# Patient Record
Sex: FEMALE | Race: WHITE | NOT HISPANIC OR LATINO | Employment: OTHER | URBAN - METROPOLITAN AREA
[De-identification: names, ages, dates, MRNs, and addresses within clinical notes are randomized per-mention and may not be internally consistent; named-entity substitution may affect disease eponyms.]

---

## 2017-01-05 ENCOUNTER — TELEPHONE (OUTPATIENT)
Dept: VASCULAR SURGERY | Facility: CLINIC | Age: 67
End: 2017-01-05

## 2017-01-05 NOTE — TELEPHONE ENCOUNTER
----- Message from Tara Daugherty sent at 1/4/2017 11:35 AM CST -----  Contact: kAua silverman/ [facility]   [facility]ua silverman/ [facility]  Calling in regards to speaking with a Nurse about the patient. Please advise. Call to pod. No answer.   Call back Akua .   Thanks!

## 2017-01-09 DIAGNOSIS — Z95.1 S/P CABG (CORONARY ARTERY BYPASS GRAFT): ICD-10-CM

## 2017-01-09 DIAGNOSIS — I25.10 CORONARY ARTERY DISEASE INVOLVING NATIVE CORONARY ARTERY OF NATIVE HEART WITHOUT ANGINA PECTORIS: ICD-10-CM

## 2017-01-10 RX ORDER — ISOSORBIDE MONONITRATE 30 MG/1
TABLET, EXTENDED RELEASE ORAL
Qty: 60 TABLET | Refills: 11 | Status: SHIPPED | OUTPATIENT
Start: 2017-01-10 | End: 2017-12-15 | Stop reason: SDUPTHER

## 2017-01-20 DIAGNOSIS — G62.9 NEUROPATHY: ICD-10-CM

## 2017-01-20 DIAGNOSIS — I25.10 CORONARY ARTERY DISEASE INVOLVING NATIVE CORONARY ARTERY OF NATIVE HEART WITHOUT ANGINA PECTORIS: ICD-10-CM

## 2017-01-20 DIAGNOSIS — I73.9 PVD (PERIPHERAL VASCULAR DISEASE): ICD-10-CM

## 2017-01-23 RX ORDER — GABAPENTIN 400 MG/1
400 CAPSULE ORAL 3 TIMES DAILY
Qty: 90 CAPSULE | Refills: 0 | Status: SHIPPED | OUTPATIENT
Start: 2017-01-23 | End: 2017-02-20 | Stop reason: SDUPTHER

## 2017-02-20 DIAGNOSIS — I25.10 CORONARY ARTERY DISEASE INVOLVING NATIVE CORONARY ARTERY OF NATIVE HEART WITHOUT ANGINA PECTORIS: ICD-10-CM

## 2017-02-20 DIAGNOSIS — M48.061 LUMBAR STENOSIS: Primary | ICD-10-CM

## 2017-02-20 DIAGNOSIS — G62.9 NEUROPATHY: ICD-10-CM

## 2017-02-20 DIAGNOSIS — I73.9 PVD (PERIPHERAL VASCULAR DISEASE): ICD-10-CM

## 2017-02-21 RX ORDER — GABAPENTIN 400 MG/1
400 CAPSULE ORAL 3 TIMES DAILY
Qty: 90 CAPSULE | Refills: 0 | Status: SHIPPED | OUTPATIENT
Start: 2017-02-21 | End: 2017-06-14 | Stop reason: SDUPTHER

## 2017-02-24 ENCOUNTER — TELEPHONE (OUTPATIENT)
Dept: CARDIOLOGY | Facility: CLINIC | Age: 67
End: 2017-02-24

## 2017-02-24 NOTE — TELEPHONE ENCOUNTER
Spoke with patient about appointment with Celia.She mentioned she is applying for [employer] benefits and will be needing information.I asked patient to contact office when she finds out what is needed.

## 2017-03-03 ENCOUNTER — TELEPHONE (OUTPATIENT)
Dept: CARDIOLOGY | Facility: CLINIC | Age: 67
End: 2017-03-03

## 2017-03-03 NOTE — TELEPHONE ENCOUNTER
Pt attempting to get approved for hospice care//primary care would not approve and stated to speak with cardio since the issue is heart related.  I explained to rayne that I would pass message to Dr Kapoor for review.

## 2017-03-03 NOTE — TELEPHONE ENCOUNTER
----- Message from Marilee Jason sent at 3/3/2017 12:49 PM CST -----  Contact: Heather, [facility]  Ms Heather would like to speak to nurse about starting hospice services, please call her back at [phone]. Thank you

## 2017-03-08 DIAGNOSIS — I10 ESSENTIAL HYPERTENSION: ICD-10-CM

## 2017-03-08 DIAGNOSIS — I25.10 CORONARY ARTERY DISEASE INVOLVING NATIVE CORONARY ARTERY OF NATIVE HEART WITHOUT ANGINA PECTORIS: ICD-10-CM

## 2017-03-08 DIAGNOSIS — I73.9 PVD (PERIPHERAL VASCULAR DISEASE): ICD-10-CM

## 2017-03-08 RX ORDER — CARVEDILOL 12.5 MG/1
12.5 TABLET ORAL 2 TIMES DAILY WITH MEALS
Qty: 60 TABLET | Refills: 11 | Status: SHIPPED | OUTPATIENT
Start: 2017-03-08 | End: 2017-12-21 | Stop reason: SDUPTHER

## 2017-03-08 NOTE — PROGRESS NOTES
Ms. Wright stated that she is out of her Coreg and has been for a few days. Refilled Coreg and sent Rx to [facility] in [place].

## 2017-03-17 DIAGNOSIS — Z95.0 PACEMAKER: Primary | ICD-10-CM

## 2017-03-17 DIAGNOSIS — I25.10 CORONARY ARTERY DISEASE INVOLVING NATIVE CORONARY ARTERY OF NATIVE HEART WITHOUT ANGINA PECTORIS: ICD-10-CM

## 2017-03-20 ENCOUNTER — TELEPHONE (OUTPATIENT)
Dept: CARDIOLOGY | Facility: CLINIC | Age: 67
End: 2017-03-20

## 2017-03-20 NOTE — TELEPHONE ENCOUNTER
Dr Bo had asked for clearance to hold anticoagulants for colonoscopy to be scheduled.Informed them patient had NSTEMI w 3 MARY placed 8/2016 and can not hold medication for one year unless emergency is needed.Reviewed with Celia Whaley NP.Faxed information over to dr Bo.

## 2017-04-07 DIAGNOSIS — R06.02 SOB (SHORTNESS OF BREATH): ICD-10-CM

## 2017-04-10 RX ORDER — ALBUTEROL SULFATE 90 UG/1
AEROSOL, METERED RESPIRATORY (INHALATION)
Qty: 18 G | Refills: 1 | Status: SHIPPED | OUTPATIENT
Start: 2017-04-10 | End: 2019-02-20 | Stop reason: CLARIF

## 2017-04-19 ENCOUNTER — OFFICE VISIT (OUTPATIENT)
Dept: CARDIOLOGY | Facility: CLINIC | Age: 67
End: 2017-04-19
Payer: MEDICARE

## 2017-04-19 VITALS
HEIGHT: 64 IN | HEART RATE: 67 BPM | SYSTOLIC BLOOD PRESSURE: 169 MMHG | BODY MASS INDEX: 39.27 KG/M2 | DIASTOLIC BLOOD PRESSURE: 69 MMHG | WEIGHT: 230 LBS

## 2017-04-19 DIAGNOSIS — I48.0 PAROXYSMAL ATRIAL FIBRILLATION: ICD-10-CM

## 2017-04-19 DIAGNOSIS — I73.9 PVD (PERIPHERAL VASCULAR DISEASE): ICD-10-CM

## 2017-04-19 DIAGNOSIS — Z09 HOSPITAL DISCHARGE FOLLOW-UP: Primary | ICD-10-CM

## 2017-04-19 DIAGNOSIS — I25.10 CORONARY ARTERY DISEASE INVOLVING NATIVE CORONARY ARTERY OF NATIVE HEART WITHOUT ANGINA PECTORIS: ICD-10-CM

## 2017-04-19 DIAGNOSIS — I26.99 OTHER PULMONARY EMBOLISM WITHOUT ACUTE COR PULMONALE, UNSPECIFIED CHRONICITY: ICD-10-CM

## 2017-04-19 DIAGNOSIS — I49.5 SSS (SICK SINUS SYNDROME): ICD-10-CM

## 2017-04-19 DIAGNOSIS — Z95.0 PACEMAKER: ICD-10-CM

## 2017-04-19 DIAGNOSIS — E78.5 HYPERLIPIDEMIA, UNSPECIFIED HYPERLIPIDEMIA TYPE: ICD-10-CM

## 2017-04-19 DIAGNOSIS — R09.81 SINUS CONGESTION: ICD-10-CM

## 2017-04-19 PROCEDURE — 99213 OFFICE O/P EST LOW 20 MIN: CPT | Mod: PBBFAC,PO | Performed by: NURSE PRACTITIONER

## 2017-04-19 PROCEDURE — 99999 PR PBB SHADOW E&M-EST. PATIENT-LVL III: CPT | Mod: PBBFAC,,, | Performed by: NURSE PRACTITIONER

## 2017-04-19 PROCEDURE — 99213 OFFICE O/P EST LOW 20 MIN: CPT | Mod: S$PBB,,, | Performed by: NURSE PRACTITIONER

## 2017-04-19 RX ORDER — FLUTICASONE PROPIONATE 50 MCG
1 SPRAY, SUSPENSION (ML) NASAL DAILY
Qty: 1 BOTTLE | Refills: 0 | Status: SHIPPED | OUTPATIENT
Start: 2017-04-19 | End: 2017-05-17 | Stop reason: SDUPTHER

## 2017-04-19 NOTE — MR AVS SNAPSHOT
[facility]  [address]  [place] [address]  Phone: [phone]  Fax: [phone]                  Kalpana Wright   2017 9:00 AM   Office Visit    Description:  Female : 1950   Provider:  Celia Whaley NP   Department:  [facility]           Reason for Visit     Hospital Follow Up           Diagnoses this Visit        Comments    Sinus congestion    -  Primary            To Do List           Future Appointments        Provider Department Dept Phone    2017 10:15 AM PACEMAKER [facility] [phone]    2017 8:20 AM Celia Whaley NP [facility] [phone]      Goals (5 Years of Data)     None      Follow-Up and Disposition     Return in about 3 weeks (around 5/10/2017).       These Medications        Disp Refills Start End    fluticasone (FLONASE) 50 mcg/actuation nasal spray 1 Bottle 0 2017     1 spray by Each Nare route once daily. - Each Nare    Pharmacy: [id number] - [place] - [address] #: [phone]         [facility]     [facility] Nurse Care Line -  Assistance  Unless otherwise directed by your provider, please contact [facility], our nurse care line that is available for  assistance.     Registered nurses in the [facility] provide: appointment scheduling, clinical advisement, health education, and other advisory services.  Call: [phone] (toll free)               Medications           Message regarding Medications     Verify the changes and/or additions to your medication regime listed below are the same as discussed with your clinician today.  If any of these changes or additions are incorrect, please notify your healthcare provider.        START taking these NEW medications        Refills    fluticasone (FLONASE) 50 mcg/actuation nasal spray 0    Si spray by Each Nare route once daily.    Class: Normal    Route: Each Nare      STOP taking these medications      clopidogrel (PLAVIX) 75 mg tablet Take 1 tablet (75 mg total) by mouth once daily.           Verify that the below list of medications is an accurate representation of the medications you are currently taking.  If none reported, the list may be blank. If incorrect, please contact your healthcare provider. Carry this list with you in case of emergency.           Current Medications     aspirin (ECOTRIN) 81 MG EC tablet Take 81 mg by mouth every evening.    benazepril-hydrochlorthiazide (LOTENSIN HCT) 20-25 mg Tab 1 tablet once daily.     carvedilol (COREG) 12.5 MG tablet Take 1 tablet (12.5 mg total) by mouth 2 (two) times daily with meals.    ELIQUIS 5 mg Tab TAKE ONE TABLET BY MOUTH TWICE DAILY    furosemide (LASIX) 80 MG tablet Take 80 mg by mouth once daily.    gabapentin (NEURONTIN) 400 MG capsule Take 1 capsule (400 mg total) by mouth 3 (three) times daily.    hydrocodone-acetaminophen 10-325mg (NORCO)  mg Tab Take 1 tablet by mouth 2 (two) times daily as needed.    isosorbide mononitrate (IMDUR) 30 MG 24 hr tablet Take on tablet by mouth twice daily.    levothyroxine (SYNTHROID) 50 MCG tablet Take 1 tablet (50 mcg total) by mouth once daily.    nitroGLYCERIN (NITROSTAT) 0.4 MG SL tablet Place 0.4 mg under the tongue every 5 (five) minutes as needed for Chest pain.    potassium chloride (MICRO-K) 10 MEQ CpSR Take 1 capsule (10 mEq total) by mouth 2 (two) times daily.    ranolazine (RANEXA) 1,000 mg Tb12 Take 1 tablet (1,000 mg total) by mouth 2 (two) times daily.    ropinirole (REQUIP) 5 MG tablet Take by mouth. 1 Tablet Oral Every evening    torsemide (DEMADEX) 10 MG Tab Take 20 mg by mouth once daily.    VENTOLIN HFA 90 mcg/actuation inhaler INHALE ONE PUFF BY MOUTH EVERY FOUR HOURS AS NEEDED    fluticasone (FLONASE) 50 mcg/actuation nasal spray 1 spray by Each Nare route once daily.           Clinical Reference Information           Your Vitals Were     BP Pulse Height Weight Last Period BMI    169/69  "(BP Location: Left arm, Patient Position: Sitting, BP Method: Automatic) 67 5' 4" (1.626 m) 104.3 kg (230 lb) (LMP Unknown) 39.48 kg/m2      Blood Pressure          Most Recent Value    BP  (!)  169/69      Allergies as of 4/19/2017     Atorvastatin    Demerol [Meperidine]    Penicillins    Zithromax [Azithromycin]    Pravastatin    Ativan [Lorazepam]      Immunizations Administered on Date of Encounter - 4/19/2017     None      [facility] Sign-Up     Activating your [facility] account is as easy as 1-2-3!     1) Visit [email or web address], select Sign Up Now, enter this activation code and your date of birth, then select Next.  [email or web address]  Expires: 6/3/2017 10:05 AM      2) Create a username and password to use when you visit [facility] in the future and select a security question in case you lose your password and select Next.    3) Enter your e-mail address and click Sign Up!    Additional Information  If you have questions, please e-mail [email or web address] or call [phone] to talk to our [facility] staff. Remember, [facility] is NOT to be used for urgent needs. For medical emergencies, dial 911.         Language Assistance Services     ATTENTION: Language assistance services are available, free of charge. Please call [phone].      ATENCIÓN: Si habla español, tiene a lujan disposición servicios gratuitos de asistencia lingüística. Llame al [phone].     CHÚ Ý: N?u b?n nói Ti?ng Vi?t, có các d?ch v? h? tr? ngôn ng? mi?n phí dành cho b?n. G?i s? [phone].         [facility] complies with applicable Federal civil rights laws and does not discriminate on the basis of race, color, national origin, age, disability, or sex.        "

## 2017-04-19 NOTE — PROGRESS NOTES
Subjective:    Patient ID:  Kalpana Wright is a 67 y.o. female who presents for follow-up of Hospital Follow Up      HPI: Ms. Kalpana Wright presents to the clinic for follow up after discharge from [facility]. she stated that she recently had a cholecystectomy and shortly after discharge, she became very ill and was diagnosed with C diff. She stated that she was in the [facility] for about 5 weeks. She said that she was given Cipro, Flagyl, vancomycin, and lasix. She stated that she doesn't recall having any problems with her heart, but she does remember seeing Dr. Sullivan briefly. She stated that she went to [facility] and was discharged to home from there about one week ago. She has [facility] with physical therapy. She also said that she was taken off of plavix, but she doesn't know why. Last coronary stents (SVG to OM2) - August 22, 2016.    Review of the record from [place]: She had lap padmaja on March 21, 2017 and was discharged to home. On March 24, she went to NO ER for abdominal pain and some SOB. They felt that she might have some mild CHF, so she was given lasix. Cardiac enzymes were negative. CT of chest revealed no PE. Cardiology consult indicated that she appeared stable from cardiac standpoint and recommended stopping lasix due to bump in creatinine and an echo was ordered. It appears as though plavix was not continued through this hospitalization, but no one's note indicated purposefully discontinuing it. It also appears that she had microcytic anemia leading to blood transfusions (X3) on April 4, 2017.    PMH: CAD, Hx. CABG and PCIs, severe PAD, h/o PE, s/p PPM and lower back pain.    Venous U/S of LE 12/29/16: No evidence of Left lower extremity DVT.    Patient is in a lot of leg pain, unable to fully compress left distal SFV due to obesity and pain.   CONCLUSIONS: Technically difficult study.     Pharm [place] stress test 11/16/2016: Impression: ABNORMAL MYOCARDIAL  PERFUSION  1. There is a moderate to large size fixed defect of moderate to severe intensity that extends from the base to the apical inferior wall of the left ventricle, consistent with myocardial injury. There is trivial rojelio-injury ischemia.   2. Resting wall motion is physiologic.   3. Resting LV function is normal.   4. The ventricular volumes are normal at rest and stress.   5. The extracardiac distribution of radioactivity is normal.     Echo 11/16/16:CONCLUSIONS     1 - Concentric hypertrophy.     2 - Normal left ventricular systolic function (EF 55-60%).     3 - Normal left ventricular diastolic function.     4 - Normal right ventricular systolic function .     5 - The estimated PA systolic pressure is 22 mmHg.      Echo at [place] 3/27/17: Interpretation Summary  Definity contrast used for enhancement of LV function.  Technically difficult, suboptimal study  Ejection Fraction = 55-60%.  A variety of Doppler measurements indicate impaired left ventricular relaxation, which is associated with grade I/IV or mild diastolic dysfunction.  There is trace to mild mitral regurgitation. Right ventricular systolic pressure is elevated at 40-50mmHg.     Medications: she is off plavix. She is still taking ASA and Eliquis.  Sodium: she does not add salt to foods,  she is not  reading labels for sodium content. She is consuming reduced sodium V8, which still has a lot of sodium.  Diet: Stated that she is only able to consume jello and lower sodium V8 juice.  Exercise: she is working with PT twice a week at home. [facility]  Tobacco:Former smoker. no alcohol use     Weight: 104.3 kg (230 lb) she states that her weight decreased by 35 pounds due to illness.  Wt Readings from Last 3 Encounters:   04/19/17 104.3 kg (230 lb)   12/28/16 121 kg (266 lb 12.1 oz)   12/20/16 117.5 kg (259 lb)     BP log: None.      Review of Systems   Constitution: Positive for decreased appetite and weight loss. Negative for  chills, fever, night sweats and weight gain.   HENT: Negative for congestion.    Cardiovascular: Positive for dyspnea on exertion and leg swelling (Left leg with chronic mild swelling.). Negative for chest pain, claudication, cyanosis, irregular heartbeat, near-syncope, orthopnea, palpitations, paroxysmal nocturnal dyspnea and syncope.   Respiratory: Negative for cough, hemoptysis, shortness of breath, sputum production and wheezing.    Hematologic/Lymphatic: Negative for adenopathy and bleeding problem. Does not bruise/bleed easily.   Skin: Negative for color change and nail changes.   Gastrointestinal: Negative for bloating, abdominal pain, change in bowel habit, heartburn, hematochezia, melena, nausea and vomiting.   Genitourinary: Negative for hematuria.   Neurological: Negative for dizziness and light-headedness.   Psychiatric/Behavioral: Negative for altered mental status.       Objective:   Physical Exam   Constitutional: She is oriented to person, place, and time. She appears well-developed and well-nourished. No distress.   HENT:   Head: Normocephalic and atraumatic.   Eyes: Conjunctivae are normal. No scleral icterus.   Neck: Neck supple. No JVD present. No tracheal deviation present. No thyromegaly present.   Cardiovascular: Normal rate, regular rhythm, normal heart sounds and intact distal pulses.  Exam reveals no gallop and no friction rub.    No murmur heard.  Pulmonary/Chest: Effort normal and breath sounds normal. No respiratory distress. She has no wheezes. She has no rales. She exhibits no tenderness.   Abdominal: Soft. Bowel sounds are normal. She exhibits no distension and no mass. There is no tenderness. There is no rebound and no guarding.   Abdomen obese.   Musculoskeletal: Normal range of motion. She exhibits edema (Mild, nonpitting edema to left lower leg; chronic.).   Lymphadenopathy:     She has no cervical adenopathy.   Neurological: She is alert and oriented to person, place, and time.    Skin: Skin is warm and dry. No rash noted. She is not diaphoretic. No erythema. No pallor.   [place]   Psychiatric: She has a normal mood and affect.        Assessment:      1. Hospital discharge follow-up    2. Sinus congestion    3. Coronary artery disease involving native coronary artery of native heart without angina pectoris    4. PVD (peripheral vascular disease)    5. Paroxysmal atrial fibrillation    6. History of SSS (sick sinus syndrome)    7. Pacemaker    8. Other pulmonary embolism without acute cor pulmonale, unspecified chronicity    9. Hyperlipidemia, unspecified hyperlipidemia type    10. BMI 39.0-39.9,adult        Plan:     CBC via home health.If stable, will restart plavix.  PPM interrogation.  Sodium restriction discussed.  Continue Home Health and PT.  Continue current medications as directed.  Follow up in 3 weeks or call sooner for any problems.

## 2017-05-16 ENCOUNTER — TELEPHONE (OUTPATIENT)
Dept: CARDIOLOGY | Facility: CLINIC | Age: 67
End: 2017-05-16

## 2017-05-16 NOTE — TELEPHONE ENCOUNTER
The patient called and cx her PPM appointment she just got out the hospital.Appointment rescheduled.

## 2017-05-17 DIAGNOSIS — R09.81 SINUS CONGESTION: ICD-10-CM

## 2017-05-17 RX ORDER — FLUTICASONE PROPIONATE 50 MCG
1 SPRAY, SUSPENSION (ML) NASAL DAILY
Qty: 1 BOTTLE | Refills: 0 | Status: SHIPPED | OUTPATIENT
Start: 2017-05-17 | End: 2017-12-21

## 2017-05-24 ENCOUNTER — TELEPHONE (OUTPATIENT)
Dept: CARDIOLOGY | Facility: CLINIC | Age: 67
End: 2017-05-24

## 2017-05-24 NOTE — TELEPHONE ENCOUNTER
Attempted to call [facility] and it was sent to voicemail.Left message to contact me at desk [phone].

## 2017-05-25 NOTE — TELEPHONE ENCOUNTER
Dr Kapoor contacted me and d/c Plavix. Noted that patient was not supposed to be on plavix but she stated she was told to restart.Also noted Plavix removed at previous appointment.Instructed patient to stop Plavix.She verbalized understanding.She will come in for 5/30 for PPM check and instructed to call if any more problems.[facility] notified.

## 2017-05-25 NOTE — TELEPHONE ENCOUNTER
Kimberly with the [facility] at [phone] contacted me and reported the patient was seen this week and stated she has been having nose bleeds and noticed also increased bruising on her arms.it seems any scratches on her skin bleed more than usual.  She was seen by Dr Morris.The patient is on Eliquis 5 mgm daily and they wanted to report this to see what recommendations.I contacted patient and she stated as above and still taking Eliquis,Plavix and ASA daily.She has taken doses today.I instructed patient to be careful today because she can bleed easily.I will contact Dr Kapoor and call her back with instructions.her medication will need to be changed.She verbalized understanding.

## 2017-05-30 ENCOUNTER — CLINICAL SUPPORT (OUTPATIENT)
Dept: CARDIOLOGY | Facility: CLINIC | Age: 67
End: 2017-05-30
Payer: MEDICARE

## 2017-05-30 DIAGNOSIS — Z95.0 PACEMAKER: ICD-10-CM

## 2017-05-30 DIAGNOSIS — I25.10 CORONARY ARTERY DISEASE INVOLVING NATIVE CORONARY ARTERY OF NATIVE HEART WITHOUT ANGINA PECTORIS: ICD-10-CM

## 2017-05-30 PROCEDURE — 93280 PM DEVICE PROGR EVAL DUAL: CPT | Mod: PBBFAC,PO | Performed by: INTERNAL MEDICINE

## 2017-06-06 DIAGNOSIS — I25.10 CORONARY ARTERY DISEASE INVOLVING NATIVE CORONARY ARTERY OF NATIVE HEART WITHOUT ANGINA PECTORIS: Primary | ICD-10-CM

## 2017-06-06 DIAGNOSIS — R06.02 SOB (SHORTNESS OF BREATH): ICD-10-CM

## 2017-06-06 DIAGNOSIS — R06.02 SHORTNESS OF BREATH: ICD-10-CM

## 2017-06-06 RX ORDER — RANOLAZINE 1000 MG/1
1000 TABLET, EXTENDED RELEASE ORAL 2 TIMES DAILY
Qty: 60 TABLET | Refills: 5 | Status: SHIPPED | OUTPATIENT
Start: 2017-06-06 | End: 2017-11-20 | Stop reason: SDUPTHER

## 2017-06-08 ENCOUNTER — TELEPHONE (OUTPATIENT)
Dept: CARDIOLOGY | Facility: CLINIC | Age: 67
End: 2017-06-08

## 2017-06-08 NOTE — TELEPHONE ENCOUNTER
The patient had missed her appointment yesterday with Celia Whaley as HDC.I attempted to reach her at home and her cell and no answer.I was able to leave a message on her cell phone to please contact our office to reschedule.

## 2017-06-14 ENCOUNTER — OFFICE VISIT (OUTPATIENT)
Dept: CARDIOLOGY | Facility: CLINIC | Age: 67
End: 2017-06-14
Payer: MEDICARE

## 2017-06-14 VITALS
WEIGHT: 229.75 LBS | DIASTOLIC BLOOD PRESSURE: 60 MMHG | SYSTOLIC BLOOD PRESSURE: 124 MMHG | HEART RATE: 70 BPM | HEIGHT: 64 IN | BODY MASS INDEX: 39.22 KG/M2

## 2017-06-14 DIAGNOSIS — Z95.0 PACEMAKER: ICD-10-CM

## 2017-06-14 DIAGNOSIS — F17.200 SMOKER: ICD-10-CM

## 2017-06-14 DIAGNOSIS — I73.9 PVD (PERIPHERAL VASCULAR DISEASE): ICD-10-CM

## 2017-06-14 DIAGNOSIS — Z09 HOSPITAL DISCHARGE FOLLOW-UP: Primary | ICD-10-CM

## 2017-06-14 DIAGNOSIS — I10 ESSENTIAL HYPERTENSION: ICD-10-CM

## 2017-06-14 DIAGNOSIS — Z95.1 HX OF CABG: ICD-10-CM

## 2017-06-14 DIAGNOSIS — I49.5 SSS (SICK SINUS SYNDROME): ICD-10-CM

## 2017-06-14 DIAGNOSIS — I26.99 OTHER PULMONARY EMBOLISM WITHOUT ACUTE COR PULMONALE, UNSPECIFIED CHRONICITY: ICD-10-CM

## 2017-06-14 DIAGNOSIS — I25.10 CORONARY ARTERY DISEASE INVOLVING NATIVE CORONARY ARTERY OF NATIVE HEART WITHOUT ANGINA PECTORIS: ICD-10-CM

## 2017-06-14 DIAGNOSIS — I48.0 PAROXYSMAL ATRIAL FIBRILLATION: ICD-10-CM

## 2017-06-14 DIAGNOSIS — G62.9 NEUROPATHY: ICD-10-CM

## 2017-06-14 DIAGNOSIS — E78.5 HYPERLIPIDEMIA, UNSPECIFIED HYPERLIPIDEMIA TYPE: ICD-10-CM

## 2017-06-14 PROCEDURE — 99999 PR PBB SHADOW E&M-EST. PATIENT-LVL IV: CPT | Mod: PBBFAC,,, | Performed by: NURSE PRACTITIONER

## 2017-06-14 PROCEDURE — 1159F MED LIST DOCD IN RCRD: CPT | Mod: ,,, | Performed by: NURSE PRACTITIONER

## 2017-06-14 PROCEDURE — 99213 OFFICE O/P EST LOW 20 MIN: CPT | Mod: S$PBB,,, | Performed by: NURSE PRACTITIONER

## 2017-06-14 PROCEDURE — 99214 OFFICE O/P EST MOD 30 MIN: CPT | Mod: PBBFAC,PO | Performed by: NURSE PRACTITIONER

## 2017-06-14 RX ORDER — GABAPENTIN 400 MG/1
400 CAPSULE ORAL 3 TIMES DAILY
Qty: 90 CAPSULE | Refills: 0 | Status: SHIPPED | OUTPATIENT
Start: 2017-06-14 | End: 2017-12-21 | Stop reason: SDUPTHER

## 2017-06-14 RX ORDER — TORSEMIDE 20 MG/1
20 TABLET ORAL DAILY
COMMUNITY
End: 2017-12-21 | Stop reason: SDUPTHER

## 2017-06-14 NOTE — PROGRESS NOTES
Subjective:    Patient ID:  Kalpana Wright is a 67 y.o. female who presents for follow-up of Hospital Follow Up      HPI: Ms. Kalpana Wright presents to the clinic for follow up after recent hospital discharge. she stated that she was in hospital last week with black stools. She is trying to get EGD/colonoscopy.  Does complain of mild ROSA walking about 1/2 block. She has been fairly sedentary for the last 2 1/2 months-she has been in hospital most of that time for abdominal pain, C-Diff (twice), cholescystectomy. Her knees bother her more than anything. When they get tired, she has to sit.  It appears that her plavix had been stopped around May 25 due to epistaxis. She is maintained on ASA and Eliquis.    Medications: she is not missing any doses. She stated that she is taking a probiotic and she is feeling better.  Sodium: she does not add salt to foods,  she is not reading labels for sodium content.   Diet: She stated that she is finally eating healthier foods and her blood sugars have been good.  Exercise: she does not  Tobacco:Former smoker. no alcohol use    PPM interrogation 5/30/17:   Episodes  # of mode switches 6  % of time in mode switch <1%    High venticular rates  #of episodes 5 NSVT     Echo 11/16/16: CONCLUSIONS     1 - Concentric hypertrophy.     2 - Normal left ventricular systolic function (EF 55-60%).     3 - Normal left ventricular diastolic function.     4 - Normal right ventricular systolic function .     5 - The estimated PA systolic pressure is 22 mmHg.      Weight: 104.2 kg (229 lb 11.5 oz) she states that her daily weights has been stable  Wt Readings from Last 3 Encounters:   06/14/17 104.2 kg (229 lb 11.5 oz)   04/19/17 104.3 kg (230 lb)   12/28/16 121 kg (266 lb 12.1 oz)     BP log: None. She has [facility].    Review of Systems   Constitution: Positive for weight loss. Negative for chills, decreased appetite (improved), fever, night sweats and weight gain.   HENT: Negative for  congestion.    Cardiovascular: Positive for dyspnea on exertion (sometimes with walking.) and leg swelling (improved). Negative for chest pain, claudication, cyanosis, irregular heartbeat, near-syncope, orthopnea, palpitations, paroxysmal nocturnal dyspnea and syncope.   Respiratory: Negative for cough, hemoptysis, shortness of breath, sputum production and wheezing.    Hematologic/Lymphatic: Negative for adenopathy and bleeding problem. Bruises/bleeds easily.   Skin: Negative for color change and nail changes.   Gastrointestinal: Positive for melena. Negative for bloating, abdominal pain, change in bowel habit, diarrhea, heartburn, hematochezia, nausea and vomiting.   Genitourinary: Negative for hematuria.   Neurological: Negative for dizziness and light-headedness.   Psychiatric/Behavioral: Negative for altered mental status.       Objective:   Physical Exam   Constitutional: She is oriented to person, place, and time. She appears well-developed and well-nourished. No distress.   HENT:   Head: Normocephalic and atraumatic.   Eyes: Conjunctivae are normal. No scleral icterus.   Neck: Neck supple. No JVD present. No tracheal deviation present. No thyromegaly present.   Cardiovascular: Normal rate, regular rhythm, normal heart sounds and intact distal pulses.  Exam reveals no gallop and no friction rub.    No murmur heard.  Pulmonary/Chest: Effort normal and breath sounds normal. No respiratory distress. She has no wheezes. She has no rales. She exhibits no tenderness.   Abdominal: Soft. Bowel sounds are normal. She exhibits no distension and no mass. There is no tenderness. There is no rebound and no guarding.   Abdomen obese.   Musculoskeletal: Normal range of motion. She exhibits edema (Trace pretibial edema to lower legs bilaterally.).   Lymphadenopathy:     She has no cervical adenopathy.   Neurological: She is alert and oriented to person, place, and time.   Skin: Skin is warm and dry. No rash noted. She is not  diaphoretic. No erythema. No pallor.   [place]   Psychiatric: She has a normal mood and affect.        Assessment:      1. Hospital discharge follow-up    2. Coronary artery disease involving native coronary artery of native heart without angina pectoris    3. Hx of CABG    4. History of SSS (sick sinus syndrome)    5. Paroxysmal atrial fibrillation    6. Pacemaker    7. Essential hypertension    8. Hyperlipidemia, unspecified hyperlipidemia type    9. History of smoking >40 pack years (quit 3 years ago)    10. BMI 39.0-39.9,adult    11. Other pulmonary embolism without acute cor pulmonale, unspecified chronicity        Plan:   Continue current medications as directed. Continue coreg at current dose for now.  Record from [facility].  BP log from [facility].  Sodium restriction.  Walking recommended daily.  Will see GI today regarding melena. Dr. Bo.  Follow up in three months or call sooner for any problems.

## 2017-06-19 ENCOUNTER — TELEPHONE (OUTPATIENT)
Dept: CARDIOLOGY | Facility: CLINIC | Age: 67
End: 2017-06-19

## 2017-06-19 NOTE — TELEPHONE ENCOUNTER
Patient called to see if we had paper work from gastro to sign and send back. I informed her that we did not at this time.  I called the office number to her -[phone] [facility]. And gave them our correct fax number and they say they will be faxing clearance form to us soon. Will inform patient.

## 2017-06-22 ENCOUNTER — TELEPHONE (OUTPATIENT)
Dept: CARDIOLOGY | Facility: CLINIC | Age: 67
End: 2017-06-22

## 2017-06-22 NOTE — TELEPHONE ENCOUNTER
Spoke with Hui at Dr Bright's office to clarify order to hold Eliquis 3 days prior EGD per CHICA Whaley NP/Louis Knox RN.

## 2017-07-17 RX ORDER — ALBUTEROL SULFATE 90 UG/1
AEROSOL, METERED RESPIRATORY (INHALATION)
Qty: 18 G | Refills: 0 | OUTPATIENT
Start: 2017-07-17

## 2017-09-20 ENCOUNTER — OFFICE VISIT (OUTPATIENT)
Dept: CARDIOLOGY | Facility: CLINIC | Age: 67
End: 2017-09-20
Payer: MEDICARE

## 2017-09-20 VITALS
SYSTOLIC BLOOD PRESSURE: 134 MMHG | WEIGHT: 222.56 LBS | DIASTOLIC BLOOD PRESSURE: 56 MMHG | HEART RATE: 65 BPM | HEIGHT: 64 IN | BODY MASS INDEX: 38 KG/M2

## 2017-09-20 DIAGNOSIS — I49.5 SSS (SICK SINUS SYNDROME): ICD-10-CM

## 2017-09-20 DIAGNOSIS — Z95.1 HX OF CABG: ICD-10-CM

## 2017-09-20 DIAGNOSIS — I10 ESSENTIAL HYPERTENSION: ICD-10-CM

## 2017-09-20 DIAGNOSIS — I73.9 PVD (PERIPHERAL VASCULAR DISEASE): ICD-10-CM

## 2017-09-20 DIAGNOSIS — Z95.0 PACEMAKER: ICD-10-CM

## 2017-09-20 DIAGNOSIS — K21.9 GASTROESOPHAGEAL REFLUX DISEASE, ESOPHAGITIS PRESENCE NOT SPECIFIED: Primary | ICD-10-CM

## 2017-09-20 DIAGNOSIS — I48.0 PAROXYSMAL ATRIAL FIBRILLATION: ICD-10-CM

## 2017-09-20 DIAGNOSIS — I25.10 CORONARY ARTERY DISEASE INVOLVING NATIVE CORONARY ARTERY OF NATIVE HEART WITHOUT ANGINA PECTORIS: ICD-10-CM

## 2017-09-20 PROBLEM — I50.32 CHRONIC CONGESTIVE HEART FAILURE WITH LEFT VENTRICULAR DIASTOLIC DYSFUNCTION: Chronic | Status: ACTIVE | Noted: 2017-09-20

## 2017-09-20 PROCEDURE — 99214 OFFICE O/P EST MOD 30 MIN: CPT | Mod: PBBFAC,PO | Performed by: NURSE PRACTITIONER

## 2017-09-20 PROCEDURE — 1159F MED LIST DOCD IN RCRD: CPT | Mod: ,,, | Performed by: NURSE PRACTITIONER

## 2017-09-20 PROCEDURE — 99999 PR PBB SHADOW E&M-EST. PATIENT-LVL IV: CPT | Mod: PBBFAC,,, | Performed by: NURSE PRACTITIONER

## 2017-09-20 PROCEDURE — 3075F SYST BP GE 130 - 139MM HG: CPT | Mod: ,,, | Performed by: NURSE PRACTITIONER

## 2017-09-20 PROCEDURE — 3078F DIAST BP <80 MM HG: CPT | Mod: ,,, | Performed by: NURSE PRACTITIONER

## 2017-09-20 PROCEDURE — 99213 OFFICE O/P EST LOW 20 MIN: CPT | Mod: S$PBB,,, | Performed by: NURSE PRACTITIONER

## 2017-09-20 RX ORDER — ESOMEPRAZOLE MAGNESIUM 40 MG/1
40 CAPSULE, DELAYED RELEASE ORAL
Qty: 30 CAPSULE | Refills: 11 | Status: SHIPPED | OUTPATIENT
Start: 2017-09-20 | End: 2019-12-30 | Stop reason: SDUPTHER

## 2017-09-20 RX ORDER — CYCLOBENZAPRINE HCL 10 MG
10 TABLET ORAL
COMMUNITY
End: 2017-12-21

## 2017-09-20 NOTE — PROGRESS NOTES
Subjective:    Patient ID:  Kalpana Wright is a 67 y.o. female who presents for follow-up of Follow-up      HPI: Ms. Kalpana Wright presents to the clinic for follow up of CAD, CABG, chronic diastolic HF, atrial fibrillation, SSS, PPM. she stated that she is under a lot of stress because her mother suddenly became disoriented, stopped eating, and says that she is ready to go home to be with her  . Her mother is in rehab and is expected to go to nursing home in the next day or so. Ms. Wright does not expect her mother to survive. She is tearful when she talks about this and quite distressed. She denied any chest pain, SOB at rest, but she does have mild, chronic ROSA with walking. She has pain in her knees and her low back that limit her walking. She has physical and occupational therapy, but she doesn't do much otherwise, due to pain.  Last PPM interrogation 17: 6 mode switches< 1% of the time; 5 episodes of NSVT. Interrogation due in 2017.    Medications: she is not missing any doses. She is having GERd symptoms and requesting nexium refill.  Sodium: she does not add salt to foods,  she is not reading labels for sodium content. She is eating salty foods.  Exercise: she has physical therapy twice week and occupational therapy is once weekly.  Tobacco:former smoker. no alcohol use     Weight: 101 kg (222 lb 8.9 oz) she states that her daily weights has decreased over last several months; after cholecystectomy, she does not crave sweets.  Wt Readings from Last 3 Encounters:   17 101 kg (222 lb 8.9 oz)   17 104.2 kg (229 lb 11.5 oz)   17 104.3 kg (230 lb)     BP log: None. Has not been checking it at home; she does not have a BP cuff. Blood sugars .      Review of Systems   Constitution: Positive for weight loss. Negative for chills, decreased appetite, fever, night sweats and weight gain.   HENT: Negative for congestion.    Cardiovascular: Positive for dyspnea on  exertion (chronic and unchanged from the past.). Negative for chest pain, claudication, cyanosis, irregular heartbeat, leg swelling, near-syncope, orthopnea, palpitations, paroxysmal nocturnal dyspnea and syncope.   Respiratory: Negative for cough, hemoptysis, shortness of breath, sputum production and wheezing.    Hematologic/Lymphatic: Negative for adenopathy and bleeding problem. Does not bruise/bleed easily.   Skin: Negative for color change and nail changes.   Musculoskeletal: Positive for back pain (low back pain; chronic; had injection.) and joint pain (Knees hurt, especially with walking.).   Gastrointestinal: Positive for heartburn (Needs nexium refill; has not had EGD/colonoscopy yet. Has to reschedule appointment.). Negative for bloating, abdominal pain, change in bowel habit, hematochezia, melena, nausea and vomiting.   Genitourinary: Negative for hematuria.   Neurological: Negative for dizziness and light-headedness.   Psychiatric/Behavioral: Negative for altered mental status.       Objective:   Physical Exam   Constitutional: She is oriented to person, place, and time. She appears well-developed and well-nourished. No distress.   HENT:   Head: Normocephalic and atraumatic.   Eyes: Conjunctivae are normal. No scleral icterus.   Neck: Neck supple. No JVD present. No tracheal deviation present. No thyromegaly present.   Cardiovascular: Normal rate, regular rhythm, normal heart sounds and intact distal pulses.  Exam reveals no gallop and no friction rub.    No murmur heard.  Pulmonary/Chest: Effort normal and breath sounds normal. No respiratory distress. She has no wheezes. She has no rales. She exhibits no tenderness.   Abdominal: Soft. Bowel sounds are normal. She exhibits no distension and no mass. There is no tenderness. There is no rebound and no guarding.   Abdomen obese.   Musculoskeletal: Normal range of motion. She exhibits edema (trace pretibial edema to LLE; it is the leg that she had venous  harvest in CABG. Chronically swollen.).   Lymphadenopathy:     She has no cervical adenopathy.   Neurological: She is alert and oriented to person, place, and time.   Skin: Skin is warm and dry. No rash noted. She is not diaphoretic. No erythema. No pallor.   [place]   Psychiatric: She has a normal mood and affect.        Assessment:      1. Gastroesophageal reflux disease, esophagitis presence not specified    2. Coronary artery disease involving native coronary artery of native heart without angina pectoris    3. Hx of CABG    4. PVD (peripheral vascular disease)    5. History of SSS (sick sinus syndrome)    6. Paroxysmal atrial fibrillation    7. Pacemaker    8. Essential hypertension    9. Other pulmonary embolism without acute cor pulmonale, unspecified chronicity    10. Chronic congestive heart failure with left ventricular diastolic dysfunction        Plan:   Continue current medications as directed.  Refilled Nexium. She agreed to make appointment with GI to have her EGD and colonoscopy as planned.  Encouraged walking and doing exercises suggested by PT and OT.  Sodium restriction.  Rx for BP monitoring kit.  Elevate legs when seated.  PPM interrogation November 2017.  Labs from Dr. Morris' office. She is due for blood work tomorrow. Looking for lipids, chemistries, and liver function.  Follow up in 3 months or call sooner for any problems.

## 2017-10-19 ENCOUNTER — TELEPHONE (OUTPATIENT)
Dept: CARDIOLOGY | Facility: CLINIC | Age: 67
End: 2017-10-19

## 2017-10-19 NOTE — TELEPHONE ENCOUNTER
The patient had called and requested a letter to carry in her car explaining that due to her post op problems with her chest non union and her PPM it is uncomfortable to place seatbelt strap over her chest,it causes discomfort to site.She adjusts under her arm and uses across her lap only.She was warned that only if she had a doctors note explaining this will she not get a citation/ticket for not wearing belt correctly.Dsicussed with provider. Letter completed and patient notified to  when she can.She agreed.

## 2017-11-20 DIAGNOSIS — R06.02 SHORTNESS OF BREATH: ICD-10-CM

## 2017-11-20 DIAGNOSIS — I25.10 CORONARY ARTERY DISEASE INVOLVING NATIVE CORONARY ARTERY OF NATIVE HEART WITHOUT ANGINA PECTORIS: ICD-10-CM

## 2017-11-29 RX ORDER — RANOLAZINE 1000 MG/1
1000 TABLET, EXTENDED RELEASE ORAL 2 TIMES DAILY
Qty: 60 TABLET | Refills: 5 | Status: SHIPPED | OUTPATIENT
Start: 2017-11-29 | End: 2019-07-01

## 2017-12-15 DIAGNOSIS — I25.10 CORONARY ARTERY DISEASE INVOLVING NATIVE CORONARY ARTERY OF NATIVE HEART WITHOUT ANGINA PECTORIS: ICD-10-CM

## 2017-12-15 DIAGNOSIS — Z95.1 S/P CABG (CORONARY ARTERY BYPASS GRAFT): ICD-10-CM

## 2017-12-16 RX ORDER — ISOSORBIDE MONONITRATE 30 MG/1
TABLET, EXTENDED RELEASE ORAL
Qty: 60 TABLET | Refills: 11 | Status: SHIPPED | OUTPATIENT
Start: 2017-12-16 | End: 2017-12-21 | Stop reason: SDUPTHER

## 2017-12-21 ENCOUNTER — OFFICE VISIT (OUTPATIENT)
Dept: CARDIOLOGY | Facility: CLINIC | Age: 67
End: 2017-12-21
Payer: MEDICARE

## 2017-12-21 ENCOUNTER — TELEPHONE (OUTPATIENT)
Dept: NEUROSURGERY | Facility: CLINIC | Age: 67
End: 2017-12-21

## 2017-12-21 ENCOUNTER — TELEPHONE (OUTPATIENT)
Dept: CARDIOLOGY | Facility: CLINIC | Age: 67
End: 2017-12-21

## 2017-12-21 ENCOUNTER — CLINICAL SUPPORT (OUTPATIENT)
Dept: CARDIOLOGY | Facility: CLINIC | Age: 67
End: 2017-12-21
Attending: INTERNAL MEDICINE
Payer: MEDICARE

## 2017-12-21 VITALS
HEART RATE: 70 BPM | HEIGHT: 64 IN | BODY MASS INDEX: 35.85 KG/M2 | WEIGHT: 210 LBS | SYSTOLIC BLOOD PRESSURE: 122 MMHG | DIASTOLIC BLOOD PRESSURE: 58 MMHG

## 2017-12-21 DIAGNOSIS — R60.9 EDEMA, UNSPECIFIED TYPE: ICD-10-CM

## 2017-12-21 DIAGNOSIS — I25.10 CORONARY ARTERY DISEASE INVOLVING NATIVE CORONARY ARTERY OF NATIVE HEART WITHOUT ANGINA PECTORIS: ICD-10-CM

## 2017-12-21 DIAGNOSIS — M79.661 PAIN IN BOTH LOWER LEGS: ICD-10-CM

## 2017-12-21 DIAGNOSIS — G62.9 NEUROPATHY: ICD-10-CM

## 2017-12-21 DIAGNOSIS — I25.10 CORONARY ARTERY DISEASE INVOLVING NATIVE CORONARY ARTERY OF NATIVE HEART WITHOUT ANGINA PECTORIS: Primary | ICD-10-CM

## 2017-12-21 DIAGNOSIS — Z95.0 PACEMAKER: ICD-10-CM

## 2017-12-21 DIAGNOSIS — I48.0 PAROXYSMAL ATRIAL FIBRILLATION: ICD-10-CM

## 2017-12-21 DIAGNOSIS — R26.2 DIFFICULTY WALKING: ICD-10-CM

## 2017-12-21 DIAGNOSIS — G89.29 CHRONIC BILATERAL LOW BACK PAIN, WITH SCIATICA PRESENCE UNSPECIFIED: ICD-10-CM

## 2017-12-21 DIAGNOSIS — Z95.1 S/P CABG (CORONARY ARTERY BYPASS GRAFT): ICD-10-CM

## 2017-12-21 DIAGNOSIS — M54.5 CHRONIC BILATERAL LOW BACK PAIN, WITH SCIATICA PRESENCE UNSPECIFIED: ICD-10-CM

## 2017-12-21 DIAGNOSIS — I10 ESSENTIAL HYPERTENSION: ICD-10-CM

## 2017-12-21 DIAGNOSIS — M79.662 PAIN IN BOTH LOWER LEGS: ICD-10-CM

## 2017-12-21 DIAGNOSIS — E78.5 HYPERLIPIDEMIA, UNSPECIFIED HYPERLIPIDEMIA TYPE: ICD-10-CM

## 2017-12-21 DIAGNOSIS — I73.9 PVD (PERIPHERAL VASCULAR DISEASE): ICD-10-CM

## 2017-12-21 PROCEDURE — 99999 PR PBB SHADOW E&M-EST. PATIENT-LVL IV: CPT | Mod: PBBFAC,,, | Performed by: NURSE PRACTITIONER

## 2017-12-21 PROCEDURE — 93280 PM DEVICE PROGR EVAL DUAL: CPT | Mod: 26,,, | Performed by: INTERNAL MEDICINE

## 2017-12-21 PROCEDURE — 99213 OFFICE O/P EST LOW 20 MIN: CPT | Mod: S$PBB,,, | Performed by: NURSE PRACTITIONER

## 2017-12-21 PROCEDURE — 99214 OFFICE O/P EST MOD 30 MIN: CPT | Mod: PBBFAC,PO | Performed by: NURSE PRACTITIONER

## 2017-12-21 RX ORDER — GABAPENTIN 400 MG/1
400 CAPSULE ORAL 3 TIMES DAILY
Qty: 90 CAPSULE | Refills: 0 | Status: SHIPPED | OUTPATIENT
Start: 2017-12-21 | End: 2018-02-14 | Stop reason: DRUGHIGH

## 2017-12-21 RX ORDER — BENAZEPRIL/HYDROCHLOROTHIAZIDE 20 MG-25MG
1 TABLET ORAL DAILY
Qty: 30 TABLET | Refills: 11 | Status: SHIPPED | OUTPATIENT
Start: 2017-12-21 | End: 2018-02-28

## 2017-12-21 RX ORDER — ISOSORBIDE MONONITRATE 30 MG/1
TABLET, EXTENDED RELEASE ORAL
Qty: 60 TABLET | Refills: 11 | Status: SHIPPED | OUTPATIENT
Start: 2017-12-21 | End: 2018-03-15 | Stop reason: DRUGHIGH

## 2017-12-21 RX ORDER — NITROGLYCERIN 0.4 MG/1
0.4 TABLET SUBLINGUAL EVERY 5 MIN PRN
Qty: 25 TABLET | Refills: 11 | Status: SHIPPED | OUTPATIENT
Start: 2017-12-21 | End: 2021-06-30 | Stop reason: SDUPTHER

## 2017-12-21 RX ORDER — CARVEDILOL 12.5 MG/1
12.5 TABLET ORAL 2 TIMES DAILY WITH MEALS
Qty: 60 TABLET | Refills: 11 | Status: SHIPPED | OUTPATIENT
Start: 2017-12-21 | End: 2018-04-17

## 2017-12-21 RX ORDER — TORSEMIDE 20 MG/1
20 TABLET ORAL DAILY
Qty: 30 TABLET | Refills: 5 | Status: SHIPPED | OUTPATIENT
Start: 2017-12-21 | End: 2018-02-28

## 2017-12-21 NOTE — TELEPHONE ENCOUNTER
----- Message from Lena Stubbs sent at 12/21/2017  3:24 PM CST -----  Contact: Akua Knox- [facility]  Needs an appt due to back and leg pain. Please call back patient at [phone] (home)

## 2017-12-21 NOTE — PROGRESS NOTES
Subjective:    Patient ID:  Kalpana Wright is a 67 y.o. female who presents for follow-up of Follow-up      HPI: Ms. Kalpana Wright presents to the clinic in a wheelchair for follow up of CAD, CABG, chronic diastolic HF, atrial fibrillation, SSS, PPM. she stated that she is doing alright. She denied any chest pain or SOB. She does have mild ROSA sometimes, but not often. She is not walking much due to pain in her legs. Dr. Garcia's consult in the past indicated that she needed to be evaluated by neurology and orthopedics first. She was seen by Dr. Martinez and lumbar fusion was planned, but it was cancelled for unknown reason. She stated that she cannot walk very far without having to sit down and rest. She also complains of lower back pain that radiates down both legs. She also complains of chronic pain in her knees. She stated that she was seen by Dr. Douglas and surgery was not recommended.  Her mother passed away in September 2017.    Last PPM interrogation 5/30/17: 6 mode switches< 1% of the time; 5 episodes of NSVT. Interrogation was due in November 2017. Planned to get it done in clinic today.    Medications: she is not missing any doses.   Sodium: she does not add salt to foods,  she is not reading labels for sodium content. She is eating salty foods.  Exercise: she does not  Tobacco:former smoker. no alcohol use     Weight: 95.3 kg (210 lb) (patient reported weight) she states that her daily weights has decreased over last several months; after cholecystectomy, she does not crave sweets. She does crave salty and sour foods.  Wt Readings from Last 3 Encounters:   12/21/17 95.3 kg (210 lb)   09/20/17 101 kg (222 lb 8.9 oz)   06/14/17 104.2 kg (229 lb 11.5 oz)     BP log: None. Has not been checking it at home; she does not have a BP cuff. She stated that she cannot afford to get one at this time.      Review of Systems   Constitution: Positive for decreased appetite and weight loss. Negative for chills, fever, night  sweats and weight gain.   HENT: Negative for congestion.         Sinus drainage pretty constant.   Cardiovascular: Positive for dyspnea on exertion (ROSA with climbing stairs; chronic and unchanged from the past.). Negative for chest pain, claudication, cyanosis, irregular heartbeat, leg swelling, near-syncope, orthopnea, palpitations, paroxysmal nocturnal dyspnea and syncope.   Respiratory: Negative for cough, hemoptysis, shortness of breath, sputum production and wheezing.    Hematologic/Lymphatic: Negative for adenopathy and bleeding problem. Does not bruise/bleed easily.   Skin: Negative for color change and nail changes.   Musculoskeletal: Positive for back pain (low back pain; chronic; had injection.) and joint pain (Knees hurt, especially with walking.).   Gastrointestinal: Positive for heartburn (Improved; has not had EGD/colonoscopy yet. Has to reschedule appointment.). Negative for bloating, abdominal pain, change in bowel habit, hematochezia, melena, nausea and vomiting.   Genitourinary: Negative for hematuria.   Neurological: Negative for dizziness and light-headedness.   Psychiatric/Behavioral: Negative for altered mental status.       Objective:   Physical Exam   Constitutional: She is oriented to person, place, and time. She appears well-developed and well-nourished. No distress.   HENT:   Head: Normocephalic and atraumatic.   Eyes: Conjunctivae are normal. No scleral icterus.   Neck: Neck supple. No JVD present. No tracheal deviation present. No thyromegaly present.   Cardiovascular: Normal rate, regular rhythm, normal heart sounds and intact distal pulses.  Exam reveals no gallop and no friction rub.    No murmur heard.  Pulmonary/Chest: Effort normal and breath sounds normal. No respiratory distress. She has no wheezes. She has no rales. She exhibits no tenderness.   Abdominal: Soft. Bowel sounds are normal. She exhibits no distension and no mass. There is no tenderness. There is no rebound and no  guarding.   Abdomen obese.   Musculoskeletal: Normal range of motion. She exhibits no edema.   Lymphadenopathy:     She has no cervical adenopathy.   Neurological: She is alert and oriented to person, place, and time.   Skin: Skin is warm and dry. No rash noted. She is not diaphoretic. No erythema. No pallor.   [place]   Psychiatric: She has a normal mood and affect.        Assessment:      1. Coronary artery disease involving native coronary artery of native heart without angina pectoris    2. Paroxysmal atrial fibrillation    3. S/P CABG (coronary artery bypass graft)    4. PVD (peripheral vascular disease)    5. Essential hypertension    6. Edema, unspecified type    7. Neuropathy    8. Hyperlipidemia, unspecified hyperlipidemia type        Plan:   Continue current medications as directed.  Referral to Dr. Martinze for opinion about her back and leg pain.  Sodium restriction.  PPM interrogation today.  Refilled prescriptions.  Labs from Dr. Morris' office did not show lipid panel. Will request labs to be drawn by [facility]: FLP, BMP, ALT, APO-B. She is statin intolerant.  Follow up in 3 months or call sooner for any problems.    Addendum: labs received from [facility], collected on 01/09/18: glucose 110, sodium 140, K 4.5, BUN 30, creatinine 1.0 eGFR 55, ALT 7, cholesterol 152, triglycerides 77, HDL 58, LDL 79, VLDL 15, Non-HDL 94; Chol/HDL 2.6. APO B 77. Continue current treatment plan. She did not tolerate atorvastatin, lovastatin, or pravastatin.  Celia Whaley NP  [facility]

## 2017-12-21 NOTE — TELEPHONE ENCOUNTER
The patient has a referral from cardiology to see Dr Martinez for leg and back pain. The patient had been seen in past by Neurosurgery and is having increased pain ,discomfort The patient left the office before the visit was finished. Message with  .Message sent to Dr Martinez's nurse to contact patient to set up appointment after the holidays .

## 2018-01-02 DIAGNOSIS — I73.9 PVD (PERIPHERAL VASCULAR DISEASE): ICD-10-CM

## 2018-01-02 DIAGNOSIS — I25.10 CORONARY ARTERY DISEASE INVOLVING NATIVE CORONARY ARTERY OF NATIVE HEART WITHOUT ANGINA PECTORIS: ICD-10-CM

## 2018-01-02 DIAGNOSIS — R60.9 EDEMA, UNSPECIFIED TYPE: ICD-10-CM

## 2018-01-02 DIAGNOSIS — I10 ESSENTIAL HYPERTENSION: ICD-10-CM

## 2018-01-03 RX ORDER — POTASSIUM CHLORIDE 750 MG/1
10 CAPSULE, EXTENDED RELEASE ORAL 2 TIMES DAILY
Qty: 60 CAPSULE | Refills: 11 | Status: SHIPPED | OUTPATIENT
Start: 2018-01-03 | End: 2019-02-28 | Stop reason: SDUPTHER

## 2018-01-23 DIAGNOSIS — M54.5 LOW BACK PAIN, UNSPECIFIED BACK PAIN LATERALITY, UNSPECIFIED CHRONICITY, WITH SCIATICA PRESENCE UNSPECIFIED: Primary | ICD-10-CM

## 2018-02-14 ENCOUNTER — OFFICE VISIT (OUTPATIENT)
Dept: CARDIOLOGY | Facility: CLINIC | Age: 68
End: 2018-02-14
Payer: MEDICARE

## 2018-02-14 ENCOUNTER — TELEPHONE (OUTPATIENT)
Dept: CARDIOLOGY | Facility: CLINIC | Age: 68
End: 2018-02-14

## 2018-02-14 VITALS
SYSTOLIC BLOOD PRESSURE: 128 MMHG | HEART RATE: 66 BPM | DIASTOLIC BLOOD PRESSURE: 58 MMHG | BODY MASS INDEX: 37.25 KG/M2 | HEIGHT: 64 IN | WEIGHT: 218.19 LBS

## 2018-02-14 DIAGNOSIS — I25.119 CORONARY ARTERY DISEASE INVOLVING NATIVE CORONARY ARTERY OF NATIVE HEART WITH ANGINA PECTORIS: ICD-10-CM

## 2018-02-14 DIAGNOSIS — I10 ESSENTIAL HYPERTENSION: ICD-10-CM

## 2018-02-14 DIAGNOSIS — I73.9 PVD (PERIPHERAL VASCULAR DISEASE): ICD-10-CM

## 2018-02-14 DIAGNOSIS — R07.9 CHEST PAIN, UNSPECIFIED TYPE: Primary | ICD-10-CM

## 2018-02-14 DIAGNOSIS — Z95.1 HX OF CABG: ICD-10-CM

## 2018-02-14 DIAGNOSIS — Z86.711 HISTORY OF PULMONARY EMBOLISM: ICD-10-CM

## 2018-02-14 PROCEDURE — 99214 OFFICE O/P EST MOD 30 MIN: CPT | Mod: S$PBB,,, | Performed by: NURSE PRACTITIONER

## 2018-02-14 PROCEDURE — 1159F MED LIST DOCD IN RCRD: CPT | Mod: ,,, | Performed by: NURSE PRACTITIONER

## 2018-02-14 PROCEDURE — 99214 OFFICE O/P EST MOD 30 MIN: CPT | Mod: PBBFAC,PO | Performed by: NURSE PRACTITIONER

## 2018-02-14 PROCEDURE — 99999 PR PBB SHADOW E&M-EST. PATIENT-LVL IV: CPT | Mod: PBBFAC,,, | Performed by: NURSE PRACTITIONER

## 2018-02-14 RX ORDER — HYDROCHLOROTHIAZIDE 25 MG/1
12.5 TABLET ORAL DAILY
COMMUNITY
End: 2019-02-20

## 2018-02-14 RX ORDER — GABAPENTIN 600 MG/1
600 TABLET ORAL 3 TIMES DAILY
COMMUNITY
End: 2019-10-29 | Stop reason: SDUPTHER

## 2018-02-14 RX ORDER — BENAZEPRIL HYDROCHLORIDE 40 MG/1
40 TABLET ORAL DAILY
COMMUNITY
End: 2019-08-16 | Stop reason: SDUPTHER

## 2018-02-14 NOTE — TELEPHONE ENCOUNTER
The patient called to report she has had to use TNG the last several days almost daily and it takes two to relieve chest pain.She also noted last night in shoulder a throbbing beeping feeling which was not painful but was aware and it lasted a short time then went away.I made her an appointment to see Celia Whaley NP today.

## 2018-02-14 NOTE — PROGRESS NOTES
"Subjective:    Patient ID:  Kalpana Wright is a 68 y.o. female who presents for evaluation of Follow-up      HPI: Ms. Kalpana Wright presents to the clinic for evaluation of chest pain. she stated that she has had chest pain at night for the last 4-5 nights when she lays down. It is located on the left chest; described like "something sitting on my chest." Pulse ox 91-95% at home on room air. She stated that she notices tachycardia and increased force of contractions during that time. She also notices numbness in both arms. In addition, she notices electrical shock-type pain to left shoulder blade. She has been taking nitroglycerin every night - she stated that she takes 1-2 nitro tablets each night. She also said that the pain lasts quite a while. if she sits up when it first starts, the pain does not get severe, i.e, it improves when sitting up. Episodes last about 30 minutes or so. It is not clear that relief of pain is related to nitroglycerin; it seems that time from nitroglycerin to relief of symptoms is long.  Also complains of pain and swelling to left lower leg. This has been present for a couple of weeks.    Medications: she is not missing any doses. She appears to be taking double benazepril and HCTZ. One is a combination pill and she thinks that she also has benzepril tablets and HCTZ tablets.  Sodium: she does not add salt to foods,  she is not reading labels for sodium content. Eats crackers and cracklins  Exercise: she does not; she is sedentary.  Tobacco: former smoker   Alcohol: no alcohol use     Weight: 99 kg (218 lb 3.2 oz) she states that her daily weights has been stable  Wt Readings from Last 3 Encounters:   02/14/18 99 kg (218 lb 3.2 oz)   12/21/17 95.3 kg (210 lb)   09/20/17 101 kg (222 lb 8.9 oz)     BP log: None; [facility] comes once a week; also has PT for legs and knees.    Review of Systems   Constitution: Negative for chills, decreased appetite, diaphoresis, fever, night sweats, weight " "gain and weight loss.   HENT: Negative for congestion.    Cardiovascular: Positive for chest pain and leg swelling. Negative for claudication, cyanosis, dyspnea on exertion, irregular heartbeat, near-syncope, orthopnea, paroxysmal nocturnal dyspnea and syncope.   Respiratory: Negative for cough, hemoptysis, shortness of breath, sputum production and wheezing.    Hematologic/Lymphatic: Negative for adenopathy and bleeding problem. Does not bruise/bleed easily.   Skin: Negative for color change and nail changes.   Gastrointestinal: Negative for bloating, abdominal pain, change in bowel habit, heartburn, hematochezia, melena, nausea and vomiting.   Genitourinary: Negative for hematuria.   Neurological: Negative for dizziness and light-headedness.   Psychiatric/Behavioral: Negative for altered mental status.       Objective:   Physical Exam   Constitutional: She is oriented to person, place, and time. She appears well-developed and well-nourished. No distress.   HENT:   Head: Normocephalic and atraumatic.   Eyes: Conjunctivae are normal. No scleral icterus.   Neck: Neck supple. No JVD present. No tracheal deviation present. No thyromegaly present.   Cardiovascular: Normal rate, regular rhythm, normal heart sounds and intact distal pulses.  Exam reveals no gallop and no friction rub.    No murmur heard.  Pulses:       Radial pulses are 2+ on the right side, and 2+ on the left side.        Dorsalis pedis pulses are 2+ on the right side, and 2+ on the left side.        Posterior tibial pulses are 1+ on the right side, and 1+ on the left side.   Measurements: left calf 17 2/8"; right calf 16 5/8 inches; left ankle 10.5"; right ankle 9 1/8".   Pulmonary/Chest: Effort normal and breath sounds normal. No respiratory distress. She has no wheezes. She has no rales. She exhibits no tenderness.   Abdominal: Soft. Bowel sounds are normal. She exhibits no distension and no mass. There is no tenderness. There is no rebound and no " guarding.   Abdomen obese.   Musculoskeletal: Normal range of motion. She exhibits edema (1+ edema to left lower leg, with brown hyperpigmentation. The tibia and calf are tender to palpation.).   Lymphadenopathy:     She has no cervical adenopathy.   Neurological: She is alert and oriented to person, place, and time.   Skin: Skin is warm and dry. No rash noted. She is not diaphoretic. No erythema. No pallor.   [place]   Psychiatric: She has a normal mood and affect.     Addendum: labs received from [facility], collected on 01/09/18: glucose 110, sodium 140, K 4.5, BUN 30, creatinine 1.0 eGFR 55, ALT 7, cholesterol 152, triglycerides 77, HDL 58, LDL 79, VLDL 15, Non-HDL 94; Chol/HDL 2.6. APO B 77. Continue current treatment plan. She did not tolerate atorvastatin, lovastatin, or pravastatin.    Echo 11/16/16: CONCLUSIONS     1 - Concentric hypertrophy.     2 - Normal left ventricular systolic function (EF 55-60%).     3 - Normal left ventricular diastolic function.     4 - Normal right ventricular systolic function .     5 - The estimated PA systolic pressure is 22 mmHg.     Pharm. NM stress test 11/16/16:  Impression: ABNORMAL MYOCARDIAL PERFUSION  1. There is a moderate to large size fixed defect of moderate to severe intensity that extends from the base to the apical inferior wall of the left ventricle, consistent with myocardial injury. There is trivial rojelio-injury ischemia.   2. Resting wall motion is physiologic.   3. Resting LV function is normal.   4. The ventricular volumes are normal at rest and stress.   5. The extracardiac distribution of radioactivity is normal.   Assessment:      1. Chest pain, unspecified type    2. Coronary artery disease involving native coronary artery of native heart with angina pectoris    3. Hx of CABG    4. PVD (peripheral vascular disease)    5. Essential hypertension    6. History of pulmonary embolism    7. BMI 37.0-37.9, adult        Plan:     Stop duplication of  benazepril and HCTZ. Call Ms. Wright to reconcile medication list.  Get vital signs from [facility].  Pharm NM stress test.  Echocardiogram.  Venous ultrasound of left leg to evaluate for DVT.  Elevate legs when seated.  Reduce salty foods  Increase isosorbide to 60mg Qpm. Continue 30mg Qam.  Continue other current medications as directed.  Get [facility] from [facility]. She is a patient of Dr. Kristofer Morris. She was seen by JESUS Nova  Follow up after testing complete or call sooner for any problems.    45 minutes spent with patient sorting through issues.    Addendum:  Labs from [facility] show H/H 8.9/30.2. Ms Wright stated that she has been on iron tablets for one week. Will delay stress test for a couple of weeks for iron replacement. She is supposed to see PCP in a couple of weeks. She is also planning EGD in the future. Will get echo to evaluate LV function and assess for wall motion abnormalities.

## 2018-02-19 ENCOUNTER — CLINICAL SUPPORT (OUTPATIENT)
Dept: CARDIOLOGY | Facility: CLINIC | Age: 68
End: 2018-02-19
Attending: NURSE PRACTITIONER
Payer: MEDICARE

## 2018-02-19 DIAGNOSIS — I73.9 PVD (PERIPHERAL VASCULAR DISEASE): ICD-10-CM

## 2018-02-19 DIAGNOSIS — Z95.1 HX OF CABG: ICD-10-CM

## 2018-02-19 DIAGNOSIS — R07.9 CHEST PAIN, UNSPECIFIED TYPE: ICD-10-CM

## 2018-02-19 DIAGNOSIS — I10 ESSENTIAL HYPERTENSION: ICD-10-CM

## 2018-02-19 DIAGNOSIS — I25.119 CORONARY ARTERY DISEASE INVOLVING NATIVE CORONARY ARTERY OF NATIVE HEART WITH ANGINA PECTORIS: ICD-10-CM

## 2018-02-19 LAB
DIASTOLIC DYSFUNCTION: NO
ESTIMATED PA SYSTOLIC PRESSURE: 23.98
RETIRED EF AND QEF - SEE NOTES: 60 (ref 55–65)

## 2018-02-19 PROCEDURE — 93971 EXTREMITY STUDY: CPT | Mod: PBBFAC,PO | Performed by: INTERNAL MEDICINE

## 2018-02-19 PROCEDURE — 93306 TTE W/DOPPLER COMPLETE: CPT | Mod: PBBFAC,PO | Performed by: NUCLEAR MEDICINE

## 2018-02-20 ENCOUNTER — TELEPHONE (OUTPATIENT)
Dept: CARDIOLOGY | Facility: CLINIC | Age: 68
End: 2018-02-20

## 2018-02-20 NOTE — TELEPHONE ENCOUNTER
I called [facility] and got Kendra. She is with pt now at [facility] with her mom. She is not feeling good. So far they have given her fluids and pain med. Waiting on test results from [place]. Per Kendra she hopes her mom gets admitted. If not she has an appt with Gastro tomorrow at 2:45pm. Told her to keep that appt if possible. I would get med list from her at a later date, may change depending on her trmt. She agreed. olu

## 2018-02-21 ENCOUNTER — TELEPHONE (OUTPATIENT)
Dept: CARDIOLOGY | Facility: CLINIC | Age: 68
End: 2018-02-21

## 2018-02-21 NOTE — TELEPHONE ENCOUNTER
Need clearance for GI workup/procedure with dr Boet al in [place].Paerwork for clearance given to provider.  Paperwork completed and faxed to Dr Bo.

## 2018-02-23 ENCOUNTER — TELEPHONE (OUTPATIENT)
Dept: CARDIOLOGY | Facility: CLINIC | Age: 68
End: 2018-02-23

## 2018-02-23 NOTE — TELEPHONE ENCOUNTER
----- Message from Celia Whaley NP sent at 2/21/2018  8:26 AM CST -----  Her echo looks pretty good. Concentric hypertrophy has been present. Monitor BP at home. Bring BP log to next visit. If she still has [facility], we can ask for vital signs log.

## 2018-02-23 NOTE — TELEPHONE ENCOUNTER
Pt notified and will do log of B/P and Pulse and bring to the next appt. Right now she is miserable and waiting on GI to rtc to her to reschedule her appt. Terrible cramps and diarrhea. Told her to let me get off the phone in case they are trying to call her, she is expecting call today. olu

## 2018-02-27 DIAGNOSIS — I25.10 CORONARY ARTERY DISEASE INVOLVING NATIVE CORONARY ARTERY OF NATIVE HEART WITHOUT ANGINA PECTORIS: Primary | ICD-10-CM

## 2018-02-27 NOTE — TELEPHONE ENCOUNTER
Patient called and reviewed instructions for nuclear /Lexascan in the morning.She was asked to bring her bottles of medication in to review and update.

## 2018-02-28 ENCOUNTER — CLINICAL SUPPORT (OUTPATIENT)
Dept: CARDIOLOGY | Facility: CLINIC | Age: 68
End: 2018-02-28
Attending: NURSE PRACTITIONER
Payer: MEDICARE

## 2018-02-28 ENCOUNTER — TELEPHONE (OUTPATIENT)
Dept: CARDIOLOGY | Facility: CLINIC | Age: 68
End: 2018-02-28

## 2018-02-28 DIAGNOSIS — I10 ESSENTIAL HYPERTENSION: ICD-10-CM

## 2018-02-28 DIAGNOSIS — I73.9 PVD (PERIPHERAL VASCULAR DISEASE): ICD-10-CM

## 2018-02-28 DIAGNOSIS — R07.9 CHEST PAIN, UNSPECIFIED TYPE: ICD-10-CM

## 2018-02-28 DIAGNOSIS — I25.119 CORONARY ARTERY DISEASE INVOLVING NATIVE CORONARY ARTERY OF NATIVE HEART WITH ANGINA PECTORIS: ICD-10-CM

## 2018-02-28 DIAGNOSIS — Z95.1 HX OF CABG: ICD-10-CM

## 2018-02-28 PROCEDURE — 78452 HT MUSCLE IMAGE SPECT MULT: CPT | Mod: PBBFAC,PO | Performed by: INTERNAL MEDICINE

## 2018-02-28 PROCEDURE — 93016 CV STRESS TEST SUPVJ ONLY: CPT | Mod: S$PBB,,, | Performed by: INTERNAL MEDICINE

## 2018-02-28 PROCEDURE — 93018 CV STRESS TEST I&R ONLY: CPT | Mod: S$PBB,,, | Performed by: INTERNAL MEDICINE

## 2018-02-28 RX ORDER — PROMETHAZINE HYDROCHLORIDE 25 MG/1
25 TABLET ORAL EVERY 6 HOURS PRN
COMMUNITY
End: 2021-02-11 | Stop reason: SDUPTHER

## 2018-02-28 RX ORDER — FERROUS SULFATE 325(65) MG
325 TABLET ORAL
COMMUNITY
End: 2019-08-08

## 2018-02-28 RX ORDER — TEMAZEPAM 30 MG/1
30 CAPSULE ORAL NIGHTLY PRN
COMMUNITY
End: 2018-08-06

## 2018-02-28 RX ORDER — MELATONIN 10 MG
10 TABLET, SUBLINGUAL SUBLINGUAL DAILY
COMMUNITY
End: 2018-08-06

## 2018-02-28 RX ORDER — CYCLOBENZAPRINE HCL 10 MG
10 TABLET ORAL 3 TIMES DAILY PRN
COMMUNITY
End: 2019-02-20

## 2018-02-28 RX ORDER — LEVOTHYROXINE SODIUM 50 UG/1
50 TABLET ORAL DAILY
Qty: 30 TABLET | Refills: 0 | Status: SHIPPED | OUTPATIENT
Start: 2018-02-28 | End: 2019-08-30 | Stop reason: SDUPTHER

## 2018-02-28 NOTE — TELEPHONE ENCOUNTER
Reviewed med list with patient and updated list.Rx refill for Synthroid sent in.Celia Whaley NP notified of Rx refill request.Recommended we contact Dr Morris patient PCP if he wishes to continue same dose of Synthroid and to contact patient.Patient also asked to contact Dr Morris to verify how to restart the Synthroid medication.Message left at Dr Morris office at [facility].His nurse was not available and I left a voicemail.The patient was notified also and she verbalized understanding.

## 2018-03-04 LAB — DIASTOLIC DYSFUNCTION: NO

## 2018-03-15 ENCOUNTER — OFFICE VISIT (OUTPATIENT)
Dept: CARDIOLOGY | Facility: CLINIC | Age: 68
End: 2018-03-15
Payer: MEDICARE

## 2018-03-15 VITALS
BODY MASS INDEX: 36.62 KG/M2 | SYSTOLIC BLOOD PRESSURE: 142 MMHG | DIASTOLIC BLOOD PRESSURE: 62 MMHG | HEART RATE: 73 BPM | HEIGHT: 64 IN | WEIGHT: 214.5 LBS

## 2018-03-15 DIAGNOSIS — I10 ESSENTIAL HYPERTENSION: ICD-10-CM

## 2018-03-15 DIAGNOSIS — Z95.0 PACEMAKER: ICD-10-CM

## 2018-03-15 DIAGNOSIS — I49.5 SSS (SICK SINUS SYNDROME): Primary | ICD-10-CM

## 2018-03-15 DIAGNOSIS — I25.10 CORONARY ARTERY DISEASE INVOLVING NATIVE CORONARY ARTERY OF NATIVE HEART WITHOUT ANGINA PECTORIS: ICD-10-CM

## 2018-03-15 DIAGNOSIS — Z95.1 S/P CABG (CORONARY ARTERY BYPASS GRAFT): ICD-10-CM

## 2018-03-15 DIAGNOSIS — E78.5 HYPERLIPIDEMIA, UNSPECIFIED HYPERLIPIDEMIA TYPE: ICD-10-CM

## 2018-03-15 DIAGNOSIS — I49.5 SSS (SICK SINUS SYNDROME): ICD-10-CM

## 2018-03-15 DIAGNOSIS — Z09 HOSPITAL DISCHARGE FOLLOW-UP: Primary | ICD-10-CM

## 2018-03-15 DIAGNOSIS — I48.0 PAROXYSMAL ATRIAL FIBRILLATION: ICD-10-CM

## 2018-03-15 DIAGNOSIS — R06.02 SOB (SHORTNESS OF BREATH): ICD-10-CM

## 2018-03-15 DIAGNOSIS — Z95.1 HX OF CABG: ICD-10-CM

## 2018-03-15 PROCEDURE — 99214 OFFICE O/P EST MOD 30 MIN: CPT | Mod: S$PBB,,, | Performed by: NURSE PRACTITIONER

## 2018-03-15 PROCEDURE — 99214 OFFICE O/P EST MOD 30 MIN: CPT | Mod: PBBFAC,PO | Performed by: NURSE PRACTITIONER

## 2018-03-15 PROCEDURE — 99999 PR PBB SHADOW E&M-EST. PATIENT-LVL IV: CPT | Mod: PBBFAC,,, | Performed by: NURSE PRACTITIONER

## 2018-03-15 RX ORDER — ISOSORBIDE MONONITRATE 60 MG/1
TABLET, EXTENDED RELEASE ORAL
Qty: 60 TABLET | Refills: 5 | Status: SHIPPED | OUTPATIENT
Start: 2018-03-15 | End: 2019-02-20

## 2018-03-15 RX ORDER — SUCRALFATE 1 G/1
1 TABLET ORAL
COMMUNITY
End: 2019-02-20 | Stop reason: DRUGHIGH

## 2018-03-15 RX ORDER — VILAZODONE HYDROCHLORIDE 20 MG/1
20 TABLET ORAL
COMMUNITY
End: 2019-08-08

## 2018-03-15 NOTE — TELEPHONE ENCOUNTER
Dr Kapoor and Celia Whaley NP reviewed patients medical records from [facility].Plan to increase Imdur to 60 mg BID,patient verbalized understanding.Device check scheduled for next week after Nurse visit for status check.Return to clinic in 2-3 weeks to see provider and review status.Patient verbalized understanding.I called [facility] @[phone] and spoke with Case Management.They will see her tomorrw and send an update.

## 2018-03-15 NOTE — PROGRESS NOTES
Subjective:    Patient ID:  Kalpana Wright is a 68 y.o. female who presents for evaluation of Hospital Follow Up      HPI: Ms. Kalpana Wright presents to the clinic for follow up after hospital discharge. She stated that she went into [facility] yesterday for SOB and was discharged this morning to come to this appointment. She stated that she had breathing treatments at hospital and lab work. She denied chest pain, edema, orthopnea or PND. She stated that she was cleaning her refrigerator when she started coughing. She became SOB and called 911. No record is available at this time from [facility].  She stated that she was also admitted to [facility] one week ago for 4 days. She went for abdominal pain. She was found to be anemic and received on unit of blood. She reported having black stools, but she is taking iron. She had EGD on March 12-no source of bleeding found; no ulcer; biopsies done.    Medications: she is not missing any doses. She is taking Imdur 30mg Qam and 60mg Qpm.  Sodium: she does not add salt to foods,  she is not reading labels for sodium content. Eats crackers and cracklins  Exercise: she does not; she is sedentary.  Tobacco: former smoker   Alcohol: no alcohol use     Weight: 97.3 kg (214 lb 8.1 oz) she states that her daily weights has been stable  Wt Readings from Last 3 Encounters:   03/15/18 97.3 kg (214 lb 8.1 oz)   02/14/18 99 kg (218 lb 3.2 oz)   12/21/17 95.3 kg (210 lb)     BP log: None    Review of Systems   Constitution: Positive for malaise/fatigue. Negative for chills, decreased appetite, diaphoresis, fever, night sweats, weight gain and weight loss.   HENT: Negative for congestion.    Cardiovascular: Negative for chest pain, claudication, cyanosis, dyspnea on exertion, irregular heartbeat, leg swelling, near-syncope, orthopnea, paroxysmal nocturnal dyspnea and syncope.   Respiratory: Positive for cough, shortness of breath and wheezing. Negative for hemoptysis and sputum  production.    Hematologic/Lymphatic: Negative for adenopathy and bleeding problem. Does not bruise/bleed easily.   Skin: Negative for color change and nail changes.   Gastrointestinal: Negative for bloating, abdominal pain, change in bowel habit, heartburn, hematochezia, melena, nausea and vomiting.   Genitourinary: Negative for hematuria.   Neurological: Negative for dizziness and light-headedness.   Psychiatric/Behavioral: Negative for altered mental status.       Objective:   Physical Exam   Constitutional: She is oriented to person, place, and time. She appears well-developed and well-nourished. No distress.   HENT:   Head: Normocephalic and atraumatic.   Eyes: Conjunctivae are normal. No scleral icterus.   Neck: Neck supple. No JVD present. No tracheal deviation present. No thyromegaly present.   Cardiovascular: Normal rate, regular rhythm, normal heart sounds and intact distal pulses.  Exam reveals no gallop and no friction rub.    No murmur heard.  Pulses:       Radial pulses are 2+ on the right side, and 2+ on the left side.        Dorsalis pedis pulses are 2+ on the right side, and 2+ on the left side.        Posterior tibial pulses are 1+ on the right side, and 1+ on the left side.   Pulmonary/Chest: Effort normal. No respiratory distress. She has wheezes (expiratory wheezes bilaterally). She has no rales. She exhibits no tenderness.   Abdominal: Soft. Bowel sounds are normal. She exhibits no distension and no mass. There is no tenderness. There is no rebound and no guarding.   Abdomen obese.   Musculoskeletal: Normal range of motion. She exhibits no edema.   Lymphadenopathy:     She has no cervical adenopathy.   Neurological: She is alert and oriented to person, place, and time.   Skin: Skin is warm and dry. No rash noted. She is not diaphoretic. No erythema. No pallor.   [place]   Psychiatric: She has a normal mood and affect.     Addendum: labs received from [facility], collected on 01/09/18:  glucose 110, sodium 140, K 4.5, BUN 30, creatinine 1.0 eGFR 55, ALT 7, cholesterol 152, triglycerides 77, HDL 58, LDL 79, VLDL 15, Non-HDL 94; Chol/HDL 2.6. APO B 77. Continue current treatment plan. She did not tolerate atorvastatin, lovastatin, or pravastatin.    Echo 02/19/18: CONCLUSIONS     1 - Concentric hypertrophy.     2 - No wall motion abnormalities.     3 - Normal left ventricular systolic function (EF 60-65%).     4 - Normal left ventricular diastolic function.     5 - Normal right ventricular systolic function .     6 - The estimated PA systolic pressure is 24 mmHg.     Pharm. NM stress test 02/28/18:  Nuclear Quantitative Functional Analysis: LVEF: >= 70 %  Impression: ABNORMAL MYOCARDIAL PERFUSION  1. There is a moderate fixed defect of moderate intensity that extends from the base to the apical inferior wall of the left ventricle and small to moderate fixed defect of moderate intensity that extends from the base to the apical inferolateral wall of the left ventricle. There is trivial rojelio-injury ischemia.   2. There is abnormal wall motion at rest showing hypokinesis of the inferior wall of the left ventricle.   3. Resting LV function is normal.   4. The ventricular volumes are normal at rest and stress.   5. The extracardiac distribution of radioactivity is normal.      Venous doppler 02/19/18: LEFT:  No evidence of Left lower extremity DVT.      Assessment:      1. Hospital discharge follow-up    2. SOB (shortness of breath)    3. Coronary artery disease involving native coronary artery of native heart without angina pectoris    4. Hx of CABG    5. History of SSS (sick sinus syndrome)    6. Pacemaker    7. Paroxysmal atrial fibrillation    8. Essential hypertension    9. Hyperlipidemia, unspecified hyperlipidemia type    10. BMI 36.0-36.9,adult        Plan:     Continue current medications as directed.  Take medications as soon as she gets home, including proventil inhaler for wheezing. Imdur 60 mg  BID.  Monitor BP at home.  Pacemaker interrogation.  Phone review in one week.  Home health for nursing assessment and vital signs, medication adherence, activity level, low-sodium diet  -----------------------  Spoke with Dr. Morris and he stated that Ms. Wright came in with SOB and pallor with O2 sat 96%; CTA chest revealed no PE. She did not appear to have CHF or a COPD exacerbation. They did nebulizer treatments and then discharged her to come to cardiology clinic.Dr. Morris stated that he could not find a cause for her SOB, and was concerned about a heart problem.  -----------------------  Consulted with Dr. Kapoor. He recommended LHC if her symptoms continue.

## 2018-03-22 ENCOUNTER — TELEPHONE (OUTPATIENT)
Dept: CARDIOLOGY | Facility: CLINIC | Age: 68
End: 2018-03-22

## 2018-03-22 NOTE — TELEPHONE ENCOUNTER
Spoke with Grand daughter yesterday.The patient is in [facility] for evaluation of chest pain.Transfered from [facility].Plan to do a heart cath today.PPM check cancelled.[employer] notified earlier today.

## 2018-04-17 ENCOUNTER — OFFICE VISIT (OUTPATIENT)
Dept: CARDIOLOGY | Facility: CLINIC | Age: 68
End: 2018-04-17
Payer: MEDICARE

## 2018-04-17 VITALS
DIASTOLIC BLOOD PRESSURE: 58 MMHG | HEIGHT: 64 IN | WEIGHT: 204.25 LBS | BODY MASS INDEX: 34.87 KG/M2 | HEART RATE: 83 BPM | SYSTOLIC BLOOD PRESSURE: 137 MMHG

## 2018-04-17 DIAGNOSIS — I49.5 SSS (SICK SINUS SYNDROME): ICD-10-CM

## 2018-04-17 DIAGNOSIS — I50.32 CHRONIC CONGESTIVE HEART FAILURE WITH LEFT VENTRICULAR DIASTOLIC DYSFUNCTION: Chronic | ICD-10-CM

## 2018-04-17 DIAGNOSIS — E78.00 PURE HYPERCHOLESTEROLEMIA: ICD-10-CM

## 2018-04-17 DIAGNOSIS — Z95.1 HX OF CABG: ICD-10-CM

## 2018-04-17 DIAGNOSIS — I73.9 PVD (PERIPHERAL VASCULAR DISEASE): Primary | ICD-10-CM

## 2018-04-17 DIAGNOSIS — I10 ESSENTIAL HYPERTENSION: ICD-10-CM

## 2018-04-17 DIAGNOSIS — Z79.899 ENCOUNTER FOR LONG-TERM (CURRENT) USE OF MEDICATIONS: ICD-10-CM

## 2018-04-17 DIAGNOSIS — F17.200 SMOKER: ICD-10-CM

## 2018-04-17 DIAGNOSIS — I25.10 CORONARY ARTERY DISEASE INVOLVING NATIVE CORONARY ARTERY OF NATIVE HEART WITHOUT ANGINA PECTORIS: ICD-10-CM

## 2018-04-17 DIAGNOSIS — E78.49 OTHER HYPERLIPIDEMIA: Primary | ICD-10-CM

## 2018-04-17 DIAGNOSIS — Z95.0 PACEMAKER: ICD-10-CM

## 2018-04-17 PROCEDURE — 99999 PR PBB SHADOW E&M-EST. PATIENT-LVL II: CPT | Mod: PBBFAC,,, | Performed by: INTERNAL MEDICINE

## 2018-04-17 PROCEDURE — 99214 OFFICE O/P EST MOD 30 MIN: CPT | Mod: S$PBB,,, | Performed by: INTERNAL MEDICINE

## 2018-04-17 PROCEDURE — 99212 OFFICE O/P EST SF 10 MIN: CPT | Mod: PBBFAC,PO | Performed by: INTERNAL MEDICINE

## 2018-04-17 NOTE — PROGRESS NOTES
Subjective:   Patient ID:  Kalpana Wright is a 68 y.o. female who presents for follow-up of Hospital Follow Up  Pt s/p PCI of SVG to OM at [facility] . Pt now on brilenta, asa, eliquis( afib).Patient denies CP, angina or anginal equivalent.  Pt states with much bruising.    Hypertension   This is a chronic problem. The current episode started more than 1 year ago. The problem has been gradually improving since onset. Pertinent negatives include no chest pain, palpitations or shortness of breath. Past treatments include diuretics and ACE inhibitors. The current treatment provides moderate improvement. Compliance problems include medication side effects.    Hyperlipidemia   This is a chronic problem. The current episode started more than 1 year ago. Recent lipid tests were reviewed and are variable. Pertinent negatives include no chest pain or shortness of breath. She is currently on no antihyperlipidemic treatment. The current treatment provides mild improvement of lipids. Compliance problems include medication side effects.        Review of Systems   Constitution: Negative. Negative for weight gain.   HENT: Negative.    Eyes: Negative.    Cardiovascular: Negative.  Negative for chest pain, leg swelling and palpitations.   Respiratory: Negative.  Negative for shortness of breath.    Endocrine: Negative.    Hematologic/Lymphatic: Negative.    Skin: Negative.    Musculoskeletal: Negative for muscle weakness.   Gastrointestinal: Negative.    Genitourinary: Negative.    Neurological: Negative.  Negative for dizziness.   Psychiatric/Behavioral: Negative.    Allergic/Immunologic: Negative.      Family History   Problem Relation Age of Onset    Cancer Mother     Lung cancer Father     Breast cancer Sister      Past Medical History:   Diagnosis Date    Anticoagulant long-term use     Arthritis     Asthma     CHF (congestive heart failure)     COPD (chronic obstructive pulmonary disease)     Coronary artery disease      Depression     Encounter for blood transfusion     Hyperlipidemia     Hypertension     Peripheral vascular disease     Thyroid disease      Current Outpatient Prescriptions on File Prior to Visit   Medication Sig Dispense Refill    apixaban (ELIQUIS) 5 mg Tab Take 1 tablet (5 mg total) by mouth 2 (two) times daily. 60 tablet 5    aspirin (ECOTRIN) 81 MG EC tablet Take 81 mg by mouth every evening.      benazepril (LOTENSIN) 40 MG tablet Take 40 mg by mouth once daily.      cyclobenzaprine (FLEXERIL) 10 MG tablet Take 10 mg by mouth 3 (three) times daily as needed.      esomeprazole (NEXIUM) 40 MG capsule Take 1 capsule (40 mg total) by mouth before breakfast. 30 capsule 11    ferrous sulfate 325 mg (65 mg iron) Tab tablet Take 325 mg by mouth daily with breakfast.      gabapentin (NEURONTIN) 600 MG tablet Take 600 mg by mouth 3 (three) times daily.      hydroCHLOROthiazide (HYDRODIURIL) 25 MG tablet Take by mouth once daily.      hydrocodone-acetaminophen 10-325mg (NORCO)  mg Tab Take 1 tablet by mouth 2 (two) times daily as needed.      isosorbide mononitrate (IMDUR) 60 MG 24 hr tablet Take one tablet twice daily 60 tablet 5    levothyroxine (SYNTHROID) 50 MCG tablet Take 1 tablet (50 mcg total) by mouth once daily. 30 tablet 0    melatonin 10 mg Subl Place 10 mg under the tongue Daily.      nitroGLYCERIN (NITROSTAT) 0.4 MG SL tablet Place 1 tablet (0.4 mg total) under the tongue every 5 (five) minutes as needed for Chest pain. 25 tablet 11    potassium chloride (MICRO-K) 10 MEQ CpSR Take 1 capsule (10 mEq total) by mouth 2 (two) times daily. 60 capsule 11    promethazine (PHENERGAN) 25 MG tablet Take 25 mg by mouth every 6 (six) hours as needed.      ranolazine (RANEXA) 1,000 mg Tb12 Take 1 tablet (1,000 mg total) by mouth 2 (two) times daily. 60 tablet 5    ropinirole (REQUIP) 5 MG tablet Take by mouth. 1 Tablet Oral Every evening      sucralfate (CARAFATE) 1 gram tablet Take 1 g by  mouth 4 (four) times daily before meals and nightly.      temazepam (RESTORIL) 30 mg capsule Take 30 mg by mouth nightly as needed.      umeclidinium-vilanterol (ANORO ELLIPTA) 62.5-25 mcg/actuation DsDv Inhale 1 puff into the lungs once daily. Controller      VENTOLIN HFA 90 mcg/actuation inhaler INHALE ONE PUFF BY MOUTH EVERY FOUR HOURS AS NEEDED 18 g 1    vilazodone (VIIBRYD) 20 mg Tab Take 20 mg by mouth. One time a day with meals      [DISCONTINUED] carvedilol (COREG) 12.5 MG tablet Take 1 tablet (12.5 mg total) by mouth 2 (two) times daily with meals. 60 tablet 11     No current facility-administered medications on file prior to visit.      Review of patient's allergies indicates:   Allergen Reactions    Atorvastatin Other (See Comments)     Severe muscle pain    Demerol [meperidine] Hives     Other reaction(s): Anaphylaxis    Penicillins Anaphylaxis     Other reaction(s): Anaphylaxis    Zithromax [azithromycin] Other (See Comments)     By shot, closed veins and made large bruises    Pravastatin Other (See Comments)     Severe myalgias.    Ativan [lorazepam] Anxiety     Made me goofy       Objective:     Physical Exam   Constitutional: She is oriented to person, place, and time. She appears well-developed and well-nourished.   HENT:   Head: Normocephalic and atraumatic.   Eyes: Conjunctivae and EOM are normal. Pupils are equal, round, and reactive to light.   Neck: Normal range of motion. Neck supple.   Cardiovascular: Normal rate, normal heart sounds and intact distal pulses.  An irregularly irregular rhythm present.   Pulmonary/Chest: Effort normal and breath sounds normal.   Abdominal: Soft. Bowel sounds are normal.   Musculoskeletal: Normal range of motion.   Neurological: She is alert and oriented to person, place, and time.   Skin: Skin is warm and dry.   Psychiatric: She has a normal mood and affect.   Nursing note and vitals reviewed.      Assessment:   1. CAD  2. HTN  3. HLP  4. Afib  5. Hx  of MI    Plan:     Continue asa, brilenta- cad/PCI( hx of nose bleed on plavix)  Continue eliquis- afib  Continue ranexa, imdur- cad  Continue lotensin, diuretics-htn  Check lipids

## 2018-05-04 ENCOUNTER — LAB VISIT (OUTPATIENT)
Dept: LAB | Facility: HOSPITAL | Age: 68
End: 2018-05-04
Attending: INTERNAL MEDICINE
Payer: MEDICARE

## 2018-05-04 DIAGNOSIS — Z79.899 ENCOUNTER FOR LONG-TERM (CURRENT) USE OF MEDICATIONS: ICD-10-CM

## 2018-05-04 DIAGNOSIS — E78.49 OTHER HYPERLIPIDEMIA: ICD-10-CM

## 2018-05-04 LAB
ALBUMIN SERPL BCP-MCNC: 3 G/DL
ALP SERPL-CCNC: 83 U/L
ALT SERPL W/O P-5'-P-CCNC: 5 U/L
AST SERPL-CCNC: 10 U/L
BILIRUB DIRECT SERPL-MCNC: 0.1 MG/DL
BILIRUB SERPL-MCNC: 0.3 MG/DL
CHOLEST SERPL-MCNC: 156 MG/DL
CHOLEST/HDLC SERPL: 3.4 {RATIO}
HDLC SERPL-MCNC: 46 MG/DL
HDLC SERPL: 29.5 %
LDLC SERPL CALC-MCNC: 84.2 MG/DL
NONHDLC SERPL-MCNC: 110 MG/DL
PROT SERPL-MCNC: 6.9 G/DL
TRIGL SERPL-MCNC: 129 MG/DL

## 2018-05-04 PROCEDURE — 80076 HEPATIC FUNCTION PANEL: CPT

## 2018-05-04 PROCEDURE — 80061 LIPID PANEL: CPT

## 2018-05-04 PROCEDURE — 36415 COLL VENOUS BLD VENIPUNCTURE: CPT | Mod: PO

## 2018-05-07 NOTE — TELEPHONE ENCOUNTER
----- Message from Huber Kapoor MD sent at 5/6/2018  5:58 AM CDT -----  Please ell pt lipids are at goal

## 2018-05-29 ENCOUNTER — TELEPHONE (OUTPATIENT)
Dept: CARDIOLOGY | Facility: CLINIC | Age: 68
End: 2018-05-29

## 2018-05-29 NOTE — TELEPHONE ENCOUNTER
Attempted to reach patient and daughter at numbers listed and could not reached.i contacted [facility] and they will contact patient and have her call our office in reference to setting up PPM check.

## 2018-05-29 NOTE — TELEPHONE ENCOUNTER
The patient was called and notified their appointment is Thursday at 2pm.She agreed to be here for appointment.

## 2018-05-31 ENCOUNTER — TELEPHONE (OUTPATIENT)
Dept: CARDIOLOGY | Facility: CLINIC | Age: 68
End: 2018-05-31

## 2018-05-31 ENCOUNTER — CLINICAL SUPPORT (OUTPATIENT)
Dept: CARDIOLOGY | Facility: CLINIC | Age: 68
End: 2018-05-31
Attending: INTERNAL MEDICINE
Payer: MEDICARE

## 2018-05-31 DIAGNOSIS — Z95.0 PACEMAKER: ICD-10-CM

## 2018-05-31 DIAGNOSIS — I49.5 SSS (SICK SINUS SYNDROME): ICD-10-CM

## 2018-05-31 PROCEDURE — 93280 PM DEVICE PROGR EVAL DUAL: CPT | Mod: 26,,, | Performed by: INTERNAL MEDICINE

## 2018-05-31 NOTE — TELEPHONE ENCOUNTER
Attempted to reach patient earlier and still no answer.I am unable to leave a message.The patient has a PPM check today at 2pm.

## 2018-06-06 ENCOUNTER — TELEPHONE (OUTPATIENT)
Dept: CARDIOLOGY | Facility: CLINIC | Age: 68
End: 2018-06-06

## 2018-06-06 ENCOUNTER — OFFICE VISIT (OUTPATIENT)
Dept: CARDIOLOGY | Facility: CLINIC | Age: 68
End: 2018-06-06
Payer: MEDICARE

## 2018-06-06 VITALS
WEIGHT: 208.44 LBS | HEIGHT: 64 IN | BODY MASS INDEX: 35.59 KG/M2 | SYSTOLIC BLOOD PRESSURE: 113 MMHG | DIASTOLIC BLOOD PRESSURE: 50 MMHG | HEART RATE: 62 BPM

## 2018-06-06 DIAGNOSIS — Z79.01 LONG TERM CURRENT USE OF ANTICOAGULANT THERAPY: Primary | ICD-10-CM

## 2018-06-06 DIAGNOSIS — I26.99 OTHER PULMONARY EMBOLISM WITHOUT ACUTE COR PULMONALE, UNSPECIFIED CHRONICITY: ICD-10-CM

## 2018-06-06 DIAGNOSIS — T14.8XXA BLEEDING FROM WOUND: ICD-10-CM

## 2018-06-06 DIAGNOSIS — Z95.1 HISTORY OF INSERTION OF STENT INTO CORONARY ARTERY BYPASS GRAFT: ICD-10-CM

## 2018-06-06 PROCEDURE — 99214 OFFICE O/P EST MOD 30 MIN: CPT | Mod: PBBFAC,PO | Performed by: NURSE PRACTITIONER

## 2018-06-06 PROCEDURE — 99999 PR PBB SHADOW E&M-EST. PATIENT-LVL IV: CPT | Mod: PBBFAC,,, | Performed by: NURSE PRACTITIONER

## 2018-06-06 PROCEDURE — 99213 OFFICE O/P EST LOW 20 MIN: CPT | Mod: S$PBB,,, | Performed by: NURSE PRACTITIONER

## 2018-06-06 RX ORDER — ERGOCALCIFEROL 1.25 MG/1
50000 CAPSULE ORAL
COMMUNITY
End: 2019-04-24

## 2018-06-06 NOTE — Clinical Note
Ms. Wright is taking brilinta, eliquis and ASA. She had ACS on March 20, 2018 with instent restenosis of SVG to OM-she was not on plavix at the time. She had PE and atrial fib. She is complaining of bleeding from simple/minor wounds on skin that are very difficult to control. She stated that they bleed intermittently for a couple of days. She is only a little over two months since stenting. I stopped her ASA. We will call her next week to see how things are going.

## 2018-06-06 NOTE — PROGRESS NOTES
"Subjective:    Patient ID:  Kalpana Wright is a 68 y.o. female who presents for evaluation of Bleeding/Bruising      HPI: Ms. Kalpana Wright presents to the clinic for evaluation of bleeding. She stated that she is taking brilinta, eliquis and ASA daily. She stated that minor trauma causes bleeding that is very difficult to control. She stated that she stubbed her left great toe and it bled for three days. Her dog's paw scratched her leg and it bled intermittently for several days and had to be cauterized. She bumped her left forearm and a wound there has bled intermittently for 2 days. She put "gorilla glue" on the skin flap to stop bleeding. She stated that applying pressure to the wounds either doesn't stop the bleeding or it temporarily stops bleeding. She denied any bleeding from gums, nose or hematochezia, melena, or hematuria.  She denied chest pain, edema, orthopnea or PND. She is on iron for anemia. It appears that her plavix was stopped at [facility] around April 2017 when she had C diff, but she doesn't know why it was stopped.    Review of discharge summary form [facility]: Had ACS 3/20/18 and went to [facility] and transferred to [facility]; had angiogram with instent restenosis of SVG-to OM (proximal). She was loaded with Brilinta and continued on it at discharge.S/P MARY to OM graft  Cardiology note states: "can consider dropping ASA after one month"    Medications: she is not missing any doses. She is taking Imdur 60mg Qam and 60mg Qpm and ranexa.  Sodium: she does not add salt to foods,  she is not reading labels for sodium content. Eats crackers and cracklins  Exercise: she does not; she is sedentary.  Tobacco: former smoker   Alcohol: no alcohol use     Weight: 94.6 kg (208 lb 7.1 oz) she states that her daily weights has been stable Body mass index is 35.78 kg/m².    Wt Readings from Last 3 Encounters:   06/06/18 94.6 kg (208 lb 7.1 oz)   04/17/18 92.7 kg (204 lb 4.1 oz)   03/15/18 97.3 kg (214 lb " 8.1 oz)     BP log: None    Review of Systems   Constitution: Positive for malaise/fatigue. Negative for chills, decreased appetite, diaphoresis, fever, night sweats, weight gain and weight loss.   HENT: Negative for congestion.    Cardiovascular: Negative for chest pain, claudication, cyanosis, dyspnea on exertion, irregular heartbeat, leg swelling, near-syncope, orthopnea, paroxysmal nocturnal dyspnea and syncope.   Respiratory: Negative for cough, hemoptysis, shortness of breath, sputum production and wheezing.    Hematologic/Lymphatic: Positive for bleeding problem. Negative for adenopathy. Bruises/bleeds easily.   Skin: Negative for color change and nail changes.   Gastrointestinal: Negative for bloating, abdominal pain, change in bowel habit, heartburn, hematochezia, melena, nausea and vomiting.   Genitourinary: Negative for hematuria.   Neurological: Negative for dizziness and light-headedness.   Psychiatric/Behavioral: Positive for depression. Negative for altered mental status.       Objective:   Physical Exam   Constitutional: She is oriented to person, place, and time. She appears well-developed and well-nourished. No distress.   HENT:   Head: Normocephalic and atraumatic.   Eyes: Conjunctivae are normal. No scleral icterus.   Neck: Neck supple. No JVD present. No tracheal deviation present. No thyromegaly present.   Cardiovascular: Normal rate, regular rhythm, normal heart sounds and intact distal pulses.  Exam reveals no gallop and no friction rub.    No murmur heard.  Pulses:       Radial pulses are 2+ on the right side, and 2+ on the left side.        Dorsalis pedis pulses are 2+ on the right side, and 2+ on the left side.        Posterior tibial pulses are 1+ on the right side, and 1+ on the left side.   Pulmonary/Chest: Effort normal. No respiratory distress. She has no wheezes. She has no rales. She exhibits no tenderness.   Abdominal: Soft. Bowel sounds are normal. She exhibits no distension and  no mass. There is no tenderness. There is no rebound and no guarding.   Abdomen obese.   Musculoskeletal: Normal range of motion. She exhibits edema (Trace pretibial edema bilaterally; left more than right.).   Lymphadenopathy:     She has no cervical adenopathy.   Neurological: She is alert and oriented to person, place, and time.   Skin: Skin is warm and dry. No rash noted. She is not diaphoretic. No erythema. No pallor.   Pink; several ecchymotic areas noted on forearms and legs. Bandage to left forearm. Closed wound noted to left medial lower leg.   Psychiatric: She has a normal mood and affect.     Addendum: labs received from [facility], collected on 01/09/18: glucose 110, sodium 140, K 4.5, BUN 30, creatinine 1.0 eGFR 55, ALT 7, cholesterol 152, triglycerides 77, HDL 58, LDL 79, VLDL 15, Non-HDL 94; Chol/HDL 2.6. APO B 77. Continue current treatment plan. She did not tolerate atorvastatin, lovastatin, or pravastatin.    Echo 02/19/18: CONCLUSIONS     1 - Concentric hypertrophy.     2 - No wall motion abnormalities.     3 - Normal left ventricular systolic function (EF 60-65%).     4 - Normal left ventricular diastolic function.     5 - Normal right ventricular systolic function .     6 - The estimated PA systolic pressure is 24 mmHg.     Pharm. NM stress test 02/28/18:  Nuclear Quantitative Functional Analysis: LVEF: >= 70 %  Impression: ABNORMAL MYOCARDIAL PERFUSION  1. There is a moderate fixed defect of moderate intensity that extends from the base to the apical inferior wall of the left ventricle and small to moderate fixed defect of moderate intensity that extends from the base to the apical inferolateral wall of the left ventricle. There is trivial rojelio-injury ischemia.   2. There is abnormal wall motion at rest showing hypokinesis of the inferior wall of the left ventricle.   3. Resting LV function is normal.   4. The ventricular volumes are normal at rest and stress.   5. The extracardiac  distribution of radioactivity is normal.      Venous doppler 02/19/18: LEFT:  No evidence of Left lower extremity DVT.      Assessment:      1. Long term current use of anticoagulant therapy    2. Bleeding from wound    3. History of insertion of stent into coronary artery bypass graft    4. Other pulmonary embolism without acute cor pulmonale, unspecified chronicity        Plan:   Stop ASA; continue brilinta and eliquis.  Continue other current medications as directed.  Monitor BP at home.  Pacemaker interrogation done 5/31/18; report not available.  Phone review in one week.  Follow up with Dr. Kapoor on August 6, 2018 as planned or call sooner for any problems.    Addendum: PPM report shows 12 episodes in NSVT range. Will add low-dose beta blocker and decrease HCTZ. Monitor BP at home. Call for any problems.  Celia Whaley NP  [facility]

## 2018-06-06 NOTE — TELEPHONE ENCOUNTER
The patient called with noticing bleeding more prolonged.On different blood thinners and asked for appointment to see JESUS Whaley today.

## 2018-06-13 DIAGNOSIS — I47.29 NSVT (NONSUSTAINED VENTRICULAR TACHYCARDIA): Primary | ICD-10-CM

## 2018-06-13 NOTE — TELEPHONE ENCOUNTER
Attempted to reach patient and no answer.Recent PPM device check done and noted NSVT. Celia Whaley adding new Rx Metoprolol 12.5 mgm QD and decrease HCTZ 12.5 mgm. BP log x 2 weeks and call in to nurse.Will attempt to reach patient again.Could not leave message.

## 2018-06-14 RX ORDER — METOPROLOL SUCCINATE 25 MG/1
TABLET, EXTENDED RELEASE ORAL
Qty: 30 TABLET | Refills: 11 | Status: SHIPPED | OUTPATIENT
Start: 2018-06-14 | End: 2019-02-20

## 2018-06-14 NOTE — TELEPHONE ENCOUNTER
Was able to reach patient and review medication changes.She verbalized understanding,Rx sent to [facility] per patients request.

## 2018-08-06 ENCOUNTER — OFFICE VISIT (OUTPATIENT)
Dept: CARDIOLOGY | Facility: CLINIC | Age: 68
End: 2018-08-06
Payer: MEDICARE

## 2018-08-06 ENCOUNTER — TELEPHONE (OUTPATIENT)
Dept: CARDIOLOGY | Facility: CLINIC | Age: 68
End: 2018-08-06

## 2018-08-06 VITALS
HEIGHT: 64 IN | WEIGHT: 212.94 LBS | DIASTOLIC BLOOD PRESSURE: 69 MMHG | SYSTOLIC BLOOD PRESSURE: 177 MMHG | BODY MASS INDEX: 36.35 KG/M2 | HEART RATE: 82 BPM

## 2018-08-06 DIAGNOSIS — Z79.01 LONG TERM CURRENT USE OF ANTICOAGULANT THERAPY: ICD-10-CM

## 2018-08-06 DIAGNOSIS — I10 ESSENTIAL HYPERTENSION: ICD-10-CM

## 2018-08-06 DIAGNOSIS — I50.32 CHRONIC CONGESTIVE HEART FAILURE WITH LEFT VENTRICULAR DIASTOLIC DYSFUNCTION: Chronic | ICD-10-CM

## 2018-08-06 DIAGNOSIS — I73.9 PVD (PERIPHERAL VASCULAR DISEASE): Primary | ICD-10-CM

## 2018-08-06 DIAGNOSIS — F17.200 SMOKER: ICD-10-CM

## 2018-08-06 DIAGNOSIS — R06.02 SOB (SHORTNESS OF BREATH): ICD-10-CM

## 2018-08-06 DIAGNOSIS — Z95.1 HISTORY OF INSERTION OF STENT INTO CORONARY ARTERY BYPASS GRAFT: ICD-10-CM

## 2018-08-06 DIAGNOSIS — I25.10 CORONARY ARTERY DISEASE INVOLVING NATIVE CORONARY ARTERY OF NATIVE HEART WITHOUT ANGINA PECTORIS: ICD-10-CM

## 2018-08-06 DIAGNOSIS — E78.00 PURE HYPERCHOLESTEROLEMIA: ICD-10-CM

## 2018-08-06 DIAGNOSIS — Z95.0 PACEMAKER: ICD-10-CM

## 2018-08-06 PROCEDURE — 99212 OFFICE O/P EST SF 10 MIN: CPT | Mod: PBBFAC,PO | Performed by: INTERNAL MEDICINE

## 2018-08-06 PROCEDURE — 99215 OFFICE O/P EST HI 40 MIN: CPT | Mod: S$PBB,,, | Performed by: INTERNAL MEDICINE

## 2018-08-06 PROCEDURE — 99999 PR PBB SHADOW E&M-EST. PATIENT-LVL II: CPT | Mod: PBBFAC,,, | Performed by: INTERNAL MEDICINE

## 2018-08-06 RX ORDER — AMLODIPINE BESYLATE 5 MG/1
5 TABLET ORAL DAILY
Qty: 30 TABLET | Refills: 11 | Status: SHIPPED | OUTPATIENT
Start: 2018-08-06 | End: 2019-02-20 | Stop reason: DRUGHIGH

## 2018-08-06 NOTE — TELEPHONE ENCOUNTER
Called pt and notified of [facility] appt [place]. Rev'd all pretest instructions with pt, written instructions on med not to take am of test. cm

## 2018-08-06 NOTE — PROGRESS NOTES
"Subjective:   Patient ID:  Kalpana Wright is a 68 y.o. female who presents for follow-up of Follow-up  Pt with CP radiating to both arms, different from her angina 3-18  Review of discharge summary form [facility]: Had ACS 3/20/18 and went to [facility] and transferred to [facility]; had angiogram with instent restenosis of SVG-to OM (proximal). She was loaded with Brilinta and continued on it at discharge.S/P MARY to OM graft  Cardiology note states: "can consider dropping ASA after one month"    Hypertension   This is a chronic problem. The current episode started more than 1 year ago. The problem has been gradually improving since onset. The problem is controlled. Associated symptoms include chest pain and shortness of breath. Pertinent negatives include no palpitations. Past treatments include beta blockers, diuretics and ACE inhibitors. The current treatment provides moderate improvement. Compliance problems include medication side effects.  Hypertensive end-organ damage includes kidney disease, CAD/MI, CVA and heart failure. Identifiable causes of hypertension include chronic renal disease.   Atrial Fibrillation   Presents for follow-up visit. Symptoms include chest pain and shortness of breath. Symptoms are negative for bradycardia, dizziness, hemodynamic instability, hypotension, pacemaker problem, palpitations, syncope, tachycardia and weakness. The symptoms have been stable. Past medical history includes atrial fibrillation and CAD. Medication compliance problems include medication side effects.   Coronary Artery Disease   Presents for follow-up visit. Symptoms include chest pain and shortness of breath. Pertinent negatives include no dizziness, leg swelling, muscle weakness, palpitations or weight gain. The symptoms have been stable. Compliance with diet is variable. Compliance with exercise is variable. Compliance with medications is good.       Review of Systems   Constitution: Negative. Negative for " weakness and weight gain.   HENT: Negative.    Eyes: Negative.    Cardiovascular: Positive for chest pain. Negative for leg swelling, palpitations and syncope.   Respiratory: Positive for shortness of breath.    Endocrine: Negative.    Hematologic/Lymphatic: Negative.    Skin: Negative.    Musculoskeletal: Negative for muscle weakness.   Gastrointestinal: Negative.    Genitourinary: Negative.    Neurological: Negative.  Negative for dizziness.   Psychiatric/Behavioral: Negative.    Allergic/Immunologic: Negative.      Family History   Problem Relation Age of Onset    Cancer Mother     Lung cancer Father     Breast cancer Sister      Past Medical History:   Diagnosis Date    Anticoagulant long-term use     Arthritis     Asthma     CHF (congestive heart failure)     COPD (chronic obstructive pulmonary disease)     Coronary artery disease     Depression     Encounter for blood transfusion     Hyperlipidemia     Hypertension     Peripheral vascular disease     Thyroid disease      Current Outpatient Prescriptions on File Prior to Visit   Medication Sig Dispense Refill    apixaban (ELIQUIS) 5 mg Tab Take 1 tablet (5 mg total) by mouth 2 (two) times daily. 60 tablet 5    aspirin (ECOTRIN) 81 MG EC tablet Take 81 mg by mouth every evening.      benazepril (LOTENSIN) 40 MG tablet Take 40 mg by mouth once daily.      cyclobenzaprine (FLEXERIL) 10 MG tablet Take 10 mg by mouth 3 (three) times daily as needed.      ergocalciferol (VITAMIN D2) 50,000 unit Cap Take 50,000 Units by mouth every 7 days.      esomeprazole (NEXIUM) 40 MG capsule Take 1 capsule (40 mg total) by mouth before breakfast. 30 capsule 11    ferrous sulfate 325 mg (65 mg iron) Tab tablet Take 325 mg by mouth daily with breakfast.      gabapentin (NEURONTIN) 600 MG tablet Take 600 mg by mouth 3 (three) times daily.      hydroCHLOROthiazide (HYDRODIURIL) 25 MG tablet Take 12.5 mg by mouth once daily.      hydrocodone-acetaminophen  10-325mg (NORCO)  mg Tab Take 1 tablet by mouth 2 (two) times daily as needed.      isosorbide mononitrate (IMDUR) 60 MG 24 hr tablet Take one tablet twice daily 60 tablet 5    levothyroxine (SYNTHROID) 50 MCG tablet Take 1 tablet (50 mcg total) by mouth once daily. 30 tablet 0    nitroGLYCERIN (NITROSTAT) 0.4 MG SL tablet Place 1 tablet (0.4 mg total) under the tongue every 5 (five) minutes as needed for Chest pain. 25 tablet 11    potassium chloride (MICRO-K) 10 MEQ CpSR Take 1 capsule (10 mEq total) by mouth 2 (two) times daily. 60 capsule 11    promethazine (PHENERGAN) 25 MG tablet Take 25 mg by mouth every 6 (six) hours as needed.      ranolazine (RANEXA) 1,000 mg Tb12 Take 1 tablet (1,000 mg total) by mouth 2 (two) times daily. 60 tablet 5    ropinirole (REQUIP) 5 MG tablet Take by mouth. 1 Tablet Oral Every evening      sucralfate (CARAFATE) 1 gram tablet Take 1 g by mouth 4 (four) times daily before meals and nightly.      ticagrelor (BRILINTA) 90 mg tablet Take 1 tablet (90 mg total) by mouth 2 (two) times daily. 60 tablet 5    umeclidinium-vilanterol (ANORO ELLIPTA) 62.5-25 mcg/actuation DsDv Inhale 1 puff into the lungs once daily. Controller      VENTOLIN HFA 90 mcg/actuation inhaler INHALE ONE PUFF BY MOUTH EVERY FOUR HOURS AS NEEDED 18 g 1    vilazodone (VIIBRYD) 20 mg Tab Take 20 mg by mouth. One time a day with meals      [DISCONTINUED] melatonin 10 mg Subl Place 10 mg under the tongue Daily.      [DISCONTINUED] temazepam (RESTORIL) 30 mg capsule Take 30 mg by mouth nightly as needed.      metoprolol succinate (TOPROL-XL) 25 MG 24 hr tablet Take 1/2 tablet,12.5 mg daily. 30 tablet 11     No current facility-administered medications on file prior to visit.      Review of patient's allergies indicates:   Allergen Reactions    Atorvastatin Other (See Comments)     Severe muscle pain    Demerol [meperidine] Hives     Other reaction(s): Anaphylaxis    Penicillins Anaphylaxis      Other reaction(s): Anaphylaxis    Zithromax [azithromycin] Other (See Comments)     By shot, closed veins and made large bruises    Pravastatin Other (See Comments)     Severe myalgias.    Ativan [lorazepam] Anxiety     Made me goofy       Objective:     Physical Exam   Constitutional: She is oriented to person, place, and time. She appears well-developed and well-nourished.   HENT:   Head: Normocephalic and atraumatic.   Eyes: Conjunctivae and EOM are normal. Pupils are equal, round, and reactive to light.   Neck: Normal range of motion. Neck supple.   Cardiovascular: Normal rate, normal heart sounds and intact distal pulses.  An irregularly irregular rhythm present.   Pulmonary/Chest: Effort normal and breath sounds normal.   Abdominal: Soft. Bowel sounds are normal.   Musculoskeletal: Normal range of motion.   Neurological: She is alert and oriented to person, place, and time.   Skin: Skin is warm and dry.   Psychiatric: She has a normal mood and affect.   Nursing note and vitals reviewed.      Assessment:     1. PVD (peripheral vascular disease)    2. Coronary artery disease involving native coronary artery of native heart without angina pectoris    3. Essential hypertension    4. Pure hypercholesterolemia    5. History of smoking >40 pack years (quit 3 years ago)    6. Pacemaker    7. Long term current use of anticoagulant therapy    8. SOB (shortness of breath)    9. BMI 37.0-37.9, adult    10. History of insertion of stent into coronary artery bypass graft    11. Chronic congestive heart failure with left ventricular diastolic dysfunction        Plan:     PVD (peripheral vascular disease)    Coronary artery disease involving native coronary artery of native heart without angina pectoris    Essential hypertension    Pure hypercholesterolemia    History of smoking >40 pack years (quit 3 years ago)    Pacemaker    Long term current use of anticoagulant therapy    SOB (shortness of breath)    BMI 37.0-37.9,  adult    History of insertion of stent into coronary artery bypass graft    Chronic congestive heart failure with left ventricular diastolic dysfunction    continue benazapril diuretic, metoprolol-htn  Stress test

## 2018-08-09 ENCOUNTER — CLINICAL SUPPORT (OUTPATIENT)
Dept: CARDIOLOGY | Facility: CLINIC | Age: 68
End: 2018-08-09
Attending: INTERNAL MEDICINE
Payer: MEDICARE

## 2018-08-09 ENCOUNTER — HOSPITAL ENCOUNTER (OUTPATIENT)
Dept: RADIOLOGY | Facility: HOSPITAL | Age: 68
Discharge: HOME | End: 2018-08-09
Attending: INTERNAL MEDICINE
Payer: MEDICARE

## 2018-08-09 VITALS — WEIGHT: 212 LBS | HEIGHT: 64 IN | BODY MASS INDEX: 36.19 KG/M2

## 2018-08-09 DIAGNOSIS — I25.10 CORONARY ARTERY DISEASE INVOLVING NATIVE CORONARY ARTERY OF NATIVE HEART WITHOUT ANGINA PECTORIS: ICD-10-CM

## 2018-08-09 DIAGNOSIS — R06.02 SOB (SHORTNESS OF BREATH): ICD-10-CM

## 2018-08-09 PROCEDURE — 99999 PR PBB SHADOW E&M-EST. PATIENT-LVL I: CPT | Mod: PBBFAC,,,

## 2018-08-09 PROCEDURE — 99211 OFF/OP EST MAY X REQ PHY/QHP: CPT | Mod: PBBFAC,25,PO

## 2018-08-09 PROCEDURE — 93016 CV STRESS TEST SUPVJ ONLY: CPT | Mod: ,,, | Performed by: INTERNAL MEDICINE

## 2018-08-09 PROCEDURE — 78452 HT MUSCLE IMAGE SPECT MULT: CPT | Mod: 26,,, | Performed by: INTERNAL MEDICINE

## 2018-08-09 PROCEDURE — 93018 CV STRESS TEST I&R ONLY: CPT | Mod: ,,, | Performed by: INTERNAL MEDICINE

## 2018-08-09 PROCEDURE — 93017 CV STRESS TEST TRACING ONLY: CPT | Mod: PBBFAC,PO

## 2018-08-10 LAB
CV STRESS BASE HR: 63
CVPEAKDIABP: 54
CVPEAKSYSBP: 146
CVRESTDIABP: 54
CVRESTSYSBP: 146
NUC REST DIASTOLIC VOLUME INDEX: 113
NUC REST EJECTION FRACTION: 64
NUC REST SYSTOLIC VOLUME INDEX: 41

## 2018-08-14 ENCOUNTER — TELEPHONE (OUTPATIENT)
Dept: CARDIOLOGY | Facility: CLINIC | Age: 68
End: 2018-08-14

## 2018-08-14 NOTE — TELEPHONE ENCOUNTER
Note in system where pt was contacted about the  htn digital clinic. She was instructed to contact -Orders for HDMP. Referred patient to tech support to activate [facility] account and complete enrollment.  cm   ---- Message from Marti Charles sent at 8/14/2018  8:13 AM CDT -----  Contact: pt  The pt states she hasn't received her blood pressure monitor, the pt request a call at [phone]///[id number]  I called and she has not gotten a call or anything about her B/P monitoring. Told her I would look in to it and call her when I get an answer, may not be today. She agreed. olu

## 2018-08-17 ENCOUNTER — TELEPHONE (OUTPATIENT)
Dept: CARDIOLOGY | Facility: CLINIC | Age: 68
End: 2018-08-17

## 2018-08-17 DIAGNOSIS — I25.10 CORONARY ARTERY DISEASE INVOLVING NATIVE CORONARY ARTERY OF NATIVE HEART WITHOUT ANGINA PECTORIS: ICD-10-CM

## 2018-08-17 DIAGNOSIS — I10 ESSENTIAL HYPERTENSION: Primary | ICD-10-CM

## 2018-08-17 NOTE — TELEPHONE ENCOUNTER
----- Message from Huber Kapoor MD sent at 8/12/2018  8:52 PM CDT -----  Please tell pt stress test wnl  08/17 pt notified cm

## 2018-08-17 NOTE — TELEPHONE ENCOUNTER
08/17 Order for Digital Htn program  Placed again. I call and left v/m for a rtc with status on this pt. Left request to call me at [phone].cm

## 2018-08-21 NOTE — TELEPHONE ENCOUNTER
Orders for HDMP. Referred patient to tech support to activate [facility] account and complete enrollment.

## 2018-08-22 ENCOUNTER — TELEPHONE (OUTPATIENT)
Dept: CARDIOLOGY | Facility: CLINIC | Age: 68
End: 2018-08-22

## 2018-08-24 ENCOUNTER — TELEPHONE (OUTPATIENT)
Dept: CARDIOLOGY | Facility: CLINIC | Age: 68
End: 2018-08-24

## 2018-08-24 NOTE — TELEPHONE ENCOUNTER
08/24 Rtc and pt said she had been in [facility] x 1 week had excessive bleeding and had to get 3 units of blood. She was discharged on her blood thinners. Need to check her small bowel for reason on bleeding. Needs an appt with Dr Kapoor for clearance.  Moved her up to see Celia Whaley NP. Cm      ---- Message from Ricardo Guzman sent at 8/24/2018 11:48 AM CDT -----  Contact: lacey  Trying to setup followup appt for pt btw 9/3- 9/7 if possible.        ..[phone] (home)

## 2018-08-29 ENCOUNTER — TELEPHONE (OUTPATIENT)
Dept: CARDIOLOGY | Facility: CLINIC | Age: 68
End: 2018-08-29

## 2018-08-29 ENCOUNTER — OFFICE VISIT (OUTPATIENT)
Dept: CARDIOLOGY | Facility: CLINIC | Age: 68
End: 2018-08-29
Payer: MEDICARE

## 2018-08-29 VITALS
HEIGHT: 64 IN | BODY MASS INDEX: 36.19 KG/M2 | SYSTOLIC BLOOD PRESSURE: 162 MMHG | DIASTOLIC BLOOD PRESSURE: 55 MMHG | HEART RATE: 74 BPM | WEIGHT: 212 LBS

## 2018-08-29 DIAGNOSIS — Z95.1 HISTORY OF INSERTION OF STENT INTO CORONARY ARTERY BYPASS GRAFT: ICD-10-CM

## 2018-08-29 DIAGNOSIS — I49.5 SSS (SICK SINUS SYNDROME): ICD-10-CM

## 2018-08-29 DIAGNOSIS — Z95.0 PACEMAKER: ICD-10-CM

## 2018-08-29 DIAGNOSIS — I25.810 CORONARY ARTERY DISEASE INVOLVING CORONARY BYPASS GRAFT OF NATIVE HEART WITHOUT ANGINA PECTORIS: ICD-10-CM

## 2018-08-29 DIAGNOSIS — Z01.810 PREOP CARDIOVASCULAR EXAM: Primary | ICD-10-CM

## 2018-08-29 DIAGNOSIS — Z79.01 LONG TERM CURRENT USE OF ANTICOAGULANT THERAPY: ICD-10-CM

## 2018-08-29 DIAGNOSIS — I48.0 PAROXYSMAL ATRIAL FIBRILLATION: ICD-10-CM

## 2018-08-29 DIAGNOSIS — Z09 HOSPITAL DISCHARGE FOLLOW-UP: ICD-10-CM

## 2018-08-29 DIAGNOSIS — Z95.1 HX OF CABG: ICD-10-CM

## 2018-08-29 PROCEDURE — 99213 OFFICE O/P EST LOW 20 MIN: CPT | Mod: S$PBB,,, | Performed by: NURSE PRACTITIONER

## 2018-08-29 PROCEDURE — 99214 OFFICE O/P EST MOD 30 MIN: CPT | Mod: PBBFAC,PO | Performed by: NURSE PRACTITIONER

## 2018-08-29 PROCEDURE — 99999 PR PBB SHADOW E&M-EST. PATIENT-LVL IV: CPT | Mod: PBBFAC,,, | Performed by: NURSE PRACTITIONER

## 2018-08-29 RX ORDER — PRAMIPEXOLE DIHYDROCHLORIDE 0.25 MG/1
0.25 TABLET ORAL NIGHTLY
COMMUNITY
Start: 2018-08-10 | End: 2019-12-30 | Stop reason: DRUGHIGH

## 2018-08-29 NOTE — PROGRESS NOTES
"Subjective:    Patient ID:  Kalpana Wright is a 68 y.o. female who presents for evaluation of pre op clearance      HPI: Ms. Kalpana Wright presents to the clinic for follow up of hospital discharge for GIB and follow up of chest discomfort and stress test results. She denied any further episodes of chest discomfort since she saw Dr. Kapoor. Her Regadenoson NM stress test is negative for ischemia. It was done in [place] on August 9, 2018 and read by Dr. Wyman. She stated further that she had GIB requiring hospitalization and blood transfusions last week. She stated that she is feeling much better, and has no GIB since hospitalization. She is on iron for anemia. She will need a capsule endoscopy and will need to stop anticoagulants prior to procedure.  She stated that she had stopped her ASA as we discussed the last time I saw her; however, [facility] restarted it at some point since our last visit. She is taking brilinta, ASA, and eliquis daily.  She denied any bleeding since hospitalization. She denied chest pain, edema, orthopnea or PND.    She complains of pain to the posterior aspect of proximal calf when standing. This has been going on for about 1-2 weeks. She was at [facility] and got up to walk when she felt a sharp pain in that area. She stated that an U/S of the leg was done and it was fine.    Venous U/S left leg done  At [facility] on August 22, 2018: negative for DVT.    Review of discharge summary form [facility]: Had ACS 3/20/18 and went to [facility] and transferred to [facility]; had angiogram with instent restenosis of SVG-to OM (proximal). She was loaded with Brilinta and continued on it at discharge.S/P MARY to OM graft  Cardiology note states: "can consider dropping ASA after one month"    Medications: she is not missing any doses. She is taking Imdur 60mg Qam and 60mg Qpm and ranexa.  Sodium: she does not add salt to foods at the table, but she does cook with salt.  she is not reading " labels for sodium content. Eats crackers and cracklins  Exercise: she does not; she is sedentary.  Tobacco: former smoker   Alcohol: no alcohol use     Weight: 96.2 kg (211 lb 15.6 oz) she states that her daily weights has been stable Body mass index is 36.38 kg/m².    Wt Readings from Last 3 Encounters:   08/29/18 96.2 kg (211 lb 15.6 oz)   08/09/18 96.2 kg (212 lb)   08/06/18 96.6 kg (212 lb 15.4 oz)     BP log: None; she has no BP monitor. She is waiting for BP cuff.    Review of Systems   Constitution: Negative for chills, decreased appetite, diaphoresis, fever, weakness, malaise/fatigue, night sweats, weight gain and weight loss.   HENT: Negative for congestion.    Cardiovascular: Negative for chest pain, claudication, cyanosis, dyspnea on exertion, irregular heartbeat, leg swelling, near-syncope, orthopnea, paroxysmal nocturnal dyspnea and syncope.   Respiratory: Negative for cough, hemoptysis, shortness of breath, sputum production and wheezing.    Hematologic/Lymphatic: Negative for adenopathy and bleeding problem. Bruises/bleeds easily.   Skin: Negative for color change and nail changes.   Gastrointestinal: Negative for bloating, abdominal pain, change in bowel habit, heartburn, hematochezia, melena, nausea and vomiting.   Genitourinary: Negative for hematuria.   Neurological: Negative for dizziness and light-headedness.   Psychiatric/Behavioral: Positive for depression (improved.). Negative for altered mental status.       Objective:   Physical Exam   Constitutional: She is oriented to person, place, and time. She appears well-developed and well-nourished. No distress.   HENT:   Head: Normocephalic and atraumatic.   Eyes: Conjunctivae are normal. No scleral icterus.   Neck: Neck supple. No JVD present. No tracheal deviation present. No thyromegaly present.   Cardiovascular: Normal rate, regular rhythm, normal heart sounds and intact distal pulses. Exam reveals no gallop and no friction rub.   No murmur  heard.  Pulses:       Radial pulses are 2+ on the right side, and 2+ on the left side.        Dorsalis pedis pulses are 2+ on the right side, and 2+ on the left side.        Posterior tibial pulses are 1+ on the right side, and 1+ on the left side.   Pulmonary/Chest: Effort normal. No respiratory distress. She has no wheezes. She has no rales. She exhibits no tenderness.   Abdominal: Soft. Bowel sounds are normal. She exhibits no distension and no mass. There is no tenderness. There is no rebound and no guarding.   Abdomen obese.   Musculoskeletal: Normal range of motion. She exhibits edema (Trace pretibial edema to the left; right leg without edema.).   Lymphadenopathy:     She has no cervical adenopathy.   Neurological: She is alert and oriented to person, place, and time.   Skin: Skin is warm and dry. No rash noted. She is not diaphoretic. No erythema. No pallor.   Pink; several ecchymotic areas noted on forearms and legs.    Psychiatric: She has a normal mood and affect.     Addendum: labs received from [facility], collected on 01/09/18: glucose 110, sodium 140, K 4.5, BUN 30, creatinine 1.0 eGFR 55, ALT 7, cholesterol 152, triglycerides 77, HDL 58, LDL 79, VLDL 15, Non-HDL 94; Chol/HDL 2.6. APO B 77. She did not tolerate atorvastatin, lovastatin, or pravastatin.    Echo 02/19/18: CONCLUSIONS     1 - Concentric hypertrophy.     2 - No wall motion abnormalities.     3 - Normal left ventricular systolic function (EF 60-65%).     4 - Normal left ventricular diastolic function.     5 - Normal right ventricular systolic function .     6 - The estimated PA systolic pressure is 24 mmHg.     Pharm. NM stress test 02/28/18:  Nuclear Quantitative Functional Analysis: LVEF: >= 70 %  Impression: ABNORMAL MYOCARDIAL PERFUSION  1. There is a moderate fixed defect of moderate intensity that extends from the base to the apical inferior wall of the left ventricle and small to moderate fixed defect of moderate  intensity that extends from the base to the apical inferolateral wall of the left ventricle. There is trivial rojelio-injury ischemia.   2. There is abnormal wall motion at rest showing hypokinesis of the inferior wall of the left ventricle.   3. Resting LV function is normal.   4. The ventricular volumes are normal at rest and stress.   5. The extracardiac distribution of radioactivity is normal.      Venous doppler 02/19/18: LEFT:  No evidence of Left lower extremity DVT.      PPM interrogation 5/31/18: 12 episodes in NSVT range.  Beta blocker added.    Assessment:      1. Preop cardiovascular exam    2. Hospital discharge follow-up    3. Coronary artery disease involving coronary bypass graft of native heart without angina pectoris    4. Hx of CABG    5. History of insertion of stent into coronary artery bypass graft    6. History of SSS (sick sinus syndrome)    7. Pacemaker    8. Paroxysmal atrial fibrillation    9. Long term current use of anticoagulant therapy    10. BMI 36.0-36.9,adult        Plan:     [facility] - Blood pressure log.  Ms. Wright is in stable cardiac condition for endoscopy. Can hold ASA, brilinta, and eliquis for one week prior to procedure. Resume ASAP after procedure.  Continue amlodipine, benazepril, HCTZ, and metoprolol - HTN.  Continue apixaban, metoprolol - PAF  Continue brilinta, metoprolol, benazepril - CAD; she is statin intolerant. Lipids at goal.  Sodium restriction encouraged.  Elevate legs when seated.  Walking as much as possible.  Follow up in 3 months or call sooner for any problems.    Celia Whaley NP  [facility]

## 2018-08-29 NOTE — TELEPHONE ENCOUNTER
The patient was seen today.Plan: Appointment with Dr Bo  Scheduled .Instructions given to patient.[facility] called and requested vital sign report.To be faxed.

## 2018-09-07 NOTE — TELEPHONE ENCOUNTER
Followed up with patient. She will go to [facility] next week for set up. She is appreciative of follow up.

## 2018-09-27 ENCOUNTER — TELEPHONE (OUTPATIENT)
Dept: CARDIOLOGY | Facility: CLINIC | Age: 68
End: 2018-09-27

## 2018-09-27 NOTE — TELEPHONE ENCOUNTER
----- Message from Rachel Polanco sent at 9/27/2018  2:30 PM CDT -----  Type: Needs Medical Advice    Who Called: Patient  Symptoms (please be specific):  Patient  How long has patient had these symptoms:  [facility]  Pharmacy name and phone #:  [facility]  Best Call Back Number: [phone] (home)     Additional Information: Patient is currently admitted at [facility] and currently need a copy of her nuclear tests sent to their location. Please fax # [phone], Attention: Jacob

## 2018-09-27 NOTE — TELEPHONE ENCOUNTER
The patient is at [facility] and needed stress nuclear test done at [facility] sent to [facility]Called and notified it is in Epic and can retrieve.

## 2018-11-06 ENCOUNTER — OFFICE VISIT (OUTPATIENT)
Dept: CARDIOLOGY | Facility: CLINIC | Age: 68
End: 2018-11-06
Payer: MEDICARE

## 2018-11-06 VITALS
DIASTOLIC BLOOD PRESSURE: 81 MMHG | HEIGHT: 64 IN | WEIGHT: 207 LBS | SYSTOLIC BLOOD PRESSURE: 198 MMHG | HEART RATE: 91 BPM | BODY MASS INDEX: 35.34 KG/M2

## 2018-11-06 DIAGNOSIS — F17.200 SMOKER: ICD-10-CM

## 2018-11-06 DIAGNOSIS — I48.0 PAROXYSMAL ATRIAL FIBRILLATION: ICD-10-CM

## 2018-11-06 DIAGNOSIS — R06.02 SOB (SHORTNESS OF BREATH): ICD-10-CM

## 2018-11-06 DIAGNOSIS — I20.9 ANGINA PECTORIS: ICD-10-CM

## 2018-11-06 DIAGNOSIS — Z95.0 PACEMAKER: ICD-10-CM

## 2018-11-06 DIAGNOSIS — E78.00 PURE HYPERCHOLESTEROLEMIA: ICD-10-CM

## 2018-11-06 DIAGNOSIS — I10 ESSENTIAL HYPERTENSION: ICD-10-CM

## 2018-11-06 DIAGNOSIS — Z95.1 HX OF CABG: ICD-10-CM

## 2018-11-06 DIAGNOSIS — I25.810 CORONARY ARTERY DISEASE INVOLVING CORONARY BYPASS GRAFT OF NATIVE HEART WITHOUT ANGINA PECTORIS: Primary | ICD-10-CM

## 2018-11-06 PROCEDURE — 99214 OFFICE O/P EST MOD 30 MIN: CPT | Mod: S$PBB,,, | Performed by: INTERNAL MEDICINE

## 2018-11-06 PROCEDURE — 99999 PR PBB SHADOW E&M-EST. PATIENT-LVL III: CPT | Mod: PBBFAC,,, | Performed by: INTERNAL MEDICINE

## 2018-11-06 PROCEDURE — 99213 OFFICE O/P EST LOW 20 MIN: CPT | Mod: PBBFAC,PO | Performed by: INTERNAL MEDICINE

## 2018-11-06 NOTE — PROGRESS NOTES
Subjective:    Patient ID:  Kalpana Wright is a 68 y.o. female who presents for evaluation of Hospital Follow Up (hospital follow up)      HPI 67 yo WF with difficult to control HTN and CAD with previous CABG and repeat cath in March of 2018 with patent LIMA to LAD and lesion in SVG to OM that was stented. Cath report states collateral circulation to RCA thus I assume RCA was occluded. She had negative nuclear in August but has had increasing CP with frequent use of NTG.She also has pacemaker and PAF.    Kalpana Wright   Regadenoson stress test with myocardial perfusion SPECT (multi study)   Order# [id number]   Reading physician: Jesus Wyman MD Ordering physician: Huber Kapoor MD Study date: 8/9/18   Patient Information     Name MRN Description   Kalpana Wright [id number] 68 y.o. Female   Performed Procedure     STRESS TEST, REGADENOSON W MYOCARDIAL PERFUSION SPECT (MULTI STUDY)   Conclusion     · The patient reported shortness of breath and chest discomfort during the stress test.  · There were no arrhythmias during stress.  · The perfusion scan is free of evidence from myocardial ischemia or injury.  · Resting ejection fraction was 64 %         Review of Systems   Constitution: Negative for decreased appetite, fever, weakness, malaise/fatigue, weight gain and weight loss.   HENT: Negative for hearing loss and nosebleeds.    Eyes: Negative for visual disturbance.   Cardiovascular: Positive for chest pain and dyspnea on exertion. Negative for claudication, cyanosis, irregular heartbeat, leg swelling, near-syncope, orthopnea, palpitations, paroxysmal nocturnal dyspnea and syncope.   Respiratory: Positive for shortness of breath. Negative for cough, hemoptysis, sleep disturbances due to breathing, snoring and wheezing.    Endocrine: Negative for cold intolerance, heat intolerance, polydipsia and polyuria.   Hematologic/Lymphatic: Negative for adenopathy and bleeding problem. Does not bruise/bleed easily.   Skin:  "Negative for color change, itching, poor wound healing, rash and suspicious lesions.   Musculoskeletal: Negative for arthritis, back pain, falls, joint pain, joint swelling, muscle cramps, muscle weakness and myalgias.   Gastrointestinal: Positive for abdominal pain. Negative for bloating, change in bowel habit, constipation, flatus, heartburn, hematemesis, hematochezia, hemorrhoids, jaundice, melena, nausea and vomiting.   Genitourinary: Negative for bladder incontinence, decreased libido, frequency, hematuria, hesitancy and urgency.   Neurological: Negative for brief paralysis, difficulty with concentration, excessive daytime sleepiness, dizziness, focal weakness, headaches, light-headedness, loss of balance, numbness and vertigo.   Psychiatric/Behavioral: Negative for altered mental status, depression and memory loss. The patient does not have insomnia and is not nervous/anxious.    Allergic/Immunologic: Negative for environmental allergies, hives and persistent infections.        Objective:    Physical Exam   Constitutional: She is oriented to person, place, and time. She appears well-developed and well-nourished.   BP (!) 198/81   Pulse 91   Ht 5' 4" (1.626 m)   Wt 93.9 kg (207 lb)   LMP  (LMP Unknown)   BMI 35.53 kg/m²      HENT:   Head: Normocephalic and atraumatic.   Right Ear: External ear normal.   Left Ear: External ear normal.   Nose: Nose normal.   Mouth/Throat: Oropharynx is clear and moist.   Eyes: Conjunctivae, EOM and lids are normal. Pupils are equal, round, and reactive to light. Right eye exhibits no discharge. Left eye exhibits no discharge. Right conjunctiva has no hemorrhage. No scleral icterus.   Neck: Normal range of motion. Neck supple. No JVD present. No tracheal deviation present. No thyromegaly present.   Cardiovascular: Normal rate, regular rhythm, normal heart sounds and intact distal pulses. Exam reveals decreased pulses. Exam reveals no gallop and no friction rub.   No murmur " heard.  Pulses:       Posterior tibial pulses are 1+ on the right side, and 1+ on the left side.   Pulmonary/Chest: Effort normal and breath sounds normal. No respiratory distress. She has no wheezes. She has no rales. She exhibits no tenderness. Breasts are symmetrical.   Sternal scar   Abdominal: Soft. Bowel sounds are normal. She exhibits no distension and no mass. There is no hepatosplenomegaly or hepatomegaly. There is no tenderness. There is no rebound and no guarding.   Musculoskeletal: Normal range of motion. She exhibits no edema or tenderness.   Lymphadenopathy:     She has no cervical adenopathy.   Neurological: She is alert and oriented to person, place, and time. She displays normal reflexes. No cranial nerve deficit. Coordination normal.   Skin: Skin is warm and dry. No rash noted. No erythema. No pallor.   Psychiatric: She has a normal mood and affect. Her behavior is normal. Judgment and thought content normal.   Nursing note and vitals reviewed.        Assessment:       1. Coronary artery disease involving coronary bypass graft of native heart without angina pectoris    2. Paroxysmal atrial fibrillation    3. Essential hypertension    4. Hx of CABG    5. Pure hypercholesterolemia    6. Pacemaker    7. BMI 37.0-37.9, adult    8. History of smoking >40 pack years (quit 3 years ago)    9. SOB (shortness of breath)    10. Angina pectoris         Plan:     Patient with known CAD with recurrent and more frequent CP requiring NTG    Poorly controlled HTN. Increase amlodipine to BID    Discussed options and she wants to repeat angiogram.    Will arrange this with Dr Kapoor in [place].     No orders of the defined types were placed in this encounter.  Instructed to go to ER if symptoms worse.  Follow-up in about 3 months (around 2/6/2019).

## 2018-11-09 ENCOUNTER — TELEPHONE (OUTPATIENT)
Dept: CARDIOLOGY | Facility: CLINIC | Age: 68
End: 2018-11-09

## 2018-11-09 DIAGNOSIS — I20.9 ANGINA PECTORIS: ICD-10-CM

## 2018-11-09 DIAGNOSIS — I50.32 CHRONIC CONGESTIVE HEART FAILURE WITH LEFT VENTRICULAR DIASTOLIC DYSFUNCTION: Primary | Chronic | ICD-10-CM

## 2018-11-09 DIAGNOSIS — Z95.1 HX OF CABG: ICD-10-CM

## 2018-11-09 DIAGNOSIS — Z79.01 LONG TERM CURRENT USE OF ANTICOAGULANT THERAPY: ICD-10-CM

## 2018-11-09 DIAGNOSIS — I10 ESSENTIAL HYPERTENSION: ICD-10-CM

## 2018-11-09 DIAGNOSIS — R06.02 SOB (SHORTNESS OF BREATH): ICD-10-CM

## 2018-11-09 DIAGNOSIS — I48.91 ATRIAL FIBRILLATION, UNSPECIFIED TYPE: ICD-10-CM

## 2018-11-09 NOTE — TELEPHONE ENCOUNTER
I called pt and notified her of date and time of cath on Wed 11/14/18. Scheduled her for cxr and labs to do in [place] on 11/12/18-Monday. She agreed. Told her I know she is to hold her eliquis mon, tues and am of procedure wed. Will check with Dr Kapoor on instructions for Vickie and will let her know on mon. She tells me she will stop by here on Mon to get info after her precath test. I agreed. cm

## 2018-11-12 ENCOUNTER — HOSPITAL ENCOUNTER (OUTPATIENT)
Dept: RADIOLOGY | Facility: HOSPITAL | Age: 68
Discharge: HOME | End: 2018-11-12
Attending: INTERNAL MEDICINE
Payer: MEDICARE

## 2018-11-12 DIAGNOSIS — I20.9 ANGINA PECTORIS: ICD-10-CM

## 2018-11-12 DIAGNOSIS — Z95.1 HX OF CABG: ICD-10-CM

## 2018-11-12 DIAGNOSIS — I48.91 ATRIAL FIBRILLATION, UNSPECIFIED TYPE: ICD-10-CM

## 2018-11-12 DIAGNOSIS — I50.32 CHRONIC CONGESTIVE HEART FAILURE WITH LEFT VENTRICULAR DIASTOLIC DYSFUNCTION: Chronic | ICD-10-CM

## 2018-11-12 DIAGNOSIS — I10 ESSENTIAL HYPERTENSION: ICD-10-CM

## 2018-11-12 DIAGNOSIS — R06.02 SOB (SHORTNESS OF BREATH): ICD-10-CM

## 2018-11-12 PROCEDURE — 71046 X-RAY EXAM CHEST 2 VIEWS: CPT | Mod: TC,PO

## 2018-11-12 PROCEDURE — 71046 X-RAY EXAM CHEST 2 VIEWS: CPT | Mod: 26,,, | Performed by: RADIOLOGY

## 2018-11-13 RX ORDER — NITROGLYCERIN 0.4 MG/1
0.4 TABLET SUBLINGUAL EVERY 5 MIN PRN
Status: CANCELLED | OUTPATIENT
Start: 2018-11-13

## 2018-11-13 RX ORDER — HYDROCODONE BITARTRATE AND ACETAMINOPHEN 5; 325 MG/1; MG/1
1 TABLET ORAL EVERY 4 HOURS PRN
Status: CANCELLED | OUTPATIENT
Start: 2018-11-13

## 2018-11-13 RX ORDER — OXYCODONE HYDROCHLORIDE 5 MG/1
10 TABLET ORAL EVERY 4 HOURS PRN
Status: CANCELLED | OUTPATIENT
Start: 2018-11-13

## 2018-11-13 RX ORDER — ACETAMINOPHEN 325 MG/1
650 TABLET ORAL EVERY 4 HOURS PRN
Status: CANCELLED | OUTPATIENT
Start: 2018-11-13

## 2018-11-14 ENCOUNTER — HOSPITAL ENCOUNTER (OUTPATIENT)
Facility: HOSPITAL | Age: 68
Discharge: HOME | End: 2018-11-14
Attending: INTERNAL MEDICINE | Admitting: INTERNAL MEDICINE
Payer: MEDICARE

## 2018-11-14 VITALS
HEIGHT: 64 IN | BODY MASS INDEX: 35.34 KG/M2 | HEART RATE: 62 BPM | RESPIRATION RATE: 19 BRPM | TEMPERATURE: 98 F | OXYGEN SATURATION: 100 % | DIASTOLIC BLOOD PRESSURE: 50 MMHG | SYSTOLIC BLOOD PRESSURE: 110 MMHG | WEIGHT: 207 LBS

## 2018-11-14 DIAGNOSIS — R94.39 ABNORMAL STRESS TEST: ICD-10-CM

## 2018-11-14 DIAGNOSIS — I25.10 CORONARY ARTERY DISEASE INVOLVING NATIVE CORONARY ARTERY, ANGINA PRESENCE UNSPECIFIED, UNSPECIFIED WHETHER NATIVE OR TRANSPLANTED HEART: Primary | ICD-10-CM

## 2018-11-14 DIAGNOSIS — Z95.1 PRESENCE OF AORTOCORONARY BYPASS GRAFT: ICD-10-CM

## 2018-11-14 LAB — CORONARY STENOSIS: ABNORMAL

## 2018-11-14 PROCEDURE — 93459 L HRT ART/GRFT ANGIO: CPT | Mod: 26,,, | Performed by: INTERNAL MEDICINE

## 2018-11-14 PROCEDURE — 63600175 PHARM REV CODE 636 W HCPCS

## 2018-11-14 PROCEDURE — 93459 L HRT ART/GRFT ANGIO: CPT

## 2018-11-14 PROCEDURE — 93567 NJX CAR CTH SPRVLV AORTGRPHY: CPT | Mod: ,,, | Performed by: INTERNAL MEDICINE

## 2018-11-14 PROCEDURE — 25000003 PHARM REV CODE 250

## 2018-11-14 PROCEDURE — 99152 MOD SED SAME PHYS/QHP 5/>YRS: CPT

## 2018-11-14 PROCEDURE — 99152 MOD SED SAME PHYS/QHP 5/>YRS: CPT | Mod: ,,, | Performed by: INTERNAL MEDICINE

## 2018-11-14 RX ORDER — ACETAMINOPHEN 325 MG/1
650 TABLET ORAL EVERY 4 HOURS PRN
Status: DISCONTINUED | OUTPATIENT
Start: 2018-11-14 | End: 2018-11-14 | Stop reason: HOSPADM

## 2018-11-14 RX ORDER — ATROPINE SULFATE 0.1 MG/ML
0.5 INJECTION INTRAVENOUS
Status: DISCONTINUED | OUTPATIENT
Start: 2018-11-14 | End: 2018-11-14 | Stop reason: HOSPADM

## 2018-11-14 RX ORDER — SODIUM CHLORIDE 9 MG/ML
INJECTION, SOLUTION INTRAVENOUS CONTINUOUS
Status: DISCONTINUED | OUTPATIENT
Start: 2018-11-14 | End: 2018-11-14 | Stop reason: HOSPADM

## 2018-11-14 RX ORDER — OXYCODONE HYDROCHLORIDE 5 MG/1
10 TABLET ORAL EVERY 4 HOURS PRN
Status: DISCONTINUED | OUTPATIENT
Start: 2018-11-14 | End: 2018-11-14 | Stop reason: HOSPADM

## 2018-11-14 RX ORDER — HYDROCODONE BITARTRATE AND ACETAMINOPHEN 5; 325 MG/1; MG/1
1 TABLET ORAL EVERY 4 HOURS PRN
Status: DISCONTINUED | OUTPATIENT
Start: 2018-11-14 | End: 2018-11-14 | Stop reason: HOSPADM

## 2018-11-14 RX ORDER — NITROGLYCERIN 0.4 MG/1
0.4 TABLET SUBLINGUAL EVERY 5 MIN PRN
Status: DISCONTINUED | OUTPATIENT
Start: 2018-11-14 | End: 2018-11-14 | Stop reason: HOSPADM

## 2018-11-14 RX ORDER — DIPHENHYDRAMINE HCL 50 MG
50 CAPSULE ORAL ONCE
Status: COMPLETED | OUTPATIENT
Start: 2018-11-14 | End: 2018-11-14

## 2018-11-14 RX ADMIN — SODIUM CHLORIDE: 9 INJECTION, SOLUTION INTRAVENOUS at 06:11

## 2018-11-14 RX ADMIN — Medication 50 MG: at 07:11

## 2018-11-14 NOTE — PLAN OF CARE
1015 VSS DIET TAKEN 100% DISCHARGE INSTRUCTIONS GIVEN TO PT AND GRAND DAUGHTER AMB TO BATHROOM RT GROIN DRESSING DRY AND INTACT CM AND PIV DC'D REDRESSED. 1030 DISCHARGED PER W/C WITH BELONGINGS ACCOMPANIED BY THIS RN AND GRAND DAUGHTER

## 2018-11-14 NOTE — H&P
Consult Note  Cardiology    Consult Requested By:  Reason for Consult:     SUBJECTIVE:     History of Present Illness:  Patient is a 68 y.o. female presents with angina pectoris.    Patient ID:  Kalpana Wright is a 68 y.o. female who presents for evaluation of Hospital Follow Up (hospital follow up)        HPI 67 yo WF with difficult to control HTN and CAD with previous CABG and repeat cath in March of 2018 with patent LIMA to LAD and lesion in SVG to OM that was stented. Cath report states collateral circulation to RCA thus I assume RCA was occluded. She had negative nuclear in August but has had increasing CP with frequent use of NTG.She also has pacemaker and PAF.     Review of patient's allergies indicates:   Allergen Reactions    Atorvastatin Other (See Comments)     Severe muscle pain    Demerol [meperidine] Hives     Other reaction(s): Anaphylaxis    Penicillins Anaphylaxis     Other reaction(s): Anaphylaxis    Zithromax [azithromycin] Other (See Comments)     By shot, closed veins and made large bruises    Pravastatin Other (See Comments)     Severe myalgias.    Ativan [lorazepam] Anxiety     Made me goofy     Past Srical hx: CABG and PCI below  Past Medical History:   Diagnosis Date    Anticoagulant long-term use     Arthritis     Asthma     CHF (congestive heart failure)     COPD (chronic obstructive pulmonary disease)     Coronary artery disease     Depression     Encounter for blood transfusion     Hyperlipidemia     Hypertension     Peripheral vascular disease     Thyroid disease      Past Surgical History:   Procedure Laterality Date    AORTOCORONARY BYPASS-CABG N/A 4/16/2015    Performed by Mc [facility] at [facility] OR 2ND FLR    CARDIAC SURGERY      CORONARY ARTERY BYPASS GRAFT  04/16/2015    EYE SURGERY      HEART CATH-LEFT Left 4/13/2015    Performed by Fernando Yoder [facility] at [facility] CATH LAB    HYSTERECTOMY      INSERT / REPLACE / REMOVE PACEMAKER      peripheral angiogram       peripheral stenting      PLACEMENT-IVC FILTER Right 2014    [facility] at [facility] CATH LAB    REPLACEMENT, PULSE GENERATOR, CARDIAC PACEMAKER N/A 2014    Performed by [facility] at [facility] CATH LAB    STERNAL PLATING (SYNTHES) N/A 2015    Performed by Mc Kwan [facility] at [facility] OR [facility] FLR     Family History   Problem Relation Age of Onset    Cancer Mother     Lung cancer Father     Breast cancer Sister      Social History     Tobacco Use    Smoking status: Former Smoker     Packs/day: 1.00     Years: 45.00     Pack years: 45.00     Types: Cigarettes     Last attempt to quit: 10/3/2012     Years since quittin.1    Smokeless tobacco: Never Used    Tobacco comment: 43 years   Substance Use Topics    Alcohol use: No    Drug use: Not on file        Review of Systems:  Constitutional: negative  Eyes: negative  Ears, nose, mouth, throat, and face: negative  Respiratory: negative  Cardiovascular: negative  Gastrointestinal: negative  Genitourinary:negative  Hematologic/lymphatic: negative  Musculoskeletal:negative,   Neurological: negative  Behavioral/Psych: negative  Endocrine: negative  Allergic/Immunologic: negative    OBJECTIVE:     Vital Signs (Most Recent)       Vital Signs Range (Last 24H):       No current facility-administered medications for this encounter.      Current Outpatient Medications   Medication Sig    amLODIPine (NORVASC) 5 MG tablet Take 1 tablet (5 mg total) by mouth once daily.    apixaban (ELIQUIS) 5 mg Tab Take 1 tablet (5 mg total) by mouth 2 (two) times daily.    aspirin (ECOTRIN) 81 MG EC tablet Take 81 mg by mouth every evening.    benazepril (LOTENSIN) 40 MG tablet Take 40 mg by mouth once daily.    cyclobenzaprine (FLEXERIL) 10 MG tablet Take 10 mg by mouth 3 (three) times daily as needed.    ergocalciferol (VITAMIN D2) 50,000 unit Cap Take 50,000 Units by mouth every 7 days.    esomeprazole (NEXIUM) 40 MG capsule  Take 1 capsule (40 mg total) by mouth before breakfast.    ferrous sulfate 325 mg (65 mg iron) Tab tablet Take 325 mg by mouth daily with breakfast.    gabapentin (NEURONTIN) 600 MG tablet Take 600 mg by mouth 3 (three) times daily.    hydroCHLOROthiazide (HYDRODIURIL) 25 MG tablet Take 12.5 mg by mouth once daily.    hydrocodone-acetaminophen 10-325mg (NORCO)  mg Tab Take 1 tablet by mouth 2 (two) times daily as needed.    isosorbide mononitrate (IMDUR) 60 MG 24 hr tablet Take one tablet twice daily    levothyroxine (SYNTHROID) 50 MCG tablet Take 1 tablet (50 mcg total) by mouth once daily.    metoprolol succinate (TOPROL-XL) 25 MG 24 hr tablet Take 1/2 tablet,12.5 mg daily.    nitroGLYCERIN (NITROSTAT) 0.4 MG SL tablet Place 1 tablet (0.4 mg total) under the tongue every 5 (five) minutes as needed for Chest pain.    potassium chloride (MICRO-K) 10 MEQ CpSR Take 1 capsule (10 mEq total) by mouth 2 (two) times daily.    pramipexole (MIRAPEX) 0.25 MG tablet Take 0.25 mg by mouth every evening.    promethazine (PHENERGAN) 25 MG tablet Take 25 mg by mouth every 6 (six) hours as needed.    ranolazine (RANEXA) 1,000 mg Tb12 Take 1 tablet (1,000 mg total) by mouth 2 (two) times daily.    sucralfate (CARAFATE) 1 gram tablet Take 1 g by mouth 4 (four) times daily before meals and nightly.    ticagrelor (BRILINTA) 90 mg tablet Take 1 tablet (90 mg total) by mouth 2 (two) times daily.    umeclidinium-vilanterol (ANORO ELLIPTA) 62.5-25 mcg/actuation DsDv Inhale 1 puff into the lungs once daily. Controller    VENTOLIN HFA 90 mcg/actuation inhaler INHALE ONE PUFF BY MOUTH EVERY FOUR HOURS AS NEEDED    vilazodone (VIIBRYD) 20 mg Tab Take 20 mg by mouth. One time a day with meals     No current facility-administered medications on file prior to encounter.      Current Outpatient Medications on File Prior to Encounter   Medication Sig    amLODIPine (NORVASC) 5 MG tablet Take 1 tablet (5 mg total) by mouth  once daily.    apixaban (ELIQUIS) 5 mg Tab Take 1 tablet (5 mg total) by mouth 2 (two) times daily.    aspirin (ECOTRIN) 81 MG EC tablet Take 81 mg by mouth every evening.    benazepril (LOTENSIN) 40 MG tablet Take 40 mg by mouth once daily.    cyclobenzaprine (FLEXERIL) 10 MG tablet Take 10 mg by mouth 3 (three) times daily as needed.    ergocalciferol (VITAMIN D2) 50,000 unit Cap Take 50,000 Units by mouth every 7 days.    esomeprazole (NEXIUM) 40 MG capsule Take 1 capsule (40 mg total) by mouth before breakfast.    ferrous sulfate 325 mg (65 mg iron) Tab tablet Take 325 mg by mouth daily with breakfast.    gabapentin (NEURONTIN) 600 MG tablet Take 600 mg by mouth 3 (three) times daily.    hydroCHLOROthiazide (HYDRODIURIL) 25 MG tablet Take 12.5 mg by mouth once daily.    hydrocodone-acetaminophen 10-325mg (NORCO)  mg Tab Take 1 tablet by mouth 2 (two) times daily as needed.    isosorbide mononitrate (IMDUR) 60 MG 24 hr tablet Take one tablet twice daily    levothyroxine (SYNTHROID) 50 MCG tablet Take 1 tablet (50 mcg total) by mouth once daily.    metoprolol succinate (TOPROL-XL) 25 MG 24 hr tablet Take 1/2 tablet,12.5 mg daily.    nitroGLYCERIN (NITROSTAT) 0.4 MG SL tablet Place 1 tablet (0.4 mg total) under the tongue every 5 (five) minutes as needed for Chest pain.    potassium chloride (MICRO-K) 10 MEQ CpSR Take 1 capsule (10 mEq total) by mouth 2 (two) times daily.    pramipexole (MIRAPEX) 0.25 MG tablet Take 0.25 mg by mouth every evening.    promethazine (PHENERGAN) 25 MG tablet Take 25 mg by mouth every 6 (six) hours as needed.    ranolazine (RANEXA) 1,000 mg Tb12 Take 1 tablet (1,000 mg total) by mouth 2 (two) times daily.    sucralfate (CARAFATE) 1 gram tablet Take 1 g by mouth 4 (four) times daily before meals and nightly.    ticagrelor (BRILINTA) 90 mg tablet Take 1 tablet (90 mg total) by mouth 2 (two) times daily.    umeclidinium-vilanterol (ANORO ELLIPTA) 62.5-25  mcg/actuation DsDv Inhale 1 puff into the lungs once daily. Controller    VENTOLIN HFA 90 mcg/actuation inhaler INHALE ONE PUFF BY MOUTH EVERY FOUR HOURS AS NEEDED    vilazodone (VIIBRYD) 20 mg Tab Take 20 mg by mouth. One time a day with meals       Physical Exam:  General appearance: alert, appears stated age and cooperative  Head: Normocephalic, without obvious abnormality, atraumatic  Eyes:  conjunctivae/corneas clear. PERRL, EOM's intact. Fundi benign.  Nose: no discharge  Throat: normal findings: tongue midline and normal  Neck: no adenopathy, no carotid bruit, no JVD, supple, symmetrical, trachea midline and thyroid not enlarged, symmetric, no tenderness/mass/nodules  Back:  no skin lesions, erythema, or scars  Lungs:  clear to auscultation bilaterally  Chest wall: no tenderness  Heart: regular rate and rhythm, S1, S2 normal, no murmur, click, rub or gallop  Abdomen: soft, non-tender; bowel sounds normal; no masses,  no organomegaly  Extremities: extremities normal, atraumatic, no cyanosis or edema  Pulses: 2+ and symmetric  Skin: Skin color, texture, turgor normal. No rashes or lesions  Neurologic: Grossly normal    Laboratory:  Chemistry:   Lab Results   Component Value Date     11/12/2018    K 3.7 11/12/2018     11/12/2018    CO2 29 11/12/2018    BUN 14 11/12/2018    CREATININE 0.9 11/12/2018    CALCIUM 10.0 11/12/2018     Cardiac Markers:   Lab Results   Component Value Date    CKMB 1.5 01/19/2013    TROPONINI 0.103 (H) 04/26/2015    TROPONINI 0.23 (H) 01/19/2013     Cardiac Markers (Last 3):   Lab Results   Component Value Date    CKMB 1.5 01/19/2013    CKMB 1.6 01/19/2013    CKMB 2.0 01/18/2013    TROPONINI 0.103 (H) 04/26/2015    TROPONINI 0.105 (H) 04/26/2015    TROPONINI 0.131 (H) 04/25/2015    TROPONINI 0.23 (H) 01/19/2013    TROPONINI 0.40 (H) 01/19/2013    TROPONINI 0.43 (H) 01/18/2013     CBC:   Lab Results   Component Value Date    WBC 7.66 11/12/2018    HGB 11.4 (L) 11/12/2018     HCT 37.2 11/12/2018    HCT 29 (L) 04/16/2015    MCV 84 11/12/2018     11/12/2018     Lipids:   Lab Results   Component Value Date    CHOL 156 05/04/2018    TRIG 129 05/04/2018    HDL 46 05/04/2018     Coagulation:   Lab Results   Component Value Date    INR 1.0 11/12/2018    INR 1.36 08/25/2014    APTT 23.8 11/12/2018       Diagnostic Results:  ECG: Reviewed  X-Ray: Reviewed  US: Reviewed  CT: Reviewed  Echo: Reviewed      ASSESSMENT/PLAN:     Patient Active Problem List   Diagnosis    PVD (peripheral vascular disease)    CAD (coronary artery disease)    HTN (hypertension)    Hyperlipidemia    Shoulder pain, bilateral    Pain in lower limb    History of smoking >40 pack years (quit 3 years ago)    Pulmonary embolus    Bradycardia    History of SSS (sick sinus syndrome)    Pacemaker    History of Pulmonary embolism    Long term current use of anticoagulant therapy    SOB (shortness of breath)    AF (atrial fibrillation)    COPD (chronic obstructive pulmonary disease)    Morbid obesity    Hx of CABG    Hypothyroid    Acute on chronic diastolic heart failure    Essential hypertension    Pacemaker lead malfunction    BMI 36.0-36.9,adult    Excessive sleepiness    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

## 2018-11-14 NOTE — BRIEF OP NOTE
<[facility] [place]  Surgery Department  Operative Note    SUMMARY     Date of Procedure: 11/14/2018     Procedure: Procedure(s) (LRB):  CATHETERIZATION, HEART, LEFT (Left)     Surgeon(s) and Role:     * Huber Kapoor MD - Primary    Assisting Surgeon: None    Pre-Operative Diagnosis: Unstable angina pectoris [I20.0]  Hx of CABG [Z95.1]    Post-Operative Diagnosis: Post-Op Diagnosis Codes:     * Unstable angina pectoris [I20.0]     * Hx of CABG [Z95.1]    Anesthesia: RN IV Sedation    Technical Procedures Used: [facility]    Description of the Findings of the Procedure: LHC-PATENT LIMA TO LAD, PATENT SVG TO OM (STENTS NOTED) ,NO CHANGES FROM 3-18 CATH POST PCI    Significant Surgical Tasks Conducted by the Assistant(s), if Applicable: NONE    Complications: No    Estimated Blood Loss (EBL): < 50 cc           Implants: * No implants in log *    Specimens:   Specimen (12h ago, onward)    None                  Condition: Good    Disposition: PACU - hemodynamically stable.    Attestation: I was present and scrubbed for the entire procedure.

## 2018-11-26 ENCOUNTER — TELEPHONE (OUTPATIENT)
Dept: CARDIOLOGY | Facility: CLINIC | Age: 68
End: 2018-11-26

## 2018-11-26 NOTE — TELEPHONE ENCOUNTER
Late entry for Tuesday 11/20/18 for site check. Site was without redness, drainage, discoloration or bruit heard.  Did feel a hard area under entry site th size of a large plum , closure device was used at time of procedure. Slightly tender with pressure. Unable to feel pulse in feet bilateral but skin warm to touch, color wnl and large toe bilateral blanched at 3 sec. Got Yuridia the echo tech to ck pulse with portable ultrasound. Pulse present. Pt instructed to call us if needed. cm

## 2019-01-29 ENCOUNTER — TELEPHONE (OUTPATIENT)
Dept: CARDIOLOGY | Facility: CLINIC | Age: 69
End: 2019-01-29

## 2019-01-29 NOTE — TELEPHONE ENCOUNTER
The patient was called.She is in [facility] for possible hernia repair. Appointment rescheduled for PPM check and follow up with Dr Kapoor.

## 2019-02-12 ENCOUNTER — TELEPHONE (OUTPATIENT)
Dept: CARDIOLOGY | Facility: CLINIC | Age: 69
End: 2019-02-12

## 2019-02-12 NOTE — TELEPHONE ENCOUNTER
The patient was called and reminded of appointment for tomorrow.F/U appointment with Dr Kapoor made post [facility] from [facility].PPM check 2/25/19 with [employer].

## 2019-02-13 ENCOUNTER — OFFICE VISIT (OUTPATIENT)
Dept: CARDIOLOGY | Facility: CLINIC | Age: 69
End: 2019-02-13
Payer: MEDICARE

## 2019-02-13 VITALS
BODY MASS INDEX: 35.49 KG/M2 | WEIGHT: 207.88 LBS | HEART RATE: 80 BPM | SYSTOLIC BLOOD PRESSURE: 149 MMHG | DIASTOLIC BLOOD PRESSURE: 67 MMHG | HEIGHT: 64 IN

## 2019-02-13 DIAGNOSIS — F17.200 SMOKER: ICD-10-CM

## 2019-02-13 DIAGNOSIS — E66.01 MORBID OBESITY: ICD-10-CM

## 2019-02-13 DIAGNOSIS — I10 ESSENTIAL HYPERTENSION: ICD-10-CM

## 2019-02-13 DIAGNOSIS — Z95.1 HISTORY OF INSERTION OF STENT INTO CORONARY ARTERY BYPASS GRAFT: ICD-10-CM

## 2019-02-13 DIAGNOSIS — I73.9 PVD (PERIPHERAL VASCULAR DISEASE): ICD-10-CM

## 2019-02-13 DIAGNOSIS — I50.32 CHRONIC CONGESTIVE HEART FAILURE WITH LEFT VENTRICULAR DIASTOLIC DYSFUNCTION: Chronic | ICD-10-CM

## 2019-02-13 DIAGNOSIS — E78.00 PURE HYPERCHOLESTEROLEMIA: ICD-10-CM

## 2019-02-13 DIAGNOSIS — I25.10 CORONARY ARTERY DISEASE INVOLVING NATIVE CORONARY ARTERY OF NATIVE HEART WITHOUT ANGINA PECTORIS: ICD-10-CM

## 2019-02-13 DIAGNOSIS — I48.0 PAROXYSMAL ATRIAL FIBRILLATION: ICD-10-CM

## 2019-02-13 DIAGNOSIS — Z95.0 PACEMAKER: ICD-10-CM

## 2019-02-13 DIAGNOSIS — J41.0 SIMPLE CHRONIC BRONCHITIS: Primary | ICD-10-CM

## 2019-02-13 DIAGNOSIS — Z79.01 LONG TERM CURRENT USE OF ANTICOAGULANT THERAPY: ICD-10-CM

## 2019-02-13 PROCEDURE — 99999 PR PBB SHADOW E&M-EST. PATIENT-LVL III: CPT | Mod: PBBFAC,,, | Performed by: INTERNAL MEDICINE

## 2019-02-13 PROCEDURE — 99214 PR OFFICE/OUTPT VISIT, EST, LEVL IV, 30-39 MIN: ICD-10-PCS | Mod: S$PBB,,, | Performed by: INTERNAL MEDICINE

## 2019-02-13 PROCEDURE — 99214 OFFICE O/P EST MOD 30 MIN: CPT | Mod: S$PBB,,, | Performed by: INTERNAL MEDICINE

## 2019-02-13 PROCEDURE — 99999 PR PBB SHADOW E&M-EST. PATIENT-LVL III: ICD-10-PCS | Mod: PBBFAC,,, | Performed by: INTERNAL MEDICINE

## 2019-02-13 PROCEDURE — 99213 OFFICE O/P EST LOW 20 MIN: CPT | Mod: PBBFAC,PO | Performed by: INTERNAL MEDICINE

## 2019-02-13 RX ORDER — FUROSEMIDE 40 MG/1
40 TABLET ORAL
COMMUNITY
End: 2019-02-20 | Stop reason: SDUPTHER

## 2019-02-13 NOTE — PROGRESS NOTES
Subjective:   Patient ID:  Kalpana Wright is a 69 y.o. female who presents for follow-up of No chief complaint on file.  Cath 11-18 patent grafts. Pt with recent hospitalizations for colitis.Patient denies CP, angina or anginal equivalent.  Lipids are normal with praluent  Hypertension   This is a chronic problem. The current episode started more than 1 year ago. The problem has been gradually improving since onset. The problem is controlled. Pertinent negatives include no chest pain, palpitations or shortness of breath. Past treatments include beta blockers, ACE inhibitors and diuretics. The current treatment provides moderate improvement. Compliance problems include medication side effects and exercise.    Hyperlipidemia   This is a chronic problem. The current episode started more than 1 year ago. The problem is controlled. Recent lipid tests were reviewed and are variable. Pertinent negatives include no chest pain or shortness of breath. Current antihyperlipidemic treatment includes statins. The current treatment provides moderate improvement of lipids. Compliance problems include medication side effects and adherence to exercise.    Coronary Artery Disease   Presents for follow-up visit. Symptoms include leg swelling. Pertinent negatives include no chest pain, chest pressure, chest tightness, dizziness, muscle weakness, palpitations, shortness of breath or weight gain. Risk factors include hyperlipidemia. The symptoms have been stable. Compliance with diet is variable. Compliance with exercise is variable. Compliance with medications is good.       Review of Systems   Constitution: Negative. Negative for weight gain.   HENT: Negative.    Eyes: Negative.    Cardiovascular: Positive for leg swelling. Negative for chest pain and palpitations.   Respiratory: Negative.  Negative for chest tightness and shortness of breath.    Endocrine: Negative.    Hematologic/Lymphatic: Negative.    Skin: Negative.     Musculoskeletal: Negative for muscle weakness.   Gastrointestinal: Negative.    Genitourinary: Negative.    Neurological: Negative.  Negative for dizziness.   Psychiatric/Behavioral: Negative.    Allergic/Immunologic: Negative.      Family History   Problem Relation Age of Onset    Cancer Mother     Lung cancer Father     Breast cancer Sister      Past Medical History:   Diagnosis Date    Anticoagulant long-term use     Arthritis     Asthma     CHF (congestive heart failure)     COPD (chronic obstructive pulmonary disease)     Coronary artery disease     Depression     Encounter for blood transfusion     Hyperlipidemia     Hypertension     Peripheral vascular disease     Thyroid disease      Current Outpatient Medications on File Prior to Visit   Medication Sig Dispense Refill    amLODIPine (NORVASC) 5 MG tablet Take 1 tablet (5 mg total) by mouth once daily. 30 tablet 11    apixaban (ELIQUIS) 5 mg Tab Take 1 tablet (5 mg total) by mouth 2 (two) times daily. 60 tablet 5    benazepril (LOTENSIN) 40 MG tablet Take 40 mg by mouth once daily.      cyclobenzaprine (FLEXERIL) 10 MG tablet Take 10 mg by mouth 3 (three) times daily as needed.      ergocalciferol (VITAMIN D2) 50,000 unit Cap Take 50,000 Units by mouth every 7 days.      esomeprazole (NEXIUM) 40 MG capsule Take 1 capsule (40 mg total) by mouth before breakfast. 30 capsule 11    ferrous sulfate 325 mg (65 mg iron) Tab tablet Take 325 mg by mouth daily with breakfast.      furosemide (LASIX) 40 MG tablet Take 40 mg by mouth. Take 80 mg in the am and 40 mg in the pm      gabapentin (NEURONTIN) 600 MG tablet Take 600 mg by mouth 3 (three) times daily.      hydroCHLOROthiazide (HYDRODIURIL) 25 MG tablet Take 12.5 mg by mouth once daily.      hydrocodone-acetaminophen 10-325mg (NORCO)  mg Tab Take 1 tablet by mouth 2 (two) times daily as needed.      isosorbide mononitrate (IMDUR) 60 MG 24 hr tablet Take one tablet twice daily 60  tablet 5    levothyroxine (SYNTHROID) 50 MCG tablet Take 1 tablet (50 mcg total) by mouth once daily. 30 tablet 0    metoprolol succinate (TOPROL-XL) 25 MG 24 hr tablet Take 1/2 tablet,12.5 mg daily. 30 tablet 11    nitroGLYCERIN (NITROSTAT) 0.4 MG SL tablet Place 1 tablet (0.4 mg total) under the tongue every 5 (five) minutes as needed for Chest pain. 25 tablet 11    potassium chloride (MICRO-K) 10 MEQ CpSR Take 1 capsule (10 mEq total) by mouth 2 (two) times daily. (Patient taking differently: Take 10 mEq by mouth 4 (four) times daily. ) 60 capsule 11    pramipexole (MIRAPEX) 0.25 MG tablet Take 0.25 mg by mouth every evening.      promethazine (PHENERGAN) 25 MG tablet Take 25 mg by mouth every 6 (six) hours as needed.      ranolazine (RANEXA) 1,000 mg Tb12 Take 1 tablet (1,000 mg total) by mouth 2 (two) times daily. 60 tablet 5    sucralfate (CARAFATE) 1 gram tablet Take 1 g by mouth 4 (four) times daily before meals and nightly.      ticagrelor (BRILINTA) 90 mg tablet Take 1 tablet (90 mg total) by mouth 2 (two) times daily. 60 tablet 5    umeclidinium-vilanterol (ANORO ELLIPTA) 62.5-25 mcg/actuation DsDv Inhale 1 puff into the lungs once daily. Controller      VENTOLIN HFA 90 mcg/actuation inhaler INHALE ONE PUFF BY MOUTH EVERY FOUR HOURS AS NEEDED 18 g 1    vilazodone (VIIBRYD) 20 mg Tab Take 20 mg by mouth. One time a day with meals      [DISCONTINUED] aspirin (ECOTRIN) 81 MG EC tablet Take 81 mg by mouth every evening.       No current facility-administered medications on file prior to visit.      Review of patient's allergies indicates:   Allergen Reactions    Atorvastatin Other (See Comments)     Severe muscle pain    Demerol [meperidine] Hives     Other reaction(s): Anaphylaxis    Penicillins Anaphylaxis     Other reaction(s): Anaphylaxis    Zithromax [azithromycin] Other (See Comments)     By shot, closed veins and made large bruises    Pravastatin Other (See Comments)     Severe  myalgias.    Ativan [lorazepam] Anxiety     Made me goofy       Objective:     Physical Exam   Constitutional: She is oriented to person, place, and time. She appears well-developed and well-nourished.   HENT:   Head: Normocephalic and atraumatic.   Eyes: Conjunctivae and EOM are normal. Pupils are equal, round, and reactive to light.   Neck: Normal range of motion. Neck supple.   Cardiovascular: Normal rate, regular rhythm, normal heart sounds and intact distal pulses.   Pulmonary/Chest: Effort normal and breath sounds normal.   Abdominal: Soft. Bowel sounds are normal.   Musculoskeletal: Normal range of motion.   Neurological: She is alert and oriented to person, place, and time.   Skin: Skin is warm and dry.   Psychiatric: She has a normal mood and affect.   Nursing note and vitals reviewed.      Assessment:     1. Simple chronic bronchitis    2. PVD (peripheral vascular disease)    3. Coronary artery disease involving native coronary artery of native heart without angina pectoris    4. Essential hypertension    5. Pure hypercholesterolemia    6. Pacemaker    7. Paroxysmal atrial fibrillation    8. Chronic congestive heart failure with left ventricular diastolic dysfunction    9. Long term current use of anticoagulant therapy    10. Morbid obesity    11. History of smoking >40 pack years (quit 3 years ago)    12. History of insertion of stent into coronary artery bypass graft        Plan:     Simple chronic bronchitis    PVD (peripheral vascular disease)    Coronary artery disease involving native coronary artery of native heart without angina pectoris    Essential hypertension    Pure hypercholesterolemia    Pacemaker    Paroxysmal atrial fibrillation    Chronic congestive heart failure with left ventricular diastolic dysfunction    Long term current use of anticoagulant therapy    Morbid obesity    History of smoking >40 pack years (quit 3 years ago)    History of insertion of stent into coronary artery bypass  graft      Continue metoprolol, benazapril, norvasc- htn  Continue brilenta- cad  Continue eliquis- PAF  Continue praluent- hlp   Increase diuretics- LE edema

## 2019-02-20 DIAGNOSIS — I25.10 CORONARY ARTERY DISEASE INVOLVING NATIVE CORONARY ARTERY OF NATIVE HEART WITHOUT ANGINA PECTORIS: ICD-10-CM

## 2019-02-20 DIAGNOSIS — Z95.1 S/P CABG (CORONARY ARTERY BYPASS GRAFT): ICD-10-CM

## 2019-02-20 DIAGNOSIS — Z79.01 CURRENT USE OF LONG TERM ANTICOAGULATION: Primary | ICD-10-CM

## 2019-02-20 RX ORDER — CETIRIZINE HYDROCHLORIDE 10 MG/1
10 TABLET ORAL DAILY
COMMUNITY
End: 2020-09-28

## 2019-02-20 RX ORDER — FUROSEMIDE 40 MG/1
TABLET ORAL
Qty: 180 TABLET | Refills: 3 | Status: SHIPPED | OUTPATIENT
Start: 2019-02-20 | End: 2019-05-30 | Stop reason: SDUPTHER

## 2019-02-20 RX ORDER — ROPINIROLE 5 MG/1
5 TABLET, FILM COATED ORAL NIGHTLY
COMMUNITY
End: 2019-09-30 | Stop reason: SDUPTHER

## 2019-02-20 RX ORDER — ISOSORBIDE MONONITRATE 30 MG/1
30 TABLET, EXTENDED RELEASE ORAL 2 TIMES DAILY
COMMUNITY
End: 2020-03-23 | Stop reason: SDUPTHER

## 2019-02-20 RX ORDER — SUCRALFATE 1 G/1
1 TABLET ORAL 2 TIMES DAILY
COMMUNITY
End: 2019-08-08

## 2019-02-20 RX ORDER — ONDANSETRON 4 MG/1
4 TABLET, ORALLY DISINTEGRATING ORAL ONCE
COMMUNITY
End: 2019-04-24

## 2019-02-20 RX ORDER — BISOPROLOL FUMARATE 10 MG/1
10 TABLET, FILM COATED ORAL DAILY
COMMUNITY
End: 2020-07-01 | Stop reason: SDUPTHER

## 2019-02-20 RX ORDER — DICYCLOMINE HYDROCHLORIDE 10 MG/1
10 CAPSULE ORAL
COMMUNITY
End: 2019-04-24

## 2019-02-20 RX ORDER — AMLODIPINE BESYLATE 10 MG/1
10 TABLET ORAL DAILY
COMMUNITY
End: 2020-07-01 | Stop reason: SDUPTHER

## 2019-02-20 RX ORDER — TEMAZEPAM 22.5 MG/1
30 CAPSULE ORAL NIGHTLY PRN
COMMUNITY
End: 2019-09-12 | Stop reason: SDUPTHER

## 2019-02-20 RX ORDER — HYDRALAZINE HYDROCHLORIDE 25 MG/1
25 TABLET, FILM COATED ORAL 2 TIMES DAILY
COMMUNITY
End: 2020-07-01 | Stop reason: SDUPTHER

## 2019-02-20 NOTE — TELEPHONE ENCOUNTER
[facility] ,Franko Masters was at [facility] and she is out of some of her meds.The patient was d/c from [facility] recently and med changes were made.There was a question regarding her Lasix which I checked with Dr Kapoor. The Lasix will be 80 mg BID until her follow up with Celia Whaley due to excess edema,otherwise 80 each am and 40 each pm.Rx refill to be sent to her pharmacy.A list of the updated med list will be sent to [facility] at [phone] and reviewed again with patient.A list will be sent to patient as well.I contacted [facility] and notified them of d/c meds.Updated list faxed to Pharmacy.

## 2019-02-25 ENCOUNTER — CLINICAL SUPPORT (OUTPATIENT)
Dept: CARDIOLOGY | Facility: CLINIC | Age: 69
End: 2019-02-25
Attending: INTERNAL MEDICINE
Payer: MEDICARE

## 2019-02-25 DIAGNOSIS — I49.5 SSS (SICK SINUS SYNDROME): ICD-10-CM

## 2019-02-25 DIAGNOSIS — Z95.0 PACEMAKER: ICD-10-CM

## 2019-02-25 PROCEDURE — 93280 PACEMAKER INTERROGATION: ICD-10-PCS | Mod: 26,,, | Performed by: INTERNAL MEDICINE

## 2019-02-25 PROCEDURE — 93280 PM DEVICE PROGR EVAL DUAL: CPT | Mod: 26,,, | Performed by: INTERNAL MEDICINE

## 2019-02-28 ENCOUNTER — OFFICE VISIT (OUTPATIENT)
Dept: CARDIOLOGY | Facility: CLINIC | Age: 69
End: 2019-02-28
Payer: MEDICARE

## 2019-02-28 ENCOUNTER — TELEPHONE (OUTPATIENT)
Dept: CARDIOLOGY | Facility: CLINIC | Age: 69
End: 2019-02-28

## 2019-02-28 VITALS
DIASTOLIC BLOOD PRESSURE: 66 MMHG | BODY MASS INDEX: 35.56 KG/M2 | SYSTOLIC BLOOD PRESSURE: 146 MMHG | WEIGHT: 208.31 LBS | HEIGHT: 64 IN | HEART RATE: 59 BPM

## 2019-02-28 DIAGNOSIS — I25.118 CORONARY ARTERY DISEASE OF NATIVE ARTERY OF NATIVE HEART WITH STABLE ANGINA PECTORIS: ICD-10-CM

## 2019-02-28 DIAGNOSIS — Z95.1 HISTORY OF INSERTION OF STENT INTO CORONARY ARTERY BYPASS GRAFT: ICD-10-CM

## 2019-02-28 DIAGNOSIS — Z01.810 PREOP CARDIOVASCULAR EXAM: Primary | ICD-10-CM

## 2019-02-28 DIAGNOSIS — I10 ESSENTIAL HYPERTENSION: ICD-10-CM

## 2019-02-28 DIAGNOSIS — Z87.891 HISTORY OF SMOKING 30 OR MORE PACK YEARS: ICD-10-CM

## 2019-02-28 DIAGNOSIS — E78.00 PURE HYPERCHOLESTEROLEMIA: ICD-10-CM

## 2019-02-28 DIAGNOSIS — I48.0 PAROXYSMAL ATRIAL FIBRILLATION: ICD-10-CM

## 2019-02-28 DIAGNOSIS — Z95.0 PACEMAKER: ICD-10-CM

## 2019-02-28 DIAGNOSIS — I49.5 SSS (SICK SINUS SYNDROME): ICD-10-CM

## 2019-02-28 DIAGNOSIS — R60.9 EDEMA, UNSPECIFIED TYPE: ICD-10-CM

## 2019-02-28 DIAGNOSIS — E66.01 CLASS 2 SEVERE OBESITY DUE TO EXCESS CALORIES WITH SERIOUS COMORBIDITY AND BODY MASS INDEX (BMI) OF 35.0 TO 35.9 IN ADULT: ICD-10-CM

## 2019-02-28 DIAGNOSIS — I73.9 PVD (PERIPHERAL VASCULAR DISEASE): ICD-10-CM

## 2019-02-28 PROCEDURE — 99214 OFFICE O/P EST MOD 30 MIN: CPT | Mod: S$PBB,,, | Performed by: NURSE PRACTITIONER

## 2019-02-28 PROCEDURE — 99214 PR OFFICE/OUTPT VISIT, EST, LEVL IV, 30-39 MIN: ICD-10-PCS | Mod: S$PBB,,, | Performed by: NURSE PRACTITIONER

## 2019-02-28 PROCEDURE — 99999 PR PBB SHADOW E&M-EST. PATIENT-LVL IV: CPT | Mod: PBBFAC,,, | Performed by: NURSE PRACTITIONER

## 2019-02-28 PROCEDURE — 99214 OFFICE O/P EST MOD 30 MIN: CPT | Mod: PBBFAC,PO | Performed by: NURSE PRACTITIONER

## 2019-02-28 PROCEDURE — 99999 PR PBB SHADOW E&M-EST. PATIENT-LVL IV: ICD-10-PCS | Mod: PBBFAC,,, | Performed by: NURSE PRACTITIONER

## 2019-02-28 RX ORDER — POTASSIUM CHLORIDE 750 MG/1
CAPSULE, EXTENDED RELEASE ORAL
Qty: 60 CAPSULE | Refills: 11 | Status: SHIPPED | OUTPATIENT
Start: 2019-02-28 | End: 2020-09-21 | Stop reason: SDUPTHER

## 2019-02-28 NOTE — PROGRESS NOTES
Subjective:    Patient ID:  Kalpana Wright is a 69 y.o. female who presents for evaluation of surgery clearance and Follow-up (2 week follow up)      HPI: Ms. Kalpana Wright presents to the clinic for CV preop exam for colonoscopy. She has been having colitis. Lower extremity edema improved with increase in lasix dose to 80mg BID. She also increased potassium supplement accordingly. She denied any chest pain or SOB.    Cath 11-18 patent grafts. Pt with recent hospitalizations for colitis.  Venous U/S left leg done  At [facility] on August 22, 2018: negative for DVT.    Medications: she is not missing any doses. She is taking Imdur 60mg Qam and 60mg Qpm and ranexa. Taking furosemide 80mg BID. She is not taking praluent (last dose in December 2018) because she is having trouble with insurance/cost.    Sodium: she does not add salt to foods at the table, but she does cook with salt.  she is not reading labels for sodium content. Eats some salty foods.  Exercise: she does not; she is sedentary.  Tobacco: former smoker   Alcohol: no alcohol use     Weight: 94.5 kg (208 lb 5.4 oz) she states that her daily weights has been stable Body mass index is 35.76 kg/m².    Wt Readings from Last 3 Encounters:   02/28/19 94.5 kg (208 lb 5.4 oz)   02/13/19 94.3 kg (207 lb 14.3 oz)   11/14/18 93.9 kg (207 lb)     BP log: None; she has no BP monitor. She is waiting for BP cuff.    Review of Systems   Constitution: Negative for chills, decreased appetite, diaphoresis, fever, weakness, malaise/fatigue, night sweats, weight gain and weight loss.   HENT: Negative for congestion.    Cardiovascular: Negative for chest pain, claudication, cyanosis, dyspnea on exertion, irregular heartbeat, leg swelling, near-syncope, orthopnea, paroxysmal nocturnal dyspnea and syncope.   Respiratory: Negative for cough, hemoptysis, shortness of breath, sputum production and wheezing.    Hematologic/Lymphatic: Negative for adenopathy and bleeding problem.  Bruises/bleeds easily.   Skin: Negative for color change and nail changes.   Gastrointestinal: Negative for bloating, abdominal pain, change in bowel habit, heartburn, hematochezia, melena, nausea and vomiting.   Genitourinary: Negative for hematuria.   Neurological: Negative for dizziness and light-headedness.   Psychiatric/Behavioral: Positive for depression (improved; she now has a boyfriend, and she is feeling better. But she still cries when she talks about her mother.). Negative for altered mental status.       Objective:   Physical Exam   Constitutional: She is oriented to person, place, and time. She appears well-developed and well-nourished. No distress.   HENT:   Head: Normocephalic and atraumatic.   Eyes: Conjunctivae are normal. No scleral icterus.   Neck: Neck supple. No JVD present. No tracheal deviation present. No thyromegaly present.   Cardiovascular: Normal rate, regular rhythm, normal heart sounds and intact distal pulses. Exam reveals no gallop and no friction rub.   No murmur heard.  Pulses:       Radial pulses are 2+ on the right side, and 2+ on the left side.        Dorsalis pedis pulses are 2+ on the right side, and 2+ on the left side.        Posterior tibial pulses are 2+ on the right side, and 2+ on the left side.   Pulmonary/Chest: Effort normal. No respiratory distress. She has no wheezes. She has no rales. She exhibits no tenderness.   Abdominal: Soft. Bowel sounds are normal. She exhibits no distension and no mass. There is no tenderness. There is no rebound and no guarding.   Abdomen obese.   Musculoskeletal: Normal range of motion. She exhibits no edema.   Lymphadenopathy:     She has no cervical adenopathy.   Neurological: She is alert and oriented to person, place, and time.   Skin: Skin is warm and dry. No rash noted. She is not diaphoretic. No erythema. No pallor.   Pink; several ecchymotic areas noted on forearms. One small laceration to left anterior lower tibial area. Bruising  noted. Mildly pink surrounding the area. No drainage or swelling. She always has tenderness to palpation of the tibia bilaterally. No change.   Psychiatric: She has a normal mood and affect.     Addendum: labs received from [facility], collected on 01/09/18: glucose 110, sodium 140, K 4.5, BUN 30, creatinine 1.0 eGFR 55, ALT 7, cholesterol 152, triglycerides 77, HDL 58, LDL 79, VLDL 15, Non-HDL 94; Chol/HDL 2.6. APO B 77. She did not tolerate atorvastatin, lovastatin, or pravastatin.    Echo 02/19/18: CONCLUSIONS     1 - Concentric hypertrophy.     2 - No wall motion abnormalities.     3 - Normal left ventricular systolic function (EF 60-65%).     4 - Normal left ventricular diastolic function.     5 - Normal right ventricular systolic function .     6 - The estimated PA systolic pressure is 24 mmHg.     Pharm. NM stress test 02/28/18:  Nuclear Quantitative Functional Analysis: LVEF: >= 70 %  Impression: ABNORMAL MYOCARDIAL PERFUSION  1. There is a moderate fixed defect of moderate intensity that extends from the base to the apical inferior wall of the left ventricle and small to moderate fixed defect of moderate intensity that extends from the base to the apical inferolateral wall of the left ventricle. There is trivial rojelio-injury ischemia.   2. There is abnormal wall motion at rest showing hypokinesis of the inferior wall of the left ventricle.   3. Resting LV function is normal.   4. The ventricular volumes are normal at rest and stress.   5. The extracardiac distribution of radioactivity is normal.      Venous doppler 02/19/18: LEFT:  No evidence of Left lower extremity DVT.      PPM interrogation 2/29/19:   Episodes  # of mode switch 9  % of time in mode switch <1%  Longest < 1 hour    High venticular rates  #of switches 24   24 NSVT rates 150 to 170     Assessment:      1. Preop cardiovascular exam    2. Coronary artery disease of native artery of native heart with stable angina pectoris    3.  History of insertion of stent into coronary artery bypass graft    4. Paroxysmal atrial fibrillation    5. Pacemaker    6. History of SSS (sick sinus syndrome)    7. PVD (peripheral vascular disease)    8. Essential hypertension    9. Pure hypercholesterolemia    10. Edema, unspecified type    11. Class 2 severe obesity due to excess calories with serious comorbidity and body mass index (BMI) of 35.0 to 35.9 in adult    12. History of smoking 30 or more pack years        Plan:     [facility] - Blood pressure log.  Ms. Wright is in stable cardiac condition for colonoscopy. Can hold brilinta for one week and eliquis for a minimum of two days prior to procedure. Resume ASAP after procedure. Note to Dr. Bo.  Continue amlodipine, benazepril, hydralazine, and bisoprolol - HTN.  Continue apixaban, bisoprolol - PAF  Continue brilinta, bisoprolol, benazepril - CAD; she is statin intolerant. Lipids at goal. Praluent prescription sent to [facility] for assistance.  Decrease lasix to 80mg in am and 40mg in pm-her usual dose. Increase for edema/weight gain; mirror changes in potassium. Requested potassium capsules, because she is having a difficult time swallowing the tablets.  Sodium restriction encouraged.  Elevate legs when seated. Triple antibiotic ointment to laceration left lower tibial area. Monitor for infection. She does have slow wound healing.  Walking as much as possible.  Follow up with Dr. Kapoor on May 21, 2019 as planned or call sooner for any problems.    Celia Whaley NP  [facility]    Addendum: BP log from [facility] for the month of February indicates an average BP of 136/74. Will monitor on current therapy and adjust medications as needed.

## 2019-03-01 ENCOUNTER — TELEPHONE (OUTPATIENT)
Dept: PHARMACY | Facility: CLINIC | Age: 69
End: 2019-03-01

## 2019-03-11 NOTE — TELEPHONE ENCOUNTER
Praluent initial attempted.  Patient was busy and would like at call back on 3/11/19 @ 9:30am to discuss Praluent approval.  $3.80 in 004.

## 2019-03-12 NOTE — TELEPHONE ENCOUNTER
Patient informed of Praluent 75mg/mL PENS approval.  Praluent will be shipped on 3/13/19 to arrive at patient's home on 3/14/19 via [employer]. $3.80 copay. Patient will start Praluent on 3/15/19. Address confirmed. Confirmed 2 patient identifiers - name and . Therapy Appropriate.      Patient is treatment experienced so consultation was declined.  She was on Praluent for 2-3 months in 2018 but stopped due to payment issues; other pharmacy did not accept money order.  Patient is eager to restart Praluent.  She previously injected Praluent on her thighs without any difficulties; no side effects experienced.     Counseled patient on administration directions:  Inject 75mg (1 pen) into the skin every 14 days.  Monthly RX will come with gauze, bandaids, and alcohol swabs.  Storage reviewed  [facility] services explained.  I will f/u with her in one week after start and [facility] will reach out to her monthly to arrange refills.  All questions answered and addressed to patients satisfaction. Pt verbalized understanding.

## 2019-03-20 ENCOUNTER — TELEPHONE (OUTPATIENT)
Dept: CARDIOLOGY | Facility: CLINIC | Age: 69
End: 2019-03-20

## 2019-03-20 NOTE — TELEPHONE ENCOUNTER
The patient needs clearance from Dr Kapoor for [facility] to hold her Eliquis and Brilinta to do a GI procedure.Message sent to Dr Kapoor regarding parameters.

## 2019-03-21 RX ORDER — ENOXAPARIN SODIUM 100 MG/ML
1 INJECTION SUBCUTANEOUS 2 TIMES DAILY
Qty: 3.6 ML | Refills: 1 | Status: CANCELLED | OUTPATIENT
Start: 2019-03-30 | End: 2019-04-01

## 2019-03-21 NOTE — TELEPHONE ENCOUNTER
The patient was given instructions for the lovenox.She has administered before. When she takes the last  Eliquis on Friday morning,she will start lovenox 1mg /kilo subcutaneous BID the following day until procedure morning she will hold Lovenox.Orders to provider to approve Rx.

## 2019-03-21 NOTE — TELEPHONE ENCOUNTER
----- Message from Huber Kapoor MD sent at 3/20/2019  9:29 PM CDT -----  7 days brilinta, 2 days eliquis with lovenox bridge  ----- Message -----  From: Louis Knox RN  Sent: 3/20/2019   3:45 PM  To: MD Dr Antwon Odom this patient is having a GI procedure with [facility]. She needs clearance to hold her Brilinta and Eliquis and how many days do you approve?   Thank you,  Akua

## 2019-03-21 NOTE — TELEPHONE ENCOUNTER
----- Message from Denisa Geller sent at 3/21/2019 11:36 AM CDT -----  needs call back to have lovenox shots done, please order..[phone] (home)

## 2019-03-25 NOTE — TELEPHONE ENCOUNTER
Praluent follow up. Confirmed two patient identifiers - name + . Patient states that she started Praluent on 3/17/19, . Her next dose is due on 3/31/19. She is currently admitted in the hospital due to colitis symptoms x 1 week. She denies any other changes to allergies, health conditions, medications or insurance. All questions ansered to patient's satisfaction. [facility] will continue to follow up with patient for refills. TTN

## 2019-03-26 DIAGNOSIS — I25.10 CORONARY ARTERY DISEASE INVOLVING NATIVE CORONARY ARTERY OF NATIVE HEART WITHOUT ANGINA PECTORIS: ICD-10-CM

## 2019-03-26 DIAGNOSIS — I25.10 CORONARY ARTERY DISEASE INVOLVING NATIVE CORONARY ARTERY OF NATIVE HEART WITHOUT ANGINA PECTORIS: Primary | ICD-10-CM

## 2019-03-26 DIAGNOSIS — I73.9 PVD (PERIPHERAL VASCULAR DISEASE): Primary | ICD-10-CM

## 2019-03-26 RX ORDER — ENOXAPARIN SODIUM 100 MG/ML
INJECTION SUBCUTANEOUS
Status: CANCELLED | OUTPATIENT
Start: 2019-03-26

## 2019-03-26 NOTE — TELEPHONE ENCOUNTER
I contacted patient regarding her preop instructions for her upcoming GI testing and she stated she is in [facility] for abdominal distention and pain.She is holding her Brilinta now x 3 days and possibly have her EGD while in the hospital or she may be discharged and done as OP as scheduled.Lovenox Rx sent to [facility].The patient will contact office if she is d/c.

## 2019-03-27 RX ORDER — ENOXAPARIN SODIUM 100 MG/ML
1 INJECTION SUBCUTANEOUS 2 TIMES DAILY
Qty: 3.6 ML | Refills: 1 | Status: SHIPPED | OUTPATIENT
Start: 2019-03-27 | End: 2019-03-29

## 2019-04-08 ENCOUNTER — TELEPHONE (OUTPATIENT)
Dept: PHARMACY | Facility: CLINIC | Age: 69
End: 2019-04-08

## 2019-04-24 ENCOUNTER — OFFICE VISIT (OUTPATIENT)
Dept: CARDIOLOGY | Facility: CLINIC | Age: 69
End: 2019-04-24
Payer: MEDICARE

## 2019-04-24 VITALS
HEART RATE: 60 BPM | SYSTOLIC BLOOD PRESSURE: 132 MMHG | BODY MASS INDEX: 35.48 KG/M2 | DIASTOLIC BLOOD PRESSURE: 53 MMHG | HEIGHT: 64 IN | WEIGHT: 207.81 LBS

## 2019-04-24 DIAGNOSIS — I26.99 OTHER PULMONARY EMBOLISM WITHOUT ACUTE COR PULMONALE, UNSPECIFIED CHRONICITY: ICD-10-CM

## 2019-04-24 DIAGNOSIS — I48.0 PAROXYSMAL ATRIAL FIBRILLATION: ICD-10-CM

## 2019-04-24 DIAGNOSIS — Z87.891 HISTORY OF SMOKING 30 OR MORE PACK YEARS: ICD-10-CM

## 2019-04-24 DIAGNOSIS — Z95.1 HX OF CABG: ICD-10-CM

## 2019-04-24 DIAGNOSIS — E66.01 CLASS 2 SEVERE OBESITY DUE TO EXCESS CALORIES WITH SERIOUS COMORBIDITY AND BODY MASS INDEX (BMI) OF 35.0 TO 35.9 IN ADULT: ICD-10-CM

## 2019-04-24 DIAGNOSIS — I10 ESSENTIAL HYPERTENSION: ICD-10-CM

## 2019-04-24 DIAGNOSIS — Z95.1 HISTORY OF INSERTION OF STENT INTO CORONARY ARTERY BYPASS GRAFT: ICD-10-CM

## 2019-04-24 DIAGNOSIS — I25.10 CORONARY ARTERY DISEASE INVOLVING NATIVE CORONARY ARTERY OF NATIVE HEART WITHOUT ANGINA PECTORIS: ICD-10-CM

## 2019-04-24 DIAGNOSIS — Z01.810 ENCOUNTER FOR PRE-OPERATIVE CARDIOVASCULAR CLEARANCE: Primary | ICD-10-CM

## 2019-04-24 DIAGNOSIS — E78.00 PURE HYPERCHOLESTEROLEMIA: ICD-10-CM

## 2019-04-24 DIAGNOSIS — Z01.810 PREOP CARDIOVASCULAR EXAM: Primary | ICD-10-CM

## 2019-04-24 DIAGNOSIS — I49.5 SSS (SICK SINUS SYNDROME): ICD-10-CM

## 2019-04-24 DIAGNOSIS — Z95.0 PACEMAKER: ICD-10-CM

## 2019-04-24 PROCEDURE — 99214 OFFICE O/P EST MOD 30 MIN: CPT | Mod: PBBFAC,PO | Performed by: NURSE PRACTITIONER

## 2019-04-24 PROCEDURE — 99999 PR PBB SHADOW E&M-EST. PATIENT-LVL IV: CPT | Mod: PBBFAC,,, | Performed by: NURSE PRACTITIONER

## 2019-04-24 PROCEDURE — 99999 PR PBB SHADOW E&M-EST. PATIENT-LVL IV: ICD-10-PCS | Mod: PBBFAC,,, | Performed by: NURSE PRACTITIONER

## 2019-04-24 PROCEDURE — 99214 OFFICE O/P EST MOD 30 MIN: CPT | Mod: S$PBB,,, | Performed by: NURSE PRACTITIONER

## 2019-04-24 PROCEDURE — 99214 PR OFFICE/OUTPT VISIT, EST, LEVL IV, 30-39 MIN: ICD-10-PCS | Mod: S$PBB,,, | Performed by: NURSE PRACTITIONER

## 2019-04-24 RX ORDER — ENOXAPARIN SODIUM 100 MG/ML
INJECTION SUBCUTANEOUS
Qty: 4 SYRINGE | Refills: 1 | Status: SHIPPED | OUTPATIENT
Start: 2019-05-07 | End: 2019-05-08

## 2019-04-24 NOTE — TELEPHONE ENCOUNTER
The patient is for a pre op clearance.Instructions given for Holding Brilinta 7 days prior to surgery and Eliquis 2 days prior to surgery,then starting Lovenox one mg per kiligram subcutaneously BID the morning when Eliquis held.Rx sent to Pharmacy and for provider to sign.Patient given written instructions and verbalized understanding.

## 2019-04-24 NOTE — PROGRESS NOTES
Subjective:    Patient ID:  Kalpana Wright is a 69 y.o. female who presents for evaluation of Pre-op Exam    HPI: Ms. Kalpana Wright presents to the clinic for CV preop exam for hernia repair. She stated that she has had abdominal pain and periumbilical hernia was found. She is planning surgery on May 9. She denied any chest pain or SOB. She has lost weight as well by making changes to her diet and walking daily.    Cath 11/14/18   Summary/Post-Operative Diagnosis   1.   Normal LVEF.   2.   Patent LIMA to LAD.   3.   Patent SVG to OM1(stented).    Medications: she is not missing any doses. She is taking Imdur and ranexa. Taking furosemide 80mg qam and 40mg qpm. She is taking praluent as directed. Next dose due Friday, April 26. She is taking brilinta and eliquis; Not on ASA.  Sodium: she does not add salt to foods at the table, but she does cook with salt.  she is not reading labels for sodium content. Eats some salty foods.  Exercise: she does not; she is walking some every day.  Tobacco: former smoker   Alcohol: no alcohol use     Weight: 94.3 kg (207 lb 12.5 oz) she states that her daily weights has been stable Body mass index is 35.67 kg/m².  Wt Readings from Last 3 Encounters:   04/24/19 94.3 kg (207 lb 12.5 oz)   02/28/19 94.5 kg (208 lb 5.4 oz)   02/13/19 94.3 kg (207 lb 14.3 oz)     BP log: None; she has no BP monitor.     Review of Systems   Constitution: Negative for chills, decreased appetite, diaphoresis, fever, malaise/fatigue, night sweats, weight gain and weight loss.   HENT: Positive for nosebleeds. Negative for congestion.    Cardiovascular: Negative for chest pain, claudication, cyanosis, dyspnea on exertion, irregular heartbeat, leg swelling, near-syncope, orthopnea, paroxysmal nocturnal dyspnea and syncope.   Respiratory: Negative for cough, hemoptysis, shortness of breath, sputum production and wheezing.    Hematologic/Lymphatic: Positive for bleeding problem (epistaxis with cautery at [facility]; some recurrent epistaxis that is mild and controlled. Has appointment with Dr. Aparicio for this.). Negative for adenopathy. Bruises/bleeds easily.   Skin: Negative for color change and nail changes.   Gastrointestinal: Negative for bloating, abdominal pain, change in bowel habit, heartburn, hematochezia, melena, nausea and vomiting.   Genitourinary: Negative for hematuria.   Neurological: Negative for dizziness, light-headedness and weakness.   Psychiatric/Behavioral: Positive for depression (improved; she is fishing with her cousin and enjoying that. But she still cries when she talks about her mother.). Negative for altered mental status.       Objective:   Physical Exam   Constitutional: She is oriented to person, place, and time. She appears well-developed and well-nourished. No distress.   HENT:   Head: Normocephalic and atraumatic.   Eyes: Conjunctivae are normal. No scleral icterus.   Neck: Neck supple. No JVD present. No tracheal deviation present. No thyromegaly present.   Cardiovascular: Normal rate, regular rhythm, normal heart sounds and intact distal pulses. Exam reveals no gallop and no friction rub.   No murmur heard.  Pulses:       Radial pulses are 2+ on the right side, and 2+ on the left side.        Dorsalis pedis pulses are 2+ on the right side, and 2+ on the left side.        Posterior tibial pulses are 2+ on the right side, and 2+ on the left side.   Pulmonary/Chest: Effort normal. No respiratory distress. She has no wheezes. She has no rales. She exhibits no tenderness.   Abdominal: Soft. Bowel sounds are normal. She exhibits no distension and no mass. There is no tenderness. There is no rebound and no guarding.   Abdomen obese.   Musculoskeletal: Normal range of motion. She exhibits no edema.   Lymphadenopathy:     She has no cervical adenopathy.   Neurological: She is alert and oriented to person, place, and time.   Skin: Skin is warm and dry. No rash noted. She is not diaphoretic.  No erythema. No pallor.   Pink; several ecchymotic areas noted on forearms. She always has tenderness to palpation of the tibia bilaterally. No change.   Psychiatric: She has a normal mood and affect.     Labs received from [facility], collected on 01/09/18: glucose 110, sodium 140, K 4.5, BUN 30, creatinine 1.0 eGFR 55, ALT 7, cholesterol 152, triglycerides 77, HDL 58, LDL 79, VLDL 15, Non-HDL 94; Chol/HDL 2.6. APO B 77. She did not tolerate atorvastatin, lovastatin, or pravastatin.    Echo 02/19/18: CONCLUSIONS     1 - Concentric hypertrophy.     2 - No wall motion abnormalities.     3 - Normal left ventricular systolic function (EF 60-65%).     4 - Normal left ventricular diastolic function.     5 - Normal right ventricular systolic function .     6 - The estimated PA systolic pressure is 24 mmHg.     Pharm. NM stress test 02/28/18:  Nuclear Quantitative Functional Analysis: LVEF: >= 70 %  Impression: ABNORMAL MYOCARDIAL PERFUSION  1. There is a moderate fixed defect of moderate intensity that extends from the base to the apical inferior wall of the left ventricle and small to moderate fixed defect of moderate intensity that extends from the base to the apical inferolateral wall of the left ventricle. There is trivial rojelio-injury ischemia.   2. There is abnormal wall motion at rest showing hypokinesis of the inferior wall of the left ventricle.   3. Resting LV function is normal.   4. The ventricular volumes are normal at rest and stress.   5. The extracardiac distribution of radioactivity is normal.     Venous U/S left leg done at [facility] on August 22, 2018: negative for DVT.     Venous doppler 02/19/18: LEFT:  No evidence of Left lower extremity DVT.      PPM interrogation 2/29/19:   Episodes  # of mode switch 9  % of time in mode switch <1%  Longest < 1 hour    High venticular rates  #of switches 24   24 NSVT rates 150 to 170       BP log from [facility] for the month of February  indicates an average BP of 136/74. Will monitor on current therapy and adjust medications as needed.    Assessment:      1. Preop cardiovascular exam    2. Coronary artery disease involving native coronary artery of native heart without angina pectoris    3. Hx of CABG    4. History of insertion of stent into coronary artery bypass graft    5. Paroxysmal atrial fibrillation    6. History of SSS (sick sinus syndrome)    7. Pacemaker    8. Essential hypertension    9. Pure hypercholesterolemia    10. Other pulmonary embolism without acute cor pulmonale, unspecified chronicity    11. Class 2 severe obesity due to excess calories with serious comorbidity and body mass index (BMI) of 35.0 to 35.9 in adult    12. History of smoking 30 or more pack years        Plan:          Ms. Wright is in stable cardiac condition for general surgery for hernia repair. Can hold brilinta for one week and eliquis for a minimum of two days prior to procedure. Resume ASAP after procedure. Lovenox bridge as before. Note to Leonel Nelson MD General Surgery [id number] [employer] [facility] [address] Phone: [phone] Fax: [phone]  Continue amlodipine, benazepril, hydralazine, and bisoprolol - HTN.  Continue apixaban, metoprolol - PAF  Continue brilinta, metoprolol, benazepril, and praluent - CAD; she is statin intolerant. Lipids at goal.   Continue lasix to 80mg in am and 40mg in pm-her usual dose. Increase for edema/weight gain; mirror changes in potassium. Requested potassium capsules, because she is having a difficult time swallowing the tablets.  Sodium restriction encouraged.  Walking as much as possible.  Follow up with Dr. Aparicio for epistaxis.  Follow up with Dr. Kapoor in June, 2019 or call sooner for any problems.    Celia Whaley NP  [facility]

## 2019-04-29 ENCOUNTER — TELEPHONE (OUTPATIENT)
Dept: CARDIOLOGY | Facility: CLINIC | Age: 69
End: 2019-04-29

## 2019-04-29 NOTE — TELEPHONE ENCOUNTER
Dimitryleighdg is at her house now. Pt had to remove packing in her nose and replace due to the bleeding soaked the first one . Still bleeding.  She had to pea size scabs on her left arm, both were bleeding earlier due to the fact she accidentally rubbed the arm against the door opening and the scab came off. Only one area bleeding at this time and HH nurse applying pressure. Told Carrol to please get discharge papers from ER visit and see if pt is missing any instructions she should be doing. Will ask Dr Kapoor to review and I will call Carrol back with any new orders. Carrol agreed. Cm    I called and per Raina, pt had to go to the er at [facility] for nose bleeds. They cauterized blood vessel in her nose. Now she is have bleeding from her hands and arms, but could not give me anymore information. She will go back out to see her today and then give detailed report. Cm    ----- Message from Olive Schroeder sent at 4/29/2019  7:55 AM CDT -----  Contact: [email or web address]  States the patient has been having nose bleeds and bleeding from her hands and arms the last six days. States she's taking eliquis 5 mg twice a day and brilieta 90 mg twice a day. Please call Raina at [phone]. Thank you

## 2019-04-29 NOTE — TELEPHONE ENCOUNTER
Per Dr Kapoor pt to cont meds as instructed for her procedure and go back to er if nose bleed continues. Cm      She has not stopped her eliquis.    Per Celia's last clinic note Kalpana Wright is in stable cardiac condition for general surgery for hernia repair. Can hold brilinta for one week and eliquis for a minimum of two days prior to procedure and she will be on lovenox inj as directed by Celia Whaley, NP Per pt she is to stop her eliquis on the the 7th of May and Stop the Brillinta on the 2nd of May; surgery to be done on 9th of may. Please review and advise. Thanks Estephanie               ----- Message from Alma Gongora sent at 4/29/2019 11:40 AM CDT -----  Contact: [facility]- [phone]  Would like to consult with nurse regarding patient's issue with excessive nose bleeds.  Please call back at [phone]. Md Karrie

## 2019-04-29 NOTE — TELEPHONE ENCOUNTER
Pt notified and to cont meds per Dr Kapoor and she is to go to the er if nose cont to bleed. Raina with HH notified and pt notified. olu

## 2019-04-30 ENCOUNTER — TELEPHONE (OUTPATIENT)
Dept: PHARMACY | Facility: CLINIC | Age: 69
End: 2019-04-30

## 2019-05-06 ENCOUNTER — TELEPHONE (OUTPATIENT)
Dept: PHARMACY | Facility: CLINIC | Age: 69
End: 2019-05-06

## 2019-05-28 ENCOUNTER — TELEPHONE (OUTPATIENT)
Dept: PHARMACY | Facility: CLINIC | Age: 69
End: 2019-05-28

## 2019-05-30 RX ORDER — FUROSEMIDE 40 MG/1
TABLET ORAL
Qty: 180 TABLET | Refills: 3 | Status: SHIPPED | OUTPATIENT
Start: 2019-05-30 | End: 2023-04-19 | Stop reason: SDUPTHER

## 2019-06-14 ENCOUNTER — TELEPHONE (OUTPATIENT)
Dept: PHARMACY | Facility: CLINIC | Age: 69
End: 2019-06-14

## 2019-06-17 ENCOUNTER — EXTERNAL HOME HEALTH (OUTPATIENT)
Dept: HOME HEALTH SERVICES | Facility: HOSPITAL | Age: 69
End: 2019-06-17
Payer: MEDICARE

## 2019-06-26 ENCOUNTER — OFFICE VISIT (OUTPATIENT)
Dept: CARDIOLOGY | Facility: CLINIC | Age: 69
End: 2019-06-26
Payer: MEDICARE

## 2019-06-26 ENCOUNTER — TELEPHONE (OUTPATIENT)
Dept: CARDIOLOGY | Facility: CLINIC | Age: 69
End: 2019-06-26

## 2019-06-26 VITALS
HEART RATE: 72 BPM | WEIGHT: 202.06 LBS | DIASTOLIC BLOOD PRESSURE: 60 MMHG | SYSTOLIC BLOOD PRESSURE: 122 MMHG | HEIGHT: 64 IN | BODY MASS INDEX: 34.5 KG/M2

## 2019-06-26 DIAGNOSIS — I73.9 PVD (PERIPHERAL VASCULAR DISEASE): Primary | ICD-10-CM

## 2019-06-26 DIAGNOSIS — I49.5 SSS (SICK SINUS SYNDROME): ICD-10-CM

## 2019-06-26 DIAGNOSIS — Z87.891 HISTORY OF SMOKING 30 OR MORE PACK YEARS: ICD-10-CM

## 2019-06-26 DIAGNOSIS — I10 ESSENTIAL HYPERTENSION: ICD-10-CM

## 2019-06-26 DIAGNOSIS — E66.01 CLASS 2 SEVERE OBESITY DUE TO EXCESS CALORIES WITH SERIOUS COMORBIDITY AND BODY MASS INDEX (BMI) OF 35.0 TO 35.9 IN ADULT: ICD-10-CM

## 2019-06-26 DIAGNOSIS — E78.00 PURE HYPERCHOLESTEROLEMIA: ICD-10-CM

## 2019-06-26 DIAGNOSIS — I25.10 CORONARY ARTERY DISEASE INVOLVING NATIVE CORONARY ARTERY OF NATIVE HEART WITHOUT ANGINA PECTORIS: ICD-10-CM

## 2019-06-26 DIAGNOSIS — Z95.1 HISTORY OF INSERTION OF STENT INTO CORONARY ARTERY BYPASS GRAFT: ICD-10-CM

## 2019-06-26 DIAGNOSIS — Z95.0 PACEMAKER: ICD-10-CM

## 2019-06-26 DIAGNOSIS — Z95.1 HX OF CABG: ICD-10-CM

## 2019-06-26 DIAGNOSIS — I49.5 SSS (SICK SINUS SYNDROME): Primary | ICD-10-CM

## 2019-06-26 PROCEDURE — 99999 PR PBB SHADOW E&M-EST. PATIENT-LVL III: CPT | Mod: PBBFAC,,, | Performed by: INTERNAL MEDICINE

## 2019-06-26 PROCEDURE — 99214 OFFICE O/P EST MOD 30 MIN: CPT | Mod: S$PBB,,, | Performed by: INTERNAL MEDICINE

## 2019-06-26 PROCEDURE — 99214 PR OFFICE/OUTPT VISIT, EST, LEVL IV, 30-39 MIN: ICD-10-PCS | Mod: S$PBB,,, | Performed by: INTERNAL MEDICINE

## 2019-06-26 PROCEDURE — 99999 PR PBB SHADOW E&M-EST. PATIENT-LVL III: ICD-10-PCS | Mod: PBBFAC,,, | Performed by: INTERNAL MEDICINE

## 2019-06-26 PROCEDURE — 99213 OFFICE O/P EST LOW 20 MIN: CPT | Mod: PBBFAC,PO | Performed by: INTERNAL MEDICINE

## 2019-06-26 NOTE — TELEPHONE ENCOUNTER
The patient was notified of PPM check for Monday 7/1/19.She agreed and Finderly notified and confirmed.

## 2019-06-26 NOTE — PROGRESS NOTES
Subjective:   Patient ID:  Kalpana Wright is a 69 y.o. female who presents for follow-up of Follow-up  Pt states LE edema still with increased diuretics.Pt lost about 70 lbs with C diff.  PPm check wnl.    Hypertension   This is a chronic problem. The current episode started more than 1 year ago. The problem has been gradually improving since onset. The problem is controlled. Pertinent negatives include no chest pain, palpitations or shortness of breath. Past treatments include direct vasodilators, beta blockers, calcium channel blockers and ACE inhibitors. The current treatment provides moderate improvement. There are no compliance problems.    Hyperlipidemia   This is a chronic problem. The current episode started more than 1 year ago. The problem is controlled. Recent lipid tests were reviewed and are variable. Pertinent negatives include no chest pain or shortness of breath. Current antihyperlipidemic treatment includes ezetimibe (praluent). The current treatment provides moderate improvement of lipids. Compliance problems include adherence to exercise.    Edema   This is a recurrent problem. The current episode started more than 1 year ago. The problem occurs intermittently. The problem has been waxing and waning. Pertinent negatives include no chest pain. The symptoms are aggravated by walking. The treatment provided mild relief.       Review of Systems   Constitution: Negative. Negative for weight gain.   HENT: Negative.    Eyes: Negative.    Cardiovascular: Negative.  Negative for chest pain, leg swelling and palpitations.   Respiratory: Negative.  Negative for shortness of breath.    Endocrine: Negative.    Hematologic/Lymphatic: Negative.    Skin: Negative.    Musculoskeletal: Negative for muscle weakness.   Gastrointestinal: Negative.    Genitourinary: Negative.    Neurological: Negative.  Negative for dizziness.   Psychiatric/Behavioral: Negative.    Allergic/Immunologic: Negative.      Family History    Problem Relation Age of Onset    Cancer Mother     Lung cancer Father     Breast cancer Sister      Past Medical History:   Diagnosis Date    Anticoagulant long-term use     Arthritis     Asthma     CHF (congestive heart failure)     COPD (chronic obstructive pulmonary disease)     Coronary artery disease     Depression     Encounter for blood transfusion     Hyperlipidemia     Hypertension     Peripheral vascular disease     Thyroid disease      Social History     Socioeconomic History    Marital status:      Spouse name: Not on file    Number of children: Not on file    Years of education: Not on file    Highest education level: Not on file   Occupational History    Not on file   Social Needs    Financial resource strain: Not on file    Food insecurity:     Worry: Not on file     Inability: Not on file    Transportation needs:     Medical: Not on file     Non-medical: Not on file   Tobacco Use    Smoking status: Former Smoker     Packs/day: 1.00     Years: 45.00     Pack years: 45.00     Types: Cigarettes     Last attempt to quit: 10/3/2012     Years since quittin.7    Smokeless tobacco: Never Used    Tobacco comment: 43 years   Substance and Sexual Activity    Alcohol use: No    Drug use: Not on file    Sexual activity: Not on file   Lifestyle    Physical activity:     Days per week: Not on file     Minutes per session: Not on file    Stress: Not on file   Relationships    Social connections:     Talks on phone: Not on file     Gets together: Not on file     Attends Sabianist service: Not on file     Active member of club or organization: Not on file     Attends meetings of clubs or organizations: Not on file     Relationship status: Not on file   Other Topics Concern    Not on file   Social History Narrative    Not on file     Current Outpatient Medications on File Prior to Visit   Medication Sig Dispense Refill    alirocumab (PRALUENT PEN) 75 mg/mL PnIj Inject 1  mL (75 mg total) into the skin every 14 (fourteen) days. 2 Syringe 11    amLODIPine (NORVASC) 10 MG tablet Take 10 mg by mouth once daily.      apixaban (ELIQUIS) 5 mg Tab Take 5 mg by mouth 2 (two) times daily.      benazepril (LOTENSIN) 40 MG tablet Take 40 mg by mouth once daily.      bisoprolol (ZEBETA) 10 MG tablet Take 10 mg by mouth once daily.      cetirizine (ZYRTEC) 10 MG tablet Take 10 mg by mouth once daily.      esomeprazole (NEXIUM) 40 MG capsule Take 1 capsule (40 mg total) by mouth before breakfast. 30 capsule 11    ferrous sulfate 325 mg (65 mg iron) Tab tablet Take 325 mg by mouth daily with breakfast.      furosemide (LASIX) 40 MG tablet Take 80 mg in the am and 80 mg BID x 7 days,then take 80 mg in am and 40 mg in the PM thereafter. 180 tablet 3    gabapentin (NEURONTIN) 600 MG tablet Take 600 mg by mouth 3 (three) times daily.      hydrALAZINE (APRESOLINE) 25 MG tablet Take 25 mg by mouth 2 (two) times daily.      hydrocodone-acetaminophen 10-325mg (NORCO)  mg Tab Take 1 tablet by mouth 2 (two) times daily as needed.      isosorbide mononitrate (IMDUR) 30 MG 24 hr tablet Take 30 mg by mouth 2 (two) times daily.      levothyroxine (SYNTHROID) 50 MCG tablet Take 1 tablet (50 mcg total) by mouth once daily. 30 tablet 0    nitroGLYCERIN (NITROSTAT) 0.4 MG SL tablet Place 1 tablet (0.4 mg total) under the tongue every 5 (five) minutes as needed for Chest pain. 25 tablet 11    potassium chloride (MICRO-K) 10 MEQ CpSR Take 2 capsules in am and 1 in the evening; if taking additional lasix, will need to take one additional potassium capsule in the evening 60 capsule 11    pramipexole (MIRAPEX) 0.25 MG tablet Take 0.25 mg by mouth every evening.      promethazine (PHENERGAN) 25 MG tablet Take 25 mg by mouth every 6 (six) hours as needed.      ranolazine (RANEXA) 1,000 mg Tb12 Take 1 tablet (1,000 mg total) by mouth 2 (two) times daily. 60 tablet 5    rOPINIRole (REQUIP) 5 MG  tablet Take 5 mg by mouth every evening.      sucralfate (CARAFATE) 1 gram tablet Take 1 g by mouth 2 (two) times daily. AC and HS      temazepam (RESTORIL) 22.5 MG capsule Take 30 mg by mouth nightly as needed for Insomnia.      ticagrelor (BRILINTA) 90 mg tablet Take 1 tablet (90 mg total) by mouth 2 (two) times daily. 60 tablet 5    umeclidinium-vilanterol (ANORO ELLIPTA) 62.5-25 mcg/actuation DsDv Inhale 1 puff into the lungs once daily. Controller      vilazodone (VIIBRYD) 20 mg Tab Take 20 mg by mouth. One time a day with meals       No current facility-administered medications on file prior to visit.      Review of patient's allergies indicates:   Allergen Reactions    Atorvastatin Other (See Comments)     Severe muscle pain    Demerol [meperidine] Hives     Other reaction(s): Anaphylaxis    Penicillins Anaphylaxis     Other reaction(s): Anaphylaxis    Zithromax [azithromycin] Other (See Comments)     By shot, closed veins and made large bruises    Pravastatin Other (See Comments)     Severe myalgias.    Ativan [lorazepam] Anxiety     Made me goofy       Objective:     Physical Exam   Constitutional: She is oriented to person, place, and time. She appears well-developed and well-nourished.   HENT:   Head: Normocephalic and atraumatic.   Eyes: Pupils are equal, round, and reactive to light. Conjunctivae and EOM are normal.   Neck: Normal range of motion. Neck supple.   Cardiovascular: Normal rate, regular rhythm, normal heart sounds and intact distal pulses.   Pulmonary/Chest: Effort normal and breath sounds normal.   Abdominal: Soft. Bowel sounds are normal.   Musculoskeletal: Normal range of motion.   Neurological: She is alert and oriented to person, place, and time.   Skin: Skin is warm and dry.   Psychiatric: She has a normal mood and affect.   Nursing note and vitals reviewed.      Assessment:     1. PVD (peripheral vascular disease)    2. Coronary artery disease involving native coronary  artery of native heart without angina pectoris    3. Essential hypertension    4. Pure hypercholesterolemia    5. History of SSS (sick sinus syndrome)    6. Pacemaker    7. Hx of CABG    8. History of smoking 30 or more pack years    9. Class 2 severe obesity due to excess calories with serious comorbidity and body mass index (BMI) of 35.0 to 35.9 in adult    10. History of insertion of stent into coronary artery bypass graft        Plan:     PVD (peripheral vascular disease)    Coronary artery disease involving native coronary artery of native heart without angina pectoris    Essential hypertension    Pure hypercholesterolemia    History of SSS (sick sinus syndrome)    Pacemaker    Hx of CABG    History of smoking 30 or more pack years    Class 2 severe obesity due to excess calories with serious comorbidity and body mass index (BMI) of 35.0 to 35.9 in adult    History of insertion of stent into coronary artery bypass graft      Stockings  Continue praluent-hlp  Continue brilenta,- cad  Continue ranexa, isosorbide-cad  Continue hydralazine, bisoprolol, benazapril, amlodipine-htn

## 2019-06-27 PROCEDURE — G0179 MD RECERTIFICATION HHA PT: HCPCS | Mod: ,,, | Performed by: FAMILY MEDICINE

## 2019-06-27 PROCEDURE — G0179 PR HOME HEALTH MD RECERTIFICATION: ICD-10-PCS | Mod: ,,, | Performed by: FAMILY MEDICINE

## 2019-07-01 ENCOUNTER — TELEPHONE (OUTPATIENT)
Dept: CARDIOLOGY | Facility: CLINIC | Age: 69
End: 2019-07-01

## 2019-07-01 ENCOUNTER — EXTERNAL HOME HEALTH (OUTPATIENT)
Dept: HOME HEALTH SERVICES | Facility: HOSPITAL | Age: 69
End: 2019-07-01
Payer: MEDICARE

## 2019-07-01 PROBLEM — K80.20 GALLSTONES: Status: ACTIVE | Noted: 2017-03-18

## 2019-07-01 PROBLEM — A04.72 CLOSTRIDIUM DIFFICILE COLITIS: Status: ACTIVE | Noted: 2018-09-25

## 2019-07-01 PROBLEM — K62.5 RECTAL HEMORRHAGE: Status: ACTIVE | Noted: 2018-09-25

## 2019-07-01 PROBLEM — K56.0 PARALYTIC ILEUS: Status: ACTIVE | Noted: 2019-01-07

## 2019-07-01 PROBLEM — Z79.899 ON LONG TERM DRUG THERAPY: Status: ACTIVE | Noted: 2018-09-25

## 2019-07-01 PROBLEM — B35.1 ONYCHOMYCOSIS: Status: ACTIVE | Noted: 2018-09-25

## 2019-07-01 PROBLEM — D50.9 IRON DEFICIENCY ANEMIA: Status: ACTIVE | Noted: 2018-09-25

## 2019-07-01 PROBLEM — R11.15 NON-INTRACTABLE CYCLICAL VOMITING WITH NAUSEA: Status: ACTIVE | Noted: 2019-02-01

## 2019-07-01 PROBLEM — E87.6 HYPOKALEMIA: Status: ACTIVE | Noted: 2019-02-01

## 2019-07-01 PROBLEM — R10.33 PERIUMBILICAL PAIN: Status: ACTIVE | Noted: 2019-01-29

## 2019-07-01 PROBLEM — N17.9 ACUTE RENAL FAILURE: Status: ACTIVE | Noted: 2019-01-11

## 2019-07-01 PROBLEM — I25.810 CORONARY ARTERY DISEASE INVOLVING CORONARY BYPASS GRAFT OF NATIVE HEART WITHOUT ANGINA PECTORIS: Status: ACTIVE | Noted: 2019-01-29

## 2019-07-01 PROBLEM — K59.00 CONSTIPATION: Status: ACTIVE | Noted: 2018-09-25

## 2019-07-01 PROBLEM — R05.9 COUGH: Status: ACTIVE | Noted: 2018-09-25

## 2019-07-01 PROBLEM — E86.0 LUETSCHER'S SYNDROME: Status: ACTIVE | Noted: 2019-01-07

## 2019-07-01 PROBLEM — R11.10 VOMITING: Status: ACTIVE | Noted: 2018-06-06

## 2019-07-01 PROBLEM — F41.9 ANXIETY: Status: ACTIVE | Noted: 2018-11-19

## 2019-07-01 PROBLEM — I50.21 ACUTE SYSTOLIC HEART FAILURE: Status: ACTIVE | Noted: 2018-03-21

## 2019-07-01 PROBLEM — R10.31 RIGHT LOWER QUADRANT ABDOMINAL PAIN: Status: ACTIVE | Noted: 2017-05-10

## 2019-07-01 PROBLEM — K59.1 FUNCTIONAL DIARRHEA: Status: ACTIVE | Noted: 2019-01-29

## 2019-07-01 PROBLEM — Z86.79 PERSONAL HISTORY OF CORONARY ARTERY DISEASE: Status: ACTIVE | Noted: 2017-03-18

## 2019-07-01 PROBLEM — M79.676 PAIN IN TOE: Status: ACTIVE | Noted: 2018-09-25

## 2019-07-01 PROBLEM — J96.11 CHRONIC RESPIRATORY FAILURE WITH HYPOXIA AND HYPERCAPNIA: Status: ACTIVE | Noted: 2019-01-07

## 2019-07-01 PROBLEM — G47.30 SLEEP APNEA: Status: ACTIVE | Noted: 2018-09-25

## 2019-07-01 PROBLEM — Z78.0 MENOPAUSE: Status: ACTIVE | Noted: 2018-10-03

## 2019-07-01 PROBLEM — J96.22 ACUTE ON CHRONIC RESPIRATORY FAILURE WITH HYPERCAPNIA: Status: ACTIVE | Noted: 2019-01-10

## 2019-07-01 PROBLEM — G62.9 NEUROPATHY: Status: ACTIVE | Noted: 2018-09-25

## 2019-07-01 PROBLEM — I24.9 ACS (ACUTE CORONARY SYNDROME): Status: ACTIVE | Noted: 2018-03-20

## 2019-07-01 PROBLEM — J96.12 CHRONIC RESPIRATORY FAILURE WITH HYPOXIA AND HYPERCAPNIA: Status: ACTIVE | Noted: 2019-01-07

## 2019-07-01 PROBLEM — L73.9 FOLLICULITIS: Status: ACTIVE | Noted: 2018-09-25

## 2019-07-01 PROBLEM — G25.81 RESTLESS LEGS: Status: ACTIVE | Noted: 2018-10-02

## 2019-07-01 PROBLEM — N17.9 AKI (ACUTE KIDNEY INJURY): Status: ACTIVE | Noted: 2019-01-29

## 2019-07-01 RX ORDER — AZELASTINE 1 MG/ML
SPRAY, METERED NASAL
COMMUNITY
End: 2019-08-08

## 2019-07-01 RX ORDER — OXYCODONE AND ACETAMINOPHEN 10; 325 MG/1; MG/1
1 TABLET ORAL EVERY 4 HOURS PRN
Refills: 0 | COMMUNITY
Start: 2019-05-09 | End: 2019-08-08

## 2019-07-01 RX ORDER — HYDROCHLOROTHIAZIDE 25 MG/1
25 TABLET ORAL
COMMUNITY
End: 2020-07-01 | Stop reason: SDUPTHER

## 2019-07-01 RX ORDER — RANOLAZINE 1000 MG/1
TABLET, EXTENDED RELEASE ORAL
Refills: 3 | COMMUNITY
Start: 2019-05-31 | End: 2019-08-16 | Stop reason: SDUPTHER

## 2019-07-01 RX ORDER — ALBUTEROL SULFATE 90 UG/1
AEROSOL, METERED RESPIRATORY (INHALATION)
Refills: 3 | COMMUNITY
Start: 2019-05-30

## 2019-07-01 RX ORDER — DICLOFENAC SODIUM 10 MG/G
GEL TOPICAL
COMMUNITY
Start: 2019-05-09 | End: 2020-09-28

## 2019-07-01 RX ORDER — BUTALBITAL, ACETAMINOPHEN AND CAFFEINE 300; 40; 50 MG/1; MG/1; MG/1
CAPSULE ORAL
COMMUNITY
End: 2020-09-28

## 2019-07-01 RX ORDER — DICYCLOMINE HYDROCHLORIDE 10 MG/1
CAPSULE ORAL
COMMUNITY
End: 2020-09-28

## 2019-07-01 RX ORDER — MUPIROCIN 20 MG/G
OINTMENT TOPICAL
COMMUNITY

## 2019-07-01 NOTE — TELEPHONE ENCOUNTER
07/01 Rtc and notified , Akua will call her to reschedule. Cm    ----- Message from Alma Gongora sent at 7/1/2019  7:57 AM CDT -----  Contact: self  [phone]  Pt would like to reschedule Pacemaker appt.  Please call back at [phone]. Md Karrie

## 2019-07-08 ENCOUNTER — OFFICE VISIT (OUTPATIENT)
Dept: FAMILY MEDICINE | Facility: CLINIC | Age: 69
End: 2019-07-08
Payer: MEDICARE

## 2019-07-08 ENCOUNTER — HOSPITAL ENCOUNTER (OUTPATIENT)
Dept: RADIOLOGY | Facility: HOSPITAL | Age: 69
Discharge: HOME | End: 2019-07-08
Attending: FAMILY MEDICINE
Payer: MEDICARE

## 2019-07-08 VITALS
HEART RATE: 59 BPM | DIASTOLIC BLOOD PRESSURE: 58 MMHG | SYSTOLIC BLOOD PRESSURE: 147 MMHG | HEIGHT: 64 IN | BODY MASS INDEX: 34.49 KG/M2 | TEMPERATURE: 98 F | WEIGHT: 202 LBS
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
## 2019-07-08 DIAGNOSIS — Z79.899 ON LONG TERM DRUG THERAPY: ICD-10-CM

## 2019-07-08 DIAGNOSIS — Z79.01 LONG TERM CURRENT USE OF ANTICOAGULANT THERAPY: Primary | ICD-10-CM

## 2019-07-08 DIAGNOSIS — S39.92XA INJURY OF COCCYX, INITIAL ENCOUNTER: ICD-10-CM

## 2019-07-08 PROBLEM — L73.9 FOLLICULITIS: Status: RESOLVED | Noted: 2018-09-25 | Resolved: 2019-07-08

## 2019-07-08 PROBLEM — I50.21 ACUTE SYSTOLIC HEART FAILURE: Status: RESOLVED | Noted: 2018-03-21 | Resolved: 2019-07-08

## 2019-07-08 PROBLEM — K59.1 FUNCTIONAL DIARRHEA: Status: RESOLVED | Noted: 2019-01-29 | Resolved: 2019-07-08

## 2019-07-08 PROBLEM — R05.9 COUGH: Status: RESOLVED | Noted: 2018-09-25 | Resolved: 2019-07-08

## 2019-07-08 PROBLEM — E66.01 MORBID OBESITY: Status: ACTIVE | Noted: 2017-03-18

## 2019-07-08 PROBLEM — R11.15 NON-INTRACTABLE CYCLICAL VOMITING WITH NAUSEA: Status: RESOLVED | Noted: 2019-02-01 | Resolved: 2019-07-08

## 2019-07-08 PROBLEM — N17.9 AKI (ACUTE KIDNEY INJURY): Status: RESOLVED | Noted: 2019-01-29 | Resolved: 2019-07-08

## 2019-07-08 PROBLEM — J96.22 ACUTE ON CHRONIC RESPIRATORY FAILURE WITH HYPERCAPNIA: Status: RESOLVED | Noted: 2019-01-10 | Resolved: 2019-07-08

## 2019-07-08 PROBLEM — I50.9 CONGESTIVE HEART FAILURE: Status: ACTIVE | Noted: 2017-03-24

## 2019-07-08 PROBLEM — Z95.1 STATUS POST CORONARY ARTERY BYPASS GRAFT: Status: ACTIVE | Noted: 2018-11-19

## 2019-07-08 PROBLEM — I10 HYPERTENSION: Status: ACTIVE | Noted: 2018-10-02

## 2019-07-08 PROBLEM — N17.9 ACUTE RENAL FAILURE: Status: RESOLVED | Noted: 2019-01-11 | Resolved: 2019-07-08

## 2019-07-08 PROBLEM — I24.9 ACS (ACUTE CORONARY SYNDROME): Status: RESOLVED | Noted: 2018-03-20 | Resolved: 2019-07-08

## 2019-07-08 PROBLEM — K62.5 RECTAL HEMORRHAGE: Status: RESOLVED | Noted: 2018-09-25 | Resolved: 2019-07-08

## 2019-07-08 PROBLEM — A04.72 CLOSTRIDIUM DIFFICILE COLITIS: Status: RESOLVED | Noted: 2018-09-25 | Resolved: 2019-07-08

## 2019-07-08 PROCEDURE — 99204 PR OFFICE/OUTPT VISIT, NEW, LEVL IV, 45-59 MIN: ICD-10-PCS | Mod: S$PBB,,, | Performed by: FAMILY MEDICINE

## 2019-07-08 PROCEDURE — 99999 PR PBB SHADOW E&M-EST. PATIENT-LVL III: ICD-10-PCS | Mod: PBBFAC,,, | Performed by: FAMILY MEDICINE

## 2019-07-08 PROCEDURE — 72220 X-RAY EXAM SACRUM TAILBONE: CPT | Mod: TC,PO

## 2019-07-08 PROCEDURE — 72220 XR SACRUM AND COCCYX: ICD-10-PCS | Mod: 26,,, | Performed by: RADIOLOGY

## 2019-07-08 PROCEDURE — 99999 PR PBB SHADOW E&M-EST. PATIENT-LVL III: CPT | Mod: PBBFAC,,, | Performed by: FAMILY MEDICINE

## 2019-07-08 PROCEDURE — 99213 OFFICE O/P EST LOW 20 MIN: CPT | Mod: PBBFAC,PO | Performed by: FAMILY MEDICINE

## 2019-07-08 PROCEDURE — 72220 X-RAY EXAM SACRUM TAILBONE: CPT | Mod: 26,,, | Performed by: RADIOLOGY

## 2019-07-08 PROCEDURE — 99204 OFFICE O/P NEW MOD 45 MIN: CPT | Mod: S$PBB,,, | Performed by: FAMILY MEDICINE

## 2019-07-08 RX ORDER — DICLOFENAC SODIUM 10 MG/G
GEL TOPICAL
COMMUNITY
End: 2019-07-08

## 2019-07-08 NOTE — ASSESSMENT & PLAN NOTE
Likely bruised versus fracture.  X-ray today.  Anti-inflammatories are contraindicated due to patient's anticoagulation.

## 2019-07-08 NOTE — PROGRESS NOTES
Subjective:      Patient ID: Kalpana Wright is a 69 y.o. female.    Chief Complaint: Follow-up    Problem List Items Addressed This Visit     Long term current use of anticoagulant therapy - Primary    Overview     Patient is a fall risk.  On long-term anticoagulation.  Reports compliance.  Bruising is somewhat better but still having the issue.         Current Assessment & Plan     Continue anticoagulation.  Discussed fall prevention.         On long term drug therapy    Overview     07/08/2019   Patient is on CHRONIC long-term drug therapy for managed conditions. See medication list. Reports compliance.  No side effects reported.  Routine lab work is being monitored.  Patient does not need refills today.     No recent blood work found in epic.  Requesting records from previous location    Lab Results   Component Value Date    WBC 7.66 11/12/2018    HGB 11.4 (L) 11/12/2018    HCT 37.2 11/12/2018    MCV 84 11/12/2018     11/12/2018      CMP  Sodium   Date Value Ref Range Status   11/12/2018 141 136 - 145 mmol/L Final     Potassium   Date Value Ref Range Status   11/12/2018 3.7 3.5 - 5.1 mmol/L Final     Chloride   Date Value Ref Range Status   11/12/2018 102 95 - 110 mmol/L Final     CO2   Date Value Ref Range Status   11/12/2018 29 23 - 29 mmol/L Final     Glucose   Date Value Ref Range Status   11/12/2018 91 70 - 110 mg/dL Final     BUN, Bld   Date Value Ref Range Status   11/12/2018 14 8 - 23 mg/dL Final     Creatinine   Date Value Ref Range Status   11/12/2018 0.9 0.5 - 1.4 mg/dL Final     Calcium   Date Value Ref Range Status   11/12/2018 10.0 8.7 - 10.5 mg/dL Final     Total Protein   Date Value Ref Range Status   05/04/2018 6.9 6.0 - 8.4 g/dL Final     Albumin   Date Value Ref Range Status   05/04/2018 3.0 (L) 3.5 - 5.2 g/dL Final     Total Bilirubin   Date Value Ref Range Status   05/04/2018 0.3 0.1 - 1.0 mg/dL Final     Comment:     For infants and newborns, interpretation of results should be  based  on gestational age, weight and in agreement with clinical  observations.  Premature Infant recommended reference ranges:  Up to 24 hours.............<8.0 mg/dL  Up to 48 hours............<12.0 mg/dL  3-5 days..................<15.0 mg/dL  6-29 days.................<15.0 mg/dL       Alkaline Phosphatase   Date Value Ref Range Status   05/04/2018 83 55 - 135 U/L Final     AST   Date Value Ref Range Status   05/04/2018 10 10 - 40 U/L Final     ALT   Date Value Ref Range Status   05/04/2018 5 (L) 10 - 44 U/L Final     Anion Gap   Date Value Ref Range Status   11/12/2018 10 8 - 16 mmol/L Final     eGFR if    Date Value Ref Range Status   11/12/2018 >60.0 >60 mL/min/1.73 m^2 Final     eGFR if non    Date Value Ref Range Status   11/12/2018 >60.0 >60 mL/min/1.73 m^2 Final     Comment:     Calculation used to obtain the estimated glomerular filtration  rate (eGFR) is the CKD-EPI equation.        No results found for: TSH            Current Assessment & Plan     Complete history and physical was completed today.  Complete and thorough medication reconciliation was performed.  Discussed risks and benefits of medications.  Advised patient on orders and health maintenance.  We discussed old records and old labs if available.  Will request any records not available through epic.  Continue current medications listed on your summary sheet.           Fall    Overview     New problem acute.  Happened several days ago.  Patient was getting up out of her wheelchair and thought it was locked when it rolled about from under her and she fell and hit her tailbone on the wheelchair.  Patient has been having moderate to severe pain to the point where she cannot sit on her wheelchair for long periods of time.         Current Assessment & Plan     Continue walker and wheelchair.  Physical therapy as needed.         Relevant Orders    X-Ray Sacrum And Coccyx    Injury of coccyx    Overview     New  problem.  Result of the fall.  Affecting her daily functioning.         Current Assessment & Plan     Likely bruised versus fracture.  X-ray today.  Anti-inflammatories are contraindicated due to patient's anticoagulation.         Relevant Orders    X-Ray Sacrum And Coccyx          Past Medical History:  Past Medical History:   Diagnosis Date    ACS (acute coronary syndrome) 3/20/2018    Acute on chronic diastolic congestive heart failure 4/27/2015    Acute on chronic respiratory failure with hypercapnia 1/10/2019    Acute renal failure 1/11/2019    Acute systolic heart failure 3/21/2018    JG (acute kidney injury) 1/29/2019    Anticoagulant long-term use     Arthritis     Asthma     CHF (congestive heart failure)     Clostridium difficile colitis 9/25/2018    COPD (chronic obstructive pulmonary disease)     Coronary artery disease     Depression     Encounter for blood transfusion     Hyperlipidemia     Hypertension     Non-intractable cyclical vomiting with nausea 2/1/2019    Peripheral vascular disease     Thyroid disease      Past Surgical History:   Procedure Laterality Date    AORTOCORONARY BYPASS-CABG N/A 4/16/2015    [facility] at [facility] 2ND FLR    CARDIAC SURGERY      CATHETERIZATION, HEART, LEFT Left 11/14/2018    Performed by [facility] at [facility] CATH LAB    CORONARY ARTERY BYPASS GRAFT  04/16/2015    EYE SURGERY      HEART CATH-LEFT Left 4/13/2015    [facility]oder [facility] at [facility] CATH LAB    HYSTERECTOMY      INSERT / REPLACE / REMOVE PACEMAKER      peripheral angiogram      peripheral stenting      PLACEMENT-IVC FILTER Right 2/11/2014    Performed by [facility]oor [facility] at [facility] CATH LAB    REPLACEMENT, PULSE GENERATOR, CARDIAC PACEMAKER N/A 2/11/2014    Performed by [facility] at [facility] CATH LAB    STERNAL PLATING (SYNTHES) N/A 4/16/2015    Performed by Mc Kwan [facility] at [facility] OR 2ND FLR     Review of  "patient's allergies indicates:   Allergen Reactions    Atorvastatin Other (See Comments)     Severe muscle pain  Other reaction(s): Abdominal Pain  SEVERE MYALGIAS      Azithromycin Other (See Comments)     By shot, closed veins and made large bruises  By shot, closed veins and made large bruises      Latex, natural rubber Rash     "TOURNIQUET ON LEG LEFT HUGE BLISTER THAT TOOK 9 MONTHS OF WOUND CARE TO HEAL."    Meperidine Hives and Anaphylaxis     Other reaction(s): Anaphylaxis  Hives (skin)^  Hives (skin)^      Penicillins Anaphylaxis and Hives     Other reaction(s): Anaphylaxis  Shortness of breath^swelling  Shortness of breath and swelling  Anaphylaxis  Shortness of breath^swelling      Tofacitinib Rash and Swelling     Rash and swelling of face      Lorazepam Anxiety and Other (See Comments)     Made me goofy  CAUSED CONFUSION "Made me goofy."      Pravastatin Other (See Comments)     Severe myalgias.  Cramps/Severe myalgias.    Flu vac 2018 [id number](pf)      Other reaction(s): Other (See Comments)  Flip into aFIB    Nystatin Rash    Pepper (genus capsicum) Other (See Comments)     Reflux and ulcers     Current Outpatient Medications on File Prior to Visit   Medication Sig Dispense Refill    alirocumab (PRALUENT PEN) 75 mg/mL PnIj Inject 1 mL (75 mg total) into the skin every 14 (fourteen) days. 2 Syringe 11    amLODIPine (NORVASC) 10 MG tablet Take 10 mg by mouth once daily.      apixaban (ELIQUIS) 5 mg Tab Take 1 tablet (5 mg total) by mouth 2 (two) times daily. 180 tablet 3    azelastine (ASTELIN) 137 mcg (0.1 %) nasal spray azelastine 137 mcg (0.1 %) nasal spray aerosol      benazepril (LOTENSIN) 40 MG tablet Take 40 mg by mouth once daily.      bisoprolol (ZEBETA) 10 MG tablet Take 10 mg by mouth once daily.      butalbital-acetaminophen-caff (FIORICET) -40 mg Cap Fioricet 50 mg-300 mg-40 mg capsule   Take 1 capsule every 4 hours by oral route as needed for 30 days.      " cetirizine (ZYRTEC) 10 MG tablet Take 10 mg by mouth once daily.      diclofenac sodium (VOLTAREN) 1 % Gel       dicyclomine (BENTYL) 10 MG capsule dicyclomine 10 mg capsule      ferrous sulfate 325 mg (65 mg iron) Tab tablet Take 325 mg by mouth daily with breakfast.      furosemide (LASIX) 40 MG tablet Take 80 mg in the am and 80 mg BID x 7 days,then take 80 mg in am and 40 mg in the PM thereafter. 180 tablet 3    gabapentin (NEURONTIN) 600 MG tablet Take 600 mg by mouth 3 (three) times daily.      hydrALAZINE (APRESOLINE) 25 MG tablet Take 25 mg by mouth 2 (two) times daily.      hydroCHLOROthiazide (HYDRODIURIL) 25 MG tablet Take 25 mg by mouth.      hydrocodone-acetaminophen 10-325mg (NORCO)  mg Tab Take 1 tablet by mouth 2 (two) times daily as needed.      isosorbide mononitrate (IMDUR) 30 MG 24 hr tablet Take 30 mg by mouth 2 (two) times daily.      levothyroxine (SYNTHROID) 50 MCG tablet Take 1 tablet (50 mcg total) by mouth once daily. 30 tablet 0    mupirocin (BACTROBAN) 2 % ointment mupirocin 2 % topical ointment      nitroGLYCERIN (NITROSTAT) 0.4 MG SL tablet Place 1 tablet (0.4 mg total) under the tongue every 5 (five) minutes as needed for Chest pain. 25 tablet 11    oxyCODONE-acetaminophen (PERCOCET)  mg per tablet Take 1 tablet by mouth every 4 (four) hours as needed.  0    potassium chloride (MICRO-K) 10 MEQ CpSR Take 2 capsules in am and 1 in the evening; if taking additional lasix, will need to take one additional potassium capsule in the evening 60 capsule 11    pramipexole (MIRAPEX) 0.25 MG tablet Take 0.25 mg by mouth every evening.      promethazine (PHENERGAN) 25 MG tablet Take 25 mg by mouth every 6 (six) hours as needed.      ranolazine (RANEXA) 1,000 mg Tb12 TAKE ONE TABLET BY MOUTH TWICE DAILY  3    rOPINIRole (REQUIP) 5 MG tablet Take 5 mg by mouth every evening.      sucralfate (CARAFATE) 1 gram tablet Take 1 g by mouth 2 (two) times daily. AC and HS       temazepam (RESTORIL) 22.5 MG capsule Take 30 mg by mouth nightly as needed for Insomnia.      ticagrelor (BRILINTA) 90 mg tablet Take 1 tablet (90 mg total) by mouth 2 (two) times daily. 60 tablet 5    umeclidinium-vilanterol (ANORO ELLIPTA) 62.5-25 mcg/actuation DsDv Inhale 1 puff into the lungs once daily. Controller      VENTOLIN HFA 90 mcg/actuation inhaler Inhale 2 puff(s) every 4 hours by inhalation route.  3    vilazodone (VIIBRYD) 20 mg Tab Take 20 mg by mouth. One time a day with meals      esomeprazole (NEXIUM) 40 MG capsule Take 1 capsule (40 mg total) by mouth before breakfast. 30 capsule 11    [DISCONTINUED] diclofenac sodium (VOLTAREN) 1 % Gel diclofenac 1 % topical gel       No current facility-administered medications on file prior to visit.      Social History     Socioeconomic History    Marital status:      Spouse name: Not on file    Number of children: Not on file    Years of education: Not on file    Highest education level: Not on file   Occupational History    Not on file   Social Needs    Financial resource strain: Not on file    Food insecurity:     Worry: Not on file     Inability: Not on file    Transportation needs:     Medical: Not on file     Non-medical: Not on file   Tobacco Use    Smoking status: Former Smoker     Packs/day: 1.00     Years: 45.00     Pack years: 45.00     Types: Cigarettes     Last attempt to quit: 10/3/2012     Years since quittin.7    Smokeless tobacco: Never Used    Tobacco comment: 43 years   Substance and Sexual Activity    Alcohol use: No    Drug use: Not on file    Sexual activity: Not on file   Lifestyle    Physical activity:     Days per week: Not on file     Minutes per session: Not on file    Stress: Not on file   Relationships    Social connections:     Talks on phone: Not on file     Gets together: Not on file     Attends Faith service: Not on file     Active member of club or organization: Not on file     Attends  "meetings of clubs or organizations: Not on file     Relationship status: Not on file   Other Topics Concern    Not on file   Social History Narrative    Not on file     Family History   Problem Relation Age of Onset    Cancer Mother     Lung cancer Father     Breast cancer Sister      I have reviewed the complete PMH, social history, surgical history, allergies and medications.  As well as family history.    Review of Systems   Constitutional: Positive for fatigue. Negative for chills, fever and unexpected weight change.   HENT: Negative for ear pain and sore throat.    Eyes: Negative for redness and visual disturbance.   Respiratory: Negative for cough and shortness of breath.    Cardiovascular: Negative for chest pain and palpitations.   Gastrointestinal: Negative for nausea and vomiting.   Genitourinary: Negative for difficulty urinating and hematuria.   Musculoskeletal: Positive for arthralgias, back pain, gait problem and myalgias.   Skin: Negative for rash and wound.   Neurological: Positive for dizziness and weakness. Negative for headaches.   Hematological: Negative for adenopathy. Bruises/bleeds easily.   Psychiatric/Behavioral: Positive for decreased concentration and sleep disturbance. The patient is nervous/anxious.        Objective:     BP (!) 147/58   Pulse (!) 59   Temp 98.1 °F (36.7 °C) (Oral)   Ht 5' 4" (1.626 m)   Wt 91.6 kg (202 lb)   LMP  (LMP Unknown)   BMI 34.67 kg/m²     Physical Exam   Constitutional: She is oriented to person, place, and time. She appears well-developed and well-nourished. No distress.   HENT:   Head: Normocephalic and atraumatic.   Eyes: Pupils are equal, round, and reactive to light. EOM are normal.   Neck: Normal range of motion. Neck supple.   Cardiovascular: Normal rate, regular rhythm, normal heart sounds and intact distal pulses.   No murmur heard.  Pulmonary/Chest: Effort normal and breath sounds normal. No respiratory distress. She has no wheezes. "   Musculoskeletal: Normal range of motion. She exhibits tenderness. She exhibits no edema.   Coccyx sacral area is tender to palpation.  No crepitation.  Straight leg raise is positive.  Bilaterally worse on the right.   Neurological: She is alert and oriented to person, place, and time. No cranial nerve deficit.   Skin: Skin is dry. Capillary refill takes less than 2 seconds. Pallor: Healing ecchymoses on the arms bilaterally.   Psychiatric: She has a normal mood and affect. Her behavior is normal.   Nursing note and vitals reviewed.    Assessment:     1. Long term current use of anticoagulant therapy    2. On long term drug therapy    3. Fall, initial encounter    4. Injury of coccyx, initial encounter        Plan:     I have Reviewed and summarized old records.  I have performed thorough medication reconciliation today and discussed risk and benefits of each medication.  I have reviewed labs and discussed with patient.  All questions were answered.  I am requesting old records and will review them once they are available.    Problem List Items Addressed This Visit     Long term current use of anticoagulant therapy - Primary     Continue anticoagulation.  Discussed fall prevention.         On long term drug therapy     Complete history and physical was completed today.  Complete and thorough medication reconciliation was performed.  Discussed risks and benefits of medications.  Advised patient on orders and health maintenance.  We discussed old records and old labs if available.  Will request any records not available through epic.  Continue current medications listed on your summary sheet.           Fall     Continue walker and wheelchair.  Physical therapy as needed.         Relevant Orders    X-Ray Sacrum And Coccyx    Injury of coccyx     Likely bruised versus fracture.  X-ray today.  Anti-inflammatories are contraindicated due to patient's anticoagulation.         Relevant Orders    X-Ray Sacrum And Coccyx         Follow up in about 1 month (around 8/5/2019) for Med refills.  This note was compiled by using a dictation system and therefore please be aware that typographical errors can and do occur.  Please contact me if you see any errors specifically.    Adan Woodward MD  We Offer Telehealth & Same Day Appointments!   Book your Telehealth appointment with me through my nurse or   Clinic appointments on [facility] [facility]!  [phone]          Check out my Facebook Page and Follow Me at: CLICK HERE    Check out my website at [facility] by clicking on: CLICK HERE    To Schedule appointments online, go to [facility]: CLICK HERE

## 2019-07-08 NOTE — PATIENT INSTRUCTIONS
"  Preventing Falls: Are You At Risk of Falling?     Ask for help to reduce risk of falling in your home.     As you get older, you're not as steady on your feet as you once were. And you may have health problems you didn't have when you were younger. So, it's not surprising that older people are more likely to trip and fall. Falling can be very serious. It can change your overall health and quality of life. That's why it's important to be aware of your own risk of falling.  The dangers of falling  Falls are one of the main causes of injury in people over age 65. An older person who falls may take longer to get better than a younger person. And, after a fall, an older person is more likely to have problems that don't go away. So, preventing falls can help you avoid serious health problems.  Are you at risk of falling?  Answer these questions to rate your level of risk.  · Are you a woman?  · Have you fallen or stumbled in the last year?  · Are you over age 65?  · Are you ever dizzy or lightheaded with standing?  · Do you have a hard time getting in and out of the bathtub or on and off the toilet?  · Do you lean on objects to help you get around? Or do you use a cane or walker?  · Do you have vision or hearing problems? For example, do you need new glasses or hearing aids?  · Do you have 2 or more long-lasting (chronic) medical conditions?  · Do you take 3 or more medicines?  · Have you felt depressed recently?  · Have you had more trouble with your memory in recent months?  · Are there hazards in your home that might cause you to fall, such as loose rugs or poor lighting?  · Do you have a pet that jumps on you or might trip you?  · Have you stopped getting regular exercise?  · Do you have diabetes?   · Do you have a neurologic disease, such as Parkinson or Alzheimer disease?   · Do you drink alcohol?  · Do you wear athletic shoes or slippers, or go barefoot at home?  You can help prevent falls  If you answered "yes" " to any of the above questions, you should take steps to reduce your risk of a fall. Monitoring health conditions and keeping walkways in your home free of clutter are just 2 ways. Changing is sometimes easier said than done. But keep in mind that even small changes can make you less likely to fall.  The fear of falling  It's normal to be scared of falling, especially if you've fallen before. But being afraid can actually make you more likely to fall. This is because:  · Fear might cause you to become less active. Being less active can lead to a loss of strength and balance.  · Fear can lead to isolation from others, depression, or the use of more medicines or alcohol. And all these things make falling even more likely.  To break the cycle, learn more about ways to avoid falling. As you take control, you may find yourself feeling less afraid.   Date Last Reviewed: 6/12/2015  © 6528-9318 [employer]. [address] [place] [address], [place] [address]. All rights reserved. This information is not intended as a substitute for professional medical care. Always follow your healthcare professional's instructions.

## 2019-07-09 NOTE — PROGRESS NOTES
X-rays negative for fracture.    Please call patient with these normal results if they are not enrolled with my chart.

## 2019-07-16 ENCOUNTER — TELEPHONE (OUTPATIENT)
Dept: CARDIOLOGY | Facility: CLINIC | Age: 69
End: 2019-07-16

## 2019-07-16 NOTE — TELEPHONE ENCOUNTER
She is having a myelogram. Cm    I rtc and notified her Dr Kapoor on vacation and the person I need to review with will not be in till this Thursday. She tells me the o/p procedure is for this Friday. Explained to her if it is approved, still would need to wait for the procedure for this Friday, due to time of days to hold. Told her I would discuss with Celia Whaley NP and call her back once rev'd. She will call pt and tell her it will need to be reschedule, but will need clearance first. Contact her once clearance done. Cm    ----- Message from Little Monzon sent at 7/16/2019 10:33 AM CDT -----  Contact: Kevyn silverman/ Zina Laguna  Calling to get order to hold Castillo on patient for a procedure need to be done. Please call Kevyn @ [phone] and fax # [phone]. Thanks, shawnee

## 2019-07-18 ENCOUNTER — TELEPHONE (OUTPATIENT)
Dept: CARDIOLOGY | Facility: CLINIC | Age: 69
End: 2019-07-18

## 2019-07-18 ENCOUNTER — CLINICAL SUPPORT (OUTPATIENT)
Dept: CARDIOLOGY | Facility: CLINIC | Age: 69
End: 2019-07-18
Attending: INTERNAL MEDICINE
Payer: MEDICARE

## 2019-07-18 DIAGNOSIS — I25.10 CORONARY ARTERY DISEASE INVOLVING NATIVE CORONARY ARTERY OF NATIVE HEART WITHOUT ANGINA PECTORIS: ICD-10-CM

## 2019-07-18 DIAGNOSIS — I49.5 SSS (SICK SINUS SYNDROME): ICD-10-CM

## 2019-07-18 DIAGNOSIS — I73.9 PVD (PERIPHERAL VASCULAR DISEASE): ICD-10-CM

## 2019-07-18 PROCEDURE — 93280 PACEMAKER INTERROGATION: ICD-10-PCS | Mod: 26,,, | Performed by: INTERNAL MEDICINE

## 2019-07-18 PROCEDURE — 93280 PM DEVICE PROGR EVAL DUAL: CPT | Mod: 26,,, | Performed by: INTERNAL MEDICINE

## 2019-07-18 NOTE — TELEPHONE ENCOUNTER
The patient is here for BOAZ.Stated she can not stand having BP cuff or any pressure on lower right leg.Sonographer discussed with Celia Whaley NP and test was cancelled.Patient will wait for PPM check and surgery clearance note faxed to [phone].Patient instructed to hold Brylinta x 7 days and Eliquis x 3 days prior to procedure.She verbalized understanding.

## 2019-07-19 ENCOUNTER — TELEPHONE (OUTPATIENT)
Dept: PHARMACY | Facility: CLINIC | Age: 69
End: 2019-07-19

## 2019-07-22 ENCOUNTER — PATIENT OUTREACH (OUTPATIENT)
Dept: ADMINISTRATIVE | Facility: HOSPITAL | Age: 69
End: 2019-07-22

## 2019-07-22 NOTE — PROGRESS NOTES
Health Maintenance reviewed. Colonoscopy dated 4/1/2019 performed at [facility] by Dr. Jorge Luis Bo sent to [facility] for media upload. Mammogram and Dxa order pended.   updated.

## 2019-07-26 ENCOUNTER — TELEPHONE (OUTPATIENT)
Dept: CARDIOLOGY | Facility: CLINIC | Age: 69
End: 2019-07-26

## 2019-07-26 NOTE — TELEPHONE ENCOUNTER
Pt called. She had myelogram done and when she turned over onto her chest the plate post her cabg is pushed out. They want to send her home . Told her to ask to speak to the nursing supervisor, do not let them send her home. Ask the nursing supervisor to have them get cardiologist on call to see her and have them contact cardiac surgeon if needed. Told her Dr Kapoor can not do anything at this time. She will get back with me. cm

## 2019-07-30 NOTE — TELEPHONE ENCOUNTER
Praluent 75mg PENS follow up and refill confirmed. We will ship Praluent refill on 19 via fedex to arrive on 19. $0.00 copay- 004. Confirmed 2 patient identifiers - name and .      Patient self injects Praluent without any difficulties.  She stores med in the fridge until ready to use.  She has 0 doses on hand, last injection on 19.  Administration dates are marked on the package to adhere to due dates.  Patient reports no side effects since beginning of therapy.  No missed doses, no new medications, no new allergies reported at this time.  Patient reports lipids look good since labs were last done on 19 for hernia surgery at a different hospital; feels much better from sx since then.  SHARPS container requested.  All questions answered and addressed to patients satisfaction. Pt verbalized understanding.

## 2019-08-08 ENCOUNTER — APPOINTMENT (OUTPATIENT)
Dept: LAB | Facility: HOSPITAL | Age: 69
End: 2019-08-08
Attending: FAMILY MEDICINE
Payer: MEDICARE

## 2019-08-08 ENCOUNTER — OFFICE VISIT (OUTPATIENT)
Dept: FAMILY MEDICINE | Facility: CLINIC | Age: 69
End: 2019-08-08
Payer: MEDICARE

## 2019-08-08 ENCOUNTER — TELEPHONE (OUTPATIENT)
Dept: FAMILY MEDICINE | Facility: CLINIC | Age: 69
End: 2019-08-08

## 2019-08-08 VITALS
WEIGHT: 199 LBS | BODY MASS INDEX: 33.97 KG/M2 | HEART RATE: 80 BPM | SYSTOLIC BLOOD PRESSURE: 144 MMHG | HEIGHT: 64 IN | TEMPERATURE: 99 F | DIASTOLIC BLOOD PRESSURE: 70 MMHG

## 2019-08-08 DIAGNOSIS — I73.9 PVD (PERIPHERAL VASCULAR DISEASE): ICD-10-CM

## 2019-08-08 DIAGNOSIS — Z11.59 NEED FOR HEPATITIS C SCREENING TEST: ICD-10-CM

## 2019-08-08 DIAGNOSIS — I10 ESSENTIAL HYPERTENSION: ICD-10-CM

## 2019-08-08 DIAGNOSIS — E07.9 THYROID DISEASE: ICD-10-CM

## 2019-08-08 DIAGNOSIS — Z79.899 ON LONG TERM DRUG THERAPY: Primary | ICD-10-CM

## 2019-08-08 DIAGNOSIS — Z79.01 LONG TERM CURRENT USE OF ANTICOAGULANT THERAPY: ICD-10-CM

## 2019-08-08 DIAGNOSIS — Z23 NEED FOR VACCINATION: Primary | ICD-10-CM

## 2019-08-08 DIAGNOSIS — Z79.899 ON LONG TERM DRUG THERAPY: ICD-10-CM

## 2019-08-08 DIAGNOSIS — E78.00 PURE HYPERCHOLESTEROLEMIA: ICD-10-CM

## 2019-08-08 DIAGNOSIS — E87.6 HYPOKALEMIA: ICD-10-CM

## 2019-08-08 DIAGNOSIS — E55.9 VITAMIN D DEFICIENCY: ICD-10-CM

## 2019-08-08 LAB
25(OH)D3+25(OH)D2 SERPL-MCNC: 21 NG/ML (ref 30–96)
ALBUMIN SERPL BCP-MCNC: 3.3 G/DL (ref 3.5–5.2)
ALP SERPL-CCNC: 115 U/L (ref 55–135)
ALT SERPL W/O P-5'-P-CCNC: 11 U/L (ref 10–44)
ANION GAP SERPL CALC-SCNC: 9 MMOL/L (ref 8–16)
AST SERPL-CCNC: 16 U/L (ref 10–40)
BILIRUB SERPL-MCNC: 0.2 MG/DL (ref 0.1–1)
BUN SERPL-MCNC: 15 MG/DL (ref 8–23)
CALCIUM SERPL-MCNC: 9.4 MG/DL (ref 8.7–10.5)
CHLORIDE SERPL-SCNC: 105 MMOL/L (ref 95–110)
CO2 SERPL-SCNC: 26 MMOL/L (ref 23–29)
CREAT SERPL-MCNC: 0.8 MG/DL (ref 0.5–1.4)
EST. GFR  (AFRICAN AMERICAN): >60 ML/MIN/1.73 M^2
EST. GFR  (NON AFRICAN AMERICAN): >60 ML/MIN/1.73 M^2
GLUCOSE SERPL-MCNC: 88 MG/DL (ref 70–110)
POTASSIUM SERPL-SCNC: 4 MMOL/L (ref 3.5–5.1)
PROT SERPL-MCNC: 6.9 G/DL (ref 6–8.4)
SODIUM SERPL-SCNC: 140 MMOL/L (ref 136–145)
T3FREE SERPL-MCNC: 2.6 PG/ML (ref 2.3–4.2)
T4 SERPL-MCNC: 8.5 UG/DL (ref 4.5–11.5)
TSH SERPL DL<=0.005 MIU/L-ACNC: 2.07 UIU/ML (ref 0.4–4)

## 2019-08-08 PROCEDURE — 82306 VITAMIN D 25 HYDROXY: CPT

## 2019-08-08 PROCEDURE — 84481 FREE ASSAY (FT-3): CPT

## 2019-08-08 PROCEDURE — 99213 OFFICE O/P EST LOW 20 MIN: CPT | Mod: PBBFAC,PO | Performed by: FAMILY MEDICINE

## 2019-08-08 PROCEDURE — 84436 ASSAY OF TOTAL THYROXINE: CPT

## 2019-08-08 PROCEDURE — 36415 COLL VENOUS BLD VENIPUNCTURE: CPT | Mod: PO

## 2019-08-08 PROCEDURE — 83036 HEMOGLOBIN GLYCOSYLATED A1C: CPT

## 2019-08-08 PROCEDURE — 80053 COMPREHEN METABOLIC PANEL: CPT

## 2019-08-08 PROCEDURE — 80061 LIPID PANEL: CPT

## 2019-08-08 PROCEDURE — 84443 ASSAY THYROID STIM HORMONE: CPT

## 2019-08-08 PROCEDURE — 99214 PR OFFICE/OUTPT VISIT, EST, LEVL IV, 30-39 MIN: ICD-10-PCS | Mod: S$PBB,,, | Performed by: FAMILY MEDICINE

## 2019-08-08 PROCEDURE — 86803 HEPATITIS C AB TEST: CPT

## 2019-08-08 PROCEDURE — 85025 COMPLETE CBC W/AUTO DIFF WBC: CPT

## 2019-08-08 PROCEDURE — 99214 OFFICE O/P EST MOD 30 MIN: CPT | Mod: S$PBB,,, | Performed by: FAMILY MEDICINE

## 2019-08-08 PROCEDURE — 99999 PR PBB SHADOW E&M-EST. PATIENT-LVL III: CPT | Mod: PBBFAC,,, | Performed by: FAMILY MEDICINE

## 2019-08-08 PROCEDURE — 99999 PR PBB SHADOW E&M-EST. PATIENT-LVL III: ICD-10-PCS | Mod: PBBFAC,,, | Performed by: FAMILY MEDICINE

## 2019-08-08 RX ORDER — ERGOCALCIFEROL 1.25 MG/1
50000 CAPSULE ORAL
Qty: 4 CAPSULE | Refills: 3 | Status: SHIPPED | OUTPATIENT
Start: 2019-08-08 | End: 2019-09-05 | Stop reason: SDUPTHER

## 2019-08-08 NOTE — Clinical Note
I cannot sign the note because the shot is still pending.  Did you give this patient the pneumonia vaccine?

## 2019-08-08 NOTE — PATIENT INSTRUCTIONS
Established High Blood Pressure    High blood pressure (hypertension) is a chronic disease. Often, healthcare providers dont know what causes it. But it can be caused by certain health conditions and medicines.  If you have high blood pressure, you may not have any symptoms. If you do have symptoms, they may include headache, dizziness, changes in your vision, chest pain, and shortness of breath. But even without symptoms, high blood pressure thats not treated raises your risk for heart attack and stroke. High blood pressure is a serious health risk and shouldnt be ignored.  A blood pressure reading is made up of two numbers: a higher number over a lower number. The top number is the systolic pressure. The bottom number is the diastolic pressure. A normal blood pressure is a systolic pressure of  less than 120 over a diastolic pressure of less than 80. You will see your blood pressure readings written together. For example, a person with a systolic pressure of 188 and a diastolic pressure of 78 will have 118/78 written in the medical record.  High blood pressure is when either the top number is 140 or higher, or the bottom number is 90 or higher. This must be the result when taking your blood pressure a number of times. The blood pressures between normal and high are called prehypertension.  Home care  If you have high blood pressure, you should do what is listed below to lower your blood pressure. If you are taking medicines for high blood pressure, these methods may reduce or end your need for medicines in the future.  · Begin a weight-loss program if you are overweight.  · Cut back on how much salt you get in your diet. Heres how to do this:  ¨ Dont eat foods that have a lot of salt. These include olives, pickles, smoked meats, and salted potato chips.  ¨ Dont add salt to your food at the table.  ¨ Use only small amounts of salt when cooking.  · Start an exercise program. Talk with your healthcare  provider about the type of exercise program that would be best for you. It doesn't have to be hard. Even brisk walking for 20 minutes 3 times a week is a good form of exercise.  · Dont take medicines that stimulate the heart. This includes many over-the-counter cold and sinus decongestant pills and sprays, as well as diet pills. Check the warnings about hypertension on the label. Before buying any over-the-counter medicines or supplements, always ask the pharmacist about the product's potential interaction with your high blood pressure and your high blood pressure medicines.  · Stimulants such as amphetamine or cocaine could be deadly for someone with high blood pressure. Never take these.  · Limit how much caffeine you get in your diet. Switch to caffeine-free products.  · Stop smoking. If you are a long-time smoker, this can be hard. Talk to your healthcare provider about medicines and nicotine replacement options to help you. Also, enroll in a stop-smoking program to make it more likely that you will quit for good.  · Learn how to handle stress. This is an important part of any program to lower blood pressure. Learn about relaxation methods like meditation, yoga, or biofeedback.  · If your provider prescribed medicines, take them exactly as directed. Missing doses may cause your blood pressure get out of control.  · If you miss a dose or doses, check with your healthcare provider or pharmacist about what to do.  · Consider buying an automatic blood pressure machine. Ask your provider for a recommendation. You can get one of these at most pharmacies.     The American [employer] Association recommends the following guidelines for home blood pressure monitoring:  · Don't smoke or drink coffee for 30 minutes before taking your blood pressure.  · Go to the bathroom before the test.  · Relax for 5 minutes before taking the measurement.  · Sit with your back supported (don't sit on a couch or soft chair); keep your feet on  the floor uncrossed. Place your arm on a solid flat surface (like a table) with the upper part of the arm at heart level. Place the middle of the cuff directly above the eye of the elbow. Check the monitor's instruction manual for an illustration.  · Take multiple readings. When you measure, take 2 to 3 readings one minute apart and record all of the results.  · Take your blood pressure at the same time every day, or as your healthcare provider recommends.  · Record the date, time, and blood pressure reading.  · Take the record with you to your next medical appointment. If your blood pressure monitor has a built-in memory, simply take the monitor with you to your next appointment.  · Call your provider if you have several high readings. Don't be frightened by a single high blood pressure reading, but if you get several high readings, check in with your healthcare provider.  · Note: When blood pressure reaches a systolic (top number) of 180 or higher OR diastolic (bottom number) of 110 or higher, seek emergency medical treatment.  Follow-up care  You will need to see your healthcare provider regularly. This is to check your blood pressure and to make changes to your medicines. Make a follow-up appointment as directed. Bring the record of your home blood pressure readings to the appointment.  When to seek medical advice  Call your healthcare provider right away if any of these occur:  · Blood pressure reaches a systolic (upper number) of 180 or higher OR a diastolic (bottom number) of 110 or higher  · Chest pain or shortness of breath  · Severe headache  · Throbbing or rushing sound in the ears  · Nosebleed  · Sudden severe pain in your belly (abdomen)  · Extreme drowsiness, confusion, or fainting  · Dizziness or spinning sensation (vertigo)  · Weakness of an arm or leg or one side of the face  · You have problems speaking or seeing   Date Last Reviewed: 12/1/2016  © [id number] [employer]. [address] [place] [address]  [place] [address]. All rights reserved. This information is not intended as a substitute for professional medical care. Always follow your healthcare professional's instructions.

## 2019-08-08 NOTE — TELEPHONE ENCOUNTER
Patient could not void for the UA. Please put in order for urine when she comes back for her follow up also pt has an allergy to latex so we could not give the pneumo 23 patient can receive this at the pharmacy. Notified patient of this.

## 2019-08-09 LAB
BASOPHILS # BLD AUTO: 0.02 K/UL (ref 0–0.2)
BASOPHILS NFR BLD: 0.2 % (ref 0–1.9)
DIFFERENTIAL METHOD: ABNORMAL
EOSINOPHIL # BLD AUTO: 0.2 K/UL (ref 0–0.5)
EOSINOPHIL NFR BLD: 2.1 % (ref 0–8)
ERYTHROCYTE [DISTWIDTH] IN BLOOD BY AUTOMATED COUNT: 18.9 % (ref 11.5–14.5)
ESTIMATED AVG GLUCOSE: 100 MG/DL (ref 68–131)
HBA1C MFR BLD HPLC: 5.1 % (ref 4–5.6)
HCT VFR BLD AUTO: 33.1 % (ref 37–48.5)
HCV AB SERPL QL IA: NEGATIVE
HGB BLD-MCNC: 9.4 G/DL (ref 12–16)
IMM GRANULOCYTES # BLD AUTO: 0.02 K/UL (ref 0–0.04)
IMM GRANULOCYTES NFR BLD AUTO: 0.2 % (ref 0–0.5)
LYMPHOCYTES # BLD AUTO: 1.7 K/UL (ref 1–4.8)
LYMPHOCYTES NFR BLD: 19.9 % (ref 18–48)
MCH RBC QN AUTO: 23.7 PG (ref 27–31)
MCHC RBC AUTO-ENTMCNC: 28.4 G/DL (ref 32–36)
MCV RBC AUTO: 83 FL (ref 82–98)
MONOCYTES # BLD AUTO: 0.5 K/UL (ref 0.3–1)
MONOCYTES NFR BLD: 5.6 % (ref 4–15)
NEUTROPHILS # BLD AUTO: 6.3 K/UL (ref 1.8–7.7)
NEUTROPHILS NFR BLD: 72 % (ref 38–73)
NRBC BLD-RTO: 0 /100 WBC
PLATELET # BLD AUTO: 302 K/UL (ref 150–350)
PMV BLD AUTO: 9.6 FL (ref 9.2–12.9)
RBC # BLD AUTO: 3.97 M/UL (ref 4–5.4)
WBC # BLD AUTO: 8.73 K/UL (ref 3.9–12.7)

## 2019-08-12 LAB
CHOLEST SERPL-MCNC: 113 MG/DL
HDL SERPL QN: 9.2 NM
HDL SERPL-SCNC: 32 UMOL/L
HDLC SERPL-MCNC: 55 MG/DL (ref 40–59)
HLD.LARGE SERPL-SCNC: 6.2 UMOL/L
LDL SERPL QN: 20.8 NM
LDL SERPL-SCNC: 457 NMOL/L
LDL SMALL SERPL-SCNC: 288 NMOL/L
LDLC SERPL CALC-MCNC: 41 MG/DL
PATHOLOGY STUDY: ABNORMAL
TRIGL SERPL-MCNC: 87 MG/DL (ref 30–149)
VLDL LARGE SERPL-SCNC: <1.5 NMOL/L
VLDL SERPL QN: 49.5 NM

## 2019-08-12 NOTE — ASSESSMENT & PLAN NOTE
Kidney function is well preserved.  Thyroid functions are stable.  Electrolytes are stable.  Patient is anemic on labs.

## 2019-08-12 NOTE — PROGRESS NOTES
Subjective:      Patient ID: Kalpana Wright is a 69 y.o. female.    Chief Complaint: Follow-up (Chronic conditions long-term drug therapy); Medication Refill; and Vitamin D Deficiency      Problem List Items Addressed This Visit     PVD (peripheral vascular disease)    Overview     Chronic.  Uncontrolled.  Patient needs compression stockings.  Pain with walking.         Hyperlipidemia    Overview     Chronic.  Patient on Praluent.  Special lipid panel is very well controlled.  No side effects reported.  Reports compliance.         Current Assessment & Plan     Continue medication.  Recheck special lipid panel next year.  LFTs are stable.  Diet and exercise discussed.         Long term current use of anticoagulant therapy    Overview     Initial HPI:  Patient is a fall risk.  On long-term anticoagulation.  Reports compliance.  Bruising is somewhat better but still having the issue.    Update August 8, 2019:  Patient still reports difficulty ambulating around at home.  Uses walker.         Current Assessment & Plan     Continue anticoagulation for now.  Bruising is stable.  Fall precautions.         Thyroid disease    Overview     Patient with history of thyroid disorder.  Checking TSH.  Associated with fatigue.         Current Assessment & Plan     Lab Results   Component Value Date    TSH 2.068 08/08/2019              Essential hypertension    Overview     Chronic.  Uncontrolled today.  Patient on several blood pressure medication.  Reports compliance.  Reports better blood pressure control at home with home monitor.  Patient on amlodipine benazepril bisoprolol furosemide hydralazine hydrochlorothiazide Imdur.  No side effects reported.  Denies any chest pain shortness of breath prior vision headaches dizziness lightheadedness.    Lab Results   Component Value Date    CREATININE 0.8 08/08/2019              Current Assessment & Plan     Continue current medication regimen.  Blood pressure is actually well controlled  for this patient.  She is on maximal medical therapy.  Continue follow-up with cardiology.  Continue follow-up routine blood work.         On long term drug therapy    Overview     07/08/2019   Patient is on CHRONIC long-term drug therapy for managed conditions. See medication list. Reports compliance.  No side effects reported.  Routine lab work is being monitored.  Patient does not need refills today.     No recent blood work found in epic.  Requesting records from previous location    Lab Results   Component Value Date    WBC 7.66 11/12/2018    HGB 11.4 (L) 11/12/2018    HCT 37.2 11/12/2018    MCV 84 11/12/2018     11/12/2018      CMP  Sodium   Date Value Ref Range Status   11/12/2018 141 136 - 145 mmol/L Final     Potassium   Date Value Ref Range Status   11/12/2018 3.7 3.5 - 5.1 mmol/L Final     Chloride   Date Value Ref Range Status   11/12/2018 102 95 - 110 mmol/L Final     CO2   Date Value Ref Range Status   11/12/2018 29 23 - 29 mmol/L Final     Glucose   Date Value Ref Range Status   11/12/2018 91 70 - 110 mg/dL Final     BUN, Bld   Date Value Ref Range Status   11/12/2018 14 8 - 23 mg/dL Final     Creatinine   Date Value Ref Range Status   11/12/2018 0.9 0.5 - 1.4 mg/dL Final     Calcium   Date Value Ref Range Status   11/12/2018 10.0 8.7 - 10.5 mg/dL Final     Total Protein   Date Value Ref Range Status   05/04/2018 6.9 6.0 - 8.4 g/dL Final     Albumin   Date Value Ref Range Status   05/04/2018 3.0 (L) 3.5 - 5.2 g/dL Final     Total Bilirubin   Date Value Ref Range Status   05/04/2018 0.3 0.1 - 1.0 mg/dL Final     Comment:     For infants and newborns, interpretation of results should be based  on gestational age, weight and in agreement with clinical  observations.  Premature Infant recommended reference ranges:  Up to 24 hours.............<8.0 mg/dL  Up to 48 hours............<12.0 mg/dL  3-5 days..................<15.0 mg/dL  6-29 days.................<15.0 mg/dL       Alkaline Phosphatase    Date Value Ref Range Status   05/04/2018 83 55 - 135 U/L Final     AST   Date Value Ref Range Status   05/04/2018 10 10 - 40 U/L Final     ALT   Date Value Ref Range Status   05/04/2018 5 (L) 10 - 44 U/L Final     Anion Gap   Date Value Ref Range Status   11/12/2018 10 8 - 16 mmol/L Final     eGFR if    Date Value Ref Range Status   11/12/2018 >60.0 >60 mL/min/1.73 m^2 Final     eGFR if non    Date Value Ref Range Status   11/12/2018 >60.0 >60 mL/min/1.73 m^2 Final     Comment:     Calculation used to obtain the estimated glomerular filtration  rate (eGFR) is the CKD-EPI equation.        No results found for: TSH   =======================================================    08/12/2019   Patient is on CHRONIC long-term drug therapy for managed conditions. See medication list. Reports compliance.  No side effects reported.  Routine lab work is being monitored.  Patient does need refills today.     Lab Results   Component Value Date    WBC 8.73 08/08/2019    HGB 9.4 (L) 08/08/2019    HCT 33.1 (L) 08/08/2019    MCV 83 08/08/2019     08/08/2019      CMP  Sodium   Date Value Ref Range Status   08/08/2019 140 136 - 145 mmol/L Final     Potassium   Date Value Ref Range Status   08/08/2019 4.0 3.5 - 5.1 mmol/L Final     Chloride   Date Value Ref Range Status   08/08/2019 105 95 - 110 mmol/L Final     CO2   Date Value Ref Range Status   08/08/2019 26 23 - 29 mmol/L Final     Glucose   Date Value Ref Range Status   08/08/2019 88 70 - 110 mg/dL Final     BUN, Bld   Date Value Ref Range Status   08/08/2019 15 8 - 23 mg/dL Final     Creatinine   Date Value Ref Range Status   08/08/2019 0.8 0.5 - 1.4 mg/dL Final     Calcium   Date Value Ref Range Status   08/08/2019 9.4 8.7 - 10.5 mg/dL Final     Total Protein   Date Value Ref Range Status   08/08/2019 6.9 6.0 - 8.4 g/dL Final     Albumin   Date Value Ref Range Status   08/08/2019 3.3 (L) 3.5 - 5.2 g/dL Final     Total Bilirubin   Date Value  Ref Range Status   08/08/2019 0.2 0.1 - 1.0 mg/dL Final     Comment:     For infants and newborns, interpretation of results should be based  on gestational age, weight and in agreement with clinical  observations.  Premature Infant recommended reference ranges:  Up to 24 hours.............<8.0 mg/dL  Up to 48 hours............<12.0 mg/dL  3-5 days..................<15.0 mg/dL  6-29 days.................<15.0 mg/dL       Alkaline Phosphatase   Date Value Ref Range Status   08/08/2019 115 55 - 135 U/L Final     AST   Date Value Ref Range Status   08/08/2019 16 10 - 40 U/L Final     ALT   Date Value Ref Range Status   08/08/2019 11 10 - 44 U/L Final     Anion Gap   Date Value Ref Range Status   08/08/2019 9 8 - 16 mmol/L Final     eGFR if    Date Value Ref Range Status   08/08/2019 >60.0 >60 mL/min/1.73 m^2 Final     eGFR if non    Date Value Ref Range Status   08/08/2019 >60.0 >60 mL/min/1.73 m^2 Final     Comment:     Calculation used to obtain the estimated glomerular filtration  rate (eGFR) is the CKD-EPI equation.        Lab Results   Component Value Date    TSH 2.068 08/08/2019               Current Assessment & Plan     Kidney function is well preserved.  Thyroid functions are stable.  Electrolytes are stable.  Patient is anemic on labs.         Hypokalemia    Overview     Chronic.  On potassium supplementation.  Also on diuretic.  Checking potassium level.    Lab Results   Component Value Date    K 4.0 08/08/2019              Current Assessment & Plan     Continue current supplementation regimen         Need for vaccination - Primary    Overview     Due for pneumonia vaccination.  However patient has latex allergy.  Patient cannot receive pneumococcal vaccine here.  Well patient follow-up at pharmacy for different pneumococcal vaccine         Need for hepatitis C screening test    Overview     Born in 1950.    Lab Results   Component Value Date    HEPCAB Negative 08/08/2019               Vitamin D deficiency    Overview       08/12/2019  Vit d deficiency. Takes vitamin d supplement. No SE reported. Fatigue is slightly improved.            Current Assessment & Plan     Continue vitamin-D supplementation.  Repeat vitamin-D in November 2019                Past Medical History:  Past Medical History:   Diagnosis Date    ACS (acute coronary syndrome) 3/20/2018    Acute on chronic diastolic congestive heart failure 4/27/2015    Acute on chronic respiratory failure with hypercapnia 1/10/2019    Acute renal failure 1/11/2019    Acute systolic heart failure 3/21/2018    JG (acute kidney injury) 1/29/2019    Anticoagulant long-term use     Arthritis     Asthma     CHF (congestive heart failure)     Clostridium difficile colitis 9/25/2018    COPD (chronic obstructive pulmonary disease)     Coronary artery disease     Depression     Encounter for blood transfusion     Hyperlipidemia     Hypertension     Non-intractable cyclical vomiting with nausea 2/1/2019    Peripheral vascular disease     Thyroid disease      Past Surgical History:   Procedure Laterality Date    AORTOCORONARY BYPASS-CABG N/A 4/16/2015    Performed by [facility] at [facility] OR 2ND FLR    CARDIAC SURGERY      CATHETERIZATION, HEART, LEFT Left 11/14/2018    [facility] at [facility] CATH LAB    CORONARY ARTERY BYPASS GRAFT  04/16/2015    EYE SURGERY      HEART CATH-LEFT Left 4/13/2015    Performed by [facility] at [facility] CATH LAB    HYSTERECTOMY      INSERT / REPLACE / REMOVE PACEMAKER      peripheral angiogram      peripheral stenting      PLACEMENT-IVC FILTER Right 2/11/2014    [facility] CATH LAB    REPLACEMENT, PULSE GENERATOR, CARDIAC PACEMAKER N/A 2/11/2014    [facility] CATH LAB    STERNAL PLATING (SYNTHES) N/A 4/16/2015    Performed by Mc Kwan [facility] at [facility] OR 2ND FLR     Review of  "patient's allergies indicates:   Allergen Reactions    Atorvastatin Other (See Comments)     Severe muscle pain  Other reaction(s): Abdominal Pain  SEVERE MYALGIAS      Azithromycin Other (See Comments)     By shot, closed veins and made large bruises  By shot, closed veins and made large bruises      Latex, natural rubber Rash     "TOURNIQUET ON LEG LEFT HUGE BLISTER THAT TOOK 9 MONTHS OF WOUND CARE TO HEAL."    Meperidine Hives and Anaphylaxis     Other reaction(s): Anaphylaxis  Hives (skin)^  Hives (skin)^      Penicillins Anaphylaxis and Hives     Other reaction(s): Anaphylaxis  Shortness of breath^swelling  Shortness of breath and swelling  Anaphylaxis  Shortness of breath^swelling      Tofacitinib Rash and Swelling     Rash and swelling of face      Lorazepam Anxiety and Other (See Comments)     Made me goofy  CAUSED CONFUSION "Made me goofy."      Pravastatin Other (See Comments)     Severe myalgias.  Cramps/Severe myalgias.    Flu vac 2018 [id number](pf)      Other reaction(s): Other (See Comments)  Flip into aFIB    Nystatin Rash    Pepper (genus capsicum) Other (See Comments)     Reflux and ulcers     Current Outpatient Medications on File Prior to Visit   Medication Sig Dispense Refill    alirocumab (PRALUENT PEN) 75 mg/mL PnIj Inject 1 mL (75 mg total) into the skin every 14 (fourteen) days. 2 Syringe 11    amLODIPine (NORVASC) 10 MG tablet Take 10 mg by mouth once daily.      apixaban (ELIQUIS) 5 mg Tab Take 1 tablet (5 mg total) by mouth 2 (two) times daily. 180 tablet 3    benazepril (LOTENSIN) 40 MG tablet Take 40 mg by mouth once daily.      bisoprolol (ZEBETA) 10 MG tablet Take 10 mg by mouth once daily.      cetirizine (ZYRTEC) 10 MG tablet Take 10 mg by mouth once daily.      diclofenac sodium (VOLTAREN) 1 % Gel       dicyclomine (BENTYL) 10 MG capsule dicyclomine 10 mg capsule      furosemide (LASIX) 40 MG tablet Take 80 mg in the am and 80 mg BID x 7 days,then take 80 mg " in am and 40 mg in the PM thereafter. (Patient taking differently: Take 80 mg in the am and 80 mg pm) 180 tablet 3    gabapentin (NEURONTIN) 600 MG tablet Take 600 mg by mouth 3 (three) times daily.      hydrALAZINE (APRESOLINE) 25 MG tablet Take 25 mg by mouth 2 (two) times daily.      hydroCHLOROthiazide (HYDRODIURIL) 25 MG tablet Take 25 mg by mouth.      hydrocodone-acetaminophen 10-325mg (NORCO)  mg Tab Take 1 tablet by mouth 2 (two) times daily as needed.      isosorbide mononitrate (IMDUR) 30 MG 24 hr tablet Take 30 mg by mouth 2 (two) times daily.      levothyroxine (SYNTHROID) 50 MCG tablet Take 1 tablet (50 mcg total) by mouth once daily. 30 tablet 0    mupirocin (BACTROBAN) 2 % ointment mupirocin 2 % topical ointment      nitroGLYCERIN (NITROSTAT) 0.4 MG SL tablet Place 1 tablet (0.4 mg total) under the tongue every 5 (five) minutes as needed for Chest pain. 25 tablet 11    potassium chloride (MICRO-K) 10 MEQ CpSR Take 2 capsules in am and 1 in the evening; if taking additional lasix, will need to take one additional potassium capsule in the evening 60 capsule 11    pramipexole (MIRAPEX) 0.25 MG tablet Take 0.25 mg by mouth every evening.      promethazine (PHENERGAN) 25 MG tablet Take 25 mg by mouth every 6 (six) hours as needed.      ranolazine (RANEXA) 1,000 mg Tb12 TAKE ONE TABLET BY MOUTH TWICE DAILY  3    rOPINIRole (REQUIP) 5 MG tablet Take 5 mg by mouth every evening.      temazepam (RESTORIL) 22.5 MG capsule Take 30 mg by mouth nightly as needed for Insomnia.      umeclidinium-vilanterol (ANORO ELLIPTA) 62.5-25 mcg/actuation DsDv Inhale 1 puff into the lungs once daily. Controller      VENTOLIN HFA 90 mcg/actuation inhaler Inhale 2 puff(s) every 4 hours by inhalation route.  3    butalbital-acetaminophen-caff (FIORICET) -40 mg Cap Fioricet 50 mg-300 mg-40 mg capsule   Take 1 capsule every 4 hours by oral route as needed for 30 days.      esomeprazole (NEXIUM) 40 MG  capsule Take 1 capsule (40 mg total) by mouth before breakfast. 30 capsule 11     No current facility-administered medications on file prior to visit.      Social History     Socioeconomic History    Marital status:      Spouse name: Not on file    Number of children: Not on file    Years of education: Not on file    Highest education level: Not on file   Occupational History    Not on file   Social Needs    Financial resource strain: Not on file    Food insecurity:     Worry: Not on file     Inability: Not on file    Transportation needs:     Medical: Not on file     Non-medical: Not on file   Tobacco Use    Smoking status: Former Smoker     Packs/day: 1.00     Years: 45.00     Pack years: 45.00     Types: Cigarettes     Last attempt to quit: 10/3/2012     Years since quittin.8    Smokeless tobacco: Never Used    Tobacco comment: 43 years   Substance and Sexual Activity    Alcohol use: No    Drug use: Not on file    Sexual activity: Not on file   Lifestyle    Physical activity:     Days per week: Not on file     Minutes per session: Not on file    Stress: Not on file   Relationships    Social connections:     Talks on phone: Not on file     Gets together: Not on file     Attends [employer] service: Not on file     Active member of club or organization: Not on file     Attends meetings of clubs or organizations: Not on file     Relationship status: Not on file   Other Topics Concern    Not on file   Social History Narrative    Not on file     Family History   Problem Relation Age of Onset    Cancer Mother     Lung cancer Father     Breast cancer Sister        I have reviewed the complete PMH, social history, surgical history, allergies and medications.  As well as family history.    Review of Systems   Constitutional: Positive for fatigue. Negative for chills, fever and unexpected weight change.   HENT: Negative for ear pain and sore throat.    Eyes: Negative for redness and visual  "disturbance.   Respiratory: Negative for cough and shortness of breath.    Cardiovascular: Negative for chest pain and palpitations.   Gastrointestinal: Negative for nausea and vomiting.   Genitourinary: Negative for difficulty urinating and hematuria.   Musculoskeletal: Positive for arthralgias, back pain, gait problem and myalgias.   Skin: Negative for rash and wound.   Neurological: Positive for weakness. Negative for headaches.   Hematological: Negative for adenopathy. Bruises/bleeds easily.   Psychiatric/Behavioral: Positive for decreased concentration and sleep disturbance. The patient is nervous/anxious.        Objective:     BP (!) 144/70   Pulse 80   Temp 98.9 °F (37.2 °C) (Oral)   Ht 5' 4" (1.626 m)   Wt 90.3 kg (199 lb)   LMP  (LMP Unknown)   BMI 34.16 kg/m²     Physical Exam   Constitutional: She is oriented to person, place, and time. She appears well-developed and well-nourished. No distress.   HENT:   Head: Normocephalic and atraumatic.   Eyes: Pupils are equal, round, and reactive to light. EOM are normal.   Neck: Normal range of motion. Neck supple.   Cardiovascular: Normal rate, regular rhythm, normal heart sounds and intact distal pulses.   No murmur heard.  Pulmonary/Chest: Effort normal and breath sounds normal. No respiratory distress. She has no wheezes.   Abdominal: Soft. Bowel sounds are normal. She exhibits no distension.   Musculoskeletal: Normal range of motion. She exhibits tenderness. She exhibits no edema.   Coccyx sacral area is tender to palpation.  No crepitation.  Straight leg raise is positive.  Bilaterally worse on the right.   Neurological: She is alert and oriented to person, place, and time. No cranial nerve deficit.   Skin: Skin is warm and dry. Capillary refill takes less than 2 seconds. Pallor: Healing ecchymoses on the arms bilaterally.   Psychiatric: She has a normal mood and affect. Her behavior is normal.   Nursing note and vitals reviewed.      Assessment:     1. " Need for vaccination    2. Need for hepatitis C screening test    3. Essential hypertension    4. Thyroid disease    5. Long term current use of anticoagulant therapy    6. Hypokalemia    7. On long term drug therapy    8. Pure hypercholesterolemia    9. Vitamin D deficiency    10. PVD (peripheral vascular disease)        Plan:     I have Reviewed and summarized old records.  I have performed thorough medication reconciliation today and discussed risk and benefits of each medication.  I have reviewed labs and discussed with patient.  All questions were answered.  I am requesting old records and will review them once they are available.    Problem List Items Addressed This Visit     PVD (peripheral vascular disease)    Relevant Orders    COMPRESSION STOCKINGS    Hyperlipidemia     Continue medication.  Recheck special lipid panel next year.  LFTs are stable.  Diet and exercise discussed.         Relevant Orders    Hemoglobin A1c (Completed)    Vitamin D (Completed)    Lipoprotein Analysis, by NMR (Completed)    Long term current use of anticoagulant therapy     Continue anticoagulation for now.  Bruising is stable.  Fall precautions.         Relevant Orders    CBC auto differential (Completed)    Comprehensive metabolic panel (Completed)    Thyroid disease     Lab Results   Component Value Date    TSH 2.068 08/08/2019              Relevant Orders    Hemoglobin A1c (Completed)    TSH (Completed)    T3, free (Completed)    T4 (Completed)    Essential hypertension     Continue current medication regimen.  Blood pressure is actually well controlled for this patient.  She is on maximal medical therapy.  Continue follow-up with cardiology.  Continue follow-up routine blood work.         Relevant Orders    Hemoglobin A1c (Completed)    On long term drug therapy     Kidney function is well preserved.  Thyroid functions are stable.  Electrolytes are stable.  Patient is anemic on labs.         Relevant Orders    CBC auto  differential (Completed)    Comprehensive metabolic panel (Completed)    Hemoglobin A1c (Completed)    TSH (Completed)    T3, free (Completed)    T4 (Completed)    Lipoprotein Analysis, by NMR (Completed)    Hypokalemia     Continue current supplementation regimen         Relevant Orders    Comprehensive metabolic panel (Completed)    Need for vaccination - Primary    Need for hepatitis C screening test    Relevant Orders    Hepatitis C antibody (Completed)    Vitamin D deficiency     Continue vitamin-D supplementation.  Repeat vitamin-D in November 2019         Relevant Medications    ergocalciferol (ERGOCALCIFEROL) 50,000 unit Cap    Other Relevant Orders    Vitamin D (Completed)          Follow up in about 4 weeks (around 9/5/2019) for HTN.    DISCLAIMER: This note was compiled by using a speech recognition dictation system and therefore please be aware that typographical / speech recognition errors can and do occur.  Please contact me if you see any errors specifically.    Adan Woodward MD  We Offer Telehealth & Same Day Appointments!   Book your Telehealth appointment with me through my nurse or   Clinic appointments on [employer]!    Office: [phone]          Check out my Facebook Page and Follow Me at: CLICK HERE    Check out my website at [employer] by clicking on: CLICK HERE    To Schedule appointments online, go to [facility] [employer] CLICK HERE     Location: [email or web address]    [address] [place] [address]    FAX: [phone]

## 2019-08-12 NOTE — ASSESSMENT & PLAN NOTE
Continue current medication regimen.  Blood pressure is actually well controlled for this patient.  She is on maximal medical therapy.  Continue follow-up with cardiology.  Continue follow-up routine blood work.

## 2019-08-12 NOTE — ASSESSMENT & PLAN NOTE
Continue medication.  Recheck special lipid panel next year.  LFTs are stable.  Diet and exercise discussed.

## 2019-08-13 ENCOUNTER — TELEPHONE (OUTPATIENT)
Dept: FAMILY MEDICINE | Facility: CLINIC | Age: 69
End: 2019-08-13

## 2019-08-13 DIAGNOSIS — Z79.01 CURRENT USE OF LONG TERM ANTICOAGULATION: ICD-10-CM

## 2019-08-13 RX ORDER — TICAGRELOR 90 MG/1
TABLET ORAL
Qty: 60 TABLET | Refills: 5 | Status: SHIPPED | OUTPATIENT
Start: 2019-08-13 | End: 2020-07-14

## 2019-08-13 NOTE — TELEPHONE ENCOUNTER
No the patient did not receive the pneumonia vaccine due to her latex allergy she is supposed to get this at the pharmacy. Vaccine will be deleted

## 2019-08-13 NOTE — TELEPHONE ENCOUNTER
----- Message from Adan Woodward MD sent at 8/12/2019  5:10 PM CDT -----  I cannot sign the note because the shot is still pending.  Did you give this patient the pneumonia vaccine?

## 2019-08-16 RX ORDER — BENAZEPRIL HYDROCHLORIDE 40 MG/1
40 TABLET ORAL DAILY
Qty: 90 TABLET | Refills: 3 | Status: SHIPPED | OUTPATIENT
Start: 2019-08-16 | End: 2020-07-01 | Stop reason: SDUPTHER

## 2019-08-16 RX ORDER — RANOLAZINE 1000 MG/1
1000 TABLET, EXTENDED RELEASE ORAL 2 TIMES DAILY
Qty: 180 TABLET | Refills: 3 | Status: SHIPPED | OUTPATIENT
Start: 2019-08-16 | End: 2020-11-30 | Stop reason: SDUPTHER

## 2019-08-20 ENCOUNTER — TELEPHONE (OUTPATIENT)
Dept: PHARMACY | Facility: CLINIC | Age: 69
End: 2019-08-20

## 2019-08-22 ENCOUNTER — PATIENT OUTREACH (OUTPATIENT)
Dept: ADMINISTRATIVE | Facility: HOSPITAL | Age: 69
End: 2019-08-22

## 2019-08-26 PROCEDURE — G0179 MD RECERTIFICATION HHA PT: HCPCS | Mod: ,,, | Performed by: FAMILY MEDICINE

## 2019-08-26 PROCEDURE — G0179 PR HOME HEALTH MD RECERTIFICATION: ICD-10-PCS | Mod: ,,, | Performed by: FAMILY MEDICINE

## 2019-08-30 DIAGNOSIS — I25.10 CORONARY ARTERY DISEASE INVOLVING NATIVE CORONARY ARTERY OF NATIVE HEART WITHOUT ANGINA PECTORIS: ICD-10-CM

## 2019-08-30 RX ORDER — LEVOTHYROXINE SODIUM 50 UG/1
50 TABLET ORAL DAILY
Qty: 90 TABLET | Refills: 3 | Status: SHIPPED | OUTPATIENT
Start: 2019-08-30 | End: 2020-07-01 | Stop reason: SDUPTHER

## 2019-09-03 ENCOUNTER — TELEPHONE (OUTPATIENT)
Dept: FAMILY MEDICINE | Facility: CLINIC | Age: 69
End: 2019-09-03

## 2019-09-03 NOTE — TELEPHONE ENCOUNTER
----- Message from Wanda Whaley sent at 9/3/2019  3:04 PM CDT -----  Contact: pt  .Type:  Test Results    Who Called:  pt  Name of Test (Lab/Mammo/Etc):     Date of Test:    Ordering Provider:    Where the test was performed:    Would the patient rather a call back or a response via [facility]?  Call back   Best Call Back Number:  [phone] ([place])   Additional Information:   Pt stated she received a letter to call in regarding test results

## 2019-09-03 NOTE — TELEPHONE ENCOUNTER
Pt states she has an infection in her gums under her dentures and would like medication sent to her pharmacy. She has a f/u appointment with Dr Woodward for 9/5/19.

## 2019-09-04 NOTE — TELEPHONE ENCOUNTER
Spoke with patient and she states that she can take Keflex, so I phoned in the prescription to her pharmacy.

## 2019-09-04 NOTE — TELEPHONE ENCOUNTER
See if the patient can take Keflex.  If she can send in 500 mg b.i.d. for 7 days until she can see the dentist.

## 2019-09-05 ENCOUNTER — OFFICE VISIT (OUTPATIENT)
Dept: FAMILY MEDICINE | Facility: CLINIC | Age: 69
End: 2019-09-05
Payer: MEDICARE

## 2019-09-05 VITALS
HEIGHT: 64 IN | TEMPERATURE: 98 F | DIASTOLIC BLOOD PRESSURE: 60 MMHG | BODY MASS INDEX: 33.87 KG/M2 | HEART RATE: 60 BPM | SYSTOLIC BLOOD PRESSURE: 154 MMHG | WEIGHT: 198.38 LBS

## 2019-09-05 DIAGNOSIS — Z12.31 ENCOUNTER FOR SCREENING MAMMOGRAM FOR BREAST CANCER: ICD-10-CM

## 2019-09-05 DIAGNOSIS — E55.9 VITAMIN D DEFICIENCY: ICD-10-CM

## 2019-09-05 DIAGNOSIS — R30.0 DYSURIA: Primary | ICD-10-CM

## 2019-09-05 DIAGNOSIS — Z78.0 MENOPAUSE: ICD-10-CM

## 2019-09-05 DIAGNOSIS — T14.8XXA BRUISING: ICD-10-CM

## 2019-09-05 DIAGNOSIS — R31.9 HEMATURIA, UNSPECIFIED TYPE: ICD-10-CM
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
## 2019-09-05 LAB
BILIRUB UR QL STRIP: NEGATIVE
CLARITY UR: CLEAR
COLOR UR: YELLOW
GLUCOSE UR QL STRIP: NEGATIVE
HGB UR QL STRIP: ABNORMAL
KETONES UR QL STRIP: NEGATIVE
LEUKOCYTE ESTERASE UR QL STRIP: NEGATIVE
NITRITE UR QL STRIP: NEGATIVE
PH UR STRIP: 7 [PH] (ref 5–8)
PROT UR QL STRIP: ABNORMAL
SP GR UR STRIP: 1.01 (ref 1–1.03)
URN SPEC COLLECT METH UR: ABNORMAL

## 2019-09-05 PROCEDURE — 99214 OFFICE O/P EST MOD 30 MIN: CPT | Mod: PBBFAC,PO | Performed by: FAMILY MEDICINE

## 2019-09-05 PROCEDURE — 99999 PR PBB SHADOW E&M-EST. PATIENT-LVL IV: CPT | Mod: PBBFAC,,, | Performed by: FAMILY MEDICINE

## 2019-09-05 PROCEDURE — 81002 URINALYSIS NONAUTO W/O SCOPE: CPT | Mod: PO

## 2019-09-05 PROCEDURE — 99214 PR OFFICE/OUTPT VISIT, EST, LEVL IV, 30-39 MIN: ICD-10-PCS | Mod: S$PBB,,, | Performed by: FAMILY MEDICINE

## 2019-09-05 PROCEDURE — 99999 PR PBB SHADOW E&M-EST. PATIENT-LVL IV: ICD-10-PCS | Mod: PBBFAC,,, | Performed by: FAMILY MEDICINE

## 2019-09-05 PROCEDURE — 99214 OFFICE O/P EST MOD 30 MIN: CPT | Mod: S$PBB,,, | Performed by: FAMILY MEDICINE

## 2019-09-05 RX ORDER — CEPHALEXIN 500 MG/1
500 CAPSULE ORAL 2 TIMES DAILY
COMMUNITY
End: 2020-04-04

## 2019-09-05 RX ORDER — CYCLOBENZAPRINE HCL 10 MG
TABLET ORAL
Qty: 60 TABLET | Refills: 11 | Status: SHIPPED | OUTPATIENT
Start: 2019-09-05 | End: 2020-09-25 | Stop reason: SDUPTHER

## 2019-09-05 RX ORDER — CALCITRIOL 0.25 UG/1
0.25 CAPSULE ORAL DAILY
Qty: 90 CAPSULE | Refills: 3 | Status: SHIPPED | OUTPATIENT
Start: 2019-09-05 | End: 2020-09-04

## 2019-09-05 RX ORDER — CYCLOBENZAPRINE HCL 10 MG
TABLET ORAL
Refills: 0 | COMMUNITY
Start: 2019-08-08 | End: 2019-09-05 | Stop reason: SDUPTHER

## 2019-09-05 NOTE — PROGRESS NOTES
Subjective:      Patient ID: Kalpana Wright is a 69 y.o. female.    Chief Complaint: Urinary Tract Infection; Leg Pain (patient fell about 2 days ago); Arm Pain; Vitamin D Deficiency; Mammogram; and DEXA scan      Problem List Items Addressed This Visit     Menopause    Overview     Due for bone density scanning.  Patient is taking vitamin-D however most recent prescription was denied by insurance.           Fall    Overview     Iniital HPI: New problem acute.  Happened several days ago.  Patient was getting up out of her wheelchair and thought it was locked when it rolled about from under her and she fell and hit her tailbone on the wheelchair.  Patient has been having moderate to severe pain to the point where she cannot sit on her wheelchair for long periods of time.    ====================  09/05/2019  Fell again on Saturday. Tripped over a weedeater at home.   Legs are bruised.  No open lesions.  Patient denies hitting her head.         Current Assessment & Plan     Fall precautions were discussed.  Continue walker and wheelchair.  Physical therapy if needed.         Vitamin D deficiency    Overview       08/12/2019  Vit d deficiency. Takes vitamin d supplement. No SE reported. Fatigue is slightly improved.   ====================  09/05/2019  Patient reports vitamin-D supplement was denied by insurance.  Patient not taking any vitamin-D currently.  Requesting alternative prescription.           Current Assessment & Plan     Will try alternative prescription.  Repeat vitamin-D level in a few months.  If prescription is not approved.  Patient advised to take over-the-counter 2000 units daily.  Checking bone density scan patient may need to be on calcium as well.         Dysuria - Primary    Overview     New problem.  Patient recently started antibiotic for dental infection.  Her urinalysis today shows trace blood and trace protein.  Having pain with urination.  No blood in her urine that she can see.          Current Assessment & Plan     Continue Keflex.  Repeat urinalysis and 1 to 2 weeks.         Encounter for screening mammogram for breast cancer    Overview     Patient is overdue for mammogram.  Strong family history of cancer.         Hematuria    Overview     Noted on urinalysis today.  Will follow up for repeat.  Patient has been a smoker in the past.  Patient has never seen a urologist.         Current Assessment & Plan     Repeat urinalysis in 1 to 2 weeks.  Refer to Urology if persistent.         Bruising    Overview     Chronic.  Worsening.  Patient fell recently and has bruising on the legs and arms.  Tender to touch.  No areas of open skin currently.         Current Assessment & Plan     Patient is high wrist for bruising and bleeding due to multiple blood thinners and antiplatelets.  However with her high risk she needs to be on these medications.  Patient was advised of risk and benefits of medications.  ER precautions were given.                Past Medical History:  Past Medical History:   Diagnosis Date    ACS (acute coronary syndrome) 3/20/2018    Acute on chronic diastolic congestive heart failure 4/27/2015    Acute on chronic respiratory failure with hypercapnia 1/10/2019    Acute renal failure 1/11/2019    Acute systolic heart failure 3/21/2018    JG (acute kidney injury) 1/29/2019    Anticoagulant long-term use     Arthritis     Asthma     CHF (congestive heart failure)     Clostridium difficile colitis 9/25/2018    COPD (chronic obstructive pulmonary disease)     Coronary artery disease     Depression     Encounter for blood transfusion     Hyperlipidemia     Hypertension     Non-intractable cyclical vomiting with nausea 2/1/2019    Peripheral vascular disease     Thyroid disease      Past Surgical History:   Procedure Laterality Date    AORTOCORONARY BYPASS-CABG N/A 4/16/2015    Performed by Mc Kwan MD at [facility] OR [address] [place] [address]    CARDIAC SURGERY       "CATHETERIZATION, HEART, LEFT Left 11/14/2018    Performed by Huber Kapoor [facility] at [facility] CATH LAB    CORONARY ARTERY BYPASS GRAFT  04/16/2015    EYE SURGERY      HEART CATH-LEFT Left 4/13/2015    Performed by [facility] at [facility] CATH LAB    HYSTERECTOMY      INSERT / REPLACE / REMOVE PACEMAKER      peripheral angiogram      peripheral stenting      PLACEMENT-IVC FILTER Right 2/11/2014    Performed by Huber Kapoor MD at [facility] CATH LAB    REPLACEMENT, PULSE GENERATOR, CARDIAC PACEMAKER N/A 2/11/2014    Performed by Huber Kapoor MD at [facility] CATH LAB    STERNAL PLATING (SYNTHES) N/A 4/16/2015    Performed by Mc Kwan MD at [facility] OR [address] [place] [address]     Review of patient's allergies indicates:   Allergen Reactions    Atorvastatin Other (See Comments)     Severe muscle pain  Other reaction(s): Abdominal Pain  SEVERE MYALGIAS      Azithromycin Other (See Comments)     By shot, closed veins and made large bruises  By shot, closed veins and made large bruises      Latex, natural rubber Rash     "TOURNIQUET ON LEG LEFT HUGE BLISTER THAT TOOK 9 MONTHS OF WOUND CARE TO HEAL."    Meperidine Hives and Anaphylaxis     Other reaction(s): Anaphylaxis  Hives (skin)^  Hives (skin)^      Penicillins Anaphylaxis and Hives     Other reaction(s): Anaphylaxis  Shortness of breath^swelling  Shortness of breath and swelling  Anaphylaxis  Shortness of breath^swelling      Tofacitinib Rash and Swelling     Rash and swelling of face      Lorazepam Anxiety and Other (See Comments)     Made me goofy  CAUSED CONFUSION "Made me goofy."      Pravastatin Other (See Comments)     Severe myalgias.  Cramps/Severe myalgias.    Flu vac 2018 [id number](pf)      Other reaction(s): Other (See Comments)  Flip into aFIB    Nystatin Rash    Pepper (genus capsicum) Other (See Comments)     Reflux and ulcers     Medication List with Changes/Refills   New Medications    CALCITRIOL (ROCALTROL) 0.25 MCG CAP    Take 1 " capsule (0.25 mcg total) by mouth once daily.   Current Medications    ALIROCUMAB (PRALUENT PEN) 75 MG/ML PNIJ    Inject 1 mL (75 mg total) into the skin every 14 (fourteen) days.    AMLODIPINE (NORVASC) 10 MG TABLET    Take 10 mg by mouth once daily.    APIXABAN (ELIQUIS) 5 MG TAB    Take 1 tablet (5 mg total) by mouth 2 (two) times daily.    BENAZEPRIL (LOTENSIN) 40 MG TABLET    Take 1 tablet (40 mg total) by mouth once daily.    BISOPROLOL (ZEBETA) 10 MG TABLET    Take 10 mg by mouth once daily.    BRILINTA 90 MG TABLET    TAKE ONE TABLET TWICE DAILY    BUTALBITAL-ACETAMINOPHEN-CAFF (FIORICET) -40 MG CAP    Fioricet 50 mg-300 mg-40 mg capsule   Take 1 capsule every 4 hours by oral route as needed for 30 days.    CEPHALEXIN (KEFLEX) 500 MG CAPSULE    Take 500 mg by mouth 2 (two) times daily.    CETIRIZINE (ZYRTEC) 10 MG TABLET    Take 10 mg by mouth once daily.    DICLOFENAC SODIUM (VOLTAREN) 1 % GEL        DICYCLOMINE (BENTYL) 10 MG CAPSULE    dicyclomine 10 mg capsule    ESOMEPRAZOLE (NEXIUM) 40 MG CAPSULE    Take 1 capsule (40 mg total) by mouth before breakfast.    FUROSEMIDE (LASIX) 40 MG TABLET    Take 80 mg in the am and 80 mg BID x 7 days,then take 80 mg in am and 40 mg in the PM thereafter.    GABAPENTIN (NEURONTIN) 600 MG TABLET    Take 600 mg by mouth 3 (three) times daily.    HYDRALAZINE (APRESOLINE) 25 MG TABLET    Take 25 mg by mouth 2 (two) times daily.    HYDROCHLOROTHIAZIDE (HYDRODIURIL) 25 MG TABLET    Take 25 mg by mouth.    HYDROCODONE-ACETAMINOPHEN 10-325MG (NORCO)  MG TAB    Take 1 tablet by mouth 2 (two) times daily as needed.    ISOSORBIDE MONONITRATE (IMDUR) 30 MG 24 HR TABLET    Take 30 mg by mouth 2 (two) times daily.    LEVOTHYROXINE (SYNTHROID) 50 MCG TABLET    Take 1 tablet (50 mcg total) by mouth once daily.    MUPIROCIN (BACTROBAN) 2 % OINTMENT    mupirocin 2 % topical ointment    NITROGLYCERIN (NITROSTAT) 0.4 MG SL TABLET    Place 1 tablet (0.4 mg total) under the  tongue every 5 (five) minutes as needed for Chest pain.    POTASSIUM CHLORIDE (MICRO-K) 10 MEQ CPSR    Take 2 capsules in am and 1 in the evening; if taking additional lasix, will need to take one additional potassium capsule in the evening    PRAMIPEXOLE (MIRAPEX) 0.25 MG TABLET    Take 0.25 mg by mouth every evening.    PROMETHAZINE (PHENERGAN) 25 MG TABLET    Take 25 mg by mouth every 6 (six) hours as needed.    RANOLAZINE (RANEXA) 1,000 MG TB12    Take 1 tablet (1,000 mg total) by mouth 2 (two) times daily.    ROPINIROLE (REQUIP) 5 MG TABLET    Take 5 mg by mouth every evening.    TEMAZEPAM (RESTORIL) 22.5 MG CAPSULE    Take 30 mg by mouth nightly as needed for Insomnia.    UMECLIDINIUM-VILANTEROL (ANORO ELLIPTA) 62.5-25 MCG/ACTUATION DSDV    Inhale 1 puff into the lungs once daily. Controller    VENTOLIN HFA 90 MCG/ACTUATION INHALER    Inhale 2 puff(s) every 4 hours by inhalation route.   Changed and/or Refilled Medications    Modified Medication Previous Medication    CYCLOBENZAPRINE (FLEXERIL) 10 MG TABLET cyclobenzaprine (FLEXERIL) 10 MG tablet       take 1 tablet by oral route 2 times every day    take 1 tablet by oral route 2 times every day   Discontinued Medications    ERGOCALCIFEROL (ERGOCALCIFEROL) 50,000 UNIT CAP    Take 1 capsule (50,000 Units total) by mouth every 7 days.      Social History     Tobacco Use    Smoking status: Former Smoker     Packs/day: 1.00     Years: 45.00     Pack years: 45.00     Types: Cigarettes     Last attempt to quit: 10/3/2012     Years since quittin.9    Smokeless tobacco: Never Used    Tobacco comment: 43 years   Substance Use Topics    Alcohol use: No      Family History   Problem Relation Age of Onset    Cancer Mother     Lung cancer Father     Breast cancer Sister        I have reviewed the complete PMH, social history, surgical history, allergies and medications.  As well as family history.    Review of Systems   Constitutional: Negative for activity  "change and fatigue.   HENT: Negative for congestion and sinus pain.    Eyes: Negative for visual disturbance.   Respiratory: Negative for chest tightness and shortness of breath.    Cardiovascular: Negative for palpitations and leg swelling.   Gastrointestinal: Negative for abdominal pain, diarrhea and nausea.   Endocrine: Negative for polyuria.   Genitourinary: Positive for dysuria, frequency and urgency. Negative for difficulty urinating.   Musculoskeletal: Positive for arthralgias and joint swelling (left leg swollen).   Skin: Negative for rash.   Neurological: Negative for dizziness and headaches.   Psychiatric/Behavioral: Negative for agitation. The patient is not nervous/anxious.        Objective:     BP (!) 154/60 Comment: manual recheck  Pulse 60   Temp 97.6 °F (36.4 °C) (Oral)   Ht 5' 4" (1.626 m)   Wt 90 kg (198 lb 6.4 oz)   LMP  (LMP Unknown)   BMI 34.06 kg/m²    BMI Readings from Last 3 Encounters:   09/05/19 34.06 kg/m²   08/08/19 34.16 kg/m²   07/08/19 34.67 kg/m²         Physical Exam   Constitutional: She is oriented to person, place, and time. She appears well-developed and well-nourished. No distress.   Sitting in wheelchair.   HENT:   Head: Normocephalic and atraumatic.   Eyes: Pupils are equal, round, and reactive to light. EOM are normal.   Neck: Normal range of motion. Neck supple.   Cardiovascular: Normal rate, regular rhythm, normal heart sounds and intact distal pulses.   No murmur heard.  Left leg swelling.  Patient not wearing compression stockings.   Pulmonary/Chest: Effort normal and breath sounds normal. No respiratory distress. She has no wheezes.   Musculoskeletal: Normal range of motion. She exhibits tenderness. She exhibits no edema.   Neurological: She is alert and oriented to person, place, and time. No cranial nerve deficit. Gait abnormal.   Skin: Skin is warm and dry. Capillary refill takes less than 2 seconds. Bruising (Bilateral lower extremity) and ecchymosis noted. "   No open lesions or areas of infection currently.  Diffuse varicose veins noted.   Psychiatric: She has a normal mood and affect. Her behavior is normal.   Nursing note and vitals reviewed.      Assessment:     1. Dysuria    2. Encounter for screening mammogram for breast cancer    3. Menopause    4. Hematuria, unspecified type    5. Fall, initial encounter    6. Bruising    7. Vitamin D deficiency        Plan:     I have Reviewed and summarized old records.  I have performed thorough medication reconciliation today and discussed risk and benefits of each medication.    Problem List Items Addressed This Visit     Menopause    Relevant Orders    DXA Bone Density Spine And Hip    Fall     Fall precautions were discussed.  Continue walker and wheelchair.  Physical therapy if needed.         Relevant Medications    cyclobenzaprine (FLEXERIL) 10 MG tablet    Vitamin D deficiency     Will try alternative prescription.  Repeat vitamin-D level in a few months.  If prescription is not approved.  Patient advised to take over-the-counter 2000 units daily.  Checking bone density scan patient may need to be on calcium as well.         Relevant Medications    calcitRIOL (ROCALTROL) 0.25 MCG Cap    Dysuria - Primary     Continue Keflex.  Repeat urinalysis and 1 to 2 weeks.         Relevant Orders    URINALYSIS (Completed)    Encounter for screening mammogram for breast cancer    Relevant Orders    Mammo Digital Screening Bilat    Hematuria     Repeat urinalysis in 1 to 2 weeks.  Refer to Urology if persistent.         Bruising     Patient is high wrist for bruising and bleeding due to multiple blood thinners and antiplatelets.  However with her high risk she needs to be on these medications.  Patient was advised of risk and benefits of medications.  ER precautions were given.               Follow up in about 2 months (around 11/5/2019), or if symptoms worsen or fail to improve, for LAB RESULTS.    If no improvement in symptoms or  symptoms worsen, advised to call/follow-up at clinic or go to ER. Patient voiced understanding and all questions/concerns were addressed.     DISCLAIMER: This note was compiled by using a speech recognition dictation system and therefore please be aware that typographical / speech recognition errors can and do occur.  Please contact me if you see any errors specifically.    Adan Woodward MD  We Offer Telehealth & Same Day Appointments!   Book your Telehealth appointment with me through my nurse or   Clinic appointments on [employer]!    Office: [phone]          Check out my Facebook Page and Follow Me at: CLICK HERE    Check out my website at [employer] by clicking on: CLICK HERE    To Schedule appointments online, go to [employer]: CLICK HERE     Location: [email or web address]    [address] [place] [address]    FAX: [phone]

## 2019-09-05 NOTE — ASSESSMENT & PLAN NOTE
Will try alternative prescription.  Repeat vitamin-D level in a few months.  If prescription is not approved.  Patient advised to take over-the-counter 2000 units daily.  Checking bone density scan patient may need to be on calcium as well.

## 2019-09-05 NOTE — ASSESSMENT & PLAN NOTE
Patient is high wrist for bruising and bleeding due to multiple blood thinners and antiplatelets.  However with her high risk she needs to be on these medications.  Patient was advised of risk and benefits of medications.  ER precautions were given.

## 2019-09-05 NOTE — PATIENT INSTRUCTIONS
Cyclobenzaprine tablets  What is this medicine?  CYCLOBENZAPRINE (sye kloe BONNIE za preen) is a muscle relaxer. It is used to treat muscle pain, spasms, and stiffness.  How should I use this medicine?  Take this medicine by mouth with a glass of water. Follow the directions on the prescription label. If this medicine upsets your stomach, take it with food or milk. Take your medicine at regular intervals. Do not take it more often than directed.  Talk to your pediatrician regarding the use of this medicine in children. Special care may be needed.  What side effects may I notice from receiving this medicine?  Side effects that you should report to your doctor or health care professional as soon as possible:  · allergic reactions like skin rash, itching or hives, swelling of the face, lips, or tongue  · breathing problems  · chest pain  · fast, irregular heartbeat  · hallucinations  · seizures  · unusually weak or tired  Side effects that usually do not require medical attention (report to your doctor or health care professional if they continue or are bothersome):  · headache  · nausea, vomiting  What may interact with this medicine?  Do not take this medicine with any of the following medications:  · certain medicines for fungal infections like fluconazole, itraconazole, ketoconazole, posaconazole, voriconazole  · cisapride  · dofetilide  · dronedarone  · halofantrine  · levomethadyl  · MAOIs like Carbex, Eldepryl, Marplan, Nardil, and Parnate  · narcotic medicines for cough  · pimozide  · thioridazine  · ziprasidone  This medicine may also interact with the following medications:  · alcohol  · antihistamines for allergy, cough and cold  · certain medicines for anxiety or sleep  · certain medicines for cancer  · certain medicines for depression like amitriptyline, fluoxetine, sertraline  · certain medicines for infection like alfuzosin, chloroquine, clarithromycin, levofloxacin, mefloquine, pentamidine,  troleandomycin  · certain medicines for irregular heart beat  · certain medicines for seizures like phenobarbital, primidone  · contrast dyes  · general anesthetics like halothane, isoflurane, methoxyflurane, propofol  · local anesthetics like lidocaine, pramoxine, tetracaine  · medicines that relax muscles for surgery  · narcotic medicines for pain  · other medicines that prolong the QT interval (cause an abnormal heart rhythm)  · phenothiazines like chlorpromazine, mesoridazine, prochlorperazine  What if I miss a dose?  If you miss a dose, take it as soon as you can. If it is almost time for your next dose, take only that dose. Do not take double or extra doses.  Where should I keep my medicine?  Keep out of the reach of children.  Store at room temperature between 15 and 30 degrees C (59 and 86 degrees F). Keep container tightly closed. Throw away any unused medicine after the expiration date.  What should I tell my health care provider before I take this medicine?  They need to know if you have any of these conditions:  · heart disease, irregular heartbeat, or previous heart attack  · liver disease  · thyroid problem  · an unusual or allergic reaction to cyclobenzaprine, tricyclic antidepressants, lactose, other medicines, foods, dyes, or preservatives  · pregnant or trying to get pregnant  · breast-feeding  What should I watch for while using this medicine?  Tell your doctor or health care professional if your symptoms do not start to get better or if they get worse.  You may get drowsy or dizzy. Do not drive, use machinery, or do anything that needs mental alertness until you know how this medicine affects you. Do not stand or sit up quickly, especially if you are an older patient. This reduces the risk of dizzy or fainting spells. Alcohol may interfere with the effect of this medicine. Avoid alcoholic drinks.  If you are taking another medicine that also causes drowsiness, you may have more side effects. Give  your health care provider a list of all medicines you use. Your doctor will tell you how much medicine to take. Do not take more medicine than directed. Call emergency for help if you have problems breathing or unusual sleepiness.  Your mouth may get dry. Chewing sugarless gum or sucking hard candy, and drinking plenty of water may help. Contact your doctor if the problem does not go away or is severe.  NOTE:This sheet is a summary. It may not cover all possible information. If you have questions about this medicine, talk to your doctor, pharmacist, or health care provider. Copyright© 2017 [employer]

## 2019-09-05 NOTE — TELEPHONE ENCOUNTER
Praluent refill confirmed. We will ship Praluent refill on 9/10/19 via fedex to arrive on 19. $0.00 copay- 004. Confirmed 2 patient identifiers - name and .      Patient confirms no doses were missed.  She writes the date of last injection and next injection on the box.  Patient has 1 dose on hand, next injection 19.  No side effects noted.  No new allergies reported at this time. She has vitamin D deficiency, calcitriol prescribed. No DDIs.  Patient reports that all her providers are [facility] except pain management, so med list in chart is up to date.  All questions answered and addressed to patients satisfaction. Pt verbalized understanding.

## 2019-09-06 ENCOUNTER — EXTERNAL HOME HEALTH (OUTPATIENT)
Dept: HOME HEALTH SERVICES | Facility: HOSPITAL | Age: 69
End: 2019-09-06
Payer: MEDICARE

## 2019-09-11 ENCOUNTER — LAB VISIT (OUTPATIENT)
Dept: LAB | Facility: HOSPITAL | Age: 69
End: 2019-09-11
Attending: FAMILY MEDICINE
Payer: MEDICARE

## 2019-09-11 DIAGNOSIS — I25.10 CORONARY ARTERY DISEASE INVOLVING NATIVE CORONARY ARTERY OF NATIVE HEART WITHOUT ANGINA PECTORIS: ICD-10-CM

## 2019-09-11 LAB
ALBUMIN SERPL BCP-MCNC: 3.4 G/DL (ref 3.5–5.2)
ALP SERPL-CCNC: 120 U/L (ref 55–135)
ALT SERPL W/O P-5'-P-CCNC: 7 U/L (ref 10–44)
AST SERPL-CCNC: 13 U/L (ref 10–40)
BILIRUB DIRECT SERPL-MCNC: 0.1 MG/DL (ref 0.1–0.3)
BILIRUB SERPL-MCNC: 0.2 MG/DL (ref 0.1–1)
CHOLEST SERPL-MCNC: 162 MG/DL (ref 120–199)
CHOLEST/HDLC SERPL: 3.5 {RATIO} (ref 2–5)
HDLC SERPL-MCNC: 46 MG/DL (ref 40–75)
HDLC SERPL: 28.4 % (ref 20–50)
LDLC SERPL CALC-MCNC: 92.8 MG/DL (ref 63–159)
NONHDLC SERPL-MCNC: 116 MG/DL
PROT SERPL-MCNC: 7.1 G/DL (ref 6–8.4)
TRIGL SERPL-MCNC: 116 MG/DL (ref 30–150)

## 2019-09-11 PROCEDURE — 80076 HEPATIC FUNCTION PANEL: CPT

## 2019-09-11 PROCEDURE — 80061 LIPID PANEL: CPT

## 2019-09-11 PROCEDURE — 36415 COLL VENOUS BLD VENIPUNCTURE: CPT | Mod: PO

## 2019-09-12 RX ORDER — TEMAZEPAM 30 MG/1
30 CAPSULE ORAL NIGHTLY PRN
Qty: 30 CAPSULE | Refills: 5 | Status: SHIPPED | OUTPATIENT
Start: 2019-09-12 | End: 2019-10-12

## 2019-09-30 ENCOUNTER — TELEPHONE (OUTPATIENT)
Dept: HOME HEALTH SERVICES | Facility: HOSPITAL | Age: 69
End: 2019-09-30

## 2019-09-30 DIAGNOSIS — N39.0 RECURRENT UTI: Primary | ICD-10-CM

## 2019-09-30 RX ORDER — ROPINIROLE 5 MG/1
5 TABLET, FILM COATED ORAL NIGHTLY
Qty: 90 TABLET | Refills: 3 | Status: SHIPPED | OUTPATIENT
Start: 2019-09-30 | End: 2020-08-20 | Stop reason: SDUPTHER

## 2019-10-01 NOTE — TELEPHONE ENCOUNTER
Patient states that she has taken all of her medication for her UTI, but she is still having pain when she urinates.

## 2019-10-02 NOTE — TELEPHONE ENCOUNTER
Orders have been pended to you for your approval, I also tried to call the patient to get her back in for UA & Culture, but no answer, left vm asking patient to call the office.

## 2019-10-03 ENCOUNTER — HOSPITAL ENCOUNTER (OUTPATIENT)
Dept: RADIOLOGY | Facility: HOSPITAL | Age: 69
Discharge: HOME | End: 2019-10-03
Attending: FAMILY MEDICINE
Payer: MEDICARE

## 2019-10-03 ENCOUNTER — TELEPHONE (OUTPATIENT)
Dept: PHARMACY | Facility: CLINIC | Age: 69
End: 2019-10-03

## 2019-10-03 VITALS — WEIGHT: 198 LBS | BODY MASS INDEX: 33.8 KG/M2 | HEIGHT: 64 IN

## 2019-10-03 DIAGNOSIS — Z12.31 ENCOUNTER FOR SCREENING MAMMOGRAM FOR BREAST CANCER: ICD-10-CM

## 2019-10-03 DIAGNOSIS — N39.0 RECURRENT UTI: ICD-10-CM

## 2019-10-03 DIAGNOSIS — Z79.899 ON LONG TERM DRUG THERAPY: ICD-10-CM

## 2019-10-03 DIAGNOSIS — Z78.0 MENOPAUSE: ICD-10-CM

## 2019-10-03 DIAGNOSIS — N39.0 RECURRENT UTI: Primary | ICD-10-CM

## 2019-10-03 DIAGNOSIS — M81.0 AGE-RELATED OSTEOPOROSIS WITHOUT CURRENT PATHOLOGICAL FRACTURE: Primary | ICD-10-CM

## 2019-10-03 DIAGNOSIS — M81.0 AGE-RELATED OSTEOPOROSIS WITHOUT CURRENT PATHOLOGICAL FRACTURE: ICD-10-CM

## 2019-10-03 LAB
BACTERIA #/AREA URNS HPF: ABNORMAL /HPF
BILIRUB UR QL STRIP: NEGATIVE
CLARITY UR: CLEAR
COLOR UR: YELLOW
GLUCOSE UR QL STRIP: NEGATIVE
HGB UR QL STRIP: ABNORMAL
KETONES UR QL STRIP: NEGATIVE
LEUKOCYTE ESTERASE UR QL STRIP: ABNORMAL
MICROSCOPIC COMMENT: ABNORMAL
NITRITE UR QL STRIP: NEGATIVE
PH UR STRIP: 6 [PH] (ref 5–8)
PROT UR QL STRIP: ABNORMAL
RBC #/AREA URNS HPF: 1 /HPF (ref 0–4)
SP GR UR STRIP: 1.01 (ref 1–1.03)
SQUAMOUS #/AREA URNS HPF: 7 /HPF
URN SPEC COLLECT METH UR: ABNORMAL
WBC #/AREA URNS HPF: 30 /HPF (ref 0–5)
WBC CLUMPS URNS QL MICRO: ABNORMAL

## 2019-10-03 PROCEDURE — 77080 DXA BONE DENSITY AXIAL: CPT | Mod: 26,,, | Performed by: RADIOLOGY

## 2019-10-03 PROCEDURE — 77067 MAMMO DIGITAL SCREENING BILAT WITH TOMOSYNTHESIS_CAD: ICD-10-PCS | Mod: 26,,, | Performed by: RADIOLOGY

## 2019-10-03 PROCEDURE — 87086 URINE CULTURE/COLONY COUNT: CPT

## 2019-10-03 PROCEDURE — 77067 SCR MAMMO BI INCL CAD: CPT | Mod: TC,PO

## 2019-10-03 PROCEDURE — 81000 URINALYSIS NONAUTO W/SCOPE: CPT | Mod: PO

## 2019-10-03 PROCEDURE — 77080 DXA BONE DENSITY AXIAL: CPT | Mod: TC,PO

## 2019-10-03 PROCEDURE — 77067 SCR MAMMO BI INCL CAD: CPT | Mod: 26,,, | Performed by: RADIOLOGY

## 2019-10-03 PROCEDURE — 77080 DEXA BONE DENSITY SPINE HIP: ICD-10-PCS | Mod: 26,,, | Performed by: RADIOLOGY

## 2019-10-03 PROCEDURE — 77063 BREAST TOMOSYNTHESIS BI: CPT | Mod: 26,,, | Performed by: RADIOLOGY

## 2019-10-03 PROCEDURE — 77063 MAMMO DIGITAL SCREENING BILAT WITH TOMOSYNTHESIS_CAD: ICD-10-PCS | Mod: 26,,, | Performed by: RADIOLOGY

## 2019-10-03 RX ORDER — ALENDRONATE SODIUM 70 MG/1
70 TABLET ORAL
Qty: 12 TABLET | Refills: 11 | Status: SHIPPED | OUTPATIENT
Start: 2019-10-03 | End: 2020-03-10 | Stop reason: SDUPTHER

## 2019-10-03 RX ORDER — CALCIUM CARBONATE 500(1250)
1 TABLET ORAL 3 TIMES DAILY
Refills: 0 | COMMUNITY
Start: 2019-10-03 | End: 2020-09-28

## 2019-10-03 NOTE — TELEPHONE ENCOUNTER
----- Message from Adan Woodward MD sent at 10/3/2019 10:03 AM CDT -----  Patient is not active on MyChart.  Place orders and I will sign them    The bone density test (DEXA scan) is showing osteoporosis.    -Start oscal 500+d taking 1 by mouth three times a day which is an over the counter medication-put it on the medcard.   -Start FOSAMAX 70 mg orally once a week with a glass of water and no lying down or eating for 30 minutes after.  Give #12 and refill x 3. Send in a prescription to the pharmacy for that medicine.    -Recheck the DEXA scan in 2 years.  
Spoke to patient about results and recommendations and pended medications for your approval.  
negative...

## 2019-10-03 NOTE — PROGRESS NOTES
Patient is not active on MyChart.  Place orders and I will sign them    The bone density test (DEXA scan) is showing osteoporosis.    -Start oscal 500+d taking 1 by mouth three times a day which is an over the counter medication-put it on the medcard.   -Start FOSAMAX 70 mg orally once a week with a glass of water and no lying down or eating for 30 minutes after.  Give #12 and refill x 3. Send in a prescription to the pharmacy for that medicine.    -Recheck the DEXA scan in 2 years.

## 2019-10-03 NOTE — PROGRESS NOTES
PLEASE CHECK THE CHART TO SEE WHAT ANTIBIOTIC PATIENT HAS BEEN ON FOR URINARY TRACT INFECTION.  CHECK TO SEE IF THERE HAS BEEN ANY URINE CULTURES RECENTLY.  MAKE SURE PATIENT HAS APPOINTMENT WITH UROLOGY.    SEE IF THE PATIENT CAN TAKE CIPRO.  IF SO START CIPROFLOXACIN 500 MG B.I.D. FOR 10 DAYS.  REPEAT URINALYSIS IN 2 WEEKS.

## 2019-10-03 NOTE — TELEPHONE ENCOUNTER
RX call regarding Praluent from [facility]. Patient reached-- stated she forgot to take her last dose and will be taking it tomorrow night on Friday, 10/4. Her next needed injection is scheduled for Friday, 10/18. Patient stated she had multiple ailments but did not want to stop taking her medication due to the improvement of her Cholesterol. Pending refill out accordingly for a call  to next injection date.   (copay 0.00)

## 2019-10-03 NOTE — PROGRESS NOTES
Noted. Patient should take Macrobid 100 mg b.i.d. for 10 days.  Follow-up with Urology.  Urine culture pending.

## 2019-10-04 LAB — BACTERIA UR CULT: NO GROWTH

## 2019-10-08 NOTE — PROGRESS NOTES
Normal mammogram, repeat in 1 year, result released through [facility].  Please call the patient if not enrolled with my chart.

## 2019-10-09 ENCOUNTER — TELEPHONE (OUTPATIENT)
Dept: PHARMACY | Facility: CLINIC | Age: 69
End: 2019-10-09

## 2019-10-25 PROCEDURE — G0179 PR HOME HEALTH MD RECERTIFICATION: ICD-10-PCS | Mod: ,,, | Performed by: FAMILY MEDICINE

## 2019-10-25 PROCEDURE — G0179 MD RECERTIFICATION HHA PT: HCPCS | Mod: ,,, | Performed by: FAMILY MEDICINE

## 2019-10-29 ENCOUNTER — PATIENT OUTREACH (OUTPATIENT)
Dept: ADMINISTRATIVE | Facility: HOSPITAL | Age: 69
End: 2019-10-29

## 2019-10-29 PROBLEM — Z78.9 STATIN INTOLERANCE: Status: ACTIVE | Noted: 2019-10-29

## 2019-10-29 RX ORDER — GABAPENTIN 600 MG/1
600 TABLET ORAL 3 TIMES DAILY
Qty: 90 TABLET | Refills: 11 | Status: SHIPPED | OUTPATIENT
Start: 2019-10-29 | End: 2019-11-05 | Stop reason: SDUPTHER

## 2019-11-04 NOTE — PROGRESS NOTES
PLAN:      Problem List Items Addressed This Visit     Thyroid disease     Will planned update thyroid labs in three months.  Continue current regimen.  Discussed hypothyroidism disease course.  Discussed risks and benefits of medication use.  ER precautions.           Essential hypertension     Continue current medication regimen.  Blood pressure is actually well controlled for this patient.  She is on maximal medical therapy.  Continue follow-up with cardiology.  Continue follow-up routine blood work.    Discussed hypertension disease course.  Discussed-DASH-diet and exercise.  Discussed medication regimen importance of treating high blood pressure.  Patient understood and advised of risk of untreated blood pressure.  ER precautions were given for symptoms of hypertensive urgency and emergency.           Anxiety     Discussed anxiety condition course.  Discussed SSRI as first-line treatment for this condition.  Discussed risk of discontinuing this medication without tapering.  Patient was educated, advised of side effects, and all questions were answered.  Patient voiced understanding.  Patient will follow up routinely and notify us if having any side effects or worsening or persistent symptoms.  ER precautions were given.           Relevant Medications    amitriptyline (ELAVIL) 25 MG tablet    Need for vaccination for Strep pneumoniae    Encounter for long-term (current) use of medications - Primary     Complete history and physical was completed today.  Complete and thorough medication reconciliation was performed.  Discussed risks and benefits of medications.  Advised patient on orders and health maintenance.  We discussed old records and old labs if available.  Will request any records not available through epic.  Continue current medications listed on your summary sheet.           Relevant Medications    gabapentin (NEURONTIN) 600 MG tablet    Other Relevant Orders    CBC auto differential    Comprehensive  metabolic panel    TSH        Future Appointments     Date Provider Specialty Appt Notes    11/18/2019 Ty Palacio IV, MD Urology Recurrent UTI//rb    1/29/2020  Lab     2/5/2020 Adan Woodward MD Family Medicine 3 month f/u HTN           Medication List with Changes/Refills   New Medications    AMITRIPTYLINE (ELAVIL) 25 MG TABLET    Take 1 tablet (25 mg total) by mouth nightly as needed for Insomnia.   Current Medications    ALENDRONATE (FOSAMAX) 70 MG TABLET    Take 1 tablet (70 mg total) by mouth every 7 days.    ALIROCUMAB (PRALUENT PEN) 75 MG/ML PNIJ    Inject 1 mL (75 mg total) into the skin every 14 (fourteen) days.    AMLODIPINE (NORVASC) 10 MG TABLET    Take 10 mg by mouth once daily.    APIXABAN (ELIQUIS) 5 MG TAB    Take 1 tablet (5 mg total) by mouth 2 (two) times daily.    BENAZEPRIL (LOTENSIN) 40 MG TABLET    Take 1 tablet (40 mg total) by mouth once daily.    BISOPROLOL (ZEBETA) 10 MG TABLET    Take 10 mg by mouth once daily.    BRILINTA 90 MG TABLET    TAKE ONE TABLET TWICE DAILY    BUTALBITAL-ACETAMINOPHEN-CAFF (FIORICET) -40 MG CAP    Fioricet 50 mg-300 mg-40 mg capsule   Take 1 capsule every 4 hours by oral route as needed for 30 days.    CALCITRIOL (ROCALTROL) 0.25 MCG CAP    Take 1 capsule (0.25 mcg total) by mouth once daily.    CALCIUM CARBONATE (OS-VANESSA) 500 MG CALCIUM (1,250 MG) TABLET    Take 1 tablet (500 mg total) by mouth 3 (three) times daily.    CEPHALEXIN (KEFLEX) 500 MG CAPSULE    Take 500 mg by mouth 2 (two) times daily.    CETIRIZINE (ZYRTEC) 10 MG TABLET    Take 10 mg by mouth once daily.    CYCLOBENZAPRINE (FLEXERIL) 10 MG TABLET    take 1 tablet by oral route 2 times every day    DICLOFENAC SODIUM (VOLTAREN) 1 % GEL        DICYCLOMINE (BENTYL) 10 MG CAPSULE    dicyclomine 10 mg capsule    ESOMEPRAZOLE (NEXIUM) 40 MG CAPSULE    Take 1 capsule (40 mg total) by mouth before breakfast.    FUROSEMIDE (LASIX) 40 MG TABLET    Take 80 mg in the am and 80 mg BID x 7  days,then take 80 mg in am and 40 mg in the PM thereafter.    HYDRALAZINE (APRESOLINE) 25 MG TABLET    Take 25 mg by mouth 2 (two) times daily.    HYDROCHLOROTHIAZIDE (HYDRODIURIL) 25 MG TABLET    Take 25 mg by mouth.    HYDROCODONE-ACETAMINOPHEN 10-325MG (NORCO)  MG TAB    Take 1 tablet by mouth 2 (two) times daily as needed.    ISOSORBIDE MONONITRATE (IMDUR) 30 MG 24 HR TABLET    Take 30 mg by mouth 2 (two) times daily.    LEVOTHYROXINE (SYNTHROID) 50 MCG TABLET    Take 1 tablet (50 mcg total) by mouth once daily.    MUPIROCIN (BACTROBAN) 2 % OINTMENT    mupirocin 2 % topical ointment    NITROGLYCERIN (NITROSTAT) 0.4 MG SL TABLET    Place 1 tablet (0.4 mg total) under the tongue every 5 (five) minutes as needed for Chest pain.    POTASSIUM CHLORIDE (MICRO-K) 10 MEQ CPSR    Take 2 capsules in am and 1 in the evening; if taking additional lasix, will need to take one additional potassium capsule in the evening    PRAMIPEXOLE (MIRAPEX) 0.25 MG TABLET    Take 0.25 mg by mouth every evening.    PROMETHAZINE (PHENERGAN) 25 MG TABLET    Take 25 mg by mouth every 6 (six) hours as needed.    RANOLAZINE (RANEXA) 1,000 MG TB12    Take 1 tablet (1,000 mg total) by mouth 2 (two) times daily.    ROPINIROLE (REQUIP) 5 MG TABLET    Take 1 tablet (5 mg total) by mouth every evening.    UMECLIDINIUM-VILANTEROL (ANORO ELLIPTA) 62.5-25 MCG/ACTUATION DSDV    Inhale 1 puff into the lungs once daily. Controller    VENTOLIN HFA 90 MCG/ACTUATION INHALER    Inhale 2 puff(s) every 4 hours by inhalation route.   Changed and/or Refilled Medications    Modified Medication Previous Medication    GABAPENTIN (NEURONTIN) 600 MG TABLET gabapentin (NEURONTIN) 600 MG tablet       Take 1 tablet (600 mg total) by mouth 3 (three) times daily.    Take 1 tablet (600 mg total) by mouth 3 (three) times daily.       Adan Woodward M.D.     ==========================================================================  Subjective:      Patient ID: Kalpana  Kyle is a 69 y.o. female.  has a past medical history of ACS (acute coronary syndrome) (3/20/2018), Acute on chronic diastolic congestive heart failure (4/27/2015), Acute on chronic respiratory failure with hypercapnia (1/10/2019), Acute renal failure (1/11/2019), Acute systolic heart failure (3/21/2018), JG (acute kidney injury) (1/29/2019), Anticoagulant long-term use, Arthritis, Asthma, Bradycardia (12/16/2013), CHF (congestive heart failure), Clostridium difficile colitis (9/25/2018), COPD (chronic obstructive pulmonary disease), Coronary artery disease, Depression, Encounter for blood transfusion, Fall (7/8/2019), Gallstones (3/18/2017), History of Pulmonary embolism (7/17/2014), Hyperlipidemia, Hypertension, Need for vaccination (8/8/2019), Non-intractable cyclical vomiting with nausea (2/1/2019), Peripheral vascular disease, Pulmonary embolus (9/9/2013), and Thyroid disease.     Chief Complaint: 2 month lab review and HTN f/u  Patient needs Low Dose CT Lung Screen, Pneumonia Vaccine and Shingles Vaccine.   Patient refused low-dose CT scan says that she does not want to know if she has cancer.  Patient was advised risk and benefits.  Patient cannot have pneumonia vaccine here due to latex allergy.    Problem List Items Addressed This Visit     Thyroid disease    Overview     Patient with history of thyroid disorder.  Associated with fatigue.  Chronic.  Stable.  Reviewed recent thyroid labs.  Patient reports control with levothyroxine.  Lab Results   Component Value Date    TSH 2.068 08/08/2019    C1HLDPK 8.5 08/08/2019              Current Assessment & Plan     Will planned update thyroid labs in three months.  Continue current regimen.  Discussed hypothyroidism disease course.  Discussed risks and benefits of medication use.  ER precautions.           Essential hypertension    Overview     Initial HPI:  Chronic.  Uncontrolled today.  Patient on several blood pressure medication.  Reports compliance.   Reports better blood pressure control at home with home monitor.  Patient on amlodipine benazepril bisoprolol furosemide hydralazine hydrochlorothiazide Imdur.  No side effects reported.  Denies any chest pain shortness of breath prior vision headaches dizziness lightheadedness.    Lab Results   Component Value Date    CREATININE 0.8 08/08/2019       =======================================================  November 2019:  Patient continues to have uncontrolled blood pressure.  Diastolic is in the 70s.  Patient reports blood pressure is much higher at home with stress level.  Patient is following up with Cardiology.           Current Assessment & Plan     Continue current medication regimen.  Blood pressure is actually well controlled for this patient.  She is on maximal medical therapy.  Continue follow-up with cardiology.  Continue follow-up routine blood work.    Discussed hypertension disease course.  Discussed-DASH-diet and exercise.  Discussed medication regimen importance of treating high blood pressure.  Patient understood and advised of risk of untreated blood pressure.  ER precautions were given for symptoms of hypertensive urgency and emergency.           Anxiety    Overview     Chronic.  Uncontrolled.  Patient reports compliance with current medication regimen.  Patient reports that is is likely due to insomnia.  Patient has failed multiple medications for this and has been on amitriptyline in the past with success.  Patient would like to go back on this medication.  Patient denies any SI or HI.  Patient denies any hallucinations.         Current Assessment & Plan     Discussed anxiety condition course.  Discussed SSRI as first-line treatment for this condition.  Discussed risk of discontinuing this medication without tapering.  Patient was educated, advised of side effects, and all questions were answered.  Patient voiced understanding.  Patient will follow up routinely and notify us if having any side  effects or worsening or persistent symptoms.  ER precautions were given.           Need for vaccination for Strep pneumoniae    Overview     Patient is allergic to latex.  This is contraindicated with our vaccines.  Patient will follow up at the pharmacy for vaccine.         Encounter for long-term (current) use of medications - Primary    Overview       Patient is on CHRONIC long-term drug therapy for managed conditions. See medication list. Reports compliance.  No side effects reported.  Routine lab work is being monitored.  Patient does need refills today.     Lab Results   Component Value Date    WBC 8.73 08/08/2019    HGB 9.4 (L) 08/08/2019    HCT 33.1 (L) 08/08/2019    MCV 83 08/08/2019     08/08/2019         Chemistry        Component Value Date/Time     08/08/2019 1427    K 4.0 08/08/2019 1427     08/08/2019 1427    CO2 26 08/08/2019 1427    BUN 15 08/08/2019 1427    CREATININE 0.8 08/08/2019 1427    GLU 88 08/08/2019 1427        Component Value Date/Time    CALCIUM 9.4 08/08/2019 1427    ALKPHOS 120 09/11/2019 1035    AST 13 09/11/2019 1035    ALT 7 (L) 09/11/2019 1035    BILITOT 0.2 09/11/2019 1035    ESTGFRAFRICA >60.0 08/08/2019 1427    EGFRNONAA >60.0 08/08/2019 1427          Lab Results   Component Value Date    TSH 2.068 08/08/2019    C8FDSRF 8.5 08/08/2019    T3FREE 2.6 08/08/2019              Current Assessment & Plan     Complete history and physical was completed today.  Complete and thorough medication reconciliation was performed.  Discussed risks and benefits of medications.  Advised patient on orders and health maintenance.  We discussed old records and old labs if available.  Will request any records not available through epic.  Continue current medications listed on your summary sheet.                  Past Medical History:  Past Medical History:   Diagnosis Date    ACS (acute coronary syndrome) 3/20/2018    Acute on chronic diastolic congestive heart failure 4/27/2015     Acute on chronic respiratory failure with hypercapnia 1/10/2019    Acute renal failure 1/11/2019    Acute systolic heart failure 3/21/2018    JG (acute kidney injury) 1/29/2019    Anticoagulant long-term use     Arthritis     Asthma     Bradycardia 12/16/2013    CHF (congestive heart failure)     Clostridium difficile colitis 9/25/2018    COPD (chronic obstructive pulmonary disease)     Coronary artery disease     Depression     Encounter for blood transfusion     Fall 7/8/2019    Iniital HPI: New problem acute.  Happened several days ago.  Patient was getting up out of her wheelchair and thought it was locked when it rolled about from under her and she fell and hit her tailbone on the wheelchair.  Patient has been having moderate to severe pain to the point where she cannot sit on her wheelchair for long periods of time.  ==================== 09/05/2019 Fell again on Satur    Gallstones 3/18/2017    History of Pulmonary embolism 7/17/2014    Hyperlipidemia     Hypertension     Need for vaccination 8/8/2019    Due for pneumonia vaccination.  However patient has latex allergy.  Patient cannot receive pneumococcal vaccine here.  Well patient follow-up at pharmacy for different pneumococcal vaccine    Non-intractable cyclical vomiting with nausea 2/1/2019    Peripheral vascular disease     Pulmonary embolus 9/9/2013    Thyroid disease      Past Surgical History:   Procedure Laterality Date    CARDIAC SURGERY      CORONARY ARTERY BYPASS GRAFT  04/16/2015    EYE SURGERY      HYSTERECTOMY      INSERT / REPLACE / REMOVE PACEMAKER      LEFT HEART CATHETERIZATION Left 11/14/2018    Procedure: CATHETERIZATION, HEART, LEFT;  Surgeon: Huber Kapoor MD;  Location[facility] CATH LAB;  Service: Cardiology;  Laterality: Left;    OOPHORECTOMY      peripheral angiogram      peripheral stenting       Review of patient's allergies indicates:   Allergen Reactions    Atorvastatin Other (See  "Comments)     Severe muscle pain  Other reaction(s): Abdominal Pain  SEVERE MYALGIAS      Azithromycin Other (See Comments)     By shot, closed veins and made large bruises  By shot, closed veins and made large bruises      Latex, natural rubber Rash     "TOURNIQUET ON LEG LEFT HUGE BLISTER THAT TOOK 9 MONTHS OF WOUND CARE TO HEAL."    Meperidine Hives and Anaphylaxis     Other reaction(s): Anaphylaxis  Hives (skin)^  Hives (skin)^      Penicillins Anaphylaxis and Hives     Other reaction(s): Anaphylaxis  Shortness of breath^swelling  Shortness of breath and swelling  Anaphylaxis  Shortness of breath^swelling      Tofacitinib Rash and Swelling     Rash and swelling of face      Lorazepam Anxiety and Other (See Comments)     Made me goofy  CAUSED CONFUSION "Made me goofy."      Pravastatin Other (See Comments)     Severe myalgias.  Cramps/Severe myalgias.    Flu vac 2018 [id number](pf)      Other reaction(s): Other (See Comments)  Flip into aFIB    Nystatin Rash    Pepper (genus capsicum) Other (See Comments)     Reflux and ulcers     Medication List with Changes/Refills   New Medications    AMITRIPTYLINE (ELAVIL) 25 MG TABLET    Take 1 tablet (25 mg total) by mouth nightly as needed for Insomnia.   Current Medications    ALENDRONATE (FOSAMAX) 70 MG TABLET    Take 1 tablet (70 mg total) by mouth every 7 days.    ALIROCUMAB (PRALUENT PEN) 75 MG/ML PNIJ    Inject 1 mL (75 mg total) into the skin every 14 (fourteen) days.    AMLODIPINE (NORVASC) 10 MG TABLET    Take 10 mg by mouth once daily.    APIXABAN (ELIQUIS) 5 MG TAB    Take 1 tablet (5 mg total) by mouth 2 (two) times daily.    BENAZEPRIL (LOTENSIN) 40 MG TABLET    Take 1 tablet (40 mg total) by mouth once daily.    BISOPROLOL (ZEBETA) 10 MG TABLET    Take 10 mg by mouth once daily.    BRILINTA 90 MG TABLET    TAKE ONE TABLET TWICE DAILY    BUTALBITAL-ACETAMINOPHEN-CAFF (FIORICET) -40 MG CAP    Fioricet 50 mg-300 mg-40 mg capsule   Take 1 " capsule every 4 hours by oral route as needed for 30 days.    CALCITRIOL (ROCALTROL) 0.25 MCG CAP    Take 1 capsule (0.25 mcg total) by mouth once daily.    CALCIUM CARBONATE (OS-VANESSA) 500 MG CALCIUM (1,250 MG) TABLET    Take 1 tablet (500 mg total) by mouth 3 (three) times daily.    CEPHALEXIN (KEFLEX) 500 MG CAPSULE    Take 500 mg by mouth 2 (two) times daily.    CETIRIZINE (ZYRTEC) 10 MG TABLET    Take 10 mg by mouth once daily.    CYCLOBENZAPRINE (FLEXERIL) 10 MG TABLET    take 1 tablet by oral route 2 times every day    DICLOFENAC SODIUM (VOLTAREN) 1 % GEL        DICYCLOMINE (BENTYL) 10 MG CAPSULE    dicyclomine 10 mg capsule    ESOMEPRAZOLE (NEXIUM) 40 MG CAPSULE    Take 1 capsule (40 mg total) by mouth before breakfast.    FUROSEMIDE (LASIX) 40 MG TABLET    Take 80 mg in the am and 80 mg BID x 7 days,then take 80 mg in am and 40 mg in the PM thereafter.    HYDRALAZINE (APRESOLINE) 25 MG TABLET    Take 25 mg by mouth 2 (two) times daily.    HYDROCHLOROTHIAZIDE (HYDRODIURIL) 25 MG TABLET    Take 25 mg by mouth.    HYDROCODONE-ACETAMINOPHEN 10-325MG (NORCO)  MG TAB    Take 1 tablet by mouth 2 (two) times daily as needed.    ISOSORBIDE MONONITRATE (IMDUR) 30 MG 24 HR TABLET    Take 30 mg by mouth 2 (two) times daily.    LEVOTHYROXINE (SYNTHROID) 50 MCG TABLET    Take 1 tablet (50 mcg total) by mouth once daily.    MUPIROCIN (BACTROBAN) 2 % OINTMENT    mupirocin 2 % topical ointment    NITROGLYCERIN (NITROSTAT) 0.4 MG SL TABLET    Place 1 tablet (0.4 mg total) under the tongue every 5 (five) minutes as needed for Chest pain.    POTASSIUM CHLORIDE (MICRO-K) 10 MEQ CPSR    Take 2 capsules in am and 1 in the evening; if taking additional lasix, will need to take one additional potassium capsule in the evening    PRAMIPEXOLE (MIRAPEX) 0.25 MG TABLET    Take 0.25 mg by mouth every evening.    PROMETHAZINE (PHENERGAN) 25 MG TABLET    Take 25 mg by mouth every 6 (six) hours as needed.    RANOLAZINE (RANEXA) 1,000 MG  TB12    Take 1 tablet (1,000 mg total) by mouth 2 (two) times daily.    ROPINIROLE (REQUIP) 5 MG TABLET    Take 1 tablet (5 mg total) by mouth every evening.    UMECLIDINIUM-VILANTEROL (ANORO ELLIPTA) 62.5-25 MCG/ACTUATION DSDV    Inhale 1 puff into the lungs once daily. Controller    VENTOLIN HFA 90 MCG/ACTUATION INHALER    Inhale 2 puff(s) every 4 hours by inhalation route.   Changed and/or Refilled Medications    Modified Medication Previous Medication    GABAPENTIN (NEURONTIN) 600 MG TABLET gabapentin (NEURONTIN) 600 MG tablet       Take 1 tablet (600 mg total) by mouth 3 (three) times daily.    Take 1 tablet (600 mg total) by mouth 3 (three) times daily.      Social History     Tobacco Use    Smoking status: Former Smoker     Packs/day: 1.00     Years: 45.00     Pack years: 45.00     Types: Cigarettes     Last attempt to quit: 10/3/2012     Years since quittin.0    Smokeless tobacco: Never Used    Tobacco comment: 43 years   Substance Use Topics    Alcohol use: No      Family History   Problem Relation Age of Onset    Cancer Mother     Lung cancer Father     Breast cancer Sister        I have reviewed the complete PMH, social history, surgical history, allergies and medications.  As well as family history.    Review of Systems   Constitutional: Positive for fatigue. Negative for chills, fever and unexpected weight change.   HENT: Negative for ear pain and sore throat.    Eyes: Negative for redness and visual disturbance.   Respiratory: Negative for cough and shortness of breath.    Cardiovascular: Negative for chest pain and palpitations.   Gastrointestinal: Negative for nausea and vomiting.   Genitourinary: Negative for difficulty urinating and hematuria.   Musculoskeletal: Positive for arthralgias. Negative for myalgias.   Skin: Negative for rash and wound.   Neurological: Negative for speech difficulty and headaches.   Hematological: Bruises/bleeds easily.   Psychiatric/Behavioral: Positive for  "sleep disturbance. The patient is nervous/anxious.        Objective:     BP (!) 160/70   Pulse 68   Temp 97.9 °F (36.6 °C) (Oral)   Ht 5' 4" (1.626 m)   Wt 84.8 kg (187 lb)   LMP  (LMP Unknown)   BMI 32.10 kg/m²     Physical Exam   Constitutional: She is oriented to person, place, and time. She appears well-developed and well-nourished. No distress.   Sitting in wheelchair no acute distress   HENT:   Head: Normocephalic and atraumatic.   Eyes: Pupils are equal, round, and reactive to light. EOM are normal.   Neck: Normal range of motion. Neck supple.   Cardiovascular: Normal rate, regular rhythm, normal heart sounds and intact distal pulses.   No murmur heard.  Pulmonary/Chest: Effort normal and breath sounds normal. No respiratory distress. She has no wheezes.   Abdominal: Soft. Bowel sounds are normal. She exhibits no distension.   Musculoskeletal: Normal range of motion. She exhibits edema and tenderness.   Neurological: She is alert and oriented to person, place, and time. No cranial nerve deficit.   Skin: Skin is warm and dry. Capillary refill takes less than 2 seconds.   Psychiatric: She has a normal mood and affect. Her behavior is normal.   Nursing note and vitals reviewed.      Assessment:     1. Encounter for long-term (current) use of medications    2. Need for vaccination for Strep pneumoniae    3. Anxiety    4. Essential hypertension    5. Thyroid disease        MDM:     I have Reviewed and summarized old records.  I have performed thorough medication reconciliation today and discussed risk and benefits of each medication.  I have reviewed  labs and discussed with patient.  All questions were answered.  I am requesting old records and will review them once they are available.    Follow up in about 3 months (around 2/5/2020), or if symptoms worsen or fail to improve, for LAB RESULTS, HTN.    If no improvement in symptoms or symptoms worsen, advised to call/follow-up at clinic or go to ER. Patient " voiced understanding and all questions/concerns were addressed.     DISCLAIMER: This note was compiled by using a speech recognition dictation system and therefore please be aware that typographical / speech recognition errors can and do occur.  Please contact me if you see any errors specifically.    Adan Woodward M.D.         We Offer Telehealth & Same Day Appointments!   Book your Telehealth appointment with me through my nurse or   Clinic appointments on [facility]!    Office: [phone]     Check out my Facebook Page and Follow Me at: CLICK HERE    Check out my website at [email or web address] by clicking on: CLICK HERE    To Schedule appointments online, go to [facility]: CLICK HERE     Location: [email or web address]    [address] [place] [address]    FAX: [phone]

## 2019-11-05 ENCOUNTER — OFFICE VISIT (OUTPATIENT)
Dept: FAMILY MEDICINE | Facility: CLINIC | Age: 69
End: 2019-11-05
Payer: MEDICARE

## 2019-11-05 ENCOUNTER — EXTERNAL HOME HEALTH (OUTPATIENT)
Dept: HOME HEALTH SERVICES | Facility: HOSPITAL | Age: 69
End: 2019-11-05
Payer: MEDICARE

## 2019-11-05 VITALS
DIASTOLIC BLOOD PRESSURE: 70 MMHG | BODY MASS INDEX: 31.92 KG/M2 | HEIGHT: 64 IN | TEMPERATURE: 98 F | WEIGHT: 187 LBS | HEART RATE: 68 BPM | SYSTOLIC BLOOD PRESSURE: 160 MMHG

## 2019-11-05 DIAGNOSIS — F41.9 ANXIETY: ICD-10-CM

## 2019-11-05 DIAGNOSIS — Z79.899 ENCOUNTER FOR LONG-TERM (CURRENT) USE OF MEDICATIONS: Primary | ICD-10-CM

## 2019-11-05 DIAGNOSIS — Z23 NEED FOR VACCINATION FOR STREP PNEUMONIAE: ICD-10-CM

## 2019-11-05 DIAGNOSIS — E07.9 THYROID DISEASE: ICD-10-CM

## 2019-11-05 DIAGNOSIS — I10 ESSENTIAL HYPERTENSION: ICD-10-CM

## 2019-11-05 PROBLEM — B35.1 ONYCHOMYCOSIS: Status: RESOLVED | Noted: 2018-09-25 | Resolved: 2019-11-05

## 2019-11-05 PROBLEM — T14.8XXA BRUISING: Status: RESOLVED | Noted: 2019-09-05 | Resolved: 2019-11-05

## 2019-11-05 PROBLEM — S39.92XA INJURY OF COCCYX: Status: RESOLVED | Noted: 2019-07-08 | Resolved: 2019-11-05

## 2019-11-05 PROBLEM — R11.10 VOMITING: Status: RESOLVED | Noted: 2018-06-06 | Resolved: 2019-11-05

## 2019-11-05 PROBLEM — R31.9 HEMATURIA: Status: RESOLVED | Noted: 2019-09-05 | Resolved: 2019-11-05
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
## 2019-11-05 PROBLEM — K80.20 GALLSTONES: Status: RESOLVED | Noted: 2017-03-18 | Resolved: 2019-11-05

## 2019-11-05 PROBLEM — R10.33 PERIUMBILICAL PAIN: Status: RESOLVED | Noted: 2019-01-29 | Resolved: 2019-11-05

## 2019-11-05 PROBLEM — M79.676 PAIN IN TOE: Status: RESOLVED | Noted: 2018-09-25 | Resolved: 2019-11-05

## 2019-11-05 PROBLEM — R30.0 DYSURIA: Status: RESOLVED | Noted: 2019-09-05 | Resolved: 2019-11-05

## 2019-11-05 PROBLEM — E87.6 HYPOKALEMIA: Status: RESOLVED | Noted: 2019-02-01 | Resolved: 2019-11-05

## 2019-11-05 PROBLEM — R10.31 RIGHT LOWER QUADRANT ABDOMINAL PAIN: Status: RESOLVED | Noted: 2017-05-10 | Resolved: 2019-11-05

## 2019-11-05 PROBLEM — K56.0 PARALYTIC ILEUS: Status: RESOLVED | Noted: 2019-01-07 | Resolved: 2019-11-05

## 2019-11-05 PROCEDURE — 99214 PR OFFICE/OUTPT VISIT, EST, LEVL IV, 30-39 MIN: ICD-10-PCS | Mod: S$PBB,,, | Performed by: FAMILY MEDICINE

## 2019-11-05 PROCEDURE — 99214 OFFICE O/P EST MOD 30 MIN: CPT | Mod: PBBFAC,PO | Performed by: FAMILY MEDICINE

## 2019-11-05 PROCEDURE — 99999 PR PBB SHADOW E&M-EST. PATIENT-LVL IV: CPT | Mod: PBBFAC,,, | Performed by: FAMILY MEDICINE

## 2019-11-05 PROCEDURE — 99214 OFFICE O/P EST MOD 30 MIN: CPT | Mod: S$PBB,,, | Performed by: FAMILY MEDICINE

## 2019-11-05 PROCEDURE — 99999 PR PBB SHADOW E&M-EST. PATIENT-LVL IV: ICD-10-PCS | Mod: PBBFAC,,, | Performed by: FAMILY MEDICINE

## 2019-11-05 RX ORDER — GABAPENTIN 600 MG/1
600 TABLET ORAL 3 TIMES DAILY
Qty: 90 TABLET | Refills: 11 | Status: SHIPPED | OUTPATIENT
Start: 2019-11-05 | End: 2020-03-05 | Stop reason: SDUPTHER

## 2019-11-05 RX ORDER — AMITRIPTYLINE HYDROCHLORIDE 25 MG/1
25 TABLET, FILM COATED ORAL NIGHTLY PRN
Qty: 30 TABLET | Refills: 3 | Status: SHIPPED | OUTPATIENT
Start: 2019-11-05 | End: 2020-02-10 | Stop reason: SDUPTHER

## 2019-11-05 NOTE — ASSESSMENT & PLAN NOTE
Discussed anxiety condition course.  Discussed SSRI as first-line treatment for this condition.  Discussed risk of discontinuing this medication without tapering.  Patient was educated, advised of side effects, and all questions were answered.  Patient voiced understanding.  Patient will follow up routinely and notify us if having any side effects or worsening or persistent symptoms.  ER precautions were given.

## 2019-11-05 NOTE — Clinical Note
Patient seen Dr. Van Gonsalez for Urology.  Cancel urology appointment with [facility] if she is not going to see them.

## 2019-11-05 NOTE — ASSESSMENT & PLAN NOTE
Will planned update thyroid labs in three months.  Continue current regimen.  Discussed hypothyroidism disease course.  Discussed risks and benefits of medication use.  ER precautions.

## 2019-11-05 NOTE — ASSESSMENT & PLAN NOTE
Continue current medication regimen.  Blood pressure is actually well controlled for this patient.  She is on maximal medical therapy.  Continue follow-up with cardiology.  Continue follow-up routine blood work.    Discussed hypertension disease course.  Discussed-DASH-diet and exercise.  Discussed medication regimen importance of treating high blood pressure.  Patient understood and advised of risk of untreated blood pressure.  ER precautions were given for symptoms of hypertensive urgency and emergency.

## 2019-11-05 NOTE — PATIENT INSTRUCTIONS
Follow up in about 3 months (around 2/5/2020), or if symptoms worsen or fail to improve, for LAB RESULTS, HTN.     If no improvement in symptoms or symptoms worsen, please be advised to call MD, follow-up at clinic and/or go to ER if becomes severe.    Adan Woodward M.D.         We Offer TELEHEATLH & Same Day Appointments!   Book your Telehealth appointment with me through my nurse or   Clinic appointments on [employer]!    [address] [place] [address]    Office: [phone]     FAX: [phone]    Check out my Facebook Page and Follow Me at: CLICK HERE    Check out my website at [facility] by clicking on: CLICK HERE    To Schedule appointments online, go to [employer]: CLICK HERE     Location: [email or web address]

## 2019-11-08 ENCOUNTER — TELEPHONE (OUTPATIENT)
Dept: PHARMACY | Facility: CLINIC | Age: 69
End: 2019-11-08

## 2019-11-12 ENCOUNTER — TELEPHONE (OUTPATIENT)
Dept: HOME HEALTH SERVICES | Facility: HOSPITAL | Age: 69
End: 2019-11-12

## 2019-11-13 ENCOUNTER — TELEPHONE (OUTPATIENT)
Dept: HOME HEALTH SERVICES | Facility: HOSPITAL | Age: 69
End: 2019-11-13

## 2019-11-13 NOTE — TELEPHONE ENCOUNTER
Spoke with patient and advised that she does NOT have to hold doses of Praluent while sick. She states that she still has the cold but took her last dose on 11/10/19, she will continue every other  dosing. [facility] set up refill  and will continue to reach out to patient monthly for refills.    Praluent 75mg/ml Pens refill. Confirmed two patient identifiers - name + . Patient confirms dosage (every other ). Patient has 0 doses remaining, next dose needed by 19. [facility] to ship out Praluent 75mg/ml Pens on 19 to arrive at patients home on 19 via [employer]. Address confirmed (Notes to [employer]: none, shipments have been coming fine), $0 Copay, Supplies needed: Sharps - flag added to [facility]. Patient denies starting any new medications, having any new diagnoses or allergies, side effects, or missing any doses. She mentions burning when injected - it was found that patient was injecting the medication cold. She was advised to set Praluent pen out at least 30 minutes prior to injection to reach room temperature and to see if this burning subsides. She verbalized understanding.  All questions answered to patients satisfaction. [facility] will continue to reach out to patient monthly for refills.   JAZLYN

## 2019-12-03 ENCOUNTER — TELEPHONE (OUTPATIENT)
Dept: HOME HEALTH SERVICES | Facility: HOSPITAL | Age: 69
End: 2019-12-03

## 2019-12-20 ENCOUNTER — TELEPHONE (OUTPATIENT)
Dept: PHARMACY | Facility: CLINIC | Age: 69
End: 2019-12-20

## 2019-12-20 NOTE — TELEPHONE ENCOUNTER
Praluent refill confirmed. We will ship Praluent refill on  via [employer] to arrive on . $0 copay- 004. Confirmed 2 patient identifiers - name and . Therapy appropriate.     Patient has 0 doses of Praluent remaining and previously injected every other . She reports that she had a death in the family and missed her  dose so she now injects on . She states her next dose is due 1 week from today (). Pt reports they are not having any side effects so far. No missed doses (other than delayed dose), no new allergies or health conditions reported at this time. Patient does report starting new medication (Elavail for nerves) - no DDI expected. Allergies reviewed and medication reconciliation complete. Patient is NOT in need of a sharps container at this time. All questions answered and addressed to patients satisfaction. Advised to call [facility] and provider if any issues arise.  Pt verbalized understanding.

## 2019-12-24 PROCEDURE — G0179 MD RECERTIFICATION HHA PT: HCPCS | Mod: ,,, | Performed by: FAMILY MEDICINE

## 2019-12-24 PROCEDURE — G0179 PR HOME HEALTH MD RECERTIFICATION: ICD-10-PCS | Mod: ,,, | Performed by: FAMILY MEDICINE

## 2019-12-30 DIAGNOSIS — K21.9 GASTROESOPHAGEAL REFLUX DISEASE, ESOPHAGITIS PRESENCE NOT SPECIFIED: ICD-10-CM

## 2019-12-30 RX ORDER — TEMAZEPAM 30 MG/1
CAPSULE ORAL
Refills: 5 | COMMUNITY
Start: 2019-11-25 | End: 2020-03-23 | Stop reason: SDUPTHER

## 2019-12-30 RX ORDER — ESOMEPRAZOLE MAGNESIUM 40 MG/1
40 CAPSULE, DELAYED RELEASE ORAL
Qty: 90 CAPSULE | Refills: 3 | Status: SHIPPED | OUTPATIENT
Start: 2019-12-30 | End: 2020-07-01 | Stop reason: SDUPTHER

## 2019-12-30 RX ORDER — PRAMIPEXOLE DIHYDROCHLORIDE 0.5 MG/1
TABLET ORAL
COMMUNITY
Start: 2019-12-06 | End: 2019-12-30 | Stop reason: SDUPTHER

## 2019-12-30 RX ORDER — PRAMIPEXOLE DIHYDROCHLORIDE 0.5 MG/1
0.5 TABLET ORAL DAILY
Qty: 90 TABLET | Refills: 3 | Status: SHIPPED | OUTPATIENT
Start: 2019-12-30 | End: 2020-09-22 | Stop reason: SDUPTHER

## 2020-01-06 ENCOUNTER — EXTERNAL HOME HEALTH (OUTPATIENT)
Dept: HOME HEALTH SERVICES | Facility: HOSPITAL | Age: 70
End: 2020-01-06
Payer: MEDICARE

## 2020-01-08 ENCOUNTER — TELEPHONE (OUTPATIENT)
Dept: FAMILY MEDICINE | Facility: CLINIC | Age: 70
End: 2020-01-08

## 2020-01-08 NOTE — TELEPHONE ENCOUNTER
Called Mr Franko back, left message for him to call the clinic if needed and that I would call the patient.

## 2020-01-08 NOTE — TELEPHONE ENCOUNTER
----- Message from Ute Baum sent at 1/8/2020 10:12 AM CST -----  Contact: patient's home health nurse-Mr. Abdul  Would like to consult with nurse regarding incident that has occurred with patient. Patient stepped on a copper wire and had it removed but she has been bleeding for the past couple of days. Please call back at [phone]

## 2020-01-09 ENCOUNTER — EXTERNAL HOME HEALTH (OUTPATIENT)
Dept: HOME HEALTH SERVICES | Facility: HOSPITAL | Age: 70
End: 2020-01-09
Payer: MEDICAID

## 2020-01-15 ENCOUNTER — TELEPHONE (OUTPATIENT)
Dept: PHARMACY | Facility: CLINIC | Age: 70
End: 2020-01-15

## 2020-01-27 ENCOUNTER — CLINICAL SUPPORT (OUTPATIENT)
Dept: CARDIOLOGY | Facility: CLINIC | Age: 70
End: 2020-01-27
Attending: INTERNAL MEDICINE
Payer: MEDICARE

## 2020-01-27 DIAGNOSIS — I25.10 CORONARY ARTERY DISEASE INVOLVING NATIVE CORONARY ARTERY OF NATIVE HEART WITHOUT ANGINA PECTORIS: ICD-10-CM

## 2020-01-27 DIAGNOSIS — I49.5 SSS (SICK SINUS SYNDROME): ICD-10-CM

## 2020-01-29 ENCOUNTER — LAB VISIT (OUTPATIENT)
Dept: LAB | Facility: HOSPITAL | Age: 70
End: 2020-01-29
Attending: FAMILY MEDICINE
Payer: MEDICARE

## 2020-01-29 DIAGNOSIS — Z79.899 ENCOUNTER FOR LONG-TERM (CURRENT) USE OF MEDICATIONS: ICD-10-CM

## 2020-01-29 LAB
ALBUMIN SERPL BCP-MCNC: 3.2 G/DL (ref 3.5–5.2)
ALP SERPL-CCNC: 96 U/L (ref 55–135)
ALT SERPL W/O P-5'-P-CCNC: 8 U/L (ref 10–44)
ANION GAP SERPL CALC-SCNC: 8 MMOL/L (ref 8–16)
AST SERPL-CCNC: 15 U/L (ref 10–40)
BASOPHILS # BLD AUTO: 0.03 K/UL (ref 0–0.2)
BASOPHILS NFR BLD: 0.5 % (ref 0–1.9)
BILIRUB SERPL-MCNC: 0.2 MG/DL (ref 0.1–1)
BUN SERPL-MCNC: 17 MG/DL (ref 8–23)
CALCIUM SERPL-MCNC: 9.4 MG/DL (ref 8.7–10.5)
CHLORIDE SERPL-SCNC: 102 MMOL/L (ref 95–110)
CO2 SERPL-SCNC: 34 MMOL/L (ref 23–29)
CREAT SERPL-MCNC: 0.9 MG/DL (ref 0.5–1.4)
DIFFERENTIAL METHOD: ABNORMAL
EOSINOPHIL # BLD AUTO: 0.2 K/UL (ref 0–0.5)
EOSINOPHIL NFR BLD: 2.8 % (ref 0–8)
ERYTHROCYTE [DISTWIDTH] IN BLOOD BY AUTOMATED COUNT: 17.5 % (ref 11.5–14.5)
EST. GFR  (AFRICAN AMERICAN): >60 ML/MIN/1.73 M^2
EST. GFR  (NON AFRICAN AMERICAN): >60 ML/MIN/1.73 M^2
GLUCOSE SERPL-MCNC: 93 MG/DL (ref 70–110)
HCT VFR BLD AUTO: 38 % (ref 37–48.5)
HGB BLD-MCNC: 11.3 G/DL (ref 12–16)
IMM GRANULOCYTES # BLD AUTO: 0.02 K/UL (ref 0–0.04)
IMM GRANULOCYTES NFR BLD AUTO: 0.3 % (ref 0–0.5)
LYMPHOCYTES # BLD AUTO: 1.8 K/UL (ref 1–4.8)
LYMPHOCYTES NFR BLD: 28.2 % (ref 18–48)
MCH RBC QN AUTO: 26.2 PG (ref 27–31)
MCHC RBC AUTO-ENTMCNC: 29.7 G/DL (ref 32–36)
MCV RBC AUTO: 88 FL (ref 82–98)
MONOCYTES # BLD AUTO: 0.4 K/UL (ref 0.3–1)
MONOCYTES NFR BLD: 6.6 % (ref 4–15)
NEUTROPHILS # BLD AUTO: 4 K/UL (ref 1.8–7.7)
NEUTROPHILS NFR BLD: 61.6 % (ref 38–73)
NRBC BLD-RTO: 0 /100 WBC
PLATELET # BLD AUTO: 199 K/UL (ref 150–350)
PMV BLD AUTO: 10.9 FL (ref 9.2–12.9)
POTASSIUM SERPL-SCNC: 3.6 MMOL/L (ref 3.5–5.1)
PROT SERPL-MCNC: 6.8 G/DL (ref 6–8.4)
RBC # BLD AUTO: 4.31 M/UL (ref 4–5.4)
SODIUM SERPL-SCNC: 144 MMOL/L (ref 136–145)
TSH SERPL DL<=0.005 MIU/L-ACNC: 2.91 UIU/ML (ref 0.4–4)
WBC # BLD AUTO: 6.53 K/UL (ref 3.9–12.7)

## 2020-01-29 PROCEDURE — 85025 COMPLETE CBC W/AUTO DIFF WBC: CPT

## 2020-01-29 PROCEDURE — 84443 ASSAY THYROID STIM HORMONE: CPT

## 2020-01-29 PROCEDURE — 80053 COMPREHEN METABOLIC PANEL: CPT

## 2020-01-29 PROCEDURE — 36415 COLL VENOUS BLD VENIPUNCTURE: CPT | Mod: PO

## 2020-01-30 NOTE — PROGRESS NOTES
#CALL THE PATIENT# to discuss results/see if they have questions and document verification. Make FU appt if needed.    #Pend me the orders in my interpretation below.#    I have sent a message to them with the interpretation (see below).  See if they have any questions and make a follow-up appointment if not already schedule and if needed.    Also please address any outstanding health maintenance that may be due: LDCT Lung Screen due on 01/18/2004  Pneumococcal Vaccine (65+ Low/Medium Risk)(2 of 2 - PPSV23) due on 04/04/2017  ====================================    My interpretation that was sent to them through [employer]:    Kalpana, I have reviewed your recent blood work.     Your labs are stable.  Will discuss in detail at follow-up office visit.    Please send us a message so that we know that you have received this message.

## 2020-02-07 ENCOUNTER — TELEPHONE (OUTPATIENT)
Dept: PHARMACY | Facility: CLINIC | Age: 70
End: 2020-02-07

## 2020-02-07 NOTE — TELEPHONE ENCOUNTER
Rx call for Praluent refill pt reached, stated she has a dose on hand for her upcoming inj which is 2/10 and does'n't need her next refill till her next inj which 2/24. Will pend refill out and followup with pt. copay 3.90 @099

## 2020-02-10 DIAGNOSIS — F41.9 ANXIETY: ICD-10-CM

## 2020-02-10 RX ORDER — AMITRIPTYLINE HYDROCHLORIDE 25 MG/1
25 TABLET, FILM COATED ORAL NIGHTLY PRN
Qty: 90 TABLET | Refills: 3 | Status: SHIPPED | OUTPATIENT
Start: 2020-02-10 | End: 2020-04-01

## 2020-02-13 ENCOUNTER — TELEPHONE (OUTPATIENT)
Dept: PHARMACY | Facility: CLINIC | Age: 70
End: 2020-02-13

## 2020-02-17 ENCOUNTER — TELEPHONE (OUTPATIENT)
Dept: PHARMACY | Facility: CLINIC | Age: 70
End: 2020-02-17

## 2020-02-17 NOTE — TELEPHONE ENCOUNTER
Rx call for Praluent refill pt reached shipping  with  arrival copay 3.90 AR, charging CCOF: 6367 on 3/2/20 upon pt request @004. No supplies is needed at this time, next inj is . Address and  confirmed. Patient has 0 doses on hand at this time. Patient has not started any new medications, has had no missed doses and no side effects present. Patient is currently taking the medication as directed by doctors instruction Inject 1 mL (75 mg total) into the skin every 14 (fourteen) days. Patient does have a safe place in their residence to keep medication at desired temperature away from small children and pets. Patient also does have the capability of contacting 911 in the event of an emergency. Patient states they do not have any questions or concerns at this time.

## 2020-02-22 PROCEDURE — G0179 MD RECERTIFICATION HHA PT: HCPCS | Mod: ,,, | Performed by: FAMILY MEDICINE

## 2020-02-22 PROCEDURE — G0179 PR HOME HEALTH MD RECERTIFICATION: ICD-10-PCS | Mod: ,,, | Performed by: FAMILY MEDICINE

## 2020-02-27 ENCOUNTER — EXTERNAL HOME HEALTH (OUTPATIENT)
Dept: HOME HEALTH SERVICES | Facility: HOSPITAL | Age: 70
End: 2020-02-27
Payer: MEDICARE

## 2020-03-05 DIAGNOSIS — Z79.899 ENCOUNTER FOR LONG-TERM (CURRENT) USE OF MEDICATIONS: ICD-10-CM

## 2020-03-05 RX ORDER — GABAPENTIN 600 MG/1
600 TABLET ORAL 3 TIMES DAILY
Qty: 90 TABLET | Refills: 11 | Status: SHIPPED | OUTPATIENT
Start: 2020-03-05 | End: 2020-09-02 | Stop reason: SDUPTHER

## 2020-03-10 DIAGNOSIS — M81.0 AGE-RELATED OSTEOPOROSIS WITHOUT CURRENT PATHOLOGICAL FRACTURE: ICD-10-CM

## 2020-03-10 RX ORDER — ALENDRONATE SODIUM 70 MG/1
70 TABLET ORAL
Qty: 12 TABLET | Refills: 3 | Status: SHIPPED | OUTPATIENT
Start: 2020-03-10 | End: 2020-12-14 | Stop reason: SDUPTHER

## 2020-03-11 ENCOUNTER — TELEPHONE (OUTPATIENT)
Dept: FAMILY MEDICINE | Facility: CLINIC | Age: 70
End: 2020-03-11

## 2020-03-11 DIAGNOSIS — J44.9 COPD WITHOUT EXACERBATION: ICD-10-CM

## 2020-03-11 DIAGNOSIS — J44.9 COPD WITHOUT EXACERBATION: Primary | ICD-10-CM

## 2020-03-11 RX ORDER — IPRATROPIUM BROMIDE AND ALBUTEROL SULFATE 2.5; .5 MG/3ML; MG/3ML
3 SOLUTION RESPIRATORY (INHALATION)
Qty: 1 BOX | Refills: 11 | Status: SHIPPED | OUTPATIENT
Start: 2020-03-11 | End: 2020-03-11 | Stop reason: SDUPTHER

## 2020-03-11 RX ORDER — IPRATROPIUM BROMIDE AND ALBUTEROL SULFATE 2.5; .5 MG/3ML; MG/3ML
3 SOLUTION RESPIRATORY (INHALATION)
Qty: 1 BOX | Refills: 11 | Status: SHIPPED | OUTPATIENT
Start: 2020-03-11 | End: 2021-06-30 | Stop reason: SDUPTHER

## 2020-03-11 RX ORDER — BUPRENORPHINE 7.5 UG/H
PATCH TRANSDERMAL
COMMUNITY
Start: 2020-01-16 | End: 2020-11-30

## 2020-03-11 RX ORDER — BUPRENORPHINE 10 UG/H
PATCH TRANSDERMAL
COMMUNITY
Start: 2020-02-13 | End: 2020-11-30

## 2020-03-11 NOTE — TELEPHONE ENCOUNTER
Called and notified patient that medication has been sent to her pharmacy for her by Dr. Woodward.

## 2020-03-11 NOTE — TELEPHONE ENCOUNTER
Patient is requesting medication to go in her nebulizer however I do not see anything listed on her medication profile to pend, please advise.

## 2020-03-11 NOTE — TELEPHONE ENCOUNTER
----- Message from Ricardo Guzman sent at 3/11/2020  9:34 AM CDT -----  Contact: pt   .Type:  RX Refill Request    Who Called:  Pt   Refill or New Rx: refill   RX Name and Strength: neubulizer medication pt not sure of name   How is the patient currently taking it? (ex. 1XDay): everyday   Is this a 30 day or 90 day RX: 90 day   Preferred Pharmacy with phone number:[facility]   Local or Mail Order: local   Ordering Provider: sil   Would the patient rather a call back or a response via [facility]?  Callback   Best Call Back Number: .[phone]   Additional Information:  Pt completely out         ..  [facility] [place] - [address]  [address] [facility]  [place] [address]  Phone: [phone] Fax: [phone]

## 2020-03-17 DIAGNOSIS — Z95.1 HISTORY OF INSERTION OF STENT INTO CORONARY ARTERY BYPASS GRAFT: ICD-10-CM

## 2020-03-17 DIAGNOSIS — Z87.891 HISTORY OF SMOKING 30 OR MORE PACK YEARS: ICD-10-CM

## 2020-03-17 DIAGNOSIS — E78.00 PURE HYPERCHOLESTEROLEMIA: ICD-10-CM

## 2020-03-17 DIAGNOSIS — I25.118 CORONARY ARTERY DISEASE OF NATIVE ARTERY OF NATIVE HEART WITH STABLE ANGINA PECTORIS: ICD-10-CM

## 2020-03-18 DIAGNOSIS — I25.118 CORONARY ARTERY DISEASE OF NATIVE ARTERY OF NATIVE HEART WITH STABLE ANGINA PECTORIS: ICD-10-CM

## 2020-03-18 DIAGNOSIS — Z95.1 HISTORY OF INSERTION OF STENT INTO CORONARY ARTERY BYPASS GRAFT: ICD-10-CM

## 2020-03-18 DIAGNOSIS — Z87.891 HISTORY OF SMOKING 30 OR MORE PACK YEARS: ICD-10-CM

## 2020-03-18 DIAGNOSIS — E78.00 PURE HYPERCHOLESTEROLEMIA: ICD-10-CM

## 2020-03-19 ENCOUNTER — TELEPHONE (OUTPATIENT)
Dept: PHARMACY | Facility: CLINIC | Age: 70
End: 2020-03-19

## 2020-03-23 RX ORDER — TEMAZEPAM 30 MG/1
CAPSULE ORAL
Qty: 30 CAPSULE | Refills: 5 | Status: SHIPPED | OUTPATIENT
Start: 2020-03-23 | End: 2020-07-01 | Stop reason: SDUPTHER

## 2020-03-23 RX ORDER — ISOSORBIDE MONONITRATE 30 MG/1
30 TABLET, EXTENDED RELEASE ORAL 2 TIMES DAILY
Qty: 60 TABLET | Refills: 11 | Status: SHIPPED | OUTPATIENT
Start: 2020-03-23 | End: 2020-07-01 | Stop reason: SDUPTHER

## 2020-03-23 RX ORDER — TEMAZEPAM 30 MG/1
CAPSULE ORAL
Qty: 30 CAPSULE | Refills: 5 | OUTPATIENT
Start: 2020-03-23

## 2020-03-23 NOTE — TELEPHONE ENCOUNTER
----- Message from Lee Stevens sent at 3/23/2020  2:44 PM CDT -----  Contact: pt  Type:  RX Refill Request    Who Called: MARJORIE VARGAS   Refill or New Rx: refill  RX Name and Strength: temazepam 30 mg  How is the patient currently taking it? (ex. 1XDay):1 nightly  Is this a 30 day or 90 day RX: 30 day   Preferred Pharmacy with phone number:   [facility] [place] - [address]  [address] [facility] [address]  Phone: [phone] Fax: [phone]    Local or Mail Order:local  Ordering Provider sesar  Would the patient rather a call back or a response via [facility]?call  Best Call Back Number: [phone] (home)   Additional Information:                 Type:  RX Refill Request    Who Called: MARJORIE VARGAS   Refill or New Rx:refill   RX Name and Str (IMDUR) 30 MG  How is the patient currently taking it? (ex. 1XDay):2 tabs   Is this a 30 day or 90 day RX:30day  Preferred Pharmacy with phone number:  [facility] [place] - [address]  [address] [facility] [place] [address]  Phone: [phone] Fax: [phone]    Local or Mail Order:local  Ordering Provider:sesar  Would the patient rather a call back or a response via  [facility]? call  Best Call Back Number:[phone] (home)   Additional Information:

## 2020-03-23 NOTE — TELEPHONE ENCOUNTER
----- Message from Montserrat Blanco sent at 3/23/2020  2:55 PM CDT -----  Contact: Pharmacy  Type:  RX Refill Request     Who Called: Elsa  Refill or New Rx: Refill   RX Name and Strength: Isosorbide 30 mg  How is the patient currently taking it? (ex. 1XDay): 2x day  Is this a 30 day or 90 day RX: 90 if possible  Preferred Pharmacy with phone number:   [facility] [place] - [address]  [address] [facility] [place] [address]  Phone: [phone] Fax: [phone]  Local or Mail Order: Local  Ordering Provider: Dr. Woodward  Would the patient rather a call back or a response via [facility]? Call back   Best Call Back Number: Please call her at [phone]  Additional Information: n/a    Type:  RX Refill Request    Who Called: Elsa  Refill or New Rx: Refill   RX Name and Strength: Temazepam 30 mg  How is the patient currently taking it? (ex. 1XDay): 1x day  Is this a 30 day or 90 day RX: 30  Preferred Pharmacy with phone number:   [facility] [place] [place] - [address]  [address] [facility] [place] [address]  Phone: [phone] Fax: [phone]   Local or Mail Order: Local   Ordering Provider: Dr. Woodward  Would the patient rather a call back or a response via [facility]? Call back   Best Call Back Number: Please call her at [phone]  Additional Information: n/a

## 2020-03-30 ENCOUNTER — TELEPHONE (OUTPATIENT)
Dept: CARDIOLOGY | Facility: HOSPITAL | Age: 70
End: 2020-03-30

## 2020-03-30 DIAGNOSIS — Z95.0 PACEMAKER: ICD-10-CM

## 2020-03-30 DIAGNOSIS — I49.5 SSS (SICK SINUS SYNDROME): Primary | ICD-10-CM

## 2020-03-30 NOTE — TELEPHONE ENCOUNTER
Patient notified of PPM check appointment and protocols .Appointment mailed and virtual phone number to set up appointment.

## 2020-04-01 ENCOUNTER — OFFICE VISIT (OUTPATIENT)
Dept: FAMILY MEDICINE | Facility: CLINIC | Age: 70
End: 2020-04-01
Payer: MEDICARE

## 2020-04-01 DIAGNOSIS — F41.9 ANXIETY: ICD-10-CM

## 2020-04-01 DIAGNOSIS — F51.01 PRIMARY INSOMNIA: ICD-10-CM

## 2020-04-01 DIAGNOSIS — Z79.899 ENCOUNTER FOR LONG-TERM (CURRENT) USE OF MEDICATIONS: Primary | ICD-10-CM

## 2020-04-01 PROCEDURE — 99213 OFFICE O/P EST LOW 20 MIN: CPT | Mod: S$PBB,,, | Performed by: FAMILY MEDICINE

## 2020-04-01 PROCEDURE — 99213 PR OFFICE/OUTPT VISIT, EST, LEVL III, 20-29 MIN: ICD-10-PCS | Mod: S$PBB,,, | Performed by: FAMILY MEDICINE

## 2020-04-01 PROCEDURE — 99999 PR PBB SHADOW E&M-EST. PATIENT-LVL II: ICD-10-PCS | Mod: PBBFAC,,, | Performed by: FAMILY MEDICINE

## 2020-04-01 PROCEDURE — 99999 PR PBB SHADOW E&M-EST. PATIENT-LVL II: CPT | Mod: PBBFAC,,, | Performed by: FAMILY MEDICINE

## 2020-04-01 PROCEDURE — 99212 OFFICE O/P EST SF 10 MIN: CPT | Mod: PBBFAC,PO | Performed by: FAMILY MEDICINE

## 2020-04-01 RX ORDER — TOBRAMYCIN AND DEXAMETHASONE 3; 1 MG/ML; MG/ML
SUSPENSION/ DROPS OPHTHALMIC
COMMUNITY
Start: 2020-03-24 | End: 2021-11-26

## 2020-04-01 RX ORDER — AMITRIPTYLINE HYDROCHLORIDE 50 MG/1
50 TABLET, FILM COATED ORAL NIGHTLY PRN
Qty: 90 TABLET | Refills: 3 | Status: SHIPPED | OUTPATIENT
Start: 2020-04-01 | End: 2021-05-20 | Stop reason: SDUPTHER

## 2020-04-01 NOTE — PATIENT INSTRUCTIONS
Follow up in about 3 months (around 7/1/2020), or if symptoms worsen or fail to improve, for Med refills, LAB RESULTS.     If no improvement in symptoms or symptoms worsen, please be advised to call MD, follow-up at clinic and/or go to ER if becomes severe.    Adan Woodward M.D.        We Offer TELEHEALTH & Same Day Appointments!   Book your Telehealth appointment with me through my nurse or   Clinic appointments on [employer]!    [address] [place] [address]    Office: [phone]   FAX: [phone]    Check out my Facebook Page and Follow Me at: [email or web address]    Check out my website at [employer] by clicking on: [email or web address]    To Schedule appointments online, go to [employer]: [email or web address]

## 2020-04-04 PROBLEM — Z79.899 ENCOUNTER FOR LONG-TERM (CURRENT) USE OF MEDICATIONS: Chronic | Status: ACTIVE | Noted: 2019-11-05

## 2020-04-04 PROBLEM — F41.9 ANXIETY: Chronic | Status: ACTIVE | Noted: 2018-11-19

## 2020-04-04 NOTE — PROGRESS NOTES
Primary Care Telemedicine Note    The patient location is:  Patient Home- [place]  The chief complaint leading to consultation is:  Follow-up/anxiety/insomnia  Total time spent with patient: 11 min    Visit type: Virtual visit with synchronous audio only and video  Each patient to whom he or she provides medical services by telemedicine is:  (1) informed of the relationship between the physician and patient and the respective role of any other health care provider with respect to management of the patient; and (2) notified that he or she may decline to receive medical services by telemedicine and may withdraw from such care at any time.    ===================================================================================  PLAN:      Problem List Items Addressed This Visit     Insomnia (Chronic)     Refill temazepam.The patient was checked in the [facility] Board of Pharmacy's  Prescription Monitoring Program. There appears to be no incongruities with the patient's verbalized history.     Increase amitriptyline to 50 mg at night.    Discussed insomnia condition course.  Advised of first-line medications for this condition.  Also discussed sleep hygiene.  Information was given below.  Good sleep habits (sometimes referred to as sleep hygiene) can help you get a good nights sleep.    Some habits that can improve your sleep health:  -Be consistent. Go to bed at the same time each night and get up at the same time each morning, including on the weekends  -Make sure your bedroom is quiet, dark, relaxing, and at a comfortable temperature  -Remove electronic devices, such as TVs, computers, and smart phones, from the bedroom  -Avoid large meals, caffeine, and alcohol before bedtime  -Get some exercise. Being physically active during the day can help you fall asleep more easily at night.           Anxiety (Chronic)     Patient doing well on amitriptyline but is having increased anxiety.  Will increase dose to 50  mg at night.    Discussed anxiety condition course.  Discussed SSRI as first-line treatment for this condition.  Discussed risk of discontinuing this medication without tapering.  Patient was educated, advised of side effects, and all questions were answered.  Patient voiced understanding.  Patient will follow up routinely and notify us if having any side effects or worsening or persistent symptoms.  ER precautions were given.           Relevant Medications    amitriptyline (ELAVIL) 50 MG tablet    Encounter for long-term (current) use of medications - Primary (Chronic)     Labs review and are stable at this time.  Repeat lab orders pending.             Future Appointments     Date Provider Specialty Appt Notes    7/1/2020 Adan Woodward MD Family Medicine 3 mo f/u    7/27/2020  Cardiology [employer] [facility]           Medication List with Changes/Refills   Current Medications    ALBUTEROL-IPRATROPIUM (DUO-NEB) 2.5 MG-0.5 MG/3 ML NEBULIZER SOLUTION    Take 3 mLs by nebulization every 6 (six) hours while awake.    ALENDRONATE (FOSAMAX) 70 MG TABLET    Take 1 tablet (70 mg total) by mouth every 7 days.    ALIROCUMAB (PRALUENT PEN) 75 MG/ML PNIJ    Inject 1 mL (75 mg total) into the skin every 14 (fourteen) days.    AMLODIPINE (NORVASC) 10 MG TABLET    Take 10 mg by mouth once daily.    APIXABAN (ELIQUIS) 5 MG TAB    Take 1 tablet (5 mg total) by mouth 2 (two) times daily.    BENAZEPRIL (LOTENSIN) 40 MG TABLET    Take 1 tablet (40 mg total) by mouth once daily.    BISOPROLOL (ZEBETA) 10 MG TABLET    Take 10 mg by mouth once daily.    BRILINTA 90 MG TABLET    TAKE ONE TABLET TWICE DAILY    BUPRENORPHINE (BUTRANS) 10 MCG/HOUR PTWK        BUPRENORPHINE (BUTRANS) 7.5 MCG/HOUR PTWK        BUTALBITAL-ACETAMINOPHEN-CAFF (FIORICET) -40 MG CAP    Fioricet 50 mg-300 mg-40 mg capsule   Take 1 capsule every 4 hours by oral route as needed for 30 days.    CALCITRIOL (ROCALTROL) 0.25 MCG CAP    Take 1 capsule (0.25 mcg  total) by mouth once daily.    CALCIUM CARBONATE (OS-VANESSA) 500 MG CALCIUM (1,250 MG) TABLET    Take 1 tablet (500 mg total) by mouth 3 (three) times daily.    CETIRIZINE (ZYRTEC) 10 MG TABLET    Take 10 mg by mouth once daily.    CYCLOBENZAPRINE (FLEXERIL) 10 MG TABLET    take 1 tablet by oral route 2 times every day    DICLOFENAC SODIUM (VOLTAREN) 1 % GEL        DICYCLOMINE (BENTYL) 10 MG CAPSULE    dicyclomine 10 mg capsule    ESOMEPRAZOLE (NEXIUM) 40 MG CAPSULE    Take 1 capsule (40 mg total) by mouth before breakfast.    FUROSEMIDE (LASIX) 40 MG TABLET    Take 80 mg in the am and 80 mg BID x 7 days,then take 80 mg in am and 40 mg in the PM thereafter.    GABAPENTIN (NEURONTIN) 600 MG TABLET    Take 1 tablet (600 mg total) by mouth 3 (three) times daily.    HYDRALAZINE (APRESOLINE) 25 MG TABLET    Take 25 mg by mouth 2 (two) times daily.    HYDROCHLOROTHIAZIDE (HYDRODIURIL) 25 MG TABLET    Take 25 mg by mouth.    HYDROCODONE-ACETAMINOPHEN 10-325MG (NORCO)  MG TAB    Take 1 tablet by mouth 2 (two) times daily as needed.    ISOSORBIDE MONONITRATE (IMDUR) 30 MG 24 HR TABLET    Take 1 tablet (30 mg total) by mouth 2 (two) times daily.    LEVOTHYROXINE (SYNTHROID) 50 MCG TABLET    Take 1 tablet (50 mcg total) by mouth once daily.    MUPIROCIN (BACTROBAN) 2 % OINTMENT    mupirocin 2 % topical ointment    NITROGLYCERIN (NITROSTAT) 0.4 MG SL TABLET    Place 1 tablet (0.4 mg total) under the tongue every 5 (five) minutes as needed for Chest pain.    POTASSIUM CHLORIDE (MICRO-K) 10 MEQ CPSR    Take 2 capsules in am and 1 in the evening; if taking additional lasix, will need to take one additional potassium capsule in the evening    PRAMIPEXOLE (MIRAPEX) 0.5 MG TABLET    Take 1 tablet (0.5 mg total) by mouth once daily.    PROMETHAZINE (PHENERGAN) 25 MG TABLET    Take 25 mg by mouth every 6 (six) hours as needed.    RANOLAZINE (RANEXA) 1,000 MG TB12    Take 1 tablet (1,000 mg total) by mouth 2 (two) times daily.     ROPINIROLE (REQUIP) 5 MG TABLET    Take 1 tablet (5 mg total) by mouth every evening.    TEMAZEPAM (RESTORIL) 30 MG CAPSULE    Take 1 capsule (30 mg total) by mouth nightly as needed for Insomnia.    TOBRAMYCIN-DEXAMETHASONE 0.3-0.1% (TOBRADEX) 0.3-0.1 % DRPS    ONE DROP IN EACH EYE EVERY 2 HOURS    UMECLIDINIUM-VILANTEROL (ANORO ELLIPTA) 62.5-25 MCG/ACTUATION DSDV    Inhale 1 puff into the lungs once daily. Controller    VENTOLIN HFA 90 MCG/ACTUATION INHALER    Inhale 2 puff(s) every 4 hours by inhalation route.   Changed and/or Refilled Medications    Modified Medication Previous Medication    AMITRIPTYLINE (ELAVIL) 50 MG TABLET amitriptyline (ELAVIL) 25 MG tablet       Take 1 tablet (50 mg total) by mouth nightly as needed for Insomnia.    Take 1 tablet (25 mg total) by mouth nightly as needed for Insomnia.   Discontinued Medications    CEPHALEXIN (KEFLEX) 500 MG CAPSULE    Take 500 mg by mouth 2 (two) times daily.       Adan Woodward M.D.     ==========================================================================  Subjective:      Patient ID: Kalpana Wright is a 70 y.o. female.  has a past medical history of ACS (acute coronary syndrome) (3/20/2018), Acute on chronic diastolic congestive heart failure (4/27/2015), Acute on chronic respiratory failure with hypercapnia (1/10/2019), Acute renal failure (1/11/2019), Acute systolic heart failure (3/21/2018), JG (acute kidney injury) (1/29/2019), Anticoagulant long-term use, Arthritis, Asthma, Bradycardia (12/16/2013), CHF (congestive heart failure), Clostridium difficile colitis (9/25/2018), COPD (chronic obstructive pulmonary disease), Coronary artery disease, Depression, Encounter for blood transfusion, Fall (7/8/2019), Gallstones (3/18/2017), History of Pulmonary embolism (7/17/2014), Hyperlipidemia, Hypertension, Need for vaccination (8/8/2019), Non-intractable cyclical vomiting with nausea (2/1/2019), Peripheral vascular disease, Pulmonary embolus  (9/9/2013), and Thyroid disease.     Chief Complaint: Follow-up and Insomnia      Problem List Items Addressed This Visit     Insomnia (Chronic)    Overview     Chronic.  Intermittent control.  Doing well on temazepam and amitriptyline for comorbid anxiety.   reviewed.  No abuse suspected.         Current Assessment & Plan     Refill temazepam.The patient was checked in the [facility] Board [facility]acy's  Prescription Monitoring Program. There appears to be no incongruities with the patient's verbalized history.     Increase amitriptyline to 50 mg at night.    Discussed insomnia condition course.  Advised of first-line medications for this condition.  Also discussed sleep hygiene.  Information was given below.  Good sleep habits (sometimes referred to as sleep hygiene) can help you get a good nights sleep.    Some habits that can improve your sleep health:  -Be consistent. Go to bed at the same time each night and get up at the same time each morning, including on the weekends  -Make sure your bedroom is quiet, dark, relaxing, and at a comfortable temperature  -Remove electronic devices, such as TVs, computers, and smart phones, from the bedroom  -Avoid large meals, caffeine, and alcohol before bedtime  -Get some exercise. Being physically active during the day can help you fall asleep more easily at night.           Anxiety (Chronic)    Overview     Initial HPI:  Chronic.  Uncontrolled.  Patient reports compliance with current medication regimen.  Patient reports that is is likely due to insomnia.  Patient has failed multiple medications for this and has been on amitriptyline in the past with success.  Patient would like to go back on this medication.  Patient denies any SI or HI.  Patient denies any hallucinations.  =======================================================  APRIL 2020:  Chronic.  Stable.  Patient reports improvement with amitriptyline added to her regimen.  She reports temazepam is  helping some but she is still not getting sleep and is having increased anxiety during this COVID-19 outbreak.  Denies SI HI or hallucinations.  ======================================================         Current Assessment & Plan     Patient doing well on amitriptyline but is having increased anxiety.  Will increase dose to 50 mg at night.    Discussed anxiety condition course.  Discussed SSRI as first-line treatment for this condition.  Discussed risk of discontinuing this medication without tapering.  Patient was educated, advised of side effects, and all questions were answered.  Patient voiced understanding.  Patient will follow up routinely and notify us if having any side effects or worsening or persistent symptoms.  ER precautions were given.           Encounter for long-term (current) use of medications - Primary (Chronic)    Overview     Initial HPI:  Patient is on CHRONIC long-term drug therapy for managed conditions. See medication list. Reports compliance.  No side effects reported.  Routine lab work is being monitored.  Patient does need refills today.   =======================================================  APRIL 2020:    CHRONIC. Stable. Compliant with medications for managed conditions. See medication list. No SE reported.   Routine lab analysis is being monitored. Refills were addressed.  Lab Results   Component Value Date    WBC 6.53 01/29/2020    HGB 11.3 (L) 01/29/2020    HCT 38.0 01/29/2020    MCV 88 01/29/2020     01/29/2020         Chemistry        Component Value Date/Time     01/29/2020 0835    K 3.6 01/29/2020 0835     01/29/2020 0835    CO2 34 (H) 01/29/2020 0835    BUN 17 01/29/2020 0835    CREATININE 0.9 01/29/2020 0835    GLU 93 01/29/2020 0835        Component Value Date/Time    CALCIUM 9.4 01/29/2020 0835    ALKPHOS 96 01/29/2020 0835    AST 15 01/29/2020 0835    ALT 8 (L) 01/29/2020 0835    BILITOT 0.2 01/29/2020 0835    ESTGFRAFRICA >60.0 01/29/2020 0835     EGFRNONAA >60.0 01/29/2020 0835          Lab Results   Component Value Date    TSH 2.907 01/29/2020    Q3EKADK 8.5 08/08/2019    T3FREE 2.6 08/08/2019     ======================================================         Current Assessment & Plan     Labs review and are stable at this time.  Repeat lab orders pending.                Past Medical History:  Past Medical History:   Diagnosis Date    ACS (acute coronary syndrome) 3/20/2018    Acute on chronic diastolic congestive heart failure 4/27/2015    Acute on chronic respiratory failure with hypercapnia 1/10/2019    Acute renal failure 1/11/2019    Acute systolic heart failure 3/21/2018    JG (acute kidney injury) 1/29/2019    Anticoagulant long-term use     Arthritis     Asthma     Bradycardia 12/16/2013    CHF (congestive heart failure)     Clostridium difficile colitis 9/25/2018    COPD (chronic obstructive pulmonary disease)     Coronary artery disease     Depression     Encounter for blood transfusion     Fall 7/8/2019    Iniital HPI: New problem acute.  Happened several days ago.  Patient was getting up out of her wheelchair and thought it was locked when it rolled about from under her and she fell and hit her tailbone on the wheelchair.  Patient has been having moderate to severe pain to the point where she cannot sit on her wheelchair for long periods of time.  ==================== 09/05/2019 Fell again on Satur    Gallstones 3/18/2017    History of Pulmonary embolism 7/17/2014    Hyperlipidemia     Hypertension     Need for vaccination 8/8/2019    Due for pneumonia vaccination.  However patient has latex allergy.  Patient cannot receive pneumococcal vaccine here.  Well patient follow-up at pharmacy for different pneumococcal vaccine    Non-intractable cyclical vomiting with nausea 2/1/2019    Peripheral vascular disease     Pulmonary embolus 9/9/2013    Thyroid disease      Past Surgical History:   Procedure Laterality Date     "CARDIAC SURGERY      CORONARY ARTERY BYPASS GRAFT  04/16/2015    EYE SURGERY      HYSTERECTOMY      INSERT / REPLACE / REMOVE PACEMAKER      LEFT HEART CATHETERIZATION Left 11/14/2018    Procedure: CATHETERIZATION, HEART, LEFT;  Surgeon: Huber Kapoor MD;  Location: [facility] CATH LAB;  Service: Cardiology;  Laterality: Left;    OOPHORECTOMY      peripheral angiogram      peripheral stenting       Review of patient's allergies indicates:   Allergen Reactions    Atorvastatin Other (See Comments)     Severe muscle pain  Other reaction(s): Abdominal Pain  SEVERE MYALGIAS      Azithromycin Other (See Comments)     By shot, closed veins and made large bruises  By shot, closed veins and made large bruises      Latex, natural rubber Rash     "TOURNIQUET ON LEG LEFT HUGE BLISTER THAT TOOK 9 MONTHS OF WOUND CARE TO HEAL."    Meperidine Hives and Anaphylaxis     Other reaction(s): Anaphylaxis  Hives (skin)^  Hives (skin)^      Penicillins Anaphylaxis and Hives     Other reaction(s): Anaphylaxis  Shortness of breath^swelling  Shortness of breath and swelling  Anaphylaxis  Shortness of breath^swelling      Tofacitinib Rash and Swelling     Rash and swelling of face      Lorazepam Anxiety and Other (See Comments)     Made me goofy  CAUSED CONFUSION "Made me goofy."      Pravastatin Other (See Comments)     Severe myalgias.  Cramps/Severe myalgias.    Flu vac 2018 [id number](pf)      Other reaction(s): Other (See Comments)  Flip into aFIB    Nystatin Rash    Pepper (genus capsicum) Other (See Comments)     Reflux and ulcers     Medication List with Changes/Refills   Current Medications    ALBUTEROL-IPRATROPIUM (DUO-NEB) 2.5 MG-0.5 MG/3 ML NEBULIZER SOLUTION    Take 3 mLs by nebulization every 6 (six) hours while awake.    ALENDRONATE (FOSAMAX) 70 MG TABLET    Take 1 tablet (70 mg total) by mouth every 7 days.    ALIROCUMAB (PRALUENT PEN) 75 MG/ML PNIJ    Inject 1 mL (75 mg total) into the skin every 14 " (fourteen) days.    AMLODIPINE (NORVASC) 10 MG TABLET    Take 10 mg by mouth once daily.    APIXABAN (ELIQUIS) 5 MG TAB    Take 1 tablet (5 mg total) by mouth 2 (two) times daily.    BENAZEPRIL (LOTENSIN) 40 MG TABLET    Take 1 tablet (40 mg total) by mouth once daily.    BISOPROLOL (ZEBETA) 10 MG TABLET    Take 10 mg by mouth once daily.    BRILINTA 90 MG TABLET    TAKE ONE TABLET TWICE DAILY    BUPRENORPHINE (BUTRANS) 10 MCG/HOUR PTWK        BUPRENORPHINE (BUTRANS) 7.5 MCG/HOUR PTWK        BUTALBITAL-ACETAMINOPHEN-CAFF (FIORICET) -40 MG CAP    Fioricet 50 mg-300 mg-40 mg capsule   Take 1 capsule every 4 hours by oral route as needed for 30 days.    CALCITRIOL (ROCALTROL) 0.25 MCG CAP    Take 1 capsule (0.25 mcg total) by mouth once daily.    CALCIUM CARBONATE (OS-VANESSA) 500 MG CALCIUM (1,250 MG) TABLET    Take 1 tablet (500 mg total) by mouth 3 (three) times daily.    CETIRIZINE (ZYRTEC) 10 MG TABLET    Take 10 mg by mouth once daily.    CYCLOBENZAPRINE (FLEXERIL) 10 MG TABLET    take 1 tablet by oral route 2 times every day    DICLOFENAC SODIUM (VOLTAREN) 1 % GEL        DICYCLOMINE (BENTYL) 10 MG CAPSULE    dicyclomine 10 mg capsule    ESOMEPRAZOLE (NEXIUM) 40 MG CAPSULE    Take 1 capsule (40 mg total) by mouth before breakfast.    FUROSEMIDE (LASIX) 40 MG TABLET    Take 80 mg in the am and 80 mg BID x 7 days,then take 80 mg in am and 40 mg in the PM thereafter.    GABAPENTIN (NEURONTIN) 600 MG TABLET    Take 1 tablet (600 mg total) by mouth 3 (three) times daily.    HYDRALAZINE (APRESOLINE) 25 MG TABLET    Take 25 mg by mouth 2 (two) times daily.    HYDROCHLOROTHIAZIDE (HYDRODIURIL) 25 MG TABLET    Take 25 mg by mouth.    HYDROCODONE-ACETAMINOPHEN 10-325MG (NORCO)  MG TAB    Take 1 tablet by mouth 2 (two) times daily as needed.    ISOSORBIDE MONONITRATE (IMDUR) 30 MG 24 HR TABLET    Take 1 tablet (30 mg total) by mouth 2 (two) times daily.    LEVOTHYROXINE (SYNTHROID) 50 MCG TABLET    Take 1 tablet (50  mcg total) by mouth once daily.    MUPIROCIN (BACTROBAN) 2 % OINTMENT    mupirocin 2 % topical ointment    NITROGLYCERIN (NITROSTAT) 0.4 MG SL TABLET    Place 1 tablet (0.4 mg total) under the tongue every 5 (five) minutes as needed for Chest pain.    POTASSIUM CHLORIDE (MICRO-K) 10 MEQ CPSR    Take 2 capsules in am and 1 in the evening; if taking additional lasix, will need to take one additional potassium capsule in the evening    PRAMIPEXOLE (MIRAPEX) 0.5 MG TABLET    Take 1 tablet (0.5 mg total) by mouth once daily.    PROMETHAZINE (PHENERGAN) 25 MG TABLET    Take 25 mg by mouth every 6 (six) hours as needed.    RANOLAZINE (RANEXA) 1,000 MG TB12    Take 1 tablet (1,000 mg total) by mouth 2 (two) times daily.    ROPINIROLE (REQUIP) 5 MG TABLET    Take 1 tablet (5 mg total) by mouth every evening.    TEMAZEPAM (RESTORIL) 30 MG CAPSULE    Take 1 capsule (30 mg total) by mouth nightly as needed for Insomnia.    TOBRAMYCIN-DEXAMETHASONE 0.3-0.1% (TOBRADEX) 0.3-0.1 % DRPS    ONE DROP IN EACH EYE EVERY 2 HOURS    UMECLIDINIUM-VILANTEROL (ANORO ELLIPTA) 62.5-25 MCG/ACTUATION DSDV    Inhale 1 puff into the lungs once daily. Controller    VENTOLIN HFA 90 MCG/ACTUATION INHALER    Inhale 2 puff(s) every 4 hours by inhalation route.   Changed and/or Refilled Medications    Modified Medication Previous Medication    AMITRIPTYLINE (ELAVIL) 50 MG TABLET amitriptyline (ELAVIL) 25 MG tablet       Take 1 tablet (50 mg total) by mouth nightly as needed for Insomnia.    Take 1 tablet (25 mg total) by mouth nightly as needed for Insomnia.   Discontinued Medications    CEPHALEXIN (KEFLEX) 500 MG CAPSULE    Take 500 mg by mouth 2 (two) times daily.      Social History     Tobacco Use    Smoking status: Former Smoker     Packs/day: 1.00     Years: 45.00     Pack years: 45.00     Types: Cigarettes     Last attempt to quit: 10/3/2012     Years since quittin.5    Smokeless tobacco: Never Used    Tobacco comment: 43 years   Substance  Use Topics    Alcohol use: No      Family History   Problem Relation Age of Onset    Cancer Mother     Lung cancer Father     Breast cancer Sister        I have reviewed the complete PMH, social history, surgical history, allergies and medications.  As well as family history.    Review of Systems   See HPI otherwise negative  Objective:   LMP  (LMP Unknown)   The patient logged into [employer] and entered the blood pressure below into the sangeetha to transmit to the chart for documentation purposes.  No flowsheet data found.   Physical Exam   Constitutional:  Patient is oriented to person, place, and time.  Patient appears well-developed and well-nourished. No distress.   HENT: Normocephalic and atraumatic.   Eyes: Pupils are equal, round, and reactive to light. EOM are normal.   Oropharynx clear and moist.  No pharyngeal exudates by visualization  No cervical adenopathy palpated on patient self-exam  No tenderness to palpation of the frontal sinuses on patient self-exam  No tenderness to palpation of the maxillary sinuses on patient self-exam  Neck: Normal range of motion.   Pulmonary/Chest: Effort normal.   Musculoskeletal: Normal range of motion.   Neurological:  Patient is alert and oriented to person, place, and time.   Skin: No rash noted.  Patient is not diaphoretic.   Psychiatric:  Patient has a normal mood and affect.  Patient behavior is normal. Judgment and thought content normal.  Patient mood appears not anxious.  Patient affect is not angry, not blunt, not labile and not inappropriate.  The patients speech is not rapid and/or pressured, not delayed, not tangential and not slurred.  The patient is not agitated, not aggressive, not hyperactive, not slowed, not withdrawn, not actively hallucinating and not combative. Thought content is not paranoid and not delusional. Cognition and memory are normal. Cognition and memory are not impaired.  The patient does not express impulsivity or inappropriate judgment.   The patient does not exhibit a depressed mood.  The patient expresses no homicidal and no suicidal ideation.  The patient expresses no suicidal plans and no homicidal plans.  The patient is communicative.  The patient is attentive.     Assessment:     1. Encounter for long-term (current) use of medications    2. Anxiety    3. Primary insomnia      MDM:   Moderate complexity.  Patient was seen during COVID-19 outbreak.  Patient does not meet criteria for testing.  Patient was evaluated with virtual visit for chronic conditions and medication refill.  I have performed thorough medication reconciliation today and discussed risk and benefits of each medication.  I have summarized labs and old records.  I have signed for the following orders AND/OR meds.  No orders of the defined types were placed in this encounter.    Medications Ordered This Encounter   Medications    amitriptyline (ELAVIL) 50 MG tablet     Sig: Take 1 tablet (50 mg total) by mouth nightly as needed for Insomnia.     Dispense:  90 tablet     Refill:  3        Follow up in about 3 months (around 7/1/2020), or if symptoms worsen or fail to improve, for Med refills, LAB RESULTS.    If no improvement in symptoms or symptoms worsen, advised to call/follow-up at clinic or go to ER. Patient voiced understanding and all questions/concerns were addressed.     DISCLAIMER: This note was compiled by using a speech recognition dictation system and therefore please be aware that typographical / speech recognition errors can and do occur.  Please contact me if you see any errors specifically.    Adan Woodward M.D.       Office: [phone]   [address] [place] [address]  FAX: [phone]

## 2020-04-04 NOTE — ASSESSMENT & PLAN NOTE
Refill temazepam.The patient was checked in the [facility] [employer]'s  Prescription Monitoring Program. There appears to be no incongruities with the patient's verbalized history.     Increase amitriptyline to 50 mg at night.    Discussed insomnia condition course.  Advised of first-line medications for this condition.  Also discussed sleep hygiene.  Information was given below.  Good sleep habits (sometimes referred to as sleep hygiene) can help you get a good nights sleep.    Some habits that can improve your sleep health:  -Be consistent. Go to bed at the same time each night and get up at the same time each morning, including on the weekends  -Make sure your bedroom is quiet, dark, relaxing, and at a comfortable temperature  -Remove electronic devices, such as TVs, computers, and smart phones, from the bedroom  -Avoid large meals, caffeine, and alcohol before bedtime  -Get some exercise. Being physically active during the day can help you fall asleep more easily at night.

## 2020-04-04 NOTE — ASSESSMENT & PLAN NOTE
Patient doing well on amitriptyline but is having increased anxiety.  Will increase dose to 50 mg at night.    Discussed anxiety condition course.  Discussed SSRI as first-line treatment for this condition.  Discussed risk of discontinuing this medication without tapering.  Patient was educated, advised of side effects, and all questions were answered.  Patient voiced understanding.  Patient will follow up routinely and notify us if having any side effects or worsening or persistent symptoms.  ER precautions were given.

## 2020-04-15 ENCOUNTER — TELEPHONE (OUTPATIENT)
Dept: PHARMACY | Facility: CLINIC | Age: 70
End: 2020-04-15

## 2020-04-15 NOTE — TELEPHONE ENCOUNTER
Patient reports that she has not resumed Praluent.  She still has 1 month supply on hand and wants to wait until after her next cardiology appointment to restart medication.  She will reach out to [facility] if she starts medication, but for now [facility] will stop reaching out to her until hearing from her.

## 2020-04-16 ENCOUNTER — PATIENT MESSAGE (OUTPATIENT)
Dept: CARDIOLOGY | Facility: CLINIC | Age: 70
End: 2020-04-16

## 2020-04-21 ENCOUNTER — DOCUMENT SCAN (OUTPATIENT)
Dept: HOME HEALTH SERVICES | Facility: HOSPITAL | Age: 70
End: 2020-04-21
Payer: MEDICARE

## 2020-04-22 PROCEDURE — G0180 PR HOME HEALTH MD CERTIFICATION: ICD-10-PCS | Mod: ,,, | Performed by: FAMILY MEDICINE

## 2020-04-22 PROCEDURE — G0180 MD CERTIFICATION HHA PATIENT: HCPCS | Mod: ,,, | Performed by: FAMILY MEDICINE

## 2020-04-24 ENCOUNTER — EXTERNAL HOME HEALTH (OUTPATIENT)
Dept: HOME HEALTH SERVICES | Facility: HOSPITAL | Age: 70
End: 2020-04-24
Payer: MEDICARE

## 2020-06-08 ENCOUNTER — DOCUMENT SCAN (OUTPATIENT)
Dept: HOME HEALTH SERVICES | Facility: HOSPITAL | Age: 70
End: 2020-06-08
Payer: MEDICARE

## 2020-06-17 ENCOUNTER — PATIENT OUTREACH (OUTPATIENT)
Dept: ADMINISTRATIVE | Facility: HOSPITAL | Age: 70
End: 2020-06-17

## 2020-06-17 NOTE — PROGRESS NOTES
Health Maintenance Updated.   Immunizations: Abstracted.   Care Everywhere: Abstracted: No new results found.     Health Maintenance Due   Topic    LDCT Lung Screen     Pneumococcal Vaccine (65+ Low/Medium Risk) (2 of 2 - PPSV23)

## 2020-06-21 PROCEDURE — G0179 MD RECERTIFICATION HHA PT: HCPCS | Mod: ,,, | Performed by: FAMILY MEDICINE

## 2020-06-21 PROCEDURE — G0179 PR HOME HEALTH MD RECERTIFICATION: ICD-10-PCS | Mod: ,,, | Performed by: FAMILY MEDICINE

## 2020-06-22 ENCOUNTER — EXTERNAL HOME HEALTH (OUTPATIENT)
Dept: HOME HEALTH SERVICES | Facility: HOSPITAL | Age: 70
End: 2020-06-22
Payer: MEDICARE

## 2020-06-22 NOTE — PROGRESS NOTES
60 Day Recert Order # [id number]  New Cert Period: 06/21 to 08/19/2020 with Mary Lou Humphrey - Dr. Adan Woodward

## 2020-06-23 ENCOUNTER — TELEPHONE (OUTPATIENT)
Dept: FAMILY MEDICINE | Facility: CLINIC | Age: 70
End: 2020-06-23

## 2020-06-23 NOTE — TELEPHONE ENCOUNTER
----- Message from Demar Dias sent at 6/23/2020 11:53 AM CDT -----  Regarding: Health Problems  Contact: pt  Pt is calling the staff regarding pt health problems.    Pt call back to be advised [phone]    Thanks

## 2020-06-23 NOTE — TELEPHONE ENCOUNTER
Returned call to patient, patient was requesting a sooner appointment with Dr. Woodward, however his scheduled is booked passed patient's original appointment date and time, patient verbalized that she would keep the appointment that she already had.

## 2020-06-26 ENCOUNTER — TELEPHONE (OUTPATIENT)
Dept: FAMILY MEDICINE | Facility: CLINIC | Age: 70
End: 2020-06-26

## 2020-06-26 DIAGNOSIS — N39.0 RECURRENT UTI: Primary | ICD-10-CM

## 2020-06-26 RX ORDER — SULFAMETHOXAZOLE AND TRIMETHOPRIM 800; 160 MG/1; MG/1
1 TABLET ORAL 2 TIMES DAILY
Qty: 10 TABLET | Refills: 0 | Status: SHIPPED | OUTPATIENT
Start: 2020-06-26 | End: 2020-07-01

## 2020-06-26 NOTE — TELEPHONE ENCOUNTER
Per Dr. Woodward's review of recent labs and UA at [facility], order bactrim ds for 5 days, medication pended to Dr. Woodward.

## 2020-06-26 NOTE — TELEPHONE ENCOUNTER
Called and notified patient that antibiotic Bactrim Ds was sent to her pharmacy by Dr. Woodward for UTI.  Joel verbalized understanding of this.

## 2020-07-01 ENCOUNTER — TELEPHONE (OUTPATIENT)
Dept: PHARMACY | Facility: CLINIC | Age: 70
End: 2020-07-01

## 2020-07-01 ENCOUNTER — OFFICE VISIT (OUTPATIENT)
Dept: FAMILY MEDICINE | Facility: CLINIC | Age: 70
End: 2020-07-01
Payer: MEDICARE

## 2020-07-01 ENCOUNTER — TELEPHONE (OUTPATIENT)
Dept: FAMILY MEDICINE | Facility: CLINIC | Age: 70
End: 2020-07-01

## 2020-07-01 ENCOUNTER — LAB VISIT (OUTPATIENT)
Dept: LAB | Facility: HOSPITAL | Age: 70
End: 2020-07-01
Attending: FAMILY MEDICINE
Payer: MEDICARE

## 2020-07-01 VITALS
BODY MASS INDEX: 30.73 KG/M2 | TEMPERATURE: 99 F | DIASTOLIC BLOOD PRESSURE: 68 MMHG | SYSTOLIC BLOOD PRESSURE: 168 MMHG | WEIGHT: 180 LBS | HEIGHT: 64 IN | HEART RATE: 82 BPM

## 2020-07-01 DIAGNOSIS — E78.00 PURE HYPERCHOLESTEROLEMIA: ICD-10-CM

## 2020-07-01 DIAGNOSIS — K21.9 GASTROESOPHAGEAL REFLUX DISEASE, ESOPHAGITIS PRESENCE NOT SPECIFIED: ICD-10-CM

## 2020-07-01 DIAGNOSIS — I25.10 CORONARY ARTERY DISEASE INVOLVING NATIVE CORONARY ARTERY OF NATIVE HEART WITHOUT ANGINA PECTORIS: ICD-10-CM

## 2020-07-01 DIAGNOSIS — Z79.899 ENCOUNTER FOR LONG-TERM (CURRENT) USE OF OTHER MEDICATIONS: ICD-10-CM

## 2020-07-01 DIAGNOSIS — I10 ESSENTIAL HYPERTENSION: ICD-10-CM

## 2020-07-01 DIAGNOSIS — F17.210 CIGARETTE SMOKER: ICD-10-CM

## 2020-07-01 DIAGNOSIS — Z79.899 ENCOUNTER FOR LONG-TERM (CURRENT) USE OF MEDICATIONS: Chronic | ICD-10-CM

## 2020-07-01 DIAGNOSIS — Z95.1 HISTORY OF INSERTION OF STENT INTO CORONARY ARTERY BYPASS GRAFT: ICD-10-CM

## 2020-07-01 DIAGNOSIS — Z12.2 ENCOUNTER FOR SCREENING FOR MALIGNANT NEOPLASM OF RESPIRATORY ORGANS: ICD-10-CM

## 2020-07-01 DIAGNOSIS — F51.01 PRIMARY INSOMNIA: Chronic | ICD-10-CM

## 2020-07-01 DIAGNOSIS — Z87.891 HISTORY OF SMOKING 30 OR MORE PACK YEARS: ICD-10-CM

## 2020-07-01 DIAGNOSIS — K21.00 GASTROESOPHAGEAL REFLUX DISEASE WITH ESOPHAGITIS: Primary | ICD-10-CM

## 2020-07-01 LAB
ALBUMIN SERPL BCP-MCNC: 3.5 G/DL (ref 3.5–5.2)
ALP SERPL-CCNC: 114 U/L (ref 55–135)
ALT SERPL W/O P-5'-P-CCNC: 11 U/L (ref 10–44)
ANION GAP SERPL CALC-SCNC: 11 MMOL/L (ref 8–16)
AST SERPL-CCNC: 21 U/L (ref 10–40)
BACTERIA #/AREA URNS AUTO: ABNORMAL /HPF
BILIRUB SERPL-MCNC: 0.2 MG/DL (ref 0.1–1)
BILIRUB UR QL STRIP: NEGATIVE
BUN SERPL-MCNC: 16 MG/DL (ref 8–23)
CALCIUM SERPL-MCNC: 9.4 MG/DL (ref 8.7–10.5)
CAOX CRY UR QL COMP ASSIST: ABNORMAL
CHLORIDE SERPL-SCNC: 102 MMOL/L (ref 95–110)
CHOLEST SERPL-MCNC: 159 MG/DL (ref 120–199)
CHOLEST/HDLC SERPL: 3.2 {RATIO} (ref 2–5)
CLARITY UR REFRACT.AUTO: ABNORMAL
CO2 SERPL-SCNC: 26 MMOL/L (ref 23–29)
COLOR UR AUTO: YELLOW
CREAT SERPL-MCNC: 0.9 MG/DL (ref 0.5–1.4)
ERYTHROCYTE [DISTWIDTH] IN BLOOD BY AUTOMATED COUNT: 16.2 % (ref 11.5–14.5)
EST. GFR  (AFRICAN AMERICAN): >60 ML/MIN/1.73 M^2
EST. GFR  (NON AFRICAN AMERICAN): >60 ML/MIN/1.73 M^2
GLUCOSE SERPL-MCNC: 73 MG/DL (ref 70–110)
GLUCOSE UR QL STRIP: NEGATIVE
HCT VFR BLD AUTO: 43.8 % (ref 37–48.5)
HDLC SERPL-MCNC: 50 MG/DL (ref 40–75)
HDLC SERPL: 31.4 % (ref 20–50)
HGB BLD-MCNC: 13.3 G/DL (ref 12–16)
HGB UR QL STRIP: NEGATIVE
KETONES UR QL STRIP: NEGATIVE
LDLC SERPL CALC-MCNC: 85.6 MG/DL (ref 63–159)
LEUKOCYTE ESTERASE UR QL STRIP: NEGATIVE
MCH RBC QN AUTO: 27.1 PG (ref 27–31)
MCHC RBC AUTO-ENTMCNC: 30.4 G/DL (ref 32–36)
MCV RBC AUTO: 89 FL (ref 82–98)
MICROSCOPIC COMMENT: ABNORMAL
NITRITE UR QL STRIP: NEGATIVE
NONHDLC SERPL-MCNC: 109 MG/DL
PH UR STRIP: 5 [PH] (ref 5–8)
PLATELET # BLD AUTO: 211 K/UL (ref 150–350)
PMV BLD AUTO: 10.2 FL (ref 9.2–12.9)
POTASSIUM SERPL-SCNC: 3.9 MMOL/L (ref 3.5–5.1)
PROT SERPL-MCNC: 7.7 G/DL (ref 6–8.4)
PROT UR QL STRIP: NEGATIVE
RBC # BLD AUTO: 4.91 M/UL (ref 4–5.4)
RBC #/AREA URNS AUTO: 5 /HPF (ref 0–4)
SODIUM SERPL-SCNC: 139 MMOL/L (ref 136–145)
SP GR UR STRIP: 1.02 (ref 1–1.03)
SQUAMOUS #/AREA URNS AUTO: 1 /HPF
T4 FREE SERPL-MCNC: 0.86 NG/DL (ref 0.71–1.51)
TRIGL SERPL-MCNC: 117 MG/DL (ref 30–150)
TSH SERPL DL<=0.005 MIU/L-ACNC: 5.31 UIU/ML (ref 0.4–4)
URN SPEC COLLECT METH UR: ABNORMAL
WBC # BLD AUTO: 8.59 K/UL (ref 3.9–12.7)
WBC #/AREA URNS AUTO: 1 /HPF (ref 0–5)

## 2020-07-01 PROCEDURE — 36415 COLL VENOUS BLD VENIPUNCTURE: CPT | Mod: PO

## 2020-07-01 PROCEDURE — 80053 COMPREHEN METABOLIC PANEL: CPT

## 2020-07-01 PROCEDURE — 85027 COMPLETE CBC AUTOMATED: CPT

## 2020-07-01 PROCEDURE — 80061 LIPID PANEL: CPT

## 2020-07-01 PROCEDURE — 82043 UR ALBUMIN QUANTITATIVE: CPT

## 2020-07-01 PROCEDURE — 84439 ASSAY OF FREE THYROXINE: CPT

## 2020-07-01 PROCEDURE — 81001 URINALYSIS AUTO W/SCOPE: CPT

## 2020-07-01 PROCEDURE — 99214 OFFICE O/P EST MOD 30 MIN: CPT | Mod: S$PBB,,, | Performed by: FAMILY MEDICINE

## 2020-07-01 PROCEDURE — 99999 PR PBB SHADOW E&M-EST. PATIENT-LVL V: ICD-10-PCS | Mod: PBBFAC,,, | Performed by: FAMILY MEDICINE

## 2020-07-01 PROCEDURE — 84443 ASSAY THYROID STIM HORMONE: CPT

## 2020-07-01 PROCEDURE — 99999 PR PBB SHADOW E&M-EST. PATIENT-LVL V: CPT | Mod: PBBFAC,,, | Performed by: FAMILY MEDICINE

## 2020-07-01 PROCEDURE — 83036 HEMOGLOBIN GLYCOSYLATED A1C: CPT

## 2020-07-01 PROCEDURE — 99214 PR OFFICE/OUTPT VISIT, EST, LEVL IV, 30-39 MIN: ICD-10-PCS | Mod: S$PBB,,, | Performed by: FAMILY MEDICINE

## 2020-07-01 PROCEDURE — 99215 OFFICE O/P EST HI 40 MIN: CPT | Mod: PBBFAC,PO | Performed by: FAMILY MEDICINE

## 2020-07-01 RX ORDER — LEVOTHYROXINE SODIUM 50 UG/1
50 TABLET ORAL DAILY
Qty: 90 TABLET | Refills: 3 | Status: SHIPPED | OUTPATIENT
Start: 2020-07-01 | End: 2020-07-06

## 2020-07-01 RX ORDER — ISOSORBIDE MONONITRATE 30 MG/1
30 TABLET, EXTENDED RELEASE ORAL DAILY
Qty: 90 TABLET | Refills: 3 | Status: SHIPPED | OUTPATIENT
Start: 2020-07-01 | End: 2021-11-29

## 2020-07-01 RX ORDER — BENAZEPRIL HYDROCHLORIDE 40 MG/1
40 TABLET ORAL DAILY
Qty: 90 TABLET | Refills: 3 | Status: SHIPPED | OUTPATIENT
Start: 2020-07-01 | End: 2021-11-26 | Stop reason: SDUPTHER

## 2020-07-01 RX ORDER — AMLODIPINE BESYLATE 10 MG/1
10 TABLET ORAL DAILY
Qty: 90 TABLET | Refills: 3 | Status: SHIPPED | OUTPATIENT
Start: 2020-07-01 | End: 2023-06-08 | Stop reason: SDUPTHER

## 2020-07-01 RX ORDER — HYDRALAZINE HYDROCHLORIDE 25 MG/1
25 TABLET, FILM COATED ORAL 2 TIMES DAILY
Qty: 180 TABLET | Refills: 3 | Status: SHIPPED | OUTPATIENT
Start: 2020-07-01 | End: 2023-06-08 | Stop reason: SINTOL

## 2020-07-01 RX ORDER — BISOPROLOL FUMARATE 10 MG/1
10 TABLET, FILM COATED ORAL DAILY
Qty: 90 TABLET | Refills: 3 | Status: SHIPPED | OUTPATIENT
Start: 2020-07-01 | End: 2021-08-19 | Stop reason: SDUPTHER

## 2020-07-01 RX ORDER — HYDROCHLOROTHIAZIDE 25 MG/1
25 TABLET ORAL DAILY
Qty: 90 TABLET | Refills: 3 | Status: SHIPPED | OUTPATIENT
Start: 2020-07-01 | End: 2021-03-17 | Stop reason: SDUPTHER

## 2020-07-01 RX ORDER — ESOMEPRAZOLE MAGNESIUM 40 MG/1
40 CAPSULE, DELAYED RELEASE ORAL
Qty: 90 CAPSULE | Refills: 3 | Status: SHIPPED | OUTPATIENT
Start: 2020-07-01 | End: 2021-08-19

## 2020-07-01 RX ORDER — TEMAZEPAM 30 MG/1
CAPSULE ORAL
Qty: 30 CAPSULE | Refills: 5 | Status: SHIPPED | OUTPATIENT
Start: 2020-07-01 | End: 2021-01-07 | Stop reason: SDUPTHER

## 2020-07-01 NOTE — ASSESSMENT & PLAN NOTE
Continue medical management.  Follow-up with cardiology as scheduled.  ER precautions.  
Discussed with patient.  Patient agrees and is willing to accept the risk of CT scanning for lung cancer.  
Patient started back smoking.  Patient would like to stop again.  Enroll in smoking cessation.  Counseled.  
Refill Nexium.GERD RECOMMENDATIONS  caffeine reduction dietary changes elevate HOB NPO after supper  - Take PPI in the morning 30-60 minutes before breakfast  - Educated patient on lifestyle modifications to help control/reduce reflux/abdominal pain including: avoid large meals, avoid eating within 2-3 hours of bedtime (avoid late night eating & lying down soon after eating), elevate head of bed if nocturnal symptoms are present, smoking cessation (if current smoker), & weight loss (if overweight).   - Educated to avoid known foods which trigger reflux symptoms & to minimize/avoid high-fat foods, chocolate, caffeine, citrus, alcohol, & tomato products.  - Advised to avoid/limit use of NSAID's, since they can cause GI upset, bleeding, and/or ulcers. If needed, take with food.     
Refill medication for six months.  Follow-up in six months for refills and monitoring.  
Renal function stable on current blood pressure medication.  Patient is only complex medication regimen to control her blood pressure.  Checking labs.  Home health readings have been within normal limits and at goal at home.Discussed hypertension disease course, DASH-diet and exercise.  Discussed medication regimen importance of treating high blood pressure.  Patient advised of risk of untreated blood pressure.    ER precautions were given for symptoms of hypertensive urgency and emergency.    
Restart Praluent.  Check fasting lipid panel.Discussed hyperlipidemia disease course, healthy diet and increased need for exercise.  Discussed the risk of cardiovascular disease, increase stroke and heart attack risk.    Patient voiced understanding and understood the treatment plan. All questions were answered.       
Update labs today.  Meds refilled.  
never used

## 2020-07-01 NOTE — PATIENT INSTRUCTIONS
Follow up in about 6 months (around 1/1/2021), or if symptoms worsen or fail to improve, for Med refills, LAB RESULTS.     If no improvement in symptoms or symptoms worsen, please be advised to call MD, follow-up at clinic and/or go to ER if becomes severe.    Adan Woodward M.D.        We Offer TELEHEALTH & Same Day Appointments!   Book your Telehealth appointment with me through my nurse or   Clinic appointments on [facility]!    [address] [place] [address]    Office: [phone]   FAX: [phone]    Check out my Facebook Page and Follow Me at: [email or web address]    Check [email or web address] my website at [email or web address] by clicking on: [email or web address]    To Schedule appointments online, go to [facility]: [email or web address]

## 2020-07-01 NOTE — TELEPHONE ENCOUNTER
LVM for callback to inform patient that [facility] received prescription for Praluent and benefits investigation is required.  [facility] will be back in touch once insurance determination is received.

## 2020-07-01 NOTE — PROGRESS NOTES
PLAN:      Problem List Items Addressed This Visit     Coronary artery disease involving native coronary artery of native heart without angina pectoris (Chronic)     Continue medical management.  Follow-up with cardiology as scheduled.  ER precautions.         Relevant Medications    levothyroxine (SYNTHROID) 50 MCG tablet    alirocumab (PRALUENT PEN) 75 mg/mL PnIj    Other Relevant Orders    CBC Without Differential    TSH    Comprehensive metabolic panel    Hemoglobin A1C    Lipid Panel    URINALYSIS    Urinalysis Microscopic    MICROALBUMIN / CREATININE RATIO URINE    Hyperlipidemia (Chronic)     Restart Praluent.  Check fasting lipid panel.Discussed hyperlipidemia disease course, healthy diet and increased need for exercise.  Discussed the risk of cardiovascular disease, increase stroke and heart attack risk.    Patient voiced understanding and understood the treatment plan. All questions were answered.              Relevant Medications    alirocumab (PRALUENT PEN) 75 mg/mL PnIj    Other Relevant Orders    CBC Without Differential    TSH    Comprehensive metabolic panel    Hemoglobin A1C    Lipid Panel    URINALYSIS    Urinalysis Microscopic    MICROALBUMIN / CREATININE RATIO URINE    Essential hypertension (Chronic)     Renal function stable on current blood pressure medication.  Patient is only complex medication regimen to control her blood pressure.  Checking labs.  Home health readings have been within normal limits and at goal at home.Discussed hypertension disease course, DASH-diet and exercise.  Discussed medication regimen importance of treating high blood pressure.  Patient advised of risk of untreated blood pressure.    ER precautions were given for symptoms of hypertensive urgency and emergency.           Relevant Orders    CBC Without Differential    TSH    Comprehensive metabolic panel    Hemoglobin A1C    Lipid Panel    URINALYSIS    Urinalysis Microscopic    MICROALBUMIN / CREATININE RATIO URINE     Insomnia (Chronic)     Refill medication for six months.  Follow-up in six months for refills and monitoring.         Encounter for long-term (current) use of other medications (Chronic)     Update labs today.  Meds refilled.         Relevant Medications    temazepam (RESTORIL) 30 mg capsule    amLODIPine (NORVASC) 10 MG tablet    apixaban (ELIQUIS) 5 mg Tab    benazepriL (LOTENSIN) 40 MG tablet    bisoprolol (ZEBETA) 10 MG tablet    hydrALAZINE (APRESOLINE) 25 MG tablet    hydroCHLOROthiazide (HYDRODIURIL) 25 MG tablet    isosorbide mononitrate (IMDUR) 30 MG 24 hr tablet    Gastroesophageal reflux disease with esophagitis - Primary (Chronic)     Refill Nexium.GERD RECOMMENDATIONS  caffeine reduction dietary changes elevate HOB NPO after supper  - Take PPI in the morning 30-60 minutes before breakfast  - Educated patient on lifestyle modifications to help control/reduce reflux/abdominal pain including: avoid large meals, avoid eating within 2-3 hours of bedtime (avoid late night eating & lying down soon after eating), elevate head of bed if nocturnal symptoms are present, smoking cessation (if current smoker), & weight loss (if overweight).   - Educated to avoid known foods which trigger reflux symptoms & to minimize/avoid high-fat foods, chocolate, caffeine, citrus, alcohol, & tomato products.  - Advised to avoid/limit use of NSAID's, since they can cause GI upset, bleeding, and/or ulcers. If needed, take with food.            Relevant Medications    esomeprazole (NEXIUM) 40 MG capsule    Other Relevant Orders    CBC Without Differential    TSH    Comprehensive metabolic panel    Hemoglobin A1C    Lipid Panel    URINALYSIS    Urinalysis Microscopic    MICROALBUMIN / CREATININE RATIO URINE    Cigarette smoker (Chronic)     Patient started back smoking.  Patient would like to stop again.  Enroll in smoking cessation.  Counseled.         Relevant Orders    CT Chest Lung Screening Low Dose    URINALYSIS     Urinalysis Microscopic    MICROALBUMIN / CREATININE RATIO URINE    Ambulatory referral/consult to Smoking Cessation Program    History of smoking 30 or more pack years     Discussed with patient.  Patient agrees and is willing to accept the risk of CT scanning for lung cancer.         Relevant Medications    alirocumab (PRALUENT PEN) 75 mg/mL PnIj    History of insertion of stent into coronary artery bypass graft    Relevant Medications    alirocumab (PRALUENT PEN) 75 mg/mL PnIj        Future Appointments     Date Provider Specialty Appt Notes    7/23/2020  Radiology     7/27/2020  Cardiology [facility]    1/6/2021  Lab     1/8/2021 Adan Woodward MD Family Medicine 6 mo f/u           Medication List with Changes/Refills   Current Medications    ALBUTEROL-IPRATROPIUM (DUO-NEB) 2.5 MG-0.5 MG/3 ML NEBULIZER SOLUTION    Take 3 mLs by nebulization every 6 (six) hours while awake.    ALENDRONATE (FOSAMAX) 70 MG TABLET    Take 1 tablet (70 mg total) by mouth every 7 days.    AMITRIPTYLINE (ELAVIL) 50 MG TABLET    Take 1 tablet (50 mg total) by mouth nightly as needed for Insomnia.    BRILINTA 90 MG TABLET    TAKE ONE TABLET TWICE DAILY    BUPRENORPHINE (BUTRANS) 10 MCG/HOUR PTWK        BUPRENORPHINE (BUTRANS) 7.5 MCG/HOUR PTWK        BUTALBITAL-ACETAMINOPHEN-CAFF (FIORICET) -40 MG CAP    Fioricet 50 mg-300 mg-40 mg capsule   Take 1 capsule every 4 hours by oral route as needed for 30 days.    CALCITRIOL (ROCALTROL) 0.25 MCG CAP    Take 1 capsule (0.25 mcg total) by mouth once daily.    CALCIUM CARBONATE (OS-VANESSA) 500 MG CALCIUM (1,250 MG) TABLET    Take 1 tablet (500 mg total) by mouth 3 (three) times daily.    CETIRIZINE (ZYRTEC) 10 MG TABLET    Take 10 mg by mouth once daily.    CYCLOBENZAPRINE (FLEXERIL) 10 MG TABLET    take 1 tablet by oral route 2 times every day    DICLOFENAC SODIUM (VOLTAREN) 1 % GEL        DICYCLOMINE (BENTYL) 10 MG CAPSULE    dicyclomine 10 mg capsule    FUROSEMIDE (LASIX) 40  MG TABLET    Take 80 mg in the am and 80 mg BID x 7 days,then take 80 mg in am and 40 mg in the PM thereafter.    GABAPENTIN (NEURONTIN) 600 MG TABLET    Take 1 tablet (600 mg total) by mouth 3 (three) times daily.    HYDROCODONE-ACETAMINOPHEN 10-325MG (NORCO)  MG TAB    Take 1 tablet by mouth 2 (two) times daily as needed.    MUPIROCIN (BACTROBAN) 2 % OINTMENT    mupirocin 2 % topical ointment    NITROGLYCERIN (NITROSTAT) 0.4 MG SL TABLET    Place 1 tablet (0.4 mg total) under the tongue every 5 (five) minutes as needed for Chest pain.    POTASSIUM CHLORIDE (MICRO-K) 10 MEQ CPSR    Take 2 capsules in am and 1 in the evening; if taking additional lasix, will need to take one additional potassium capsule in the evening    PRAMIPEXOLE (MIRAPEX) 0.5 MG TABLET    Take 1 tablet (0.5 mg total) by mouth once daily.    PROMETHAZINE (PHENERGAN) 25 MG TABLET    Take 25 mg by mouth every 6 (six) hours as needed.    RANOLAZINE (RANEXA) 1,000 MG TB12    Take 1 tablet (1,000 mg total) by mouth 2 (two) times daily.    ROPINIROLE (REQUIP) 5 MG TABLET    Take 1 tablet (5 mg total) by mouth every evening.    TOBRAMYCIN-DEXAMETHASONE 0.3-0.1% (TOBRADEX) 0.3-0.1 % DRPS    ONE DROP IN EACH EYE EVERY 2 HOURS    UMECLIDINIUM-VILANTEROL (ANORO ELLIPTA) 62.5-25 MCG/ACTUATION DSDV    Inhale 1 puff into the lungs once daily. Controller    VENTOLIN HFA 90 MCG/ACTUATION INHALER    Inhale 2 puff(s) every 4 hours by inhalation route.   Changed and/or Refilled Medications    Modified Medication Previous Medication    ALIROCUMAB (PRALUENT PEN) 75 MG/ML PNIJ alirocumab (PRALUENT PEN) 75 mg/mL PnIj       Inject 1 mL (75 mg total) into the skin every 14 (fourteen) days.    Inject 1 mL (75 mg total) into the skin every 14 (fourteen) days.    AMLODIPINE (NORVASC) 10 MG TABLET amLODIPine (NORVASC) 10 MG tablet       Take 1 tablet (10 mg total) by mouth once daily.    Take 10 mg by mouth once daily.    APIXABAN (ELIQUIS) 5 MG TAB apixaban (ELIQUIS)  5 mg Tab       Take 1 tablet (5 mg total) by mouth 2 (two) times daily.    Take 1 tablet (5 mg total) by mouth 2 (two) times daily.    BENAZEPRIL (LOTENSIN) 40 MG TABLET benazepril (LOTENSIN) 40 MG tablet       Take 1 tablet (40 mg total) by mouth once daily.    Take 1 tablet (40 mg total) by mouth once daily.    BISOPROLOL (ZEBETA) 10 MG TABLET bisoprolol (ZEBETA) 10 MG tablet       Take 1 tablet (10 mg total) by mouth once daily.    Take 10 mg by mouth once daily.    ESOMEPRAZOLE (NEXIUM) 40 MG CAPSULE esomeprazole (NEXIUM) 40 MG capsule       Take 1 capsule (40 mg total) by mouth before breakfast.    Take 1 capsule (40 mg total) by mouth before breakfast.    HYDRALAZINE (APRESOLINE) 25 MG TABLET hydrALAZINE (APRESOLINE) 25 MG tablet       Take 1 tablet (25 mg total) by mouth 2 (two) times daily.    Take 25 mg by mouth 2 (two) times daily.    HYDROCHLOROTHIAZIDE (HYDRODIURIL) 25 MG TABLET hydroCHLOROthiazide (HYDRODIURIL) 25 MG tablet       Take 1 tablet (25 mg total) by mouth once daily.    Take 25 mg by mouth.    ISOSORBIDE MONONITRATE (IMDUR) 30 MG 24 HR TABLET isosorbide mononitrate (IMDUR) 30 MG 24 hr tablet       Take 1 tablet (30 mg total) by mouth once daily.    Take 1 tablet (30 mg total) by mouth 2 (two) times daily.    LEVOTHYROXINE (SYNTHROID) 50 MCG TABLET levothyroxine (SYNTHROID) 50 MCG tablet       Take 1 tablet (50 mcg total) by mouth once daily.    Take 1 tablet (50 mcg total) by mouth once daily.    TEMAZEPAM (RESTORIL) 30 MG CAPSULE temazepam (RESTORIL) 30 mg capsule       Take 1 capsule (30 mg total) by mouth nightly as needed for Insomnia.    Take 1 capsule (30 mg total) by mouth nightly as needed for Insomnia.   Discontinued Medications    SULFAMETHOXAZOLE-TRIMETHOPRIM 800-160MG (BACTRIM DS) 800-160 MG TAB    Take 1 tablet by mouth 2 (two) times daily.       Adan Woodward M.D.     ==========================================================================  Subjective:      Patient ID:  Kalpana Wright is a 70 y.o. female.  has a past medical history of ACS (acute coronary syndrome) (3/20/2018), Acute on chronic diastolic congestive heart failure (4/27/2015), Acute on chronic respiratory failure with hypercapnia (1/10/2019), Acute renal failure (1/11/2019), Acute systolic heart failure (3/21/2018), JG (acute kidney injury) (1/29/2019), Anticoagulant long-term use, Arthritis, Asthma, Bradycardia (12/16/2013), CHF (congestive heart failure), Clostridium difficile colitis (9/25/2018), COPD (chronic obstructive pulmonary disease), Coronary artery disease, Depression, Encounter for blood transfusion, Fall (7/8/2019), Gallstones (3/18/2017), History of Pulmonary embolism (7/17/2014), Hyperlipidemia, Hypertension, Need for vaccination (8/8/2019), Non-intractable cyclical vomiting with nausea (2/1/2019), Peripheral vascular disease, Pulmonary embolus (9/9/2013), and Thyroid disease.     Chief Complaint: Follow-up (3 mo f/u)      Problem List Items Addressed This Visit     Coronary artery disease involving native coronary artery of native heart without angina pectoris (Chronic)    Overview     Chronic.  Stable.  Patient is compliant with her medications.  Follows with cardiology regularly.  Denies any recent chest pain palpitations shortness of breath etc..         Current Assessment & Plan     Continue medical management.  Follow-up with cardiology as scheduled.  ER precautions.         Hyperlipidemia (Chronic)    Overview     Chronic.  Patient on Praluent.  Special lipid panel is very well controlled.  No side effects reported.  Reports compliance.  June 2020:  Patient reports that she has not been taking her Praluent shot.  CHRONIC.  Updating labs. Lab analysis reviewed.   (-) CP, SOB, abdominal pain, N/V/D, constipation, jaundice, skin changes.  (-) Myalgias  Lab Results   Component Value Date    CHOL 162 09/11/2019    CHOL 156 05/04/2018    CHOL 119 (L) 04/26/2015     Lab Results   Component Value Date     HDL 46 09/11/2019    HDL 46 05/04/2018    HDL 26 (L) 04/26/2015     Lab Results   Component Value Date    LDLCALC 92.8 09/11/2019    LDLCALC 84.2 05/04/2018    LDLCALC 73.0 04/26/2015     Lab Results   Component Value Date    TRIG 116 09/11/2019    TRIG 129 05/04/2018    TRIG 100 04/26/2015     Lab Results   Component Value Date    CHOLHDL 28.4 09/11/2019    CHOLHDL 29.5 05/04/2018    CHOLHDL 21.8 04/26/2015     Lab Results   Component Value Date    TOTALCHOLEST 3.5 09/11/2019    TOTALCHOLEST 3.4 05/04/2018    TOTALCHOLEST 4.6 04/26/2015     Lab Results   Component Value Date    ALT 8 (L) 01/29/2020    AST 15 01/29/2020    ALKPHOS 96 01/29/2020    BILITOT 0.2 01/29/2020     ======================================================           Current Assessment & Plan     Restart Praluent.  Check fasting lipid panel.Discussed hyperlipidemia disease course, healthy diet and increased need for exercise.  Discussed the risk of cardiovascular disease, increase stroke and heart attack risk.    Patient voiced understanding and understood the treatment plan. All questions were answered.              Essential hypertension (Chronic)    Overview     Initial HPI:  Chronic.  Uncontrolled today.  Patient on several blood pressure medication.  Reports compliance.  Reports better blood pressure control at home with home monitor.  Patient on amlodipine benazepril bisoprolol furosemide hydralazine hydrochlorothiazide Imdur.  No side effects reported.  Denies any chest pain shortness of breath prior vision headaches dizziness lightheadedness.    Lab Results   Component Value Date    CREATININE 0.8 08/08/2019     =======================================================  November 2019:  Patient continues to have uncontrolled blood pressure.  Diastolic is in the 70s.  Patient reports blood pressure is much higher at home with stress level.  Patient is following up with Cardiology.   \  =======================================================  JULY 2020:    CHRONIC. STABLE. BP Reviewed.  Compliant with BP medications. No SE reported.   (-) CP, SOB, palpitations, dizziness, lightheadedness, HA, arm numbness, tingling or weakness, syncope.  Creatinine   Date Value Ref Range Status   01/29/2020 0.9 0.5 - 1.4 mg/dL Final     ======================================================         Current Assessment & Plan     Renal function stable on current blood pressure medication.  Patient is only complex medication regimen to control her blood pressure.  Checking labs.  Home health readings have been within normal limits and at goal at home.Discussed hypertension disease course, DASH-diet and exercise.  Discussed medication regimen importance of treating high blood pressure.  Patient advised of risk of untreated blood pressure.    ER precautions were given for symptoms of hypertensive urgency and emergency.           Insomnia (Chronic)    Overview     Chronic.  Intermittent control.  Doing well on temazepam and amitriptyline for comorbid anxiety.   reviewed.  No abuse suspected.  =======================================================  JULY 2020:  Chronic.  Stable.  Well controlled on current medication regimen.The patient was checked in the [place] [employer]'s  Prescription Monitoring Program. There appears to be no incongruities with the patient's verbalized history.     ======================================================         Current Assessment & Plan     Refill medication for six months.  Follow-up in six months for refills and monitoring.         Encounter for long-term (current) use of other medications (Chronic)    Overview     Initial HPI:  Patient is on CHRONIC long-term drug therapy for managed conditions. See medication list. Reports compliance.  No side effects reported.  Routine lab work is being monitored.  Patient does need refills today.    =======================================================  APRIL 2020:  CHRONIC. Stable. Compliant with medications for managed conditions. See medication list. No SE reported.   Routine lab analysis is being monitored. Refills were addressed.  =======================================================  JULY 2020:    CHRONIC. Stable. Compliant with medications for managed conditions. See medication list. No SE reported.   Routine lab analysis is being monitored. Refills were addressed.  Lab Results   Component Value Date    WBC 6.53 01/29/2020    HGB 11.3 (L) 01/29/2020    HCT 38.0 01/29/2020    MCV 88 01/29/2020     01/29/2020         Chemistry        Component Value Date/Time     01/29/2020 0835    K 3.6 01/29/2020 0835     01/29/2020 0835    CO2 34 (H) 01/29/2020 0835    BUN 17 01/29/2020 0835    CREATININE 0.9 01/29/2020 0835    GLU 93 01/29/2020 0835        Component Value Date/Time    CALCIUM 9.4 01/29/2020 0835    ALKPHOS 96 01/29/2020 0835    AST 15 01/29/2020 0835    ALT 8 (L) 01/29/2020 0835    BILITOT 0.2 01/29/2020 0835    ESTGFRAFRICA >60.0 01/29/2020 0835    EGFRNONAA >60.0 01/29/2020 0835          Lab Results   Component Value Date    TSH 2.907 01/29/2020    V8NRURN 8.5 08/08/2019    T3FREE 2.6 08/08/2019       ======================================================         Current Assessment & Plan     Update labs today.  Meds refilled.         Gastroesophageal reflux disease with esophagitis - Primary (Chronic)    Overview     Chronic.  Uncontrolled.  Patient needs a refill on her Nexium.  She reports that is uncontrolled when she does not take this medication.         Current Assessment & Plan     Refill Nexium.GERD RECOMMENDATIONS  caffeine reduction dietary changes elevate HOB NPO after supper  - Take PPI in the morning 30-60 minutes before breakfast  - Educated patient on lifestyle modifications to help control/reduce reflux/abdominal pain including: avoid large meals, avoid  eating within 2-3 hours of bedtime (avoid late night eating & lying down soon after eating), elevate head of bed if nocturnal symptoms are present, smoking cessation (if current smoker), & weight loss (if overweight).   - Educated to avoid known foods which trigger reflux symptoms & to minimize/avoid high-fat foods, chocolate, caffeine, citrus, alcohol, & tomato products.  - Advised to avoid/limit use of NSAID's, since they can cause GI upset, bleeding, and/or ulcers. If needed, take with food.            Cigarette smoker (Chronic)    Overview     Assistance with smoking cessation was offered, including:  [x]  Medications  [x]  Counseling  [x]  Printed Information on Smoking Cessation  [x]  Referral to a Smoking Cessation Program    Patient was counseled regarding smoking for 3-10 minutes.             Current Assessment & Plan     Patient started back smoking.  Patient would like to stop again.  Enroll in smoking cessation.  Counseled.         History of smoking 30 or more pack years    Overview     Patient needs low-dose CT scanning of the chest.         Current Assessment & Plan     Discussed with patient.  Patient agrees and is willing to accept the risk of CT scanning for lung cancer.         History of insertion of stent into coronary artery bypass graft    Overview     Instent restenosis of SVG to OM.                Past Medical History:  Past Medical History:   Diagnosis Date    ACS (acute coronary syndrome) 3/20/2018    Acute on chronic diastolic congestive heart failure 4/27/2015    Acute on chronic respiratory failure with hypercapnia 1/10/2019    Acute renal failure 1/11/2019    Acute systolic heart failure 3/21/2018    JG (acute kidney injury) 1/29/2019    Anticoagulant long-term use     Arthritis     Asthma     Bradycardia 12/16/2013    CHF (congestive heart failure)     Clostridium difficile colitis 9/25/2018    COPD (chronic obstructive pulmonary disease)     Coronary artery disease   "   Depression     Encounter for blood transfusion     Fall 7/8/2019    Iniital HPI: New problem acute.  Happened several days ago.  Patient was getting up out of her wheelchair and thought it was locked when it rolled about from under her and she fell and hit her tailbone on the wheelchair.  Patient has been having moderate to severe pain to the point where she cannot sit on her wheelchair for long periods of time.  ==================== 09/05/2019 Fell again on Satur    Gallstones 3/18/2017    History of Pulmonary embolism 7/17/2014    Hyperlipidemia     Hypertension     Need for vaccination 8/8/2019    Due for pneumonia vaccination.  However patient has latex allergy.  Patient cannot receive pneumococcal vaccine here.  Well patient follow-up at pharmacy for different pneumococcal vaccine    Non-intractable cyclical vomiting with nausea 2/1/2019    Peripheral vascular disease     Pulmonary embolus 9/9/2013    Thyroid disease      Past Surgical History:   Procedure Laterality Date    CARDIAC SURGERY      CORONARY ARTERY BYPASS GRAFT  04/16/2015    EYE SURGERY      HYSTERECTOMY      INSERT / REPLACE / REMOVE PACEMAKER      LEFT HEART CATHETERIZATION Left 11/14/2018    Procedure: CATHETERIZATION, HEART, LEFT;  Surgeon: Huber Kapoor MD;  Location: [facility] CATH LAB;  Service: Cardiology;  Laterality: Left;    OOPHORECTOMY      peripheral angiogram      peripheral stenting       Review of patient's allergies indicates:   Allergen Reactions    Atorvastatin Other (See Comments)     Severe muscle pain  Other reaction(s): Abdominal Pain  SEVERE MYALGIAS      Azithromycin Other (See Comments)     By shot, closed veins and made large bruises  By shot, closed veins and made large bruises      Latex, natural rubber Rash     "TOURNIQUET ON LEG LEFT HUGE BLISTER THAT TOOK 9 MONTHS OF WOUND CARE TO HEAL."    Meperidine Hives and Anaphylaxis     Other reaction(s): Anaphylaxis  Hives (skin)^  Hives " "(skin)^      Penicillins Anaphylaxis and Hives     Other reaction(s): Anaphylaxis  Shortness of breath^swelling  Shortness of breath and swelling  Anaphylaxis  Shortness of breath^swelling      Tofacitinib Rash and Swelling     Rash and swelling of face      Lorazepam Anxiety and Other (See Comments)     Made me goofy  CAUSED CONFUSION "Made me goofy."      Pravastatin Other (See Comments)     Severe myalgias.  Cramps/Severe myalgias.    Flu vac 2018 [id number](pf)      Other reaction(s): Other (See Comments)  Flip into aFIB    Nystatin Rash    Pepper (genus capsicum) Other (See Comments)     Reflux and ulcers     Medication List with Changes/Refills   Current Medications    ALBUTEROL-IPRATROPIUM (DUO-NEB) 2.5 MG-0.5 MG/3 ML NEBULIZER SOLUTION    Take 3 mLs by nebulization every 6 (six) hours while awake.    ALENDRONATE (FOSAMAX) 70 MG TABLET    Take 1 tablet (70 mg total) by mouth every 7 days.    AMITRIPTYLINE (ELAVIL) 50 MG TABLET    Take 1 tablet (50 mg total) by mouth nightly as needed for Insomnia.    BRILINTA 90 MG TABLET    TAKE ONE TABLET TWICE DAILY    BUPRENORPHINE (BUTRANS) 10 MCG/HOUR PTWK        BUPRENORPHINE (BUTRANS) 7.5 MCG/HOUR PTWK        BUTALBITAL-ACETAMINOPHEN-CAFF (FIORICET) -40 MG CAP    Fioricet 50 mg-300 mg-40 mg capsule   Take 1 capsule every 4 hours by oral route as needed for 30 days.    CALCITRIOL (ROCALTROL) 0.25 MCG CAP    Take 1 capsule (0.25 mcg total) by mouth once daily.    CALCIUM CARBONATE (OS-VANESSA) 500 MG CALCIUM (1,250 MG) TABLET    Take 1 tablet (500 mg total) by mouth 3 (three) times daily.    CETIRIZINE (ZYRTEC) 10 MG TABLET    Take 10 mg by mouth once daily.    CYCLOBENZAPRINE (FLEXERIL) 10 MG TABLET    take 1 tablet by oral route 2 times every day    DICLOFENAC SODIUM (VOLTAREN) 1 % GEL        DICYCLOMINE (BENTYL) 10 MG CAPSULE    dicyclomine 10 mg capsule    FUROSEMIDE (LASIX) 40 MG TABLET    Take 80 mg in the am and 80 mg BID x 7 days,then take 80 mg in " am and 40 mg in the PM thereafter.    GABAPENTIN (NEURONTIN) 600 MG TABLET    Take 1 tablet (600 mg total) by mouth 3 (three) times daily.    HYDROCODONE-ACETAMINOPHEN 10-325MG (NORCO)  MG TAB    Take 1 tablet by mouth 2 (two) times daily as needed.    MUPIROCIN (BACTROBAN) 2 % OINTMENT    mupirocin 2 % topical ointment    NITROGLYCERIN (NITROSTAT) 0.4 MG SL TABLET    Place 1 tablet (0.4 mg total) under the tongue every 5 (five) minutes as needed for Chest pain.    POTASSIUM CHLORIDE (MICRO-K) 10 MEQ CPSR    Take 2 capsules in am and 1 in the evening; if taking additional lasix, will need to take one additional potassium capsule in the evening    PRAMIPEXOLE (MIRAPEX) 0.5 MG TABLET    Take 1 tablet (0.5 mg total) by mouth once daily.    PROMETHAZINE (PHENERGAN) 25 MG TABLET    Take 25 mg by mouth every 6 (six) hours as needed.    RANOLAZINE (RANEXA) 1,000 MG TB12    Take 1 tablet (1,000 mg total) by mouth 2 (two) times daily.    ROPINIROLE (REQUIP) 5 MG TABLET    Take 1 tablet (5 mg total) by mouth every evening.    TOBRAMYCIN-DEXAMETHASONE 0.3-0.1% (TOBRADEX) 0.3-0.1 % DRPS    ONE DROP IN EACH EYE EVERY 2 HOURS    UMECLIDINIUM-VILANTEROL (ANORO ELLIPTA) 62.5-25 MCG/ACTUATION DSDV    Inhale 1 puff into the lungs once daily. Controller    VENTOLIN HFA 90 MCG/ACTUATION INHALER    Inhale 2 puff(s) every 4 hours by inhalation route.   Changed and/or Refilled Medications    Modified Medication Previous Medication    ALIROCUMAB (PRALUENT PEN) 75 MG/ML PNIJ alirocumab (PRALUENT PEN) 75 mg/mL PnIj       Inject 1 mL (75 mg total) into the skin every 14 (fourteen) days.    Inject 1 mL (75 mg total) into the skin every 14 (fourteen) days.    AMLODIPINE (NORVASC) 10 MG TABLET amLODIPine (NORVASC) 10 MG tablet       Take 1 tablet (10 mg total) by mouth once daily.    Take 10 mg by mouth once daily.    APIXABAN (ELIQUIS) 5 MG TAB apixaban (ELIQUIS) 5 mg Tab       Take 1 tablet (5 mg total) by mouth 2 (two) times daily.     Take 1 tablet (5 mg total) by mouth 2 (two) times daily.    BENAZEPRIL (LOTENSIN) 40 MG TABLET benazepril (LOTENSIN) 40 MG tablet       Take 1 tablet (40 mg total) by mouth once daily.    Take 1 tablet (40 mg total) by mouth once daily.    BISOPROLOL (ZEBETA) 10 MG TABLET bisoprolol (ZEBETA) 10 MG tablet       Take 1 tablet (10 mg total) by mouth once daily.    Take 10 mg by mouth once daily.    ESOMEPRAZOLE (NEXIUM) 40 MG CAPSULE esomeprazole (NEXIUM) 40 MG capsule       Take 1 capsule (40 mg total) by mouth before breakfast.    Take 1 capsule (40 mg total) by mouth before breakfast.    HYDRALAZINE (APRESOLINE) 25 MG TABLET hydrALAZINE (APRESOLINE) 25 MG tablet       Take 1 tablet (25 mg total) by mouth 2 (two) times daily.    Take 25 mg by mouth 2 (two) times daily.    HYDROCHLOROTHIAZIDE (HYDRODIURIL) 25 MG TABLET hydroCHLOROthiazide (HYDRODIURIL) 25 MG tablet       Take 1 tablet (25 mg total) by mouth once daily.    Take 25 mg by mouth.    ISOSORBIDE MONONITRATE (IMDUR) 30 MG 24 HR TABLET isosorbide mononitrate (IMDUR) 30 MG 24 hr tablet       Take 1 tablet (30 mg total) by mouth once daily.    Take 1 tablet (30 mg total) by mouth 2 (two) times daily.    LEVOTHYROXINE (SYNTHROID) 50 MCG TABLET levothyroxine (SYNTHROID) 50 MCG tablet       Take 1 tablet (50 mcg total) by mouth once daily.    Take 1 tablet (50 mcg total) by mouth once daily.    TEMAZEPAM (RESTORIL) 30 MG CAPSULE temazepam (RESTORIL) 30 mg capsule       Take 1 capsule (30 mg total) by mouth nightly as needed for Insomnia.    Take 1 capsule (30 mg total) by mouth nightly as needed for Insomnia.   Discontinued Medications    SULFAMETHOXAZOLE-TRIMETHOPRIM 800-160MG (BACTRIM DS) 800-160 MG TAB    Take 1 tablet by mouth 2 (two) times daily.      Social History     Tobacco Use    Smoking status: Current Some Day Smoker     Packs/day: 1.00     Years: 45.00     Pack years: 45.00     Types: Cigarettes     Last attempt to quit: 10/3/2012     Years since  "quittin.7    Smokeless tobacco: Never Used    Tobacco comment: 43 years   Substance Use Topics    Alcohol use: No      Family History   Problem Relation Age of Onset    Cancer Mother     Lung cancer Father     Breast cancer Sister        I have reviewed the complete PMH, social history, surgical history, allergies and medications.  As well as family history.    Review of Systems   Constitutional: Positive for fatigue and unexpected weight change. Negative for chills and fever.   HENT: Negative for ear pain and sore throat.    Eyes: Negative for redness and visual disturbance.   Respiratory: Negative for cough and shortness of breath.    Cardiovascular: Negative for chest pain and palpitations.   Gastrointestinal: Negative for nausea and vomiting.   Genitourinary: Negative for difficulty urinating and hematuria.   Musculoskeletal: Positive for arthralgias. Negative for myalgias.   Skin: Negative for rash and wound.   Neurological: Negative for speech difficulty and headaches.   Hematological: Bruises/bleeds easily.   Psychiatric/Behavioral: Positive for sleep disturbance. The patient is nervous/anxious.      Objective:   BP (!) 168/68   Pulse 82   Temp 98.9 °F (37.2 °C) (Oral)   Ht 5' 4" (1.626 m)   Wt 81.6 kg (180 lb)   LMP  (LMP Unknown)   BMI 30.90 kg/m²   Physical Exam  Vitals signs and nursing note reviewed.   Constitutional:       General: She is not in acute distress.     Appearance: She is well-developed.      Comments: Sitting in wheelchair no acute distress   HENT:      Head: Normocephalic and atraumatic.   Eyes:      Pupils: Pupils are equal, round, and reactive to light.   Neck:      Musculoskeletal: Normal range of motion and neck supple.   Cardiovascular:      Rate and Rhythm: Normal rate and regular rhythm.      Heart sounds: Normal heart sounds. No murmur.      Comments: Pacemaker present  Pulmonary:      Effort: Pulmonary effort is normal. No respiratory distress.      Breath sounds: " Normal breath sounds. No wheezing.   Abdominal:      General: Bowel sounds are normal. There is no distension.      Palpations: Abdomen is soft.   Musculoskeletal: Normal range of motion.         General: Tenderness present.   Skin:     General: Skin is warm and dry.      Capillary Refill: Capillary refill takes less than 2 seconds.      Findings: Bruising present.   Neurological:      Mental Status: She is alert and oriented to person, place, and time.      Cranial Nerves: No cranial nerve deficit.   Psychiatric:         Behavior: Behavior normal.         Assessment:     1. Gastroesophageal reflux disease with esophagitis    2. Coronary artery disease involving native coronary artery of native heart without angina pectoris    3. Encounter for long-term (current) use of medications    4. Pure hypercholesterolemia    5. Essential hypertension    6. Encounter for screening for malignant neoplasm of respiratory organs    7. Cigarette smoker    8. Encounter for long-term (current) use of other medications    9. Primary insomnia    10. History of insertion of stent into coronary artery bypass graft    11. History of smoking 30 or more pack years      MDM:   Moderate complexity.  High risk  I have Reviewed and summarized old records.  I have performed thorough medication reconciliation today and discussed risk and benefits of each medication.  I have reviewed labs and discussed with patient.  All questions were answered.  I am requesting old records and will review them once they are available.  Cardiology, Urology, pain management    I have signed for the following orders AND/OR meds.  Orders Placed This Encounter   Procedures    CT Chest Lung Screening Low Dose     Standing Status:   Future     Standing Expiration Date:   7/1/2021     Order Specific Question:   Is there documentation of shared decision making for this lung screening exam?     Answer:   Yes     Order Specific Question:   Are you a current or former  smoker who quit within the past 15 years?     Answer:   Yes     Order Specific Question:   Are you between age 55-77 years old?     Answer:   Yes     Order Specific Question:   Has the patient smoked 30 or more packs of cigarettes/year?     Answer:   Yes     Order Specific Question:   Does the patient show any signs or symptoms of lung cancer?     Answer:   Yes     Order Specific Question:   May the Radiologist modify the order per protocol to meet the clinical needs of the patient?     Answer:   Yes    CBC Without Differential     Standing Status:   Standing     Number of Occurrences:   99     Standing Expiration Date:   8/30/2021    TSH     Standing Status:   Standing     Number of Occurrences:   99     Standing Expiration Date:   8/30/2021    Comprehensive metabolic panel     Standing Status:   Standing     Number of Occurrences:   99     Standing Expiration Date:   6/26/2040    Hemoglobin A1C     Standing Status:   Standing     Number of Occurrences:   99     Standing Expiration Date:   6/26/2040    Lipid Panel     Standing Status:   Standing     Number of Occurrences:   99     Standing Expiration Date:   6/26/2040    URINALYSIS     Specimen Source->Urine     Order Specific Question:   Collection Type     Answer:   Urine, Clean Catch    Urinalysis Microscopic     Specimen Source->Urine     Order Specific Question:   Specimen Source     Answer:   Urine    MICROALBUMIN / CREATININE RATIO URINE     Specimen Source->Urine     Order Specific Question:   Specimen Source     Answer:   Urine    Ambulatory referral/consult to Smoking Cessation Program     Standing Status:   Future     Standing Expiration Date:   8/1/2021     Referral Priority:   Routine     Referral Type:   Consultation     Referral Reason:   Specialty Services Required     Requested Specialty:   CTTS     Number of Visits Requested:   1     Medications Ordered This Encounter   Medications    alirocumab (PRALUENT PEN) 75 mg/mL PnIj     Sig:  Inject 1 mL (75 mg total) into the skin every 14 (fourteen) days.     Dispense:  2 mL     Refill:  11     Order Specific Question:   For: 1. benefits investigation, prior auth. and appeals; 2. patient financial assistance; and 3. patient education and medication management send to [facility] to fill the prescription.     Answer:   Yes, send prescription to [facility]    amLODIPine (NORVASC) 10 MG tablet     Sig: Take 1 tablet (10 mg total) by mouth once daily.     Dispense:  90 tablet     Refill:  3     .    apixaban (ELIQUIS) 5 mg Tab     Sig: Take 1 tablet (5 mg total) by mouth 2 (two) times daily.     Dispense:  180 tablet     Refill:  3    benazepriL (LOTENSIN) 40 MG tablet     Sig: Take 1 tablet (40 mg total) by mouth once daily.     Dispense:  90 tablet     Refill:  3     .    bisoprolol (ZEBETA) 10 MG tablet     Sig: Take 1 tablet (10 mg total) by mouth once daily.     Dispense:  90 tablet     Refill:  3    esomeprazole (NEXIUM) 40 MG capsule     Sig: Take 1 capsule (40 mg total) by mouth before breakfast.     Dispense:  90 capsule     Refill:  3    hydrALAZINE (APRESOLINE) 25 MG tablet     Sig: Take 1 tablet (25 mg total) by mouth 2 (two) times daily.     Dispense:  180 tablet     Refill:  3     .    hydroCHLOROthiazide (HYDRODIURIL) 25 MG tablet     Sig: Take 1 tablet (25 mg total) by mouth once daily.     Dispense:  90 tablet     Refill:  3     .    isosorbide mononitrate (IMDUR) 30 MG 24 hr tablet     Sig: Take 1 tablet (30 mg total) by mouth once daily.     Dispense:  90 tablet     Refill:  3    levothyroxine (SYNTHROID) 50 MCG tablet     Sig: Take 1 tablet (50 mcg total) by mouth once daily.     Dispense:  90 tablet     Refill:  3    temazepam (RESTORIL) 30 mg capsule     Sig: Take 1 capsule (30 mg total) by mouth nightly as needed for Insomnia.     Dispense:  30 capsule     Refill:  5        Follow up in about 6 months (around 1/1/2021), or if symptoms worsen  or fail to improve, for Med refills, LAB RESULTS.    If no improvement in symptoms or symptoms worsen, advised to call/follow-up at clinic or go to ER. Patient voiced understanding and all questions/concerns were addressed.     DISCLAIMER: This note was compiled by using a speech recognition dictation system and therefore please be aware that typographical / speech recognition errors can and do occur.  Please contact me if you see any errors specifically.    Adan Woodward M.D.       Office: [phone] [address] [place] [address]  FAX: [phone]

## 2020-07-01 NOTE — TELEPHONE ENCOUNTER
All appointments scheduled for the patient, reminder letters mailed with upcoming appointments to address on file.

## 2020-07-01 NOTE — TELEPHONE ENCOUNTER
----- Message from Adan Woodward MD sent at 7/1/2020  2:06 PM CDT -----  This patient needs a six-month follow-up with me scheduled lab work prior to appointment

## 2020-07-02 LAB
ALBUMIN/CREAT UR: 69.4 UG/MG (ref 0–30)
CREAT UR-MCNC: 98 MG/DL (ref 15–325)
ESTIMATED AVG GLUCOSE: 97 MG/DL (ref 68–131)
HBA1C MFR BLD HPLC: 5 % (ref 4–5.6)
MICROALBUMIN UR DL<=1MG/L-MCNC: 68 UG/ML

## 2020-07-06 ENCOUNTER — TELEPHONE (OUTPATIENT)
Dept: FAMILY MEDICINE | Facility: CLINIC | Age: 70
End: 2020-07-06

## 2020-07-06 DIAGNOSIS — E03.9 HYPOTHYROIDISM, UNSPECIFIED TYPE: Primary | ICD-10-CM

## 2020-07-06 DIAGNOSIS — R80.9 POSITIVE FOR MICROALBUMINURIA: Primary | ICD-10-CM

## 2020-07-06 RX ORDER — LEVOTHYROXINE SODIUM 75 UG/1
75 TABLET ORAL
Qty: 90 TABLET | Refills: 3 | Status: SHIPPED | OUTPATIENT
Start: 2020-07-06 | End: 2021-08-19

## 2020-07-06 NOTE — PROGRESS NOTES
Please CALL patient with results and Document verification.   [phone]  Thyroid labs are abnormal.  Increase levothyroxine to 75 mcg.  Prescription sent to the pharmacy.  Recheck thyroid labs in two months.    Otherwise labs are stable.  No change in treatment otherwise.  Repeat all other labs in six months.

## 2020-07-06 NOTE — PROGRESS NOTES
Please CALL patient with results and Document verification.   [phone]  Urinary tract infection has cleared.  Patient with elevated urine microalbumin creatinine ratio.  Make sure Nephrology follow-up is set up.  If she does not have one set up referral.

## 2020-07-09 NOTE — TELEPHONE ENCOUNTER
Patient called for consult and ship upon re-start of Praluent.  OWEN COMER.     Fernando Gorman, R.Ph., AAHIVP  Clinical Pharmacist, HIV/HCV  [facility]  Phone: [phone]

## 2020-07-14 NOTE — TELEPHONE ENCOUNTER
Restart consult completed on 20 . Will ship out 7/15 to receive 7/15 with patient consent. Copay $0 in 004. Patient will restart Praluent on  (and dose every other Thursday). Confirmed 2 patient identifiers - name and . Therapy Appropriate.  --Injection experience: Yes; previously on Praluent. Patient is confident on injection technique and declined injection consult.     Patient was counseled on possible side effects:  - Injection site reaction: redness, soreness, itching, bruising, which should resolve within 3-5 days.  - flu-like symptoms  - high blood sugars in patients with preexisting diabetes  DDIs: none. Medication list reviewed. Pt advised to call [facility] prior to starting any new medications - OTC, RX or herbal.    Patient did not have any further questions. She was advised to keep a calendar to stay compliant. Patient currently keeps tract of doses by writing the next injection date on the Praluent box.   Consultation included: indication; goals of treatment; administration; storage and handling; side effects; how to handle side effects; the importance of compliance; how to handle missed doses; the importance of laboratory monitoring; the importance of keeping all

## 2020-07-16 ENCOUNTER — DOCUMENT SCAN (OUTPATIENT)
Dept: HOME HEALTH SERVICES | Facility: HOSPITAL | Age: 70
End: 2020-07-16
Payer: MEDICARE

## 2020-07-20 ENCOUNTER — DOCUMENT SCAN (OUTPATIENT)
Dept: HOME HEALTH SERVICES | Facility: HOSPITAL | Age: 70
End: 2020-07-20
Payer: MEDICARE

## 2020-07-20 ENCOUNTER — PATIENT OUTREACH (OUTPATIENT)
Dept: ADMINISTRATIVE | Facility: OTHER | Age: 70
End: 2020-07-20

## 2020-07-20 NOTE — PROGRESS NOTES
Chart reviewed.   Immunizations: Triggered Imm Registry     Orders placed: n/a  Upcoming appts to satisfy JOSE topics: n/a

## 2020-07-21 ENCOUNTER — OFFICE VISIT (OUTPATIENT)
Dept: NEPHROLOGY | Facility: CLINIC | Age: 70
End: 2020-07-21
Payer: MEDICARE

## 2020-07-21 VITALS
DIASTOLIC BLOOD PRESSURE: 70 MMHG | SYSTOLIC BLOOD PRESSURE: 122 MMHG | OXYGEN SATURATION: 95 % | HEART RATE: 61 BPM | WEIGHT: 179 LBS | BODY MASS INDEX: 30.56 KG/M2 | HEIGHT: 64 IN

## 2020-07-21 DIAGNOSIS — I49.5 SSS (SICK SINUS SYNDROME): ICD-10-CM

## 2020-07-21 DIAGNOSIS — G47.30 SLEEP APNEA, UNSPECIFIED TYPE: ICD-10-CM

## 2020-07-21 DIAGNOSIS — Z95.1 HX OF CABG: ICD-10-CM

## 2020-07-21 DIAGNOSIS — F17.210 CIGARETTE SMOKER: Chronic | ICD-10-CM

## 2020-07-21 DIAGNOSIS — E78.00 PURE HYPERCHOLESTEROLEMIA: Chronic | ICD-10-CM

## 2020-07-21 DIAGNOSIS — R80.1 PERSISTENT PROTEINURIA: Primary | ICD-10-CM

## 2020-07-21 DIAGNOSIS — I25.810 CORONARY ARTERY DISEASE INVOLVING CORONARY BYPASS GRAFT OF NATIVE HEART WITHOUT ANGINA PECTORIS: ICD-10-CM

## 2020-07-21 DIAGNOSIS — J44.9 COPD WITHOUT EXACERBATION: ICD-10-CM

## 2020-07-21 DIAGNOSIS — Z87.891 HISTORY OF SMOKING 30 OR MORE PACK YEARS: ICD-10-CM

## 2020-07-21 DIAGNOSIS — I48.0 PAROXYSMAL ATRIAL FIBRILLATION: ICD-10-CM

## 2020-07-21 DIAGNOSIS — Z95.0 PACEMAKER: ICD-10-CM

## 2020-07-21 DIAGNOSIS — E66.01 MORBID OBESITY: ICD-10-CM

## 2020-07-21 DIAGNOSIS — I25.10 CORONARY ARTERY DISEASE INVOLVING NATIVE CORONARY ARTERY OF NATIVE HEART WITHOUT ANGINA PECTORIS: Chronic | ICD-10-CM

## 2020-07-21 DIAGNOSIS — F41.9 ANXIETY: Chronic | ICD-10-CM

## 2020-07-21 DIAGNOSIS — R80.9 POSITIVE FOR MICROALBUMINURIA: ICD-10-CM

## 2020-07-21 DIAGNOSIS — E55.9 VITAMIN D DEFICIENCY: ICD-10-CM

## 2020-07-21 DIAGNOSIS — I10 ESSENTIAL HYPERTENSION: Chronic | ICD-10-CM

## 2020-07-21 PROCEDURE — 99215 OFFICE O/P EST HI 40 MIN: CPT | Mod: PBBFAC,PO | Performed by: INTERNAL MEDICINE

## 2020-07-21 PROCEDURE — 99203 OFFICE O/P NEW LOW 30 MIN: CPT | Mod: S$PBB,,, | Performed by: INTERNAL MEDICINE

## 2020-07-21 PROCEDURE — 99203 PR OFFICE/OUTPT VISIT, NEW, LEVL III, 30-44 MIN: ICD-10-PCS | Mod: S$PBB,,, | Performed by: INTERNAL MEDICINE

## 2020-07-21 PROCEDURE — 99999 PR PBB SHADOW E&M-EST. PATIENT-LVL V: CPT | Mod: PBBFAC,,, | Performed by: INTERNAL MEDICINE

## 2020-07-21 PROCEDURE — 99999 PR PBB SHADOW E&M-EST. PATIENT-LVL V: ICD-10-PCS | Mod: PBBFAC,,, | Performed by: INTERNAL MEDICINE

## 2020-07-21 NOTE — PROGRESS NOTES
Subjective:       Patient ID: Kalpana Wright is a 70 y.o. White female who presents for new evaluation of Consult    HPI     She is referred by her PCP for proteinuria in the setting of CAD, HTN with labile BP and morbid obesity.     She denies foamy urine, no hematuria and no flank pain. She follows a low sodium diet and feels she stays hydrated. No LE edema and no SOB. No NSAID use and no herbal medications. She lost a total of 100lbs after C. Diff following a lap cholecystectomy. In 2018. Currently she has chronic GIB despite multiple endoscopy and video capsule.  Of note she is extremely nervous as overtime she sees a doctor she receives bad news.     Review of Systems   Constitutional: Positive for fatigue. Negative for activity change, appetite change and unexpected weight change.   HENT: Negative for facial swelling.    Eyes: Negative for visual disturbance.   Respiratory: Negative for shortness of breath.    Cardiovascular: Negative for chest pain and leg swelling.   Gastrointestinal: Positive for blood in stool and constipation. Negative for abdominal pain and nausea.   Endocrine: Positive for cold intolerance.   Genitourinary: Positive for frequency. Negative for difficulty urinating, dysuria and flank pain.   Musculoskeletal: Positive for arthralgias and back pain.   Skin: Negative for rash.   Allergic/Immunologic: Positive for immunocompromised state.   Neurological: Positive for headaches. Negative for weakness.   Hematological: Bruises/bleeds easily.   Psychiatric/Behavioral: Positive for sleep disturbance. Negative for confusion and decreased concentration.       Objective:      Physical Exam  Vitals signs reviewed.   Constitutional:       General: She is not in acute distress.     Appearance: She is obese.   HENT:      Head: Atraumatic.   Eyes:      General: No scleral icterus.  Neck:      Vascular: No JVD.   Cardiovascular:      Heart sounds: S1 normal and S2 normal. No friction rub.   Pulmonary:       Breath sounds: Normal breath sounds. No wheezing or rales.   Abdominal:      Palpations: Abdomen is soft.   Musculoskeletal:      Right lower leg: No edema.      Left lower leg: No edema.   Skin:     General: Skin is warm and dry.   Neurological:      General: No focal deficit present.      Mental Status: She is alert and oriented to person, place, and time.   Psychiatric:         Mood and Affect: Mood normal.         Behavior: Behavior normal.         Thought Content: Thought content normal.         Assessment:       1. Persistent proteinuria    2. Positive for microalbuminuria    3. Cigarette smoker    4. Morbid obesity    5. Paroxysmal atrial fibrillation    6. Hx of CABG    7. History of SSS (sick sinus syndrome)    8. Pacemaker    9. Essential hypertension    10. Pure hypercholesterolemia    11. Coronary artery disease involving native coronary artery of native heart without angina pectoris    12. COPD without exacerbation    13. Anxiety    14. Coronary artery disease involving coronary bypass graft of native heart without angina pectoris    15. Vitamin D deficiency    16. History of smoking 30 or more pack years    17. Sleep apnea, unspecified type        Plan:           Microalbuminuria with GFR currently >60mL/min   - discussed that this represent glomerular damage and she has several risk factors   - discussed further weight loss is of utmost benefit  - good BP control is important, per Cardiology  - Patient was advised to avoid NSAIDs, continue a low sodium diet, and ensure hydration  - Continue RAAS blockade for renal preservation        RTC 3 months labs prior

## 2020-07-21 NOTE — LETTER
July 26, 2020      Adan Woodward MD  [address]  [place] [address]           [facility]  [address] [facility]  [place] [address]  Phone: [phone]          Patient: Kalpana Wright   MR Number: [id number]   YOB: 1950   Date of Visit: 7/21/2020       Dear Dr. Adan Woodward:    Thank you for referring Kalpana Wright to me for evaluation. Attached you will find relevant portions of my assessment and plan of care.    If you have questions, please do not hesitate to call me. I look forward to following Kalpana Wright along with you.    Sincerely,    Carmelo Avina MD    Enclosure  CC:  No Recipients    If you would like to receive this communication electronically, please contact [email or web address] or [phone] to request more information on [employer] Link access.    For providers and/or their staff who would like to refer a patient to [facility], please contact us through our one-stop-shop provider referral line, [facility], at [phone].    If you feel you have received this communication in error or would no longer like to receive these types of communications, please e-mail [email or web address]

## 2020-07-26 PROBLEM — R80.9 PROTEINURIA: Status: ACTIVE | Noted: 2020-07-26

## 2020-07-27 ENCOUNTER — TELEPHONE (OUTPATIENT)
Dept: NEPHROLOGY | Facility: CLINIC | Age: 70
End: 2020-07-27

## 2020-07-27 NOTE — TELEPHONE ENCOUNTER
----- Message from Carmelo Avina MD sent at 7/26/2020 10:50 PM CDT -----  RTC 3mo with labs and renal u/s prior

## 2020-08-03 ENCOUNTER — HOSPITAL ENCOUNTER (OUTPATIENT)
Dept: RADIOLOGY | Facility: HOSPITAL | Age: 70
Discharge: HOME | End: 2020-08-03
Attending: FAMILY MEDICINE
Payer: MEDICARE

## 2020-08-03 DIAGNOSIS — Z12.2 ENCOUNTER FOR SCREENING FOR MALIGNANT NEOPLASM OF RESPIRATORY ORGANS: ICD-10-CM

## 2020-08-03 DIAGNOSIS — F17.210 CIGARETTE SMOKER: ICD-10-CM

## 2020-08-03 DIAGNOSIS — F17.210 CIGARETTE SMOKER: Primary | ICD-10-CM

## 2020-08-03 PROCEDURE — G0297 CT CHEST LUNG SCREENING LOW DOSE: ICD-10-PCS | Mod: 26,,, | Performed by: RADIOLOGY

## 2020-08-03 PROCEDURE — G0297 LDCT FOR LUNG CA SCREEN: HCPCS | Mod: TC,PO

## 2020-08-03 PROCEDURE — G0297 LDCT FOR LUNG CA SCREEN: HCPCS | Mod: 26,,, | Performed by: RADIOLOGY

## 2020-08-03 NOTE — PROGRESS NOTES
Please CALL patient with results and Document verification.   [phone]    Set up low-dose CT scan in six months.

## 2020-08-04 ENCOUNTER — TELEPHONE (OUTPATIENT)
Dept: CARDIOLOGY | Facility: HOSPITAL | Age: 70
End: 2020-08-04

## 2020-08-07 ENCOUNTER — TELEPHONE (OUTPATIENT)
Dept: PHARMACY | Facility: CLINIC | Age: 70
End: 2020-08-07

## 2020-08-10 ENCOUNTER — DOCUMENT SCAN (OUTPATIENT)
Dept: HOME HEALTH SERVICES | Facility: HOSPITAL | Age: 70
End: 2020-08-10
Payer: MEDICARE

## 2020-08-17 ENCOUNTER — HOSPITAL ENCOUNTER (OUTPATIENT)
Dept: CARDIOLOGY | Facility: HOSPITAL | Age: 70
Discharge: HOME | End: 2020-08-17
Attending: INTERNAL MEDICINE
Payer: MEDICARE

## 2020-08-17 VITALS — HEART RATE: 60 BPM

## 2020-08-17 DIAGNOSIS — I49.5 SSS (SICK SINUS SYNDROME): ICD-10-CM

## 2020-08-17 DIAGNOSIS — Z95.0 PACEMAKER: ICD-10-CM

## 2020-08-17 LAB
AV DELAY - LONGEST: 250 MSEC
IMPEDANCE RA LEAD (NATIVE): 598 OHMS
IMPEDANCE RA LEAD: 525 OHMS
OHS CV DC PP MS1: 0.5 MS
OHS CV DC PP MS2: 0.4 MS
OHS CV DC PP V1: 0.9 V
OHS CV DC PP V2: 0.9 V
THRESHOLD MS RA LEAD (NATIVE): 0.4 MS
THRESHOLD MS RA LEAD: 0.5 MS
THRESHOLD V RA LEAD (NATIVE): 0.9 V
THRESHOLD V RA LEAD: 0.9 V

## 2020-08-17 PROCEDURE — 93288 CARDIAC DEVICE CHECK - IN CLINIC & HOSPITAL: ICD-10-PCS | Mod: 26,,, | Performed by: INTERNAL MEDICINE

## 2020-08-17 PROCEDURE — 93288 INTERROG EVL PM/LDLS PM IP: CPT | Mod: 26,,, | Performed by: INTERNAL MEDICINE

## 2020-08-17 PROCEDURE — 93288 INTERROG EVL PM/LDLS PM IP: CPT | Mod: PO

## 2020-08-20 ENCOUNTER — DOCUMENT SCAN (OUTPATIENT)
Dept: HOME HEALTH SERVICES | Facility: HOSPITAL | Age: 70
End: 2020-08-20
Payer: MEDICARE

## 2020-08-20 PROCEDURE — G0179 MD RECERTIFICATION HHA PT: HCPCS | Mod: ,,, | Performed by: FAMILY MEDICINE

## 2020-08-20 PROCEDURE — G0179 PR HOME HEALTH MD RECERTIFICATION: ICD-10-PCS | Mod: ,,, | Performed by: FAMILY MEDICINE

## 2020-08-21 RX ORDER — ROPINIROLE 5 MG/1
5 TABLET, FILM COATED ORAL NIGHTLY
Qty: 90 TABLET | Refills: 3 | Status: SHIPPED | OUTPATIENT
Start: 2020-08-21 | End: 2021-09-15

## 2020-08-25 ENCOUNTER — EXTERNAL HOME HEALTH (OUTPATIENT)
Dept: HOME HEALTH SERVICES | Facility: HOSPITAL | Age: 70
End: 2020-08-25
Payer: MEDICARE

## 2020-09-02 DIAGNOSIS — Z79.899 ENCOUNTER FOR LONG-TERM (CURRENT) USE OF MEDICATIONS: ICD-10-CM

## 2020-09-02 RX ORDER — GABAPENTIN 600 MG/1
600 TABLET ORAL 3 TIMES DAILY
Qty: 90 TABLET | Refills: 5 | Status: SHIPPED | OUTPATIENT
Start: 2020-09-02 | End: 2021-05-09 | Stop reason: SDUPTHER

## 2020-09-10 ENCOUNTER — TELEPHONE (OUTPATIENT)
Dept: PHARMACY | Facility: CLINIC | Age: 70
End: 2020-09-10

## 2020-09-10 RX ORDER — DOXYCYCLINE HYCLATE 100 MG
100 TABLET ORAL 2 TIMES DAILY
COMMUNITY
Start: 2020-09-03 | End: 2021-02-11

## 2020-09-10 RX ORDER — POTASSIUM CHLORIDE 20 MEQ/1
TABLET, EXTENDED RELEASE ORAL
COMMUNITY
Start: 2020-09-03 | End: 2020-09-21

## 2020-09-10 NOTE — TELEPHONE ENCOUNTER
Refill call escalation: Patient had stopped taking Praluent last month. Past documentation mentions patient had a death in the family. Patient reports she was hospitalized at the end of August for a heart attack and had multiple stents place. She is ready to re-start Praluent. She will keke her calendar as to not to forget to take the medicine. She reports she was started on doxycycline and some of her other medications' doses may have been changed after her hospitalization, but she is not able to recall which ones or go through the list at the moment. She has an appt later today to discuss her medicines. Educated patient she should request a list, so she can accurately setup medications in her pill box at home. Requested patient call back at her convenience to provide us with updated med list. [facility] will f/u on 9/16 with patient to assess adherence to ensure patient restarted medication on 9/15, and to obtain updated med list. If patient restarts, may continue to f/u at routine monthly refill calls

## 2020-09-10 NOTE — TELEPHONE ENCOUNTER
Rx call for Praluent refill pt reached shipping  with 9/15 arrival with pt consent, copay $0 @004. Added sharps flag to order upon pt request, pt was hospitalized due to having a heart attack and states she would like to start back up on 9/15 if possible. Address and  confirmed. Patient has 0 doses on hand at this time. Patient has started new medications, has had missed doses and no side effects present, was transferred to [facility] for consultation. Patient is currently taking the medication as directed by doctors instruction. Patient does have a safe place in their residence to keep medication at desired temperature away from small children and pets. Patient also does have the capability of contacting 911 in the event of an emergency. Patient states they do not have any questions or concerns at this time.

## 2020-09-11 ENCOUNTER — EXTERNAL HOME HEALTH (OUTPATIENT)
Dept: HOME HEALTH SERVICES | Facility: HOSPITAL | Age: 70
End: 2020-09-11
Payer: MEDICARE

## 2020-09-21 ENCOUNTER — OFFICE VISIT (OUTPATIENT)
Dept: FAMILY MEDICINE | Facility: CLINIC | Age: 70
End: 2020-09-21
Payer: MEDICARE

## 2020-09-21 DIAGNOSIS — I10 ESSENTIAL HYPERTENSION: ICD-10-CM

## 2020-09-21 DIAGNOSIS — I73.9 PVD (PERIPHERAL VASCULAR DISEASE): ICD-10-CM

## 2020-09-21 DIAGNOSIS — E87.6 HYPOKALEMIA: ICD-10-CM

## 2020-09-21 DIAGNOSIS — Z12.31 ENCOUNTER FOR SCREENING MAMMOGRAM FOR BREAST CANCER: ICD-10-CM

## 2020-09-21 DIAGNOSIS — Z09 HOSPITAL DISCHARGE FOLLOW-UP: Primary | ICD-10-CM

## 2020-09-21 PROCEDURE — 99443 PR PHYSICIAN TELEPHONE EVALUATION 21-30 MIN: CPT | Mod: 95,,, | Performed by: FAMILY MEDICINE

## 2020-09-21 PROCEDURE — 99443 PR PHYSICIAN TELEPHONE EVALUATION 21-30 MIN: ICD-10-PCS | Mod: 95,,, | Performed by: FAMILY MEDICINE

## 2020-09-21 RX ORDER — NAPROXEN SODIUM 220 MG/1
81 TABLET, FILM COATED ORAL EVERY OTHER DAY
COMMUNITY
Start: 2020-09-04 | End: 2021-12-10

## 2020-09-21 RX ORDER — CALCITRIOL 0.25 UG/1
CAPSULE ORAL
COMMUNITY
End: 2020-09-28

## 2020-09-21 RX ORDER — BUPRENORPHINE HYDROCHLORIDE 150 UG/1
FILM, SOLUBLE BUCCAL
COMMUNITY
End: 2021-08-19

## 2020-09-21 RX ORDER — ERGOCALCIFEROL 1.25 MG/1
CAPSULE ORAL
COMMUNITY
End: 2021-11-26 | Stop reason: SDUPTHER

## 2020-09-21 RX ORDER — POTASSIUM CHLORIDE 750 MG/1
CAPSULE, EXTENDED RELEASE ORAL
Qty: 90 CAPSULE | Refills: 11 | Status: SHIPPED | OUTPATIENT
Start: 2020-09-21 | End: 2022-04-12 | Stop reason: SDUPTHER

## 2020-09-21 NOTE — ASSESSMENT & PLAN NOTE
Reviewed hospital summary with the patient.  Patient reports that she is doing well now that she is home and is back getting home health.  Patient does report that she could use some smaller pills for potassium.  Patient also needs an appointment with her cardiologist.  I have sent a message to them about scheduling the patient for a follow-up.    Reviewed labs, imaging and procedure note.    Detailed medication reconciliation was performed.

## 2020-09-21 NOTE — ASSESSMENT & PLAN NOTE
Renal function stable on current blood pressure medication.  Patient is only complex medication regimen to control her blood pressure.  Checking labs.  Home health readings have been within normal limits and at goal at home.Discussed hypertension disease course, DASH-diet and exercise.  Discussed medication regimen importance of treating high blood pressure.  Patient advised of risk of untreated blood pressure.    ER precautions were given for symptoms of hypertensive urgency and emergency.

## 2020-09-21 NOTE — PATIENT INSTRUCTIONS
Follow up in about 4 weeks (around 10/19/2020), or if symptoms worsen or fail to improve, for Med refills, LAB RESULTS.     If no improvement in symptoms or symptoms worsen, please be advised to call MD, follow-up at clinic and/or go to ER if becomes severe.    dAan Woodward M.D.        We Offer TELEHEALTH & Same Day Appointments!   Book your Telehealth appointment with me through my nurse or   Clinic appointments on [facility]!    [address] [place] [address]    Office: [phone]   FAX: [phone]    Check out my Facebook Page and Follow Me at: [email or web address]    Check out my website at [facility] by clicking on: [email or web address]    To Schedule appointments online, go to [facility]: [email or web address]

## 2020-09-21 NOTE — Clinical Note
Please set up hospital follow-up appointment with Dr. Kapoor .  She is status post three stents at [facility].

## 2020-09-21 NOTE — PROGRESS NOTES
Established Patient - Audio Only Telehealth Visit     The patient location is:  Patient's home in [place]  The chief complaint leading to consultation is:  Hospital follow-up  Visit type: Virtual visit with audio only (telephone)  Total time spent with patient:  21 min       The reason for the audio only service rather than synchronous audio and video virtual visit was related to technical difficulties or patient preference/necessity.     Each patient to whom I provide medical services by telemedicine is:  (1) informed of the relationship between the physician and patient and the respective role of any other health care provider with respect to management of the patient; and (2) notified that they may decline to receive medical services by telemedicine and may withdraw from such care at any time. Patient verbally consented to receive this service via voice-only telephone call.       ==========================================================================  Subjective/Assessment/ Plan:      Patient ID: Kalpana Wright is a 70 y.o. female.  has a past medical history of ACS (acute coronary syndrome) (3/20/2018), Acute on chronic diastolic congestive heart failure (4/27/2015), Acute on chronic respiratory failure with hypercapnia (1/10/2019), Acute renal failure (1/11/2019), Acute systolic heart failure (3/21/2018), JG (acute kidney injury) (1/29/2019), Anticoagulant long-term use, Arthritis, Asthma, Bradycardia (12/16/2013), CHF (congestive heart failure), Clostridium difficile colitis (9/25/2018), COPD (chronic obstructive pulmonary disease), Coronary artery disease, Depression, Encounter for blood transfusion, Fall (7/8/2019), Gallstones (3/18/2017), History of Pulmonary embolism (7/17/2014), Hospital discharge follow-up (9/21/2020), Hyperlipidemia, Hypertension, Need for vaccination (8/8/2019), Non-intractable cyclical vomiting with nausea (2/1/2019), Peripheral vascular disease, Pulmonary embolus (9/9/2013), and  Thyroid disease.   Chief Complaint: Hospital Follow Up    Problem List Items Addressed This Visit     Essential hypertension (Chronic)    Overview     Initial HPI:  Chronic.  Uncontrolled today.  Patient on several blood pressure medication.  Reports compliance.  Reports better blood pressure control at home with home monitor.  Patient on amlodipine benazepril bisoprolol furosemide hydralazine hydrochlorothiazide Imdur.  No side effects reported.  Denies any chest pain shortness of breath prior vision headaches dizziness lightheadedness.    Lab Results   Component Value Date    CREATININE 0.8 08/08/2019     =======================================================  November 2019:  Patient continues to have uncontrolled blood pressure.  Diastolic is in the 70s.  Patient reports blood pressure is much higher at home with stress level.  Patient is following up with Cardiology.  \  =======================================================  JULY 2020:    CHRONIC. STABLE. BP Reviewed.  Compliant with BP medications. No SE reported.   (-) CP, SOB, palpitations, dizziness, lightheadedness, HA, arm numbness, tingling or weakness, syncope.  Creatinine   Date Value Ref Range Status   01/29/2020 0.9 0.5 - 1.4 mg/dL Final     ======================================================         Current Assessment & Plan     Renal function stable on current blood pressure medication.  Patient is only complex medication regimen to control her blood pressure.  Checking labs.  Home health readings have been within normal limits and at goal at home.Discussed hypertension disease course, DASH-diet and exercise.  Discussed medication regimen importance of treating high blood pressure.  Patient advised of risk of untreated blood pressure.    ER precautions were given for symptoms of hypertensive urgency and emergency.           PVD (peripheral vascular disease)    Overview     Chronic.  Uncontrolled.  Patient needs compression stockings.  Pain  with walking.         Hypokalemia    Overview     Chronic.  On potassium supplementation.  Also on diuretic.  Checking potassium level.    Lab Results   Component Value Date    K 3.9 07/01/2020 September 2020:  Patient requesting refill on potassium pills.  She recently had a hospitalization and was prescribed the 20 mEq tablets which are too big for her to swallow.         Current Assessment & Plan     Continue current supplementation regimen         Hospital discharge follow-up - Primary    Overview     Assessment & Plan  Problem List:  Active Hospital Problems   Diagnosis Date Noted    Acute respiratory failure with hypoxia (HCC) 08/28/2020     Resolved Hospital Problems     Assessment & Plan    1. Acute mental status change. Likely multifactorial related to cardiopulmonary disease. Seems to be approaching baseline with supportive measures.     2. Respiratory distress, COPD exacerbation, possible pneumonia. Empiric antibiotics, nebulized treatments, supplemental oxygen    3. Acute MI. History of CAD CHF arrhythmia with previous pacemaker CABG and stenting. Status post angiogram, repeat angioplasty with complex disease noted.   Status post 2nd angiogram with rotablation angioplasty stenting. Cardiology following  groin hematoma. Continue observation. Further workup as needed    4. Chronic pain, osteoarthritis. No evidence of overuse/abuse of medication. Limit narcotics and other sedating medications while hospitalized.    5. History of pulmonary embolus. Resume oral anticoagulation when cleared by cardiology    mobilize when cleared by cardiology            Current Assessment & Plan     Reviewed hospital summary with the patient.  Patient reports that she is doing well now that she is home and is back getting home health.  Patient does report that she could use some smaller pills for potassium.  Patient also needs an appointment with her cardiologist.  I have sent a message to them about scheduling the  patient for a follow-up.    Reviewed labs, imaging and procedure note.    Detailed medication reconciliation was performed.              I have reviewed the complete PMH, Family History, social history, surgical history, allergies and medications per Epic record at the time of this visit.  Review of Systems see HPI otherwise negative  Objective   LMP  (LMP Unknown)   Physical Exam limited due to telephone only interview.  Assessment:     1. Hospital discharge follow-up    2. Hypokalemia    3. PVD (peripheral vascular disease)    4. Essential hypertension      I have signed for the following orders AND/OR meds.  No orders of the defined types were placed in this encounter.    Medications Ordered This Encounter   Medications    potassium chloride (MICRO-K) 10 MEQ CpSR     Sig: Take 2 capsules in am and 1 in the evening; if taking additional lasix, will need to take one additional potassium capsule in the evening     Dispense:  90 capsule     Refill:  11     She needs capsules due to difficulty swallowing large tablets.      Medication List with Changes/Refills   Current Medications    ALBUTEROL-IPRATROPIUM (DUO-NEB) 2.5 MG-0.5 MG/3 ML NEBULIZER SOLUTION    Take 3 mLs by nebulization every 6 (six) hours while awake.    ALENDRONATE (FOSAMAX) 70 MG TABLET    Take 1 tablet (70 mg total) by mouth every 7 days.    ALIROCUMAB (PRALUENT PEN) 75 MG/ML PNIJ    Inject 1 mL (75 mg total) into the skin every 14 (fourteen) days.    AMITRIPTYLINE (ELAVIL) 50 MG TABLET    Take 1 tablet (50 mg total) by mouth nightly as needed for Insomnia.    AMLODIPINE (NORVASC) 10 MG TABLET    Take 1 tablet (10 mg total) by mouth once daily.    APIXABAN (ELIQUIS) 5 MG TAB    Take 1 tablet (5 mg total) by mouth 2 (two) times daily.    ASPIRIN 81 MG CHEW    Take 81 mg by mouth.    BENAZEPRIL (LOTENSIN) 40 MG TABLET    Take 1 tablet (40 mg total) by mouth once daily.    BISOPROLOL (ZEBETA) 10 MG TABLET    Take 1 tablet (10 mg total) by mouth once  daily.    BRILINTA 90 MG TABLET    TAKE ONE TABLET BY MOUTH TWICE DAILY    BUPRENORPHINE (BUTRANS) 10 MCG/HOUR PTWK        BUPRENORPHINE (BUTRANS) 7.5 MCG/HOUR PTWK        BUPRENORPHINE HCL (BELBUCA) 150 MCG FILM    Belbuca 150 mcg buccal film    BUTALBITAL-ACETAMINOPHEN-CAFF (FIORICET) -40 MG CAP    Fioricet 50 mg-300 mg-40 mg capsule   Take 1 capsule every 4 hours by oral route as needed for 30 days.    CALCITRIOL (ROCALTROL) 0.25 MCG CAP    calcitriol 0.25 mcg capsule    CALCIUM CARBONATE (OS-VANESSA) 500 MG CALCIUM (1,250 MG) TABLET    Take 1 tablet (500 mg total) by mouth 3 (three) times daily.    CETIRIZINE (ZYRTEC) 10 MG TABLET    Take 10 mg by mouth once daily.    CYCLOBENZAPRINE (FLEXERIL) 10 MG TABLET    take 1 tablet by oral route 2 times every day    DICLOFENAC SODIUM (VOLTAREN) 1 % GEL        DICYCLOMINE (BENTYL) 10 MG CAPSULE    dicyclomine 10 mg capsule    DOXYCYCLINE (VIBRA-TABS) 100 MG TABLET    Take 100 mg by mouth 2 (two) times daily.    ERGOCALCIFEROL (ERGOCALCIFEROL) 50,000 UNIT CAP    Vitamin D2 1,250 mcg (50,000 unit) capsule   Take 1 capsule every week by oral route.    ESOMEPRAZOLE (NEXIUM) 40 MG CAPSULE    Take 1 capsule (40 mg total) by mouth before breakfast.    FUROSEMIDE (LASIX) 40 MG TABLET    Take 80 mg in the am and 80 mg BID x 7 days,then take 80 mg in am and 40 mg in the PM thereafter.    GABAPENTIN (NEURONTIN) 600 MG TABLET    Take 1 tablet (600 mg total) by mouth 3 (three) times daily.    HYDRALAZINE (APRESOLINE) 25 MG TABLET    Take 1 tablet (25 mg total) by mouth 2 (two) times daily.    HYDROCHLOROTHIAZIDE (HYDRODIURIL) 25 MG TABLET    Take 1 tablet (25 mg total) by mouth once daily.    HYDROCODONE-ACETAMINOPHEN 10-325MG (NORCO)  MG TAB    Take 1 tablet by mouth 2 (two) times daily as needed.    ISOSORBIDE MONONITRATE (IMDUR) 30 MG 24 HR TABLET    Take 1 tablet (30 mg total) by mouth once daily.    LEVOTHYROXINE (SYNTHROID) 75 MCG TABLET    Take 1 tablet (75 mcg total)  by mouth before breakfast.    MUPIROCIN (BACTROBAN) 2 % OINTMENT    mupirocin 2 % topical ointment    NITROGLYCERIN (NITROSTAT) 0.4 MG SL TABLET    Place 1 tablet (0.4 mg total) under the tongue every 5 (five) minutes as needed for Chest pain.    PRAMIPEXOLE (MIRAPEX) 0.5 MG TABLET    Take 1 tablet (0.5 mg total) by mouth once daily.    PROMETHAZINE (PHENERGAN) 25 MG TABLET    Take 25 mg by mouth every 6 (six) hours as needed.    RANOLAZINE (RANEXA) 1,000 MG TB12    Take 1 tablet (1,000 mg total) by mouth 2 (two) times daily.    ROPINIROLE (REQUIP) 5 MG TABLET    Take 1 tablet (5 mg total) by mouth every evening.    TEMAZEPAM (RESTORIL) 30 MG CAPSULE    Take 1 capsule (30 mg total) by mouth nightly as needed for Insomnia.    TOBRAMYCIN-DEXAMETHASONE 0.3-0.1% (TOBRADEX) 0.3-0.1 % DRPS    ONE DROP IN EACH EYE EVERY 2 HOURS    UMECLIDINIUM-VILANTEROL (ANORO ELLIPTA) 62.5-25 MCG/ACTUATION DSDV    Inhale 1 puff into the lungs once daily. Controller    VENTOLIN HFA 90 MCG/ACTUATION INHALER    Inhale 2 puff(s) every 4 hours by inhalation route.   Changed and/or Refilled Medications    Modified Medication Previous Medication    POTASSIUM CHLORIDE (MICRO-K) 10 MEQ CPSR potassium chloride (MICRO-K) 10 MEQ CpSR       Take 2 capsules in am and 1 in the evening; if taking additional lasix, will need to take one additional potassium capsule in the evening    Take 2 capsules in am and 1 in the evening; if taking additional lasix, will need to take one additional potassium capsule in the evening   Discontinued Medications    POTASSIUM CHLORIDE SA (K-DUR,KLOR-CON) 20 MEQ TABLET    Take 1 tablet (20 mEq total) by mouth 3 (three) times daily with meals     Follow up in about 4 weeks (around 10/19/2020), or if symptoms worsen or fail to improve, for Med refills, LAB RESULTS.  Future Appointments     Date Provider Specialty Appt Notes    9/21/2020 Adan Woodward MD [facility] follow up    10/21/2020  Radiology  ultrasound renal    10/21/2020  Lab lab    10/21/2020  Lab     10/26/2020 Adan Woodward MD Family Medicine Med Refills     10/28/2020 Carmelo Avina MD Nephrology 3 month with labs kfb    1/6/2021  Lab     1/8/2021 Adan Woodward MD Family Medicine 6 mo f/u    2/3/2021  Radiology           If no improvement in symptoms or symptoms worsen, advised to call/follow-up at clinic or go to ER. Patient voiced understanding and all questions/concerns were addressed.   DISCLAIMER: This note was compiled by using a speech recognition dictation system and therefore please be aware that typographical / speech recognition errors can and do occur.  Please contact me if you see any errors specifically.  Adan Woodward M.D.           This service was not originating from a related E/M service provided within the previous 7 days nor will  to an E/M service or procedure within the next 24 hours or my soonest available appointment.  Prevailing standard of care was able to be met in this audio-only visit.

## 2020-09-22 RX ORDER — PRAMIPEXOLE DIHYDROCHLORIDE 0.5 MG/1
0.5 TABLET ORAL DAILY
Qty: 90 TABLET | Refills: 3 | Status: SHIPPED | OUTPATIENT
Start: 2020-09-22 | End: 2022-01-05 | Stop reason: SDUPTHER

## 2020-09-24 ENCOUNTER — TELEPHONE (OUTPATIENT)
Dept: CARDIOLOGY | Facility: CLINIC | Age: 70
End: 2020-09-24

## 2020-09-24 ENCOUNTER — TELEPHONE (OUTPATIENT)
Dept: FAMILY MEDICINE | Facility: CLINIC | Age: 70
End: 2020-09-24

## 2020-09-24 NOTE — TELEPHONE ENCOUNTER
----- Message from Bess Rand sent at 9/24/2020 11:31 AM CDT -----  Pt called insisting on speaking with the office to get paperwork printed for her pain management visit on 10/7/2020 pt has an appointment on 10/2/2020 with the office and would like them ready on that day pt can be reached at [phone]

## 2020-09-24 NOTE — TELEPHONE ENCOUNTER
Rtc and spoke , she wants paperwork on what has been done to her heart before pain pump placement. She had recent cath at [facility] and told her she would need to get that from them. I can printout last device check. She then asked me to have Celia Whaley NP write that she doesn want pt to have this procedure.  Told her that can not be done. She has the right to refuse any medical treatment she does not want and she would need to tell Pain Management that. If not procedure then no need for papers. She said she has had enough done to her body and does not want the pump and will notifiey pain management. Cm  ----- Message from Bess Rand sent at 9/24/2020 11:31 AM CDT -----  Pt called insisting on speaking with the office to get paperwork printed for her pain management visit on 10/7/2020 pt has an appointment on 10/2/2020 with the office and would like them ready on that day pt can be reached at [phone]

## 2020-09-24 NOTE — TELEPHONE ENCOUNTER
----- Message from Adan Woodward MD sent at 9/21/2020  3:45 PM CDT -----  Please make sure that patient is notified of follow-up appointments.  ----- Message -----  From: Estephanie Richardson LPN  Sent: 9/21/2020   3:41 PM CDT  To: Adan Woodward MD    Appt made. Thank you for letting us know. Estephanie   ----- Message -----  From: Adan Woodward MD  Sent: 9/21/2020  12:53 PM CDT  To: Antwon ABRAHAM Staff    Please set up hospital follow-up appointment with Dr. Kapoor .  She is status post three stents at [place].
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
## 2020-09-25 RX ORDER — CYCLOBENZAPRINE HCL 10 MG
TABLET ORAL
Qty: 60 TABLET | Refills: 11 | Status: SHIPPED | OUTPATIENT
Start: 2020-09-25 | End: 2021-08-19

## 2020-09-28 ENCOUNTER — TELEPHONE (OUTPATIENT)
Dept: FAMILY MEDICINE | Facility: CLINIC | Age: 70
End: 2020-09-28

## 2020-10-01 ENCOUNTER — PATIENT MESSAGE (OUTPATIENT)
Dept: OTHER | Facility: OTHER | Age: 70
End: 2020-10-01

## 2020-10-01 NOTE — PROGRESS NOTES
Subjective:    Patient ID:  Kalpana Wright is a 70 y.o. female who presents for follow-up of hospital discharge.      HPI: Ms. Kalpana Wright presents to the clinic for follow up after hospital discharge from [place] for NSTEMI. She was admitted with PNA, acute respiratory failure, chest pain, and NSTEMI; she had LHC and it was noted that she had recurrent restenosis of SVG to OM with suboptimal result with balloon angioplasty; she had rotablation of ostial LM with MARY placed. She stated that she is doing pretty well; she denied any chest pain or SOB. She does experience mild ROSA with lots of continuous walking. She just completed physical therapy today-she has been in PT since discharge from hospital. She denied any palpitations, lightheadedness, dizziness, or near syncope. She does report pain to LLE just distal to knee; mild swelling there as well. In the area of mild swelling, it is tender to touch.  She also reports tenderness and swelling to upper gums on right side. She stated that she doesn't think her dentures fit correctly and she will be going to dentist after leaving here.  She does report easy bruising and bleeding of the skin, but no other unusual bleeding.  Uses wheelchair to get around her house because of chronic knee pain    Hospital labs 9/2020  TROPONINI 4.99* 7.46* 8.59*    217 237*   CKMB 14.8* 25.7* 34.0*     BNP:  Lab Results (Last 7 Days)   Lab 08/28/20  1345   .6*     Medications: she is not missing any doses. She is taking lasix 80 mg in am and 40mg in pm with potassium supplement.  Sodium: she does not add salt to foods,  she is not reading labels for sodium content. Eats some salty foods.  Diet: Reports reduced appetite for the last 6 months with weight loss; she is eating some convenience foods.  Exercise: she does not  Tobacco: former smoker   Alcohol: no alcohol use     Weight: 81.6 kg (180 lb) she states that her daily weights has decreased due to decreased appetite.Body  mass index is 30.9 kg/m².   Wt Readings from Last 3 Encounters:   10/02/20 81.6 kg (180 lb)   07/21/20 81.2 kg (179 lb)   07/01/20 81.6 kg (180 lb)     BP log: None.      Review of Systems   Constitution: Positive for decreased appetite (6-7 months.). Negative for chills, fever, malaise/fatigue, night sweats, weight gain and weight loss.   HENT: Negative for congestion.    Cardiovascular: Negative for chest pain, claudication, cyanosis, dyspnea on exertion (with walking; just finished physical therapy.), irregular heartbeat, leg swelling, near-syncope, orthopnea, palpitations, paroxysmal nocturnal dyspnea and syncope.   Respiratory: Negative for cough, hemoptysis, shortness of breath, sputum production and wheezing.    Hematologic/Lymphatic: Negative for adenopathy and bleeding problem. Bruises/bleeds easily.   Skin: Negative for color change and nail changes.   Gastrointestinal: Negative for bloating, abdominal pain, change in bowel habit, heartburn, hematochezia, melena, nausea and vomiting.   Genitourinary: Negative for hematuria.   Neurological: Negative for dizziness and light-headedness.   Psychiatric/Behavioral: Negative for altered mental status.       Objective:   Physical Exam   Constitutional: She is oriented to person, place, and time. She appears well-developed and well-nourished. No distress.   HENT:   Head: Normocephalic and atraumatic.   Eyes: Conjunctivae are normal. No scleral icterus.   Neck: Neck supple. No JVD present. No tracheal deviation present. No thyromegaly present.   Cardiovascular: Normal rate, regular rhythm, normal heart sounds and intact distal pulses. Exam reveals no gallop and no friction rub.   No murmur heard.  Pulmonary/Chest: Effort normal and breath sounds normal. No respiratory distress. She has no wheezes. She has no rales. She exhibits no tenderness.   Abdominal: Soft. Bowel sounds are normal. She exhibits no distension and no mass. There is no abdominal tenderness. There  is no rebound and no guarding.   Abdomen obese.   Musculoskeletal: Normal range of motion.         General: Edema (trace edema to LLE; she did have graft for CABG taken from that leg. Mild swelling not to an area justdistal to the knee in medial aspect. Mild tenderness to palpation of the area.) present.   Lymphadenopathy:     She has no cervical adenopathy.   Neurological: She is alert and oriented to person, place, and time.   Skin: Skin is warm and dry. No rash noted. She is not diaphoretic. No erythema. No pallor.   Pink; multiple ecchymotic areas noted to both arms.   Psychiatric: She has a normal mood and affect.     Results for MARJORIE VARGAS (MRN [id number]) as of 10/2/2020 14:47   Ref. Range 7/1/2020 12:15   Sodium Latest Ref Range: 136 - 145 mmol/L 139   Potassium Latest Ref Range: 3.5 - 5.1 mmol/L 3.9   Chloride Latest Ref Range: 95 - 110 mmol/L 102   CO2 Latest Ref Range: 23 - 29 mmol/L 26   Anion Gap Latest Ref Range: 8 - 16 mmol/L 11   BUN, Bld Latest Ref Range: 8 - 23 mg/dL 16   Creatinine Latest Ref Range: 0.5 - 1.4 mg/dL 0.9   eGFR if non African American Latest Ref Range: >60 mL/min/1.73 m^2 >60.0   eGFR if African American Latest Ref Range: >60 mL/min/1.73 m^2 >60.0   Glucose Latest Ref Range: 70 - 110 mg/dL 73   Calcium Latest Ref Range: 8.7 - 10.5 mg/dL 9.4   Alkaline Phosphatase Latest Ref Range: 55 - 135 U/L 114   PROTEIN TOTAL Latest Ref Range: 6.0 - 8.4 g/dL 7.7   Albumin Latest Ref Range: 3.5 - 5.2 g/dL 3.5   BILIRUBIN TOTAL Latest Ref Range: 0.1 - 1.0 mg/dL 0.2   AST Latest Ref Range: 10 - 40 U/L 21   ALT Latest Ref Range: 10 - 44 U/L 11   Triglycerides Latest Ref Range: 30 - 150 mg/dL 117   Cholesterol Latest Ref Range: 120 - 199 mg/dL 159   HDL Latest Ref Range: 40 - 75 mg/dL 50   Hdl/Cholesterol Ratio Latest Ref Range: 20.0 - 50.0 % 31.4   LDL Cholesterol External Latest Ref Range: 63.0 - 159.0 mg/dL 85.6   Non-HDL Cholesterol Latest Units: mg/dL 109   Total Cholesterol/HDL Ratio Latest Ref  Range: 2.0 - 5.0  3.2   Hemoglobin A1C External Latest Ref Range: 4.0 - 5.6 % 5.0   Estimated Avg Glucose Latest Ref Range: 68 - 131 mg/dL 97   TSH Latest Ref Range: 0.400 - 4.000 uIU/mL 5.306 (H)   Free T4 Latest Ref Range: 0.71 - 1.51 ng/dL 0.86       PPM interrogation 8/17/2020: Device Comments  Wound check comments:   Chronic site.  General comments:   Patient without complaints.De Correspondent interrogation by Norm.  Nurses's comments:   Patient without complaints.Had 2-3 atrial tachycardia rate 160.No symptoms verbalized.     LHC at [facility] 9/6/2020: Done due to chest pain and NSTEMI  SUMMARY OF PROCEDURE:     1. Left coronary angiogram.     2. Saphenous vein graft angiogram.     3. Left heart catheterization.     4. Conscious sedation.     5. IVUS of the left main.     6. Rota to the left main.     7. PCI with drug-eluting stent to the left main.  ASSESSMENT AND PLAN:     1. Severe coronary artery disease as outlined above.     2. Recurrent restenosis of the vein graft to the OM with multiple   layers of stents and suboptimal result with balloon angioplasty.     3. Rotablation to the ostial left main with a drug-eluting stent using   5.0 x 15 Resolute [place], postdilated with a 5.0 balloon at high pressure   with excellent angiographic and intravascular ultrasound-guided results.     4. The patient will be kept on dual antiplatelets.  The patient will be   followed thoroughly.  Further recommendation will be dictated.  Mo Estrada MD    Echo at [facility] 8/30/2020:Interpretation Summary  Contrast injection was performed.  Ejection Fraction = 60-65%.  There is mild mitral regurgitation.  Contrast injection was performed.     Echo 2/19/2018:CONCLUSIONS     1 - Concentric hypertrophy.     2 - No wall motion abnormalities.     3 - Normal left ventricular systolic function (EF 60-65%).     4 - Normal left ventricular diastolic function.     5 - Normal right ventricular systolic function .     6 - The  estimated PA systolic pressure is 24 mmHg.       Assessment:      1. Hospital discharge follow-up    2. Coronary artery disease involving native coronary artery of native heart without angina pectoris    3. Hx of CABG    4. Paroxysmal atrial fibrillation    5. History of SSS (sick sinus syndrome)    6. Pacemaker    7. Essential hypertension    8. Pure hypercholesterolemia    9. Statin intolerance    10. Pain of left lower extremity    11. Swelling of lower leg        Plan:     Hospital discharge follow-up    Coronary artery disease involving native coronary artery of native heart without angina pectoris    Hx of CABG    Paroxysmal atrial fibrillation    History of SSS (sick sinus syndrome)    Pacemaker    Essential hypertension    Pure hypercholesterolemia    Statin intolerance    Pain of left lower extremity  -     CV Ultrasound doppler venous DVT leg left; Future    Swelling of lower leg  -     CV Ultrasound doppler venous DVT leg left; Future    Venous U/S LLE; phone review.  Continue amlodipine, benazepril, bisoprolol, hydralazine, HCTZ-HTN.  Continue praluent - HLP; statin intolerant.  Continue apixaban, bisoprolol - PAF  Continue brilinta - recent MARY to ostial LM.  Spoke with Dr. Kapoor, and we will decrease ASA to every other day regimen.  Continue imdur and ranexa - angina/CAD  Continue lasix with potassium - edema.  Sodium restriction encouraged.  Volume status appears euvolemic.  Follow up with Dr. Kapoor in 3 months or call sooner for any problems.

## 2020-10-02 ENCOUNTER — OFFICE VISIT (OUTPATIENT)
Dept: CARDIOLOGY | Facility: CLINIC | Age: 70
End: 2020-10-02
Payer: MEDICARE

## 2020-10-02 ENCOUNTER — TELEPHONE (OUTPATIENT)
Dept: CARDIOLOGY | Facility: CLINIC | Age: 70
End: 2020-10-02

## 2020-10-02 VITALS
OXYGEN SATURATION: 99 % | SYSTOLIC BLOOD PRESSURE: 144 MMHG | HEIGHT: 64 IN | DIASTOLIC BLOOD PRESSURE: 78 MMHG | HEART RATE: 77 BPM | WEIGHT: 180 LBS | BODY MASS INDEX: 30.73 KG/M2

## 2020-10-02 DIAGNOSIS — M79.605 PAIN OF LEFT LOWER EXTREMITY: ICD-10-CM

## 2020-10-02 DIAGNOSIS — E78.00 PURE HYPERCHOLESTEROLEMIA: Chronic | ICD-10-CM

## 2020-10-02 DIAGNOSIS — Z95.0 PACEMAKER: ICD-10-CM

## 2020-10-02 DIAGNOSIS — Z78.9 STATIN INTOLERANCE: ICD-10-CM

## 2020-10-02 DIAGNOSIS — I49.5 SSS (SICK SINUS SYNDROME): ICD-10-CM

## 2020-10-02 DIAGNOSIS — I48.0 PAROXYSMAL ATRIAL FIBRILLATION: ICD-10-CM

## 2020-10-02 DIAGNOSIS — Z09 HOSPITAL DISCHARGE FOLLOW-UP: Primary | ICD-10-CM

## 2020-10-02 DIAGNOSIS — Z95.1 HX OF CABG: ICD-10-CM

## 2020-10-02 DIAGNOSIS — I10 ESSENTIAL HYPERTENSION: Chronic | ICD-10-CM

## 2020-10-02 DIAGNOSIS — I25.10 CORONARY ARTERY DISEASE INVOLVING NATIVE CORONARY ARTERY OF NATIVE HEART WITHOUT ANGINA PECTORIS: Chronic | ICD-10-CM

## 2020-10-02 DIAGNOSIS — M79.89 SWELLING OF LOWER LEG: ICD-10-CM

## 2020-10-02 PROCEDURE — 99999 PR PBB SHADOW E&M-EST. PATIENT-LVL V: CPT | Mod: PBBFAC,,, | Performed by: NURSE PRACTITIONER

## 2020-10-02 PROCEDURE — 99214 OFFICE O/P EST MOD 30 MIN: CPT | Mod: S$PBB,,, | Performed by: NURSE PRACTITIONER

## 2020-10-02 PROCEDURE — 99999 PR PBB SHADOW E&M-EST. PATIENT-LVL V: ICD-10-PCS | Mod: PBBFAC,,, | Performed by: NURSE PRACTITIONER

## 2020-10-02 PROCEDURE — 99215 OFFICE O/P EST HI 40 MIN: CPT | Mod: PBBFAC,PO | Performed by: NURSE PRACTITIONER

## 2020-10-02 PROCEDURE — 99214 PR OFFICE/OUTPT VISIT, EST, LEVL IV, 30-39 MIN: ICD-10-PCS | Mod: S$PBB,,, | Performed by: NURSE PRACTITIONER

## 2020-10-02 NOTE — Clinical Note
I talked with Dr. Kapoor and she should decrease ASA to every other day instead of daily to decrease bleeding risk. Would you please let her know. Thank you.

## 2020-10-06 ENCOUNTER — TELEPHONE (OUTPATIENT)
Dept: PHARMACY | Facility: CLINIC | Age: 70
End: 2020-10-06

## 2020-10-20 ENCOUNTER — TELEPHONE (OUTPATIENT)
Dept: RADIOLOGY | Facility: HOSPITAL | Age: 70
End: 2020-10-20

## 2020-10-20 ENCOUNTER — DOCUMENT SCAN (OUTPATIENT)
Dept: HOME HEALTH SERVICES | Facility: HOSPITAL | Age: 70
End: 2020-10-20
Payer: MEDICARE

## 2020-10-21 ENCOUNTER — HOSPITAL ENCOUNTER (OUTPATIENT)
Dept: RADIOLOGY | Facility: HOSPITAL | Age: 70
Discharge: HOME | End: 2020-10-21
Attending: INTERNAL MEDICINE
Payer: MEDICARE

## 2020-10-21 DIAGNOSIS — R80.1 PERSISTENT PROTEINURIA: ICD-10-CM

## 2020-10-21 DIAGNOSIS — E66.01 MORBID OBESITY: ICD-10-CM

## 2020-10-21 DIAGNOSIS — F17.210 CIGARETTE SMOKER: ICD-10-CM

## 2020-10-21 DIAGNOSIS — R80.9 POSITIVE FOR MICROALBUMINURIA: ICD-10-CM

## 2020-10-21 PROCEDURE — 76770 US EXAM ABDO BACK WALL COMP: CPT | Mod: 26,,, | Performed by: RADIOLOGY

## 2020-10-21 PROCEDURE — 76770 US RETROPERITONEAL COMPLETE: ICD-10-PCS | Mod: 26,,, | Performed by: RADIOLOGY

## 2020-10-21 PROCEDURE — 76770 US EXAM ABDO BACK WALL COMP: CPT | Mod: TC,PO

## 2020-10-28 ENCOUNTER — OFFICE VISIT (OUTPATIENT)
Dept: NEPHROLOGY | Facility: CLINIC | Age: 70
End: 2020-10-28
Payer: MEDICARE

## 2020-10-28 DIAGNOSIS — E07.9 THYROID DISEASE: ICD-10-CM

## 2020-10-28 DIAGNOSIS — Z11.59 NEED FOR HEPATITIS C SCREENING TEST: ICD-10-CM

## 2020-10-28 DIAGNOSIS — Z95.1 HX OF CABG: ICD-10-CM

## 2020-10-28 DIAGNOSIS — I25.10 CORONARY ARTERY DISEASE INVOLVING NATIVE CORONARY ARTERY OF NATIVE HEART WITHOUT ANGINA PECTORIS: ICD-10-CM

## 2020-10-28 DIAGNOSIS — I50.9 CONGESTIVE HEART FAILURE, UNSPECIFIED HF CHRONICITY, UNSPECIFIED HEART FAILURE TYPE: ICD-10-CM

## 2020-10-28 DIAGNOSIS — E66.01 MORBID OBESITY: ICD-10-CM

## 2020-10-28 DIAGNOSIS — Z87.891 HISTORY OF SMOKING 30 OR MORE PACK YEARS: ICD-10-CM

## 2020-10-28 DIAGNOSIS — D50.8 OTHER IRON DEFICIENCY ANEMIA: ICD-10-CM

## 2020-10-28 DIAGNOSIS — I25.810 CORONARY ARTERY DISEASE INVOLVING CORONARY BYPASS GRAFT OF NATIVE HEART WITHOUT ANGINA PECTORIS: ICD-10-CM

## 2020-10-28 DIAGNOSIS — E78.00 PURE HYPERCHOLESTEROLEMIA: ICD-10-CM

## 2020-10-28 DIAGNOSIS — I49.5 SSS (SICK SINUS SYNDROME): ICD-10-CM

## 2020-10-28 DIAGNOSIS — E87.6 HYPOKALEMIA: ICD-10-CM

## 2020-10-28 DIAGNOSIS — Z86.79 PERSONAL HISTORY OF CORONARY ARTERY DISEASE: ICD-10-CM

## 2020-10-28 DIAGNOSIS — I48.0 PAROXYSMAL ATRIAL FIBRILLATION: ICD-10-CM

## 2020-10-28 DIAGNOSIS — I10 ESSENTIAL HYPERTENSION: ICD-10-CM

## 2020-10-28 DIAGNOSIS — E66.01 CLASS 2 SEVERE OBESITY DUE TO EXCESS CALORIES WITH SERIOUS COMORBIDITY AND BODY MASS INDEX (BMI) OF 35.0 TO 35.9 IN ADULT: ICD-10-CM

## 2020-10-28 DIAGNOSIS — E55.9 VITAMIN D DEFICIENCY: ICD-10-CM

## 2020-10-28 DIAGNOSIS — F17.210 CIGARETTE SMOKER: Chronic | ICD-10-CM

## 2020-10-28 DIAGNOSIS — R80.1 PERSISTENT PROTEINURIA: Primary | ICD-10-CM

## 2020-10-28 DIAGNOSIS — Z95.1 STATUS POST CORONARY ARTERY BYPASS GRAFT: ICD-10-CM

## 2020-10-28 DIAGNOSIS — Z95.0 PACEMAKER: ICD-10-CM

## 2020-10-28 PROCEDURE — 99443 PR PHYSICIAN TELEPHONE EVALUATION 21-30 MIN: ICD-10-PCS | Mod: 95,,, | Performed by: INTERNAL MEDICINE

## 2020-10-28 PROCEDURE — 99443 PR PHYSICIAN TELEPHONE EVALUATION 21-30 MIN: CPT | Mod: 95,,, | Performed by: INTERNAL MEDICINE

## 2020-10-28 NOTE — PROGRESS NOTES
Subjective:       Patient ID: Kalpana Wright is a 70 y.o. White female who presents for follow up evaluation of Chronic Kidney Disease    HPI     She is referred by her PCP for proteinuria in the setting of CAD, HTN with labile BP and morbid obesity.   She denies foamy urine, no hematuria and no flank pain. She follows a low sodium diet and feels she stays hydrated. No LE edema and no SOB. No NSAID use and no herbal medications. She lost a total of 100lbs after C. Diff following a lap cholecystectomy. In 2018. Currently she has chronic GIB despite multiple endoscopy and video capsule.  Of note she is extremely nervous as overtime she sees a doctor she receives bad news.     Interval history Nov 2020:   The patient location is: Home  The chief complaint leading to consultation is: CKD and review results  Visit type: audio only  Face to Face time with patient: Zero  48 minutes of total time spent on the encounter, which includes face to face time and non-face to face time preparing to see the patient (eg, review of tests), Obtaining and/or reviewing separately obtained history, Documenting clinical information in the electronic or other health record, Independently interpreting results (not separately reported) and communicating results to the patient/family/caregiver, or Care coordination (not separately reported).   Each patient to whom he or she provides medical services by telemedicine is:  (1) informed of the relationship between the physician and patient and the respective role of any other health care provider with respect to management of the patient; and (2) notified that he or she may decline to receive medical services by telemedicine and may withdraw from such care at any time.  Notes: She was first seen for CKD and this appt is to review results       Review of Systems   Cardiovascular: Negative for chest pain.   Allergic/Immunologic: Positive for immunocompromised state.       Objective:      Physical  Exam  Constitutional:       General: She is not in acute distress.  Neurological:      Mental Status: She is alert and oriented to person, place, and time.   Psychiatric:         Mood and Affect: Mood normal.         Thought Content: Thought content normal.         Assessment:       1. Persistent proteinuria    2. Morbid obesity    3. Vitamin D deficiency    4. Class 2 severe obesity due to excess calories with serious comorbidity and body mass index (BMI) of 35.0 to 35.9 in adult    5. Hx of CABG    6. Paroxysmal atrial fibrillation    7. Coronary artery disease involving coronary bypass graft of native heart without angina pectoris    8. Cigarette smoker    9. Essential hypertension    10. History of SSS (sick sinus syndrome)    11. Pacemaker    12. Pure hypercholesterolemia    13. Coronary artery disease involving native coronary artery of native heart without angina pectoris    14. History of smoking 30 or more pack years    15. Thyroid disease    16. Other iron deficiency anemia    17. Need for hepatitis C screening test    18. Hypokalemia    19. Status post coronary artery bypass graft    20. Congestive heart failure, unspecified HF chronicity, unspecified heart failure type    21. Personal history of coronary artery disease        Plan:           Microalbuminuria/Proteinuria with GFR currently >60mL/min   - reviewed results and renal u/s in detail with pt   - weight loss would be beneficial   - good BP control is also important, per Cardiology  - Patient was advised to avoid NSAIDs, continue a low sodium diet, and ensure hydration  - Continue RAAS blockade for renal preservation        RTC 6 months labs prior

## 2020-11-03 PROCEDURE — G0179 PR HOME HEALTH MD RECERTIFICATION: ICD-10-PCS | Mod: ,,, | Performed by: FAMILY MEDICINE

## 2020-11-03 PROCEDURE — G0179 MD RECERTIFICATION HHA PT: HCPCS | Mod: ,,, | Performed by: FAMILY MEDICINE

## 2020-11-06 ENCOUNTER — PATIENT MESSAGE (OUTPATIENT)
Dept: NEPHROLOGY | Facility: CLINIC | Age: 70
End: 2020-11-06

## 2020-11-12 ENCOUNTER — EXTERNAL HOME HEALTH (OUTPATIENT)
Dept: HOME HEALTH SERVICES | Facility: HOSPITAL | Age: 70
End: 2020-11-12
Payer: MEDICARE

## 2020-11-16 ENCOUNTER — SPECIALTY PHARMACY (OUTPATIENT)
Dept: PHARMACY | Facility: CLINIC | Age: 70
End: 2020-11-16

## 2020-11-16 NOTE — TELEPHONE ENCOUNTER
Specialty Pharmacy - Refill Coordination    Specialty Medication Orders Linked to Encounter      Most Recent Value   Medication #1  alirocumab (PRALUENT PEN) 75 mg/mL PnIj (Order#[id number], Rx#[id number])          Refill Questions - Documented Responses      Most Recent Value   Relationship to patient of person spoken to?  Self   HIPAA/medical authority confirmed?  Yes   Any changes in contact preferences or allowed representatives?  No   Has the patient had any insurance changes?  No   Has the patient had any changes to specialty medication, dose, or instructions?  No   Has the patient started taking any new medications, herbals, or supplements?  No   Has the patient been diagnosed with any new medical conditions?  No   Does the patient have any new allergies to medications or foods?  No   Does the patient have any concerns about side effects?  No   Can the patient store medication/sharps container properly (at the correct temperature, away from children/pets, etc.)?  Yes   Can the patient call emergency services (911) in the event of an emergency?  Yes   Does the patient have any concerns or questions about taking or administering this medication as prescribed?  No   How many doses did the patient miss in the past 4 weeks or since the last fill?  0   How many doses does the patient have on hand?  0   How many days does the patient report on hand quantity will last?  0   Does the number of doses/days supply remaining match pharmacy expected amounts?  Yes   Does the patient feel that this medication is effective?  Yes   During the past 4 weeks, has patient missed any activities due to condition or medication?  No   During the past 4 weeks, did patient have any of the following urgent care visits?  None   How will the patient receive the medication?  Mail   When does the patient need to receive the medication?  11/22/20   Shipping Address  Home   Address in [facility] confirmed and updated if neccessary?  Yes    Expected Copay ($)  0   Is the patient able to afford the medication copay?  Yes   Payment Method  zero copay   Days supply of Refill  28   Would patient like to speak to a pharmacist?  No   Do you want to trigger an intervention?  No   Do you want to trigger an additional referral task?  No   Refill activity completed?  Yes   Refill activity plan  Refill scheduled   Shipment/Pickup Date:  11/19/20          Current Outpatient Medications   Medication Sig    albuterol-ipratropium (DUO-NEB) 2.5 mg-0.5 mg/3 mL nebulizer solution Take 3 mLs by nebulization every 6 (six) hours while awake.    alendronate (FOSAMAX) 70 MG tablet Take 1 tablet (70 mg total) by mouth every 7 days.    alirocumab (PRALUENT PEN) 75 mg/mL PnIj Inject 1 mL (75 mg total) into the skin every 14 (fourteen) days.    amitriptyline (ELAVIL) 50 MG tablet Take 1 tablet (50 mg total) by mouth nightly as needed for Insomnia.    amLODIPine (NORVASC) 10 MG tablet Take 1 tablet (10 mg total) by mouth once daily.    apixaban (ELIQUIS) 5 mg Tab Take 1 tablet (5 mg total) by mouth 2 (two) times daily.    aspirin 81 MG Chew Take 81 mg by mouth every other day.     benazepriL (LOTENSIN) 40 MG tablet Take 1 tablet (40 mg total) by mouth once daily.    bisoprolol (ZEBETA) 10 MG tablet Take 1 tablet (10 mg total) by mouth once daily.    BRILINTA 90 mg tablet TAKE ONE TABLET BY MOUTH TWICE DAILY    buprenorphine (BUTRANS) 10 mcg/hour PTWK     buprenorphine (BUTRANS) 7.5 mcg/hour PTWK     buprenorphine HCL (BELBUCA) 150 mcg Film Belbuca 150 mcg buccal film    cyclobenzaprine (FLEXERIL) 10 MG tablet take 1 tablet by oral route 2 times every day (Patient not taking: Reported on 10/2/2020)    doxycycline (VIBRA-TABS) 100 MG tablet Take 100 mg by mouth 2 (two) times daily.    ergocalciferol (ERGOCALCIFEROL) 50,000 unit Cap Vitamin D2 1,250 mcg (50,000 unit) capsule   Take 1 capsule every week by oral route.    esomeprazole (NEXIUM) 40 MG capsule Take 1  capsule (40 mg total) by mouth before breakfast.    furosemide (LASIX) 40 MG tablet Take 80 mg in the am and 80 mg BID x 7 days,then take 80 mg in am and 40 mg in the PM thereafter. (Patient taking differently: Take 80 mg in the am and 80 mg pm)    gabapentin (NEURONTIN) 600 MG tablet Take 1 tablet (600 mg total) by mouth 3 (three) times daily.    hydrALAZINE (APRESOLINE) 25 MG tablet Take 1 tablet (25 mg total) by mouth 2 (two) times daily.    hydroCHLOROthiazide (HYDRODIURIL) 25 MG tablet Take 1 tablet (25 mg total) by mouth once daily.    hydrocodone-acetaminophen 10-325mg (NORCO)  mg Tab Take 1 tablet by mouth 2 (two) times daily as needed.    isosorbide mononitrate (IMDUR) 30 MG 24 hr tablet Take 1 tablet (30 mg total) by mouth once daily.    levothyroxine (SYNTHROID) 75 MCG tablet Take 1 tablet (75 mcg total) by mouth before breakfast.    mupirocin (BACTROBAN) 2 % ointment mupirocin 2 % topical ointment    nitroGLYCERIN (NITROSTAT) 0.4 MG SL tablet Place 1 tablet (0.4 mg total) under the tongue every 5 (five) minutes as needed for Chest pain.    potassium chloride (MICRO-K) 10 MEQ CpSR Take 2 capsules in am and 1 in the evening; if taking additional lasix, will need to take one additional potassium capsule in the evening    pramipexole (MIRAPEX) 0.5 MG tablet Take 1 tablet (0.5 mg total) by mouth once daily.    promethazine (PHENERGAN) 25 MG tablet Take 25 mg by mouth every 6 (six) hours as needed.    ranolazine (RANEXA) 1,000 mg Tb12 Take 1 tablet (1,000 mg total) by mouth 2 (two) times daily.    rOPINIRole (REQUIP) 5 MG tablet Take 1 tablet (5 mg total) by mouth every evening.    temazepam (RESTORIL) 30 mg capsule Take 1 capsule (30 mg total) by mouth nightly as needed for Insomnia.    tobramycin-dexamethasone 0.3-0.1% (TOBRADEX) 0.3-0.1 % DrpS ONE DROP IN EACH EYE EVERY 2 HOURS    umeclidinium-vilanterol (ANORO ELLIPTA) 62.5-25 mcg/actuation DsDv Inhale 1 puff into the lungs once  "daily. Controller    VENTOLIN HFA 90 mcg/actuation inhaler Inhale 2 puff(s) every 4 hours by inhalation route.   Last reviewed on 10/2/2020  5:09 PM by Estephanie Richardson LPN    Review of patient's allergies indicates:   Allergen Reactions    Atorvastatin Other (See Comments)     Severe muscle pain  Other reaction(s): Abdominal Pain  SEVERE MYALGIAS      Azithromycin Other (See Comments)     By shot, closed veins and made large bruises  By shot, closed veins and made large bruises      Latex, natural rubber Rash     "TOURNIQUET ON LEG LEFT HUGE BLISTER THAT TOOK 9 MONTHS OF WOUND CARE TO HEAL."    Meperidine Hives and Anaphylaxis     Other reaction(s): Anaphylaxis  Hives (skin)^  Hives (skin)^      Penicillins Anaphylaxis and Hives     Other reaction(s): Anaphylaxis  Shortness of breath^swelling  Shortness of breath and swelling  Anaphylaxis  Shortness of breath^swelling      Tofacitinib Rash and Swelling     Rash and swelling of face      Lorazepam Anxiety and Other (See Comments)     Made me goofy  CAUSED CONFUSION "Made me goofy."      Pravastatin Other (See Comments)     Severe myalgias.  Cramps/Severe myalgias.    Flu vac 2018 [id number](pf)      Other reaction(s): Other (See Comments)  Flip into aFIB    Nystatin Rash    Pepper (genus capsicum) Other (See Comments)     Reflux and ulcers    Last reviewed on  11/6/2020 4:06 PM by Carmelo Avina      Tasks added this encounter   No tasks added.   Tasks due within next 3 months   11/13/2020 - Refill Call     Vic MASCORRO [facility]  [address] [place] [id number]  Phone: [phone]  Fax: [phone]      "

## 2020-11-30 RX ORDER — RANOLAZINE 1000 MG/1
1000 TABLET, EXTENDED RELEASE ORAL 2 TIMES DAILY
Qty: 180 TABLET | Refills: 0 | Status: SHIPPED | OUTPATIENT
Start: 2020-11-30 | End: 2021-05-05 | Stop reason: SDUPTHER

## 2020-11-30 RX ORDER — BUPRENORPHINE 15 UG/H
PATCH TRANSDERMAL
COMMUNITY
Start: 2020-11-04 | End: 2020-11-30

## 2020-12-11 ENCOUNTER — PATIENT MESSAGE (OUTPATIENT)
Dept: OTHER | Facility: OTHER | Age: 70
End: 2020-12-11

## 2020-12-11 ENCOUNTER — SPECIALTY PHARMACY (OUTPATIENT)
Dept: PHARMACY | Facility: CLINIC | Age: 70
End: 2020-12-11

## 2020-12-11 NOTE — TELEPHONE ENCOUNTER
Specialty Pharmacy - Refill Coordination    Specialty Medication Orders Linked to Encounter      Most Recent Value   Medication #1  docusate sodium (COLACE) 100 MG capsule (Order#[id number], Rx#)              Current Outpatient Medications   Medication Sig    albuterol-ipratropium (DUO-NEB) 2.5 mg-0.5 mg/3 mL nebulizer solution Take 3 mLs by nebulization every 6 (six) hours while awake.    alendronate (FOSAMAX) 70 MG tablet Take 1 tablet (70 mg total) by mouth every 7 days.    alirocumab (PRALUENT PEN) 75 mg/mL PnIj Inject 1 mL (75 mg total) into the skin every 14 (fourteen) days.    amitriptyline (ELAVIL) 50 MG tablet Take 1 tablet (50 mg total) by mouth nightly as needed for Insomnia.    amLODIPine (NORVASC) 10 MG tablet Take 1 tablet (10 mg total) by mouth once daily.    apixaban (ELIQUIS) 5 mg Tab Take 1 tablet (5 mg total) by mouth 2 (two) times daily.    aspirin 81 MG Chew Take 81 mg by mouth every other day.     benazepriL (LOTENSIN) 40 MG tablet Take 1 tablet (40 mg total) by mouth once daily.    bisoprolol (ZEBETA) 10 MG tablet Take 1 tablet (10 mg total) by mouth once daily.    BRILINTA 90 mg tablet TAKE ONE TABLET BY MOUTH TWICE DAILY    buprenorphine HCL (BELBUCA) 150 mcg Film Belbuca 150 mcg buccal film    cyclobenzaprine (FLEXERIL) 10 MG tablet take 1 tablet by oral route 2 times every day (Patient not taking: Reported on 10/2/2020)    doxycycline (VIBRA-TABS) 100 MG tablet Take 100 mg by mouth 2 (two) times daily.    ergocalciferol (ERGOCALCIFEROL) 50,000 unit Cap Vitamin D2 1,250 mcg (50,000 unit) capsule   Take 1 capsule every week by oral route.    esomeprazole (NEXIUM) 40 MG capsule Take 1 capsule (40 mg total) by mouth before breakfast.    furosemide (LASIX) 40 MG tablet Take 80 mg in the am and 80 mg BID x 7 days,then take 80 mg in am and 40 mg in the PM thereafter. (Patient taking differently: Take 80 mg in the am and 80 mg pm)    gabapentin (NEURONTIN) 600 MG tablet Take 1  tablet (600 mg total) by mouth 3 (three) times daily.    hydrALAZINE (APRESOLINE) 25 MG tablet Take 1 tablet (25 mg total) by mouth 2 (two) times daily.    hydroCHLOROthiazide (HYDRODIURIL) 25 MG tablet Take 1 tablet (25 mg total) by mouth once daily.    hydrocodone-acetaminophen 10-325mg (NORCO)  mg Tab Take 1 tablet by mouth 2 (two) times daily as needed.    isosorbide mononitrate (IMDUR) 30 MG 24 hr tablet Take 1 tablet (30 mg total) by mouth once daily.    levothyroxine (SYNTHROID) 75 MCG tablet Take 1 tablet (75 mcg total) by mouth before breakfast.    mupirocin (BACTROBAN) 2 % ointment mupirocin 2 % topical ointment    nitroGLYCERIN (NITROSTAT) 0.4 MG SL tablet Place 1 tablet (0.4 mg total) under the tongue every 5 (five) minutes as needed for Chest pain.    potassium chloride (MICRO-K) 10 MEQ CpSR Take 2 capsules in am and 1 in the evening; if taking additional lasix, will need to take one additional potassium capsule in the evening    pramipexole (MIRAPEX) 0.5 MG tablet Take 1 tablet (0.5 mg total) by mouth once daily.    promethazine (PHENERGAN) 25 MG tablet Take 25 mg by mouth every 6 (six) hours as needed.    ranolazine (RANEXA) 1,000 mg Tb12 Take 1 tablet (1,000 mg total) by mouth 2 (two) times daily.    rOPINIRole (REQUIP) 5 MG tablet Take 1 tablet (5 mg total) by mouth every evening.    temazepam (RESTORIL) 30 mg capsule Take 1 capsule (30 mg total) by mouth nightly as needed for Insomnia.    tobramycin-dexamethasone 0.3-0.1% (TOBRADEX) 0.3-0.1 % DrpS ONE DROP IN EACH EYE EVERY 2 HOURS    umeclidinium-vilanterol (ANORO ELLIPTA) 62.5-25 mcg/actuation DsDv Inhale 1 puff into the lungs once daily. Controller    VENTOLIN HFA 90 mcg/actuation inhaler Inhale 2 puff(s) every 4 hours by inhalation route.   Last reviewed on 11/30/2020 11:47 AM by Ludy Ball MA    Review of patient's allergies indicates:   Allergen Reactions    Atorvastatin Other (See Comments)     Severe muscle  "pain  Other reaction(s): Abdominal Pain  SEVERE MYALGIAS      Azithromycin Other (See Comments)     By shot, closed veins and made large bruises  By shot, closed veins and made large bruises      Latex, natural rubber Rash     "TOURNIQUET ON LEG LEFT HUGE BLISTER THAT TOOK 9 MONTHS OF WOUND CARE TO HEAL."    Meperidine Hives and Anaphylaxis     Other reaction(s): Anaphylaxis  Hives (skin)^  Hives (skin)^      Penicillins Anaphylaxis and Hives     Other reaction(s): Anaphylaxis  Shortness of breath^swelling  Shortness of breath and swelling  Anaphylaxis  Shortness of breath^swelling      Tofacitinib Rash and Swelling     Rash and swelling of face      Lorazepam Anxiety and Other (See Comments)     Made me goofy  CAUSED CONFUSION "Made me goofy."      Pravastatin Other (See Comments)     Severe myalgias.  Cramps/Severe myalgias.    Flu vac 2018 [id number](pf)      Other reaction(s): Other (See Comments)  Flip into aFIB    Nystatin Rash    Pepper (genus capsicum) Other (See Comments)     Reflux and ulcers    Last reviewed on  11/30/2020 11:45 AM by Lduy Ball      Tasks added this encounter   No tasks added.   Tasks due within next 3 months   2/1/2021 - Refill Call (Auto Added)   patient has not taken the last 4 doses due to family issues. no questions for the [facility] will move refill appropriately.   [facility]  [address] [place] [id number]  Phone: [phone]  Fax: [phone]      "

## 2020-12-14 DIAGNOSIS — M81.0 AGE-RELATED OSTEOPOROSIS WITHOUT CURRENT PATHOLOGICAL FRACTURE: ICD-10-CM

## 2020-12-14 RX ORDER — ALENDRONATE SODIUM 70 MG/1
70 TABLET ORAL
Qty: 12 TABLET | Refills: 4 | Status: SHIPPED | OUTPATIENT
Start: 2020-12-14 | End: 2021-08-19

## 2020-12-14 RX ORDER — BUPRENORPHINE 15 UG/H
PATCH TRANSDERMAL
COMMUNITY
Start: 2020-12-02 | End: 2020-12-14

## 2020-12-21 DIAGNOSIS — I49.5 SSS (SICK SINUS SYNDROME): ICD-10-CM

## 2020-12-21 DIAGNOSIS — Z95.0 PACEMAKER: Primary | ICD-10-CM

## 2020-12-21 PROBLEM — Z09 HOSPITAL DISCHARGE FOLLOW-UP: Status: RESOLVED | Noted: 2020-09-21 | Resolved: 2020-12-21

## 2021-01-02 PROCEDURE — G0179 MD RECERTIFICATION HHA PT: HCPCS | Mod: ,,, | Performed by: FAMILY MEDICINE

## 2021-01-02 PROCEDURE — G0179 PR HOME HEALTH MD RECERTIFICATION: ICD-10-PCS | Mod: ,,, | Performed by: FAMILY MEDICINE

## 2021-01-06 ENCOUNTER — OFFICE VISIT (OUTPATIENT)
Dept: CARDIOLOGY | Facility: CLINIC | Age: 71
End: 2021-01-06
Payer: MEDICARE

## 2021-01-06 VITALS
TEMPERATURE: 98 F | HEART RATE: 59 BPM | DIASTOLIC BLOOD PRESSURE: 56 MMHG | OXYGEN SATURATION: 97 % | WEIGHT: 175 LBS | BODY MASS INDEX: 30.04 KG/M2 | SYSTOLIC BLOOD PRESSURE: 132 MMHG

## 2021-01-06 DIAGNOSIS — I48.0 PAROXYSMAL ATRIAL FIBRILLATION: ICD-10-CM

## 2021-01-06 DIAGNOSIS — I10 ESSENTIAL HYPERTENSION: Chronic | ICD-10-CM

## 2021-01-06 DIAGNOSIS — Z95.1 HX OF CABG: Primary | ICD-10-CM

## 2021-01-06 DIAGNOSIS — Z95.0 PACEMAKER: ICD-10-CM

## 2021-01-06 DIAGNOSIS — E66.09 CLASS 1 OBESITY DUE TO EXCESS CALORIES WITH SERIOUS COMORBIDITY AND BODY MASS INDEX (BMI) OF 30.0 TO 30.9 IN ADULT: ICD-10-CM

## 2021-01-06 DIAGNOSIS — I49.5 SSS (SICK SINUS SYNDROME): ICD-10-CM

## 2021-01-06 DIAGNOSIS — E07.9 THYROID DISEASE: ICD-10-CM

## 2021-01-06 DIAGNOSIS — I73.9 PVD (PERIPHERAL VASCULAR DISEASE): ICD-10-CM

## 2021-01-06 DIAGNOSIS — Z78.9 STATIN INTOLERANCE: ICD-10-CM

## 2021-01-06 DIAGNOSIS — I25.810 CORONARY ARTERY DISEASE INVOLVING CORONARY BYPASS GRAFT OF NATIVE HEART WITHOUT ANGINA PECTORIS: ICD-10-CM

## 2021-01-06 DIAGNOSIS — F17.200 TOBACCO DEPENDENCE: ICD-10-CM

## 2021-01-06 DIAGNOSIS — E55.9 VITAMIN D DEFICIENCY: ICD-10-CM

## 2021-01-06 DIAGNOSIS — E78.00 PURE HYPERCHOLESTEROLEMIA: Chronic | ICD-10-CM

## 2021-01-06 PROCEDURE — 99999 PR PBB SHADOW E&M-EST. PATIENT-LVL V: CPT | Mod: PBBFAC,,, | Performed by: NURSE PRACTITIONER

## 2021-01-06 PROCEDURE — 99215 OFFICE O/P EST HI 40 MIN: CPT | Mod: PBBFAC,PO | Performed by: NURSE PRACTITIONER

## 2021-01-06 PROCEDURE — 99214 PR OFFICE/OUTPT VISIT, EST, LEVL IV, 30-39 MIN: ICD-10-PCS | Mod: S$PBB,,, | Performed by: NURSE PRACTITIONER

## 2021-01-06 PROCEDURE — 99214 OFFICE O/P EST MOD 30 MIN: CPT | Mod: S$PBB,,, | Performed by: NURSE PRACTITIONER

## 2021-01-06 PROCEDURE — 99999 PR PBB SHADOW E&M-EST. PATIENT-LVL V: ICD-10-PCS | Mod: PBBFAC,,, | Performed by: NURSE PRACTITIONER

## 2021-01-07 DIAGNOSIS — Z79.899 ENCOUNTER FOR LONG-TERM (CURRENT) USE OF MEDICATIONS: Chronic | ICD-10-CM

## 2021-01-08 RX ORDER — TEMAZEPAM 30 MG/1
CAPSULE ORAL
Qty: 30 CAPSULE | Refills: 5 | Status: SHIPPED | OUTPATIENT
Start: 2021-01-08 | End: 2021-05-20 | Stop reason: SDUPTHER

## 2021-01-11 ENCOUNTER — TELEPHONE (OUTPATIENT)
Dept: FAMILY MEDICINE | Facility: CLINIC | Age: 71
End: 2021-01-11

## 2021-01-15 ENCOUNTER — EXTERNAL HOME HEALTH (OUTPATIENT)
Dept: HOME HEALTH SERVICES | Facility: HOSPITAL | Age: 71
End: 2021-01-15
Payer: MEDICARE

## 2021-01-20 ENCOUNTER — TELEPHONE (OUTPATIENT)
Dept: FAMILY MEDICINE | Facility: CLINIC | Age: 71
End: 2021-01-20

## 2021-01-22 ENCOUNTER — PATIENT MESSAGE (OUTPATIENT)
Dept: ADMINISTRATIVE | Facility: OTHER | Age: 71
End: 2021-01-22

## 2021-01-22 ENCOUNTER — TELEPHONE (OUTPATIENT)
Dept: FAMILY MEDICINE | Facility: CLINIC | Age: 71
End: 2021-01-22

## 2021-01-22 DIAGNOSIS — N39.0 RECURRENT UTI: Primary | ICD-10-CM

## 2021-01-22 RX ORDER — CEPHALEXIN 500 MG/1
500 CAPSULE ORAL EVERY 8 HOURS
Qty: 21 CAPSULE | Refills: 0 | Status: SHIPPED | OUTPATIENT
Start: 2021-01-22 | End: 2021-01-29

## 2021-01-26 ENCOUNTER — TELEPHONE (OUTPATIENT)
Dept: FAMILY MEDICINE | Facility: CLINIC | Age: 71
End: 2021-01-26

## 2021-01-27 ENCOUNTER — DOCUMENT SCAN (OUTPATIENT)
Dept: HOME HEALTH SERVICES | Facility: HOSPITAL | Age: 71
End: 2021-01-27
Payer: MEDICARE

## 2021-01-28 ENCOUNTER — DOCUMENT SCAN (OUTPATIENT)
Dept: HOME HEALTH SERVICES | Facility: HOSPITAL | Age: 71
End: 2021-01-28
Payer: MEDICARE

## 2021-02-01 ENCOUNTER — TELEPHONE (OUTPATIENT)
Dept: CARDIOLOGY | Facility: HOSPITAL | Age: 71
End: 2021-02-01

## 2021-02-02 ENCOUNTER — SPECIALTY PHARMACY (OUTPATIENT)
Dept: PHARMACY | Facility: CLINIC | Age: 71
End: 2021-02-02

## 2021-02-03 ENCOUNTER — HOSPITAL ENCOUNTER (OUTPATIENT)
Dept: CARDIOLOGY | Facility: HOSPITAL | Age: 71
Discharge: HOME | End: 2021-02-03
Attending: INTERNAL MEDICINE
Payer: MEDICARE

## 2021-02-03 ENCOUNTER — OFFICE VISIT (OUTPATIENT)
Dept: CARDIOLOGY | Facility: CLINIC | Age: 71
End: 2021-02-03
Payer: MEDICARE

## 2021-02-03 ENCOUNTER — TELEPHONE (OUTPATIENT)
Dept: FAMILY MEDICINE | Facility: CLINIC | Age: 71
End: 2021-02-03

## 2021-02-03 VITALS — TEMPERATURE: 99 F | HEART RATE: 60 BPM

## 2021-02-03 VITALS
TEMPERATURE: 99 F | BODY MASS INDEX: 30.74 KG/M2 | HEART RATE: 63 BPM | DIASTOLIC BLOOD PRESSURE: 50 MMHG | OXYGEN SATURATION: 93 % | SYSTOLIC BLOOD PRESSURE: 130 MMHG | WEIGHT: 179.13 LBS

## 2021-02-03 DIAGNOSIS — E66.09 CLASS 1 OBESITY DUE TO EXCESS CALORIES WITH SERIOUS COMORBIDITY AND BODY MASS INDEX (BMI) OF 30.0 TO 30.9 IN ADULT: ICD-10-CM

## 2021-02-03 DIAGNOSIS — I49.5 SSS (SICK SINUS SYNDROME): ICD-10-CM

## 2021-02-03 DIAGNOSIS — Z95.0 PACEMAKER: ICD-10-CM

## 2021-02-03 DIAGNOSIS — I25.10 CORONARY ARTERY DISEASE INVOLVING NATIVE CORONARY ARTERY OF NATIVE HEART WITHOUT ANGINA PECTORIS: Chronic | ICD-10-CM

## 2021-02-03 DIAGNOSIS — I48.0 PAROXYSMAL ATRIAL FIBRILLATION: ICD-10-CM

## 2021-02-03 DIAGNOSIS — E78.00 PURE HYPERCHOLESTEROLEMIA: Chronic | ICD-10-CM

## 2021-02-03 DIAGNOSIS — R60.9 EDEMA, UNSPECIFIED TYPE: Primary | ICD-10-CM

## 2021-02-03 DIAGNOSIS — Z95.1 HX OF CABG: ICD-10-CM

## 2021-02-03 DIAGNOSIS — I73.9 PVD (PERIPHERAL VASCULAR DISEASE): ICD-10-CM

## 2021-02-03 DIAGNOSIS — F17.210 CIGARETTE SMOKER: Chronic | ICD-10-CM

## 2021-02-03 DIAGNOSIS — I10 ESSENTIAL HYPERTENSION: Chronic | ICD-10-CM

## 2021-02-03 DIAGNOSIS — Z91.119 NONCOMPLIANCE WITH DIETARY RESTRICTION: ICD-10-CM

## 2021-02-03 DIAGNOSIS — Z86.79 HISTORY OF CONGESTIVE HEART FAILURE: ICD-10-CM

## 2021-02-03 PROCEDURE — 99215 OFFICE O/P EST HI 40 MIN: CPT | Mod: PBBFAC,25,PO | Performed by: NURSE PRACTITIONER

## 2021-02-03 PROCEDURE — 93288 CARDIAC DEVICE CHECK - IN CLINIC & HOSPITAL: ICD-10-PCS | Mod: 26,,, | Performed by: INTERNAL MEDICINE

## 2021-02-03 PROCEDURE — 99999 PR PBB SHADOW E&M-EST. PATIENT-LVL V: ICD-10-PCS | Mod: PBBFAC,,, | Performed by: NURSE PRACTITIONER

## 2021-02-03 PROCEDURE — 93288 INTERROG EVL PM/LDLS PM IP: CPT | Mod: PO

## 2021-02-03 PROCEDURE — 99999 PR PBB SHADOW E&M-EST. PATIENT-LVL V: CPT | Mod: PBBFAC,,, | Performed by: NURSE PRACTITIONER

## 2021-02-03 PROCEDURE — 99214 PR OFFICE/OUTPT VISIT, EST, LEVL IV, 30-39 MIN: ICD-10-PCS | Mod: S$GLB,,, | Performed by: NURSE PRACTITIONER

## 2021-02-03 PROCEDURE — 99214 OFFICE O/P EST MOD 30 MIN: CPT | Mod: S$GLB,,, | Performed by: NURSE PRACTITIONER

## 2021-02-03 PROCEDURE — 93288 INTERROG EVL PM/LDLS PM IP: CPT | Mod: 26,,, | Performed by: INTERNAL MEDICINE

## 2021-02-04 LAB
AV DELAY - FIXED: 215 MSEC
IMPEDANCE RA LEAD (NATIVE): 575 OHMS
IMPEDANCE RA LEAD: 515 OHMS
OHS CV DC PP MS1: 0.5 MS
OHS CV DC PP MS2: 0.4 MS
OHS CV DC PP V1: 0.9 V
OHS CV DC PP V2: 1.3 V
PV DELAY - FIXED: 250 MSEC
THRESHOLD MS RA LEAD: 0.4 MS
THRESHOLD V RA LEAD: 1.3 V

## 2021-02-11 ENCOUNTER — OFFICE VISIT (OUTPATIENT)
Dept: FAMILY MEDICINE | Facility: CLINIC | Age: 71
End: 2021-02-11
Payer: MEDICARE

## 2021-02-11 VITALS
OXYGEN SATURATION: 93 % | HEART RATE: 60 BPM | BODY MASS INDEX: 30.39 KG/M2 | HEIGHT: 64 IN | RESPIRATION RATE: 10 BRPM | DIASTOLIC BLOOD PRESSURE: 60 MMHG | WEIGHT: 178 LBS | SYSTOLIC BLOOD PRESSURE: 123 MMHG | TEMPERATURE: 98 F

## 2021-02-11 DIAGNOSIS — L60.8 NAIL DEFORMITY: ICD-10-CM

## 2021-02-11 DIAGNOSIS — M06.89 OTHER SPECIFIED RHEUMATOID ARTHRITIS, MULTIPLE SITES: ICD-10-CM

## 2021-02-11 DIAGNOSIS — Z79.899 ENCOUNTER FOR LONG-TERM (CURRENT) USE OF MEDICATIONS: ICD-10-CM

## 2021-02-11 DIAGNOSIS — I13.0 HYPERTENSIVE HEART AND CHRONIC KIDNEY DISEASE WITH HEART FAILURE AND STAGE 1 THROUGH STAGE 4 CHRONIC KIDNEY DISEASE, OR UNSPECIFIED CHRONIC KIDNEY DISEASE: ICD-10-CM

## 2021-02-11 DIAGNOSIS — I25.111 ATHEROSCLEROSIS OF NATIVE CORONARY ARTERY OF NATIVE HEART WITH ANGINA PECTORIS WITH DOCUMENTED SPASM: ICD-10-CM

## 2021-02-11 DIAGNOSIS — D68.62 LUPUS ANTICOAGULANT SYNDROME: ICD-10-CM

## 2021-02-11 DIAGNOSIS — J96.12 CHRONIC RESPIRATORY FAILURE WITH HYPERCAPNIA: ICD-10-CM

## 2021-02-11 DIAGNOSIS — N39.0 RECURRENT UTI: Primary | ICD-10-CM

## 2021-02-11 DIAGNOSIS — J43.9 PULMONARY EMPHYSEMA, UNSPECIFIED EMPHYSEMA TYPE: ICD-10-CM

## 2021-02-11 DIAGNOSIS — E11.51 TYPE 2 DIABETES MELLITUS WITH DIABETIC PERIPHERAL ANGIOPATHY WITHOUT GANGRENE, WITHOUT LONG-TERM CURRENT USE OF INSULIN: ICD-10-CM

## 2021-02-11 DIAGNOSIS — E66.2 CLASS 1 OBESITY WITH ALVEOLAR HYPOVENTILATION, SERIOUS COMORBIDITY, AND BODY MASS INDEX (BMI) OF 30.0 TO 30.9 IN ADULT: ICD-10-CM

## 2021-02-11 PROBLEM — R56.9 CONVULSIONS: Chronic | Status: RESOLVED | Noted: 2021-02-11 | Resolved: 2021-02-11

## 2021-02-11 PROBLEM — R56.9 CONVULSIONS: Status: ACTIVE | Noted: 2021-02-11

## 2021-02-11 PROBLEM — R56.9 CONVULSIONS: Chronic | Status: ACTIVE | Noted: 2021-02-11

## 2021-02-11 PROCEDURE — 99214 OFFICE O/P EST MOD 30 MIN: CPT | Mod: S$GLB,,, | Performed by: FAMILY MEDICINE

## 2021-02-11 PROCEDURE — 99214 PR OFFICE/OUTPT VISIT, EST, LEVL IV, 30-39 MIN: ICD-10-PCS | Mod: S$GLB,,, | Performed by: FAMILY MEDICINE

## 2021-02-11 PROCEDURE — 99215 OFFICE O/P EST HI 40 MIN: CPT | Mod: PBBFAC,PO | Performed by: FAMILY MEDICINE

## 2021-02-11 PROCEDURE — 99999 PR PBB SHADOW E&M-EST. PATIENT-LVL V: ICD-10-PCS | Mod: PBBFAC,,, | Performed by: FAMILY MEDICINE

## 2021-02-11 PROCEDURE — 99999 PR PBB SHADOW E&M-EST. PATIENT-LVL V: CPT | Mod: PBBFAC,,, | Performed by: FAMILY MEDICINE

## 2021-02-11 RX ORDER — PROMETHAZINE HYDROCHLORIDE 25 MG/1
25 TABLET ORAL EVERY 6 HOURS PRN
Qty: 45 TABLET | Refills: 4 | Status: SHIPPED | OUTPATIENT
Start: 2021-02-11 | End: 2021-06-30 | Stop reason: SDUPTHER

## 2021-02-12 ENCOUNTER — LAB VISIT (OUTPATIENT)
Dept: LAB | Facility: HOSPITAL | Age: 71
End: 2021-02-12
Attending: FAMILY MEDICINE
Payer: MEDICARE

## 2021-02-12 DIAGNOSIS — N39.0 RECURRENT UTI: ICD-10-CM

## 2021-02-12 LAB
BILIRUB UR QL STRIP: NEGATIVE
CLARITY UR: CLEAR
COLOR UR: YELLOW
GLUCOSE UR QL STRIP: NEGATIVE
HGB UR QL STRIP: NEGATIVE
KETONES UR QL STRIP: NEGATIVE
LEUKOCYTE ESTERASE UR QL STRIP: NEGATIVE
NITRITE UR QL STRIP: NEGATIVE
PH UR STRIP: 6.5 [PH] (ref 5–8)
PROT UR QL STRIP: NEGATIVE
SP GR UR STRIP: 1.01 (ref 1–1.03)
URN SPEC COLLECT METH UR: NORMAL

## 2021-02-12 PROCEDURE — 81003 URINALYSIS AUTO W/O SCOPE: CPT | Mod: PO

## 2021-02-25 ENCOUNTER — SPECIALTY PHARMACY (OUTPATIENT)
Dept: PHARMACY | Facility: CLINIC | Age: 71
End: 2021-02-25

## 2021-02-25 ENCOUNTER — PATIENT MESSAGE (OUTPATIENT)
Dept: FAMILY MEDICINE | Facility: CLINIC | Age: 71
End: 2021-02-25

## 2021-02-25 ENCOUNTER — OFFICE VISIT (OUTPATIENT)
Dept: FAMILY MEDICINE | Facility: CLINIC | Age: 71
End: 2021-02-25
Payer: MEDICARE

## 2021-02-25 DIAGNOSIS — H92.01 OTALGIA, RIGHT: ICD-10-CM

## 2021-02-25 DIAGNOSIS — J32.9 SINUSITIS, UNSPECIFIED CHRONICITY, UNSPECIFIED LOCATION: Primary | ICD-10-CM

## 2021-02-25 PROCEDURE — 99213 OFFICE O/P EST LOW 20 MIN: CPT | Mod: 95,,, | Performed by: NURSE PRACTITIONER

## 2021-02-25 PROCEDURE — 99213 PR OFFICE/OUTPT VISIT, EST, LEVL III, 20-29 MIN: ICD-10-PCS | Mod: 95,,, | Performed by: NURSE PRACTITIONER

## 2021-02-25 RX ORDER — SULFAMETHOXAZOLE AND TRIMETHOPRIM 800; 160 MG/1; MG/1
1 TABLET ORAL 2 TIMES DAILY
Qty: 14 TABLET | Refills: 0 | Status: SHIPPED | OUTPATIENT
Start: 2021-02-25 | End: 2021-03-04

## 2021-02-25 RX ORDER — OFLOXACIN 3 MG/ML
10 SOLUTION AURICULAR (OTIC) DAILY
Qty: 5 ML | Refills: 0 | Status: SHIPPED | OUTPATIENT
Start: 2021-02-25 | End: 2021-03-04

## 2021-02-25 RX ORDER — FLUTICASONE PROPIONATE 50 MCG
1 SPRAY, SUSPENSION (ML) NASAL DAILY PRN
COMMUNITY
End: 2021-11-26

## 2021-02-26 DIAGNOSIS — Z95.1 HISTORY OF INSERTION OF STENT INTO CORONARY ARTERY BYPASS GRAFT: ICD-10-CM

## 2021-02-26 DIAGNOSIS — I25.10 CORONARY ARTERY DISEASE INVOLVING NATIVE CORONARY ARTERY OF NATIVE HEART WITHOUT ANGINA PECTORIS: ICD-10-CM

## 2021-02-26 DIAGNOSIS — E78.00 PURE HYPERCHOLESTEROLEMIA: ICD-10-CM

## 2021-02-26 DIAGNOSIS — Z87.891 HISTORY OF SMOKING 30 OR MORE PACK YEARS: ICD-10-CM

## 2021-02-26 RX ORDER — EVOLOCUMAB 140 MG/ML
INJECTION, SOLUTION SUBCUTANEOUS
Status: CANCELLED | OUTPATIENT
Start: 2021-02-26

## 2021-02-26 RX ORDER — EVOLOCUMAB 420 MG/3.5
420 KIT SUBCUTANEOUS
Qty: 1 CARTRIDGE | Refills: 12 | Status: SHIPPED | OUTPATIENT
Start: 2021-02-26 | End: 2021-11-26

## 2021-03-03 PROCEDURE — G0179 PR HOME HEALTH MD RECERTIFICATION: ICD-10-PCS | Mod: ,,, | Performed by: FAMILY MEDICINE

## 2021-03-03 PROCEDURE — G0179 MD RECERTIFICATION HHA PT: HCPCS | Mod: ,,, | Performed by: FAMILY MEDICINE

## 2021-03-04 ENCOUNTER — TELEPHONE (OUTPATIENT)
Dept: FAMILY MEDICINE | Facility: CLINIC | Age: 71
End: 2021-03-04

## 2021-03-08 ENCOUNTER — DOCUMENT SCAN (OUTPATIENT)
Dept: HOME HEALTH SERVICES | Facility: HOSPITAL | Age: 71
End: 2021-03-08
Payer: MEDICARE

## 2021-03-15 ENCOUNTER — PATIENT OUTREACH (OUTPATIENT)
Dept: HOME HEALTH SERVICES | Facility: HOSPITAL | Age: 71
End: 2021-03-15

## 2021-03-17 ENCOUNTER — EXTERNAL HOME HEALTH (OUTPATIENT)
Dept: HOME HEALTH SERVICES | Facility: HOSPITAL | Age: 71
End: 2021-03-17
Payer: MEDICARE

## 2021-03-17 DIAGNOSIS — Z79.899 ENCOUNTER FOR LONG-TERM (CURRENT) USE OF MEDICATIONS: Chronic | ICD-10-CM

## 2021-03-17 RX ORDER — HYDROCHLOROTHIAZIDE 25 MG/1
25 TABLET ORAL DAILY
Qty: 90 TABLET | Refills: 4 | Status: SHIPPED | OUTPATIENT
Start: 2021-03-17 | End: 2021-08-19

## 2021-03-23 ENCOUNTER — TELEPHONE (OUTPATIENT)
Dept: CARDIOLOGY | Facility: CLINIC | Age: 71
End: 2021-03-23

## 2021-03-25 ENCOUNTER — DOCUMENT SCAN (OUTPATIENT)
Dept: HOME HEALTH SERVICES | Facility: HOSPITAL | Age: 71
End: 2021-03-25
Payer: MEDICARE

## 2021-03-29 ENCOUNTER — DOCUMENT SCAN (OUTPATIENT)
Dept: HOME HEALTH SERVICES | Facility: HOSPITAL | Age: 71
End: 2021-03-29
Payer: MEDICARE

## 2021-03-29 ENCOUNTER — HOSPITAL ENCOUNTER (OUTPATIENT)
Dept: RADIOLOGY | Facility: HOSPITAL | Age: 71
Discharge: HOME | End: 2021-03-29
Attending: NURSE PRACTITIONER
Payer: MEDICARE

## 2021-03-29 ENCOUNTER — OFFICE VISIT (OUTPATIENT)
Dept: FAMILY MEDICINE | Facility: CLINIC | Age: 71
End: 2021-03-29
Payer: MEDICARE

## 2021-03-29 VITALS
SYSTOLIC BLOOD PRESSURE: 146 MMHG | WEIGHT: 182.19 LBS | DIASTOLIC BLOOD PRESSURE: 67 MMHG | HEIGHT: 64 IN | TEMPERATURE: 98 F | BODY MASS INDEX: 31.1 KG/M2 | HEART RATE: 76 BPM

## 2021-03-29 DIAGNOSIS — S69.92XD INJURY OF LEFT WRIST, SUBSEQUENT ENCOUNTER: ICD-10-CM
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
## 2021-03-29 DIAGNOSIS — S89.92XD INJURY OF LEFT LOWER EXTREMITY, SUBSEQUENT ENCOUNTER: ICD-10-CM

## 2021-03-29 DIAGNOSIS — M79.605 LEFT LEG PAIN: ICD-10-CM

## 2021-03-29 DIAGNOSIS — Z86.718 HISTORY OF DVT (DEEP VEIN THROMBOSIS): ICD-10-CM

## 2021-03-29 DIAGNOSIS — S01.81XD LACERATION OF OTHER PART OF HEAD WITHOUT FOREIGN BODY, SUBSEQUENT ENCOUNTER: ICD-10-CM

## 2021-03-29 DIAGNOSIS — Z09 FOLLOW UP: Primary | ICD-10-CM

## 2021-03-29 DIAGNOSIS — T14.8XXA BRUISE: ICD-10-CM

## 2021-03-29 PROCEDURE — 73110 X-RAY EXAM OF WRIST: CPT | Mod: TC,PO,LT

## 2021-03-29 PROCEDURE — 73590 X-RAY EXAM OF LOWER LEG: CPT | Mod: TC,PO,LT

## 2021-03-29 PROCEDURE — 99213 OFFICE O/P EST LOW 20 MIN: CPT | Mod: S$GLB,,, | Performed by: NURSE PRACTITIONER

## 2021-03-29 PROCEDURE — 99999 PR PBB SHADOW E&M-EST. PATIENT-LVL V: ICD-10-PCS | Mod: PBBFAC,,, | Performed by: NURSE PRACTITIONER

## 2021-03-29 PROCEDURE — 99999 PR PBB SHADOW E&M-EST. PATIENT-LVL V: CPT | Mod: PBBFAC,,, | Performed by: NURSE PRACTITIONER

## 2021-03-29 PROCEDURE — 73590 X-RAY EXAM OF LOWER LEG: CPT | Mod: 26,LT,, | Performed by: RADIOLOGY

## 2021-03-29 PROCEDURE — 99215 OFFICE O/P EST HI 40 MIN: CPT | Mod: PBBFAC,25,PO | Performed by: NURSE PRACTITIONER

## 2021-03-29 PROCEDURE — 99213 PR OFFICE/OUTPT VISIT, EST, LEVL III, 20-29 MIN: ICD-10-PCS | Mod: S$GLB,,, | Performed by: NURSE PRACTITIONER

## 2021-03-29 PROCEDURE — 73110 XR WRIST COMPLETE 3 VIEWS LEFT: ICD-10-PCS | Mod: 26,LT,, | Performed by: RADIOLOGY

## 2021-03-29 PROCEDURE — 73110 X-RAY EXAM OF WRIST: CPT | Mod: 26,LT,, | Performed by: RADIOLOGY

## 2021-03-29 PROCEDURE — 73590 XR TIBIA FIBULA 2 VIEW LEFT: ICD-10-PCS | Mod: 26,LT,, | Performed by: RADIOLOGY

## 2021-04-07 ENCOUNTER — TELEPHONE (OUTPATIENT)
Dept: FAMILY MEDICINE | Facility: CLINIC | Age: 71
End: 2021-04-07

## 2021-04-08 ENCOUNTER — TELEPHONE (OUTPATIENT)
Dept: FAMILY MEDICINE | Facility: CLINIC | Age: 71
End: 2021-04-08

## 2021-04-08 ENCOUNTER — OFFICE VISIT (OUTPATIENT)
Dept: CARDIOLOGY | Facility: CLINIC | Age: 71
End: 2021-04-08
Payer: MEDICARE

## 2021-04-08 VITALS
HEART RATE: 64 BPM | SYSTOLIC BLOOD PRESSURE: 146 MMHG | WEIGHT: 177.63 LBS | BODY MASS INDEX: 30.48 KG/M2 | DIASTOLIC BLOOD PRESSURE: 60 MMHG

## 2021-04-08 DIAGNOSIS — F17.210 CIGARETTE SMOKER: Chronic | ICD-10-CM

## 2021-04-08 DIAGNOSIS — I48.0 PAROXYSMAL ATRIAL FIBRILLATION: ICD-10-CM

## 2021-04-08 DIAGNOSIS — Z95.1 HX OF CABG: ICD-10-CM

## 2021-04-08 DIAGNOSIS — E55.9 VITAMIN D DEFICIENCY: ICD-10-CM

## 2021-04-08 DIAGNOSIS — Z78.9 STATIN INTOLERANCE: ICD-10-CM

## 2021-04-08 DIAGNOSIS — E78.00 PURE HYPERCHOLESTEROLEMIA: Chronic | ICD-10-CM

## 2021-04-08 DIAGNOSIS — I10 ESSENTIAL HYPERTENSION: Chronic | ICD-10-CM

## 2021-04-08 DIAGNOSIS — E66.2 CLASS 1 OBESITY WITH ALVEOLAR HYPOVENTILATION, SERIOUS COMORBIDITY, AND BODY MASS INDEX (BMI) OF 30.0 TO 30.9 IN ADULT: Chronic | ICD-10-CM

## 2021-04-08 DIAGNOSIS — G47.33 OBSTRUCTIVE SLEEP APNEA SYNDROME: ICD-10-CM

## 2021-04-08 DIAGNOSIS — E11.51 TYPE 2 DIABETES MELLITUS WITH DIABETIC PERIPHERAL ANGIOPATHY WITHOUT GANGRENE, WITHOUT LONG-TERM CURRENT USE OF INSULIN: Chronic | ICD-10-CM

## 2021-04-08 DIAGNOSIS — I25.810 CORONARY ARTERY DISEASE INVOLVING CORONARY BYPASS GRAFT OF NATIVE HEART WITHOUT ANGINA PECTORIS: Primary | ICD-10-CM

## 2021-04-08 DIAGNOSIS — I73.9 PVD (PERIPHERAL VASCULAR DISEASE): ICD-10-CM

## 2021-04-08 DIAGNOSIS — I49.5 SSS (SICK SINUS SYNDROME): ICD-10-CM

## 2021-04-08 DIAGNOSIS — Z95.0 PACEMAKER: ICD-10-CM

## 2021-04-08 PROCEDURE — 99499 UNLISTED E&M SERVICE: CPT | Mod: S$PBB,,, | Performed by: NURSE PRACTITIONER

## 2021-04-08 PROCEDURE — 99214 PR OFFICE/OUTPT VISIT, EST, LEVL IV, 30-39 MIN: ICD-10-PCS | Mod: S$GLB,,, | Performed by: NURSE PRACTITIONER

## 2021-04-08 PROCEDURE — 99999 PR PBB SHADOW E&M-EST. PATIENT-LVL V: CPT | Mod: PBBFAC,,, | Performed by: NURSE PRACTITIONER

## 2021-04-08 PROCEDURE — 3008F PR BODY MASS INDEX (BMI) DOCUMENTED: ICD-10-PCS | Mod: CPTII,S$GLB,, | Performed by: NURSE PRACTITIONER

## 2021-04-08 PROCEDURE — 99499 RISK ADDL DX/OHS AUDIT: ICD-10-PCS | Mod: S$PBB,,, | Performed by: NURSE PRACTITIONER

## 2021-04-08 PROCEDURE — 3288F FALL RISK ASSESSMENT DOCD: CPT | Mod: CPTII,S$GLB,, | Performed by: NURSE PRACTITIONER

## 2021-04-08 PROCEDURE — 99999 PR PBB SHADOW E&M-EST. PATIENT-LVL V: ICD-10-PCS | Mod: PBBFAC,,, | Performed by: NURSE PRACTITIONER

## 2021-04-08 PROCEDURE — 1126F PR PAIN SEVERITY QUANTIFIED, NO PAIN PRESENT: ICD-10-PCS | Mod: S$GLB,,, | Performed by: NURSE PRACTITIONER

## 2021-04-08 PROCEDURE — 3288F PR FALLS RISK ASSESSMENT DOCUMENTED: ICD-10-PCS | Mod: CPTII,S$GLB,, | Performed by: NURSE PRACTITIONER

## 2021-04-08 PROCEDURE — 1159F PR MEDICATION LIST DOCUMENTED IN MEDICAL RECORD: ICD-10-PCS | Mod: S$GLB,,, | Performed by: NURSE PRACTITIONER

## 2021-04-08 PROCEDURE — 99214 OFFICE O/P EST MOD 30 MIN: CPT | Mod: S$GLB,,, | Performed by: NURSE PRACTITIONER

## 2021-04-08 PROCEDURE — 3008F BODY MASS INDEX DOCD: CPT | Mod: CPTII,S$GLB,, | Performed by: NURSE PRACTITIONER

## 2021-04-08 PROCEDURE — 1100F PR PT FALLS ASSESS DOC 2+ FALLS/FALL W/INJURY/YR: ICD-10-PCS | Mod: CPTII,S$GLB,, | Performed by: NURSE PRACTITIONER

## 2021-04-08 PROCEDURE — 1126F AMNT PAIN NOTED NONE PRSNT: CPT | Mod: S$GLB,,, | Performed by: NURSE PRACTITIONER

## 2021-04-08 PROCEDURE — 1159F MED LIST DOCD IN RCRD: CPT | Mod: S$GLB,,, | Performed by: NURSE PRACTITIONER

## 2021-04-08 PROCEDURE — 1100F PTFALLS ASSESS-DOCD GE2>/YR: CPT | Mod: CPTII,S$GLB,, | Performed by: NURSE PRACTITIONER

## 2021-04-22 ENCOUNTER — TELEPHONE (OUTPATIENT)
Dept: RADIOLOGY | Facility: HOSPITAL | Age: 71
End: 2021-04-22

## 2021-05-05 DIAGNOSIS — Z78.9 STATIN INTOLERANCE: Primary | ICD-10-CM

## 2021-05-06 ENCOUNTER — TELEPHONE (OUTPATIENT)
Dept: FAMILY MEDICINE | Facility: CLINIC | Age: 71
End: 2021-05-06

## 2021-05-06 DIAGNOSIS — Z79.899 POLYPHARMACY: ICD-10-CM
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
## 2021-05-06 DIAGNOSIS — Z91.81: ICD-10-CM

## 2021-05-06 DIAGNOSIS — Z86.718 HISTORY OF DVT (DEEP VEIN THROMBOSIS): ICD-10-CM

## 2021-05-06 DIAGNOSIS — Z74.09 MOBILITY IMPAIRED: ICD-10-CM

## 2021-05-06 RX ORDER — RANOLAZINE 1000 MG/1
1000 TABLET, EXTENDED RELEASE ORAL 2 TIMES DAILY
Qty: 180 TABLET | Refills: 0 | Status: SHIPPED | OUTPATIENT
Start: 2021-05-06 | End: 2021-11-30 | Stop reason: SDUPTHER

## 2021-05-07 PROCEDURE — G0180 MD CERTIFICATION HHA PATIENT: HCPCS | Mod: ,,, | Performed by: FAMILY MEDICINE

## 2021-05-07 PROCEDURE — G0180 PR HOME HEALTH MD CERTIFICATION: ICD-10-PCS | Mod: ,,, | Performed by: FAMILY MEDICINE

## 2021-05-10 ENCOUNTER — PATIENT MESSAGE (OUTPATIENT)
Dept: FAMILY MEDICINE | Facility: CLINIC | Age: 71
End: 2021-05-10

## 2021-05-10 DIAGNOSIS — I15.2 HYPERTENSION ASSOCIATED WITH DIABETES: Primary | ICD-10-CM

## 2021-05-10 DIAGNOSIS — E11.59 HYPERTENSION ASSOCIATED WITH DIABETES: Primary | ICD-10-CM

## 2021-05-12 ENCOUNTER — TELEPHONE (OUTPATIENT)
Dept: FAMILY MEDICINE | Facility: CLINIC | Age: 71
End: 2021-05-12

## 2021-05-17 ENCOUNTER — TELEPHONE (OUTPATIENT)
Dept: RADIOLOGY | Facility: HOSPITAL | Age: 71
End: 2021-05-17

## 2021-05-19 ENCOUNTER — HOSPITAL ENCOUNTER (OUTPATIENT)
Dept: RADIOLOGY | Facility: HOSPITAL | Age: 71
Discharge: HOME | End: 2021-05-19
Attending: NURSE PRACTITIONER
Payer: MEDICARE

## 2021-05-19 DIAGNOSIS — M79.605 LEFT LEG PAIN: ICD-10-CM

## 2021-05-19 DIAGNOSIS — Z86.718 HISTORY OF DVT (DEEP VEIN THROMBOSIS): ICD-10-CM

## 2021-05-19 PROCEDURE — 93971 EXTREMITY STUDY: CPT | Mod: 26,LT,, | Performed by: RADIOLOGY

## 2021-05-19 PROCEDURE — 93971 EXTREMITY STUDY: CPT | Mod: TC,PO,LT

## 2021-05-19 PROCEDURE — 93971 US LOWER EXTREMITY VEINS LEFT: ICD-10-PCS | Mod: 26,LT,, | Performed by: RADIOLOGY

## 2021-05-20 ENCOUNTER — PATIENT MESSAGE (OUTPATIENT)
Dept: FAMILY MEDICINE | Facility: CLINIC | Age: 71
End: 2021-05-20

## 2021-05-20 ENCOUNTER — OFFICE VISIT (OUTPATIENT)
Dept: FAMILY MEDICINE | Facility: CLINIC | Age: 71
End: 2021-05-20
Payer: MEDICARE

## 2021-05-20 ENCOUNTER — LAB VISIT (OUTPATIENT)
Dept: LAB | Facility: HOSPITAL | Age: 71
End: 2021-05-20
Attending: FAMILY MEDICINE
Payer: MEDICARE

## 2021-05-20 VITALS
TEMPERATURE: 98 F | SYSTOLIC BLOOD PRESSURE: 138 MMHG | OXYGEN SATURATION: 95 % | HEIGHT: 64 IN | DIASTOLIC BLOOD PRESSURE: 60 MMHG | RESPIRATION RATE: 12 BRPM | HEART RATE: 77 BPM | WEIGHT: 176 LBS | BODY MASS INDEX: 30.05 KG/M2

## 2021-05-20 DIAGNOSIS — R29.6 FALLS FREQUENTLY: ICD-10-CM

## 2021-05-20 DIAGNOSIS — Z79.899 ENCOUNTER FOR LONG-TERM (CURRENT) USE OF MEDICATIONS: Chronic | ICD-10-CM

## 2021-05-20 DIAGNOSIS — E78.00 PURE HYPERCHOLESTEROLEMIA: ICD-10-CM

## 2021-05-20 DIAGNOSIS — I25.10 CORONARY ARTERY DISEASE INVOLVING NATIVE CORONARY ARTERY OF NATIVE HEART WITHOUT ANGINA PECTORIS: ICD-10-CM

## 2021-05-20 DIAGNOSIS — I13.0 HYPERTENSIVE HEART AND CHRONIC KIDNEY DISEASE WITH HEART FAILURE AND STAGE 1 THROUGH STAGE 4 CHRONIC KIDNEY DISEASE, OR UNSPECIFIED CHRONIC KIDNEY DISEASE: Chronic | ICD-10-CM

## 2021-05-20 DIAGNOSIS — I10 ESSENTIAL HYPERTENSION: ICD-10-CM

## 2021-05-20 DIAGNOSIS — K21.9 GASTROESOPHAGEAL REFLUX DISEASE: ICD-10-CM

## 2021-05-20 DIAGNOSIS — Z74.09 MOBILITY IMPAIRED: ICD-10-CM

## 2021-05-20 DIAGNOSIS — F51.01 PRIMARY INSOMNIA: Chronic | ICD-10-CM

## 2021-05-20 DIAGNOSIS — N39.0 RECURRENT UTI: Primary | ICD-10-CM

## 2021-05-20 DIAGNOSIS — F41.9 ANXIETY: ICD-10-CM

## 2021-05-20 LAB
ALBUMIN SERPL BCP-MCNC: 3.2 G/DL (ref 3.5–5.2)
ALP SERPL-CCNC: 96 U/L (ref 55–135)
ALT SERPL W/O P-5'-P-CCNC: 9 U/L (ref 10–44)
ANION GAP SERPL CALC-SCNC: 10 MMOL/L (ref 8–16)
AST SERPL-CCNC: 15 U/L (ref 10–40)
BILIRUB SERPL-MCNC: 0.2 MG/DL (ref 0.1–1)
BILIRUB UR QL STRIP: NEGATIVE
BUN SERPL-MCNC: 19 MG/DL (ref 8–23)
CALCIUM SERPL-MCNC: 9.5 MG/DL (ref 8.7–10.5)
CHLORIDE SERPL-SCNC: 103 MMOL/L (ref 95–110)
CHOLEST SERPL-MCNC: 166 MG/DL (ref 120–199)
CHOLEST/HDLC SERPL: 2.8 {RATIO} (ref 2–5)
CLARITY UR: CLEAR
CO2 SERPL-SCNC: 28 MMOL/L (ref 23–29)
COLOR UR: YELLOW
CREAT SERPL-MCNC: 0.8 MG/DL (ref 0.5–1.4)
ERYTHROCYTE [DISTWIDTH] IN BLOOD BY AUTOMATED COUNT: 17.9 % (ref 11.5–14.5)
EST. GFR  (AFRICAN AMERICAN): >60 ML/MIN/1.73 M^2
EST. GFR  (NON AFRICAN AMERICAN): >60 ML/MIN/1.73 M^2
ESTIMATED AVG GLUCOSE: 97 MG/DL (ref 68–131)
GLUCOSE SERPL-MCNC: 92 MG/DL (ref 70–110)
GLUCOSE UR QL STRIP: NEGATIVE
HBA1C MFR BLD: 5 % (ref 4–5.6)
HCT VFR BLD AUTO: 39.9 % (ref 37–48.5)
HDLC SERPL-MCNC: 59 MG/DL (ref 40–75)
HDLC SERPL: 35.5 % (ref 20–50)
HGB BLD-MCNC: 11.5 G/DL (ref 12–16)
HGB UR QL STRIP: NEGATIVE
KETONES UR QL STRIP: NEGATIVE
LDLC SERPL CALC-MCNC: 86.8 MG/DL (ref 63–159)
LEUKOCYTE ESTERASE UR QL STRIP: NEGATIVE
MCH RBC QN AUTO: 24.9 PG (ref 27–31)
MCHC RBC AUTO-ENTMCNC: 28.8 G/DL (ref 32–36)
MCV RBC AUTO: 86 FL (ref 82–98)
NITRITE UR QL STRIP: NEGATIVE
NONHDLC SERPL-MCNC: 107 MG/DL
PH UR STRIP: 6 [PH] (ref 5–8)
PLATELET # BLD AUTO: 250 K/UL (ref 150–450)
PMV BLD AUTO: 9.9 FL (ref 9.2–12.9)
POTASSIUM SERPL-SCNC: 4 MMOL/L (ref 3.5–5.1)
PROT SERPL-MCNC: 7.1 G/DL (ref 6–8.4)
PROT UR QL STRIP: NEGATIVE
RBC # BLD AUTO: 4.62 M/UL (ref 4–5.4)
SODIUM SERPL-SCNC: 141 MMOL/L (ref 136–145)
SP GR UR STRIP: 1.02 (ref 1–1.03)
TRIGL SERPL-MCNC: 101 MG/DL (ref 30–150)
TSH SERPL DL<=0.005 MIU/L-ACNC: 2.77 UIU/ML (ref 0.4–4)
URN SPEC COLLECT METH UR: NORMAL
WBC # BLD AUTO: 7.22 K/UL (ref 3.9–12.7)

## 2021-05-20 PROCEDURE — 99215 PR OFFICE/OUTPT VISIT, EST, LEVL V, 40-54 MIN: ICD-10-PCS | Mod: S$PBB,,, | Performed by: FAMILY MEDICINE

## 2021-05-20 PROCEDURE — 85027 COMPLETE CBC AUTOMATED: CPT | Performed by: FAMILY MEDICINE

## 2021-05-20 PROCEDURE — 99499 RISK ADDL DX/OHS AUDIT: ICD-10-PCS | Mod: S$PBB,,, | Performed by: FAMILY MEDICINE

## 2021-05-20 PROCEDURE — 99215 OFFICE O/P EST HI 40 MIN: CPT | Mod: PBBFAC,PO | Performed by: FAMILY MEDICINE

## 2021-05-20 PROCEDURE — 36415 COLL VENOUS BLD VENIPUNCTURE: CPT | Mod: PO | Performed by: FAMILY MEDICINE

## 2021-05-20 PROCEDURE — 99215 OFFICE O/P EST HI 40 MIN: CPT | Mod: S$PBB,,, | Performed by: FAMILY MEDICINE

## 2021-05-20 PROCEDURE — 99999 PR PBB SHADOW E&M-EST. PATIENT-LVL V: CPT | Mod: PBBFAC,,, | Performed by: FAMILY MEDICINE

## 2021-05-20 PROCEDURE — 84443 ASSAY THYROID STIM HORMONE: CPT | Performed by: FAMILY MEDICINE

## 2021-05-20 PROCEDURE — 80053 COMPREHEN METABOLIC PANEL: CPT | Performed by: FAMILY MEDICINE

## 2021-05-20 PROCEDURE — 83036 HEMOGLOBIN GLYCOSYLATED A1C: CPT | Performed by: FAMILY MEDICINE

## 2021-05-20 PROCEDURE — 99999 PR PBB SHADOW E&M-EST. PATIENT-LVL V: ICD-10-PCS | Mod: PBBFAC,,, | Performed by: FAMILY MEDICINE

## 2021-05-20 PROCEDURE — 81003 URINALYSIS AUTO W/O SCOPE: CPT | Mod: PO | Performed by: FAMILY MEDICINE

## 2021-05-20 PROCEDURE — 99499 UNLISTED E&M SERVICE: CPT | Mod: S$PBB,,, | Performed by: FAMILY MEDICINE

## 2021-05-20 PROCEDURE — 80061 LIPID PANEL: CPT | Performed by: FAMILY MEDICINE

## 2021-05-20 RX ORDER — TEMAZEPAM 30 MG/1
CAPSULE ORAL
Qty: 30 CAPSULE | Refills: 5 | Status: SHIPPED | OUTPATIENT
Start: 2021-05-20 | End: 2022-01-05

## 2021-05-20 RX ORDER — BUPRENORPHINE 15 UG/H
1 PATCH TRANSDERMAL
COMMUNITY
Start: 2021-04-23 | End: 2021-08-19

## 2021-05-20 RX ORDER — CHLORHEXIDINE GLUCONATE ORAL RINSE 1.2 MG/ML
15 SOLUTION DENTAL
COMMUNITY
Start: 2021-04-13

## 2021-05-20 RX ORDER — AMITRIPTYLINE HYDROCHLORIDE 75 MG/1
75 TABLET ORAL NIGHTLY PRN
Qty: 90 TABLET | Refills: 4 | Status: SHIPPED | OUTPATIENT
Start: 2021-05-20 | End: 2021-06-30 | Stop reason: SDUPTHER

## 2021-05-21 ENCOUNTER — TELEPHONE (OUTPATIENT)
Dept: FAMILY MEDICINE | Facility: CLINIC | Age: 71
End: 2021-05-21

## 2021-05-25 ENCOUNTER — EXTERNAL HOME HEALTH (OUTPATIENT)
Dept: HOME HEALTH SERVICES | Facility: HOSPITAL | Age: 71
End: 2021-05-25
Payer: MEDICARE

## 2021-05-27 ENCOUNTER — DOCUMENT SCAN (OUTPATIENT)
Dept: HOME HEALTH SERVICES | Facility: HOSPITAL | Age: 71
End: 2021-05-27
Payer: MEDICARE

## 2021-05-28 ENCOUNTER — PATIENT OUTREACH (OUTPATIENT)
Dept: ADMINISTRATIVE | Facility: CLINIC | Age: 71
End: 2021-05-28

## 2021-05-28 ENCOUNTER — EXTERNAL HOSPITAL ADMISSION (OUTPATIENT)
Dept: ADMINISTRATIVE | Facility: CLINIC | Age: 71
End: 2021-05-28

## 2021-05-30 ENCOUNTER — OUTSIDE PLACE OF SERVICE (OUTPATIENT)
Dept: ADMINISTRATIVE | Facility: OTHER | Age: 71
End: 2021-05-30
Payer: MEDICARE

## 2021-05-30 PROCEDURE — 99222 PR INITIAL HOSPITAL CARE,LEVL II: ICD-10-PCS | Mod: ,,, | Performed by: NURSE PRACTITIONER

## 2021-05-30 PROCEDURE — 99222 1ST HOSP IP/OBS MODERATE 55: CPT | Mod: ,,, | Performed by: NURSE PRACTITIONER

## 2021-05-31 ENCOUNTER — DOCUMENT SCAN (OUTPATIENT)
Dept: HOME HEALTH SERVICES | Facility: HOSPITAL | Age: 71
End: 2021-05-31
Payer: MEDICARE

## 2021-05-31 ENCOUNTER — OUTSIDE PLACE OF SERVICE (OUTPATIENT)
Dept: ADMINISTRATIVE | Facility: OTHER | Age: 71
End: 2021-05-31
Payer: MEDICARE

## 2021-05-31 PROCEDURE — 75625 PR  ANGIO AORTOGRAM ABD SERIAL: ICD-10-PCS | Mod: 26,59,, | Performed by: THORACIC SURGERY (CARDIOTHORACIC VASCULAR SURGERY)

## 2021-05-31 PROCEDURE — 99152 PR MOD CONSCIOUS SEDATION, SAME PHYS, 5+ YRS, FIRST 15 MIN: ICD-10-PCS | Mod: ,,, | Performed by: THORACIC SURGERY (CARDIOTHORACIC VASCULAR SURGERY)

## 2021-05-31 PROCEDURE — 37236 PR OPEN/PERQ TRANSCATH PLACE STENT; INITIAL ARTERY: ICD-10-PCS | Mod: ,,, | Performed by: THORACIC SURGERY (CARDIOTHORACIC VASCULAR SURGERY)

## 2021-05-31 PROCEDURE — 99152 MOD SED SAME PHYS/QHP 5/>YRS: CPT | Mod: ,,, | Performed by: THORACIC SURGERY (CARDIOTHORACIC VASCULAR SURGERY)

## 2021-05-31 PROCEDURE — 36245 PR INS CATH ABD/L-EXT ART 1ST ORDER: ICD-10-PCS | Mod: 51,,, | Performed by: THORACIC SURGERY (CARDIOTHORACIC VASCULAR SURGERY)

## 2021-05-31 PROCEDURE — 36245 INS CATH ABD/L-EXT ART 1ST: CPT | Mod: 51,,, | Performed by: THORACIC SURGERY (CARDIOTHORACIC VASCULAR SURGERY)

## 2021-05-31 PROCEDURE — 37236 OPEN/PERQ PLACE STENT 1ST: CPT | Mod: ,,, | Performed by: THORACIC SURGERY (CARDIOTHORACIC VASCULAR SURGERY)

## 2021-05-31 PROCEDURE — 75625 CONTRAST EXAM ABDOMINL AORTA: CPT | Mod: 26,59,, | Performed by: THORACIC SURGERY (CARDIOTHORACIC VASCULAR SURGERY)

## 2021-06-01 ENCOUNTER — OUTSIDE PLACE OF SERVICE (OUTPATIENT)
Dept: ADMINISTRATIVE | Facility: OTHER | Age: 71
End: 2021-06-01
Payer: MEDICARE

## 2021-06-01 PROCEDURE — 99232 SBSQ HOSP IP/OBS MODERATE 35: CPT | Mod: ,,, | Performed by: PHYSICIAN ASSISTANT

## 2021-06-01 PROCEDURE — 99232 PR SUBSEQUENT HOSPITAL CARE,LEVL II: ICD-10-PCS | Mod: ,,, | Performed by: PHYSICIAN ASSISTANT

## 2021-06-02 ENCOUNTER — OUTSIDE PLACE OF SERVICE (OUTPATIENT)
Dept: ADMINISTRATIVE | Facility: OTHER | Age: 71
End: 2021-06-02
Payer: MEDICARE

## 2021-06-02 PROCEDURE — 99232 SBSQ HOSP IP/OBS MODERATE 35: CPT | Mod: ,,, | Performed by: PHYSICIAN ASSISTANT

## 2021-06-02 PROCEDURE — 99232 PR SUBSEQUENT HOSPITAL CARE,LEVL II: ICD-10-PCS | Mod: ,,, | Performed by: PHYSICIAN ASSISTANT

## 2021-06-03 ENCOUNTER — OUTSIDE PLACE OF SERVICE (OUTPATIENT)
Dept: ADMINISTRATIVE | Facility: OTHER | Age: 71
End: 2021-06-03
Payer: MEDICARE

## 2021-06-03 ENCOUNTER — DOCUMENT SCAN (OUTPATIENT)
Dept: HOME HEALTH SERVICES | Facility: HOSPITAL | Age: 71
End: 2021-06-03
Payer: MEDICARE

## 2021-06-03 PROCEDURE — 99232 PR SUBSEQUENT HOSPITAL CARE,LEVL II: ICD-10-PCS | Mod: ,,, | Performed by: PHYSICIAN ASSISTANT

## 2021-06-03 PROCEDURE — 99232 SBSQ HOSP IP/OBS MODERATE 35: CPT | Mod: ,,, | Performed by: PHYSICIAN ASSISTANT

## 2021-06-08 ENCOUNTER — TELEPHONE (OUTPATIENT)
Dept: FAMILY MEDICINE | Facility: CLINIC | Age: 71
End: 2021-06-08

## 2021-06-08 ENCOUNTER — PATIENT OUTREACH (OUTPATIENT)
Dept: ADMINISTRATIVE | Facility: CLINIC | Age: 71
End: 2021-06-08

## 2021-06-09 ENCOUNTER — OUTSIDE PLACE OF SERVICE (OUTPATIENT)
Dept: ADMINISTRATIVE | Facility: OTHER | Age: 71
End: 2021-06-09
Payer: MEDICARE

## 2021-06-09 PROCEDURE — 35301 PR THROMBOENDARTECTMY NECK,NECK INCIS: ICD-10-PCS | Mod: LT,,, | Performed by: THORACIC SURGERY (CARDIOTHORACIC VASCULAR SURGERY)

## 2021-06-09 PROCEDURE — 99222 1ST HOSP IP/OBS MODERATE 55: CPT | Mod: 57,,, | Performed by: NURSE PRACTITIONER

## 2021-06-09 PROCEDURE — 35301 RECHANNELING OF ARTERY: CPT | Mod: LT,,, | Performed by: THORACIC SURGERY (CARDIOTHORACIC VASCULAR SURGERY)

## 2021-06-09 PROCEDURE — 99222 PR INITIAL HOSPITAL CARE,LEVL II: ICD-10-PCS | Mod: 57,,, | Performed by: NURSE PRACTITIONER

## 2021-06-10 ENCOUNTER — OUTSIDE PLACE OF SERVICE (OUTPATIENT)
Dept: ADMINISTRATIVE | Facility: OTHER | Age: 71
End: 2021-06-10
Payer: MEDICARE

## 2021-06-10 PROCEDURE — 99024 PR POST-OP FOLLOW-UP VISIT: ICD-10-PCS | Mod: ,,, | Performed by: PHYSICIAN ASSISTANT

## 2021-06-10 PROCEDURE — 99024 POSTOP FOLLOW-UP VISIT: CPT | Mod: ,,, | Performed by: PHYSICIAN ASSISTANT

## 2021-06-11 ENCOUNTER — PATIENT OUTREACH (OUTPATIENT)
Dept: ADMINISTRATIVE | Facility: CLINIC | Age: 71
End: 2021-06-11

## 2021-06-11 ENCOUNTER — TELEPHONE (OUTPATIENT)
Dept: FAMILY MEDICINE | Facility: CLINIC | Age: 71
End: 2021-06-11

## 2021-06-17 ENCOUNTER — TELEPHONE (OUTPATIENT)
Dept: PHARMACY | Facility: CLINIC | Age: 71
End: 2021-06-17

## 2021-06-24 ENCOUNTER — EXTERNAL HOME HEALTH (OUTPATIENT)
Dept: HOME HEALTH SERVICES | Facility: HOSPITAL | Age: 71
End: 2021-06-24
Payer: MEDICARE

## 2021-06-30 ENCOUNTER — DOCUMENT SCAN (OUTPATIENT)
Dept: HOME HEALTH SERVICES | Facility: HOSPITAL | Age: 71
End: 2021-06-30
Payer: MEDICARE

## 2021-06-30 DIAGNOSIS — Z79.899 ENCOUNTER FOR LONG-TERM (CURRENT) USE OF MEDICATIONS: ICD-10-CM

## 2021-06-30 DIAGNOSIS — J44.9 COPD WITHOUT EXACERBATION: ICD-10-CM

## 2021-06-30 DIAGNOSIS — I25.810 CORONARY ARTERY DISEASE INVOLVING CORONARY BYPASS GRAFT OF NATIVE HEART WITHOUT ANGINA PECTORIS: ICD-10-CM

## 2021-06-30 DIAGNOSIS — I10 ESSENTIAL HYPERTENSION: ICD-10-CM

## 2021-06-30 DIAGNOSIS — I48.0 PAROXYSMAL ATRIAL FIBRILLATION: Primary | ICD-10-CM

## 2021-06-30 DIAGNOSIS — F41.9 ANXIETY: ICD-10-CM

## 2021-06-30 DIAGNOSIS — Z95.1 HX OF CABG: ICD-10-CM

## 2021-06-30 RX ORDER — PROMETHAZINE HYDROCHLORIDE 25 MG/1
25 TABLET ORAL EVERY 6 HOURS PRN
Qty: 45 TABLET | Refills: 4 | Status: SHIPPED | OUTPATIENT
Start: 2021-06-30 | End: 2024-03-11 | Stop reason: SDUPTHER

## 2021-06-30 RX ORDER — IPRATROPIUM BROMIDE AND ALBUTEROL SULFATE 2.5; .5 MG/3ML; MG/3ML
3 SOLUTION RESPIRATORY (INHALATION)
Qty: 1 BOX | Refills: 11 | Status: SHIPPED | OUTPATIENT
Start: 2021-06-30 | End: 2022-07-19

## 2021-06-30 RX ORDER — NITROGLYCERIN 0.4 MG/1
0.4 TABLET SUBLINGUAL EVERY 5 MIN PRN
Qty: 25 TABLET | Refills: 11 | Status: SHIPPED | OUTPATIENT
Start: 2021-06-30 | End: 2023-09-21 | Stop reason: SDUPTHER

## 2021-06-30 RX ORDER — AMITRIPTYLINE HYDROCHLORIDE 75 MG/1
75 TABLET ORAL NIGHTLY PRN
Qty: 90 TABLET | Refills: 4 | Status: SHIPPED | OUTPATIENT
Start: 2021-06-30 | End: 2022-09-02

## 2021-07-08 ENCOUNTER — TELEPHONE (OUTPATIENT)
Dept: FAMILY MEDICINE | Facility: CLINIC | Age: 71
End: 2021-07-08

## 2021-07-26 ENCOUNTER — PATIENT MESSAGE (OUTPATIENT)
Dept: FAMILY MEDICINE | Facility: CLINIC | Age: 71
End: 2021-07-26

## 2021-07-27 RX ORDER — ALIROCUMAB 75 MG/ML
75 INJECTION, SOLUTION SUBCUTANEOUS
COMMUNITY
Start: 2021-02-02 | End: 2021-08-19

## 2021-08-03 ENCOUNTER — PATIENT MESSAGE (OUTPATIENT)
Dept: FAMILY MEDICINE | Facility: CLINIC | Age: 71
End: 2021-08-03

## 2021-08-06 ENCOUNTER — TELEPHONE (OUTPATIENT)
Dept: CARDIOLOGY | Facility: CLINIC | Age: 71
End: 2021-08-06

## 2021-08-11 ENCOUNTER — PATIENT OUTREACH (OUTPATIENT)
Dept: ADMINISTRATIVE | Facility: OTHER | Age: 71
End: 2021-08-11

## 2021-08-11 PROCEDURE — G0179 PR HOME HEALTH MD RECERTIFICATION: ICD-10-PCS | Mod: ,,, | Performed by: FAMILY MEDICINE

## 2021-08-11 PROCEDURE — G0179 MD RECERTIFICATION HHA PT: HCPCS | Mod: ,,, | Performed by: FAMILY MEDICINE

## 2021-08-17 ENCOUNTER — PATIENT MESSAGE (OUTPATIENT)
Dept: FAMILY MEDICINE | Facility: CLINIC | Age: 71
End: 2021-08-17

## 2021-08-18 ENCOUNTER — TELEPHONE (OUTPATIENT)
Dept: FAMILY MEDICINE | Facility: CLINIC | Age: 71
End: 2021-08-18

## 2021-08-19 ENCOUNTER — LAB VISIT (OUTPATIENT)
Dept: LAB | Facility: HOSPITAL | Age: 71
End: 2021-08-19
Attending: FAMILY MEDICINE
Payer: MEDICARE

## 2021-08-19 ENCOUNTER — OFFICE VISIT (OUTPATIENT)
Dept: FAMILY MEDICINE | Facility: CLINIC | Age: 71
End: 2021-08-19
Payer: MEDICARE

## 2021-08-19 ENCOUNTER — TELEPHONE (OUTPATIENT)
Dept: FAMILY MEDICINE | Facility: CLINIC | Age: 71
End: 2021-08-19

## 2021-08-19 VITALS
OXYGEN SATURATION: 95 % | SYSTOLIC BLOOD PRESSURE: 138 MMHG | HEIGHT: 64 IN | DIASTOLIC BLOOD PRESSURE: 76 MMHG | BODY MASS INDEX: 27.59 KG/M2 | HEART RATE: 87 BPM | TEMPERATURE: 98 F | WEIGHT: 161.63 LBS

## 2021-08-19 DIAGNOSIS — K21.9 GASTROESOPHAGEAL REFLUX DISEASE: ICD-10-CM

## 2021-08-19 DIAGNOSIS — R10.9 ABDOMINAL PAIN, UNSPECIFIED ABDOMINAL LOCATION: Primary | ICD-10-CM

## 2021-08-19 DIAGNOSIS — E07.9 THYROID DISEASE: ICD-10-CM

## 2021-08-19 DIAGNOSIS — E03.9 HYPOTHYROIDISM, UNSPECIFIED TYPE: ICD-10-CM

## 2021-08-19 DIAGNOSIS — E78.00 PURE HYPERCHOLESTEROLEMIA: ICD-10-CM

## 2021-08-19 DIAGNOSIS — Z79.899 ENCOUNTER FOR LONG-TERM (CURRENT) USE OF MEDICATIONS: Chronic | ICD-10-CM

## 2021-08-19 DIAGNOSIS — I25.810 CORONARY ARTERY DISEASE INVOLVING CORONARY BYPASS GRAFT OF NATIVE HEART WITHOUT ANGINA PECTORIS: ICD-10-CM

## 2021-08-19 DIAGNOSIS — R04.2 HEMOPTYSIS: ICD-10-CM

## 2021-08-19 DIAGNOSIS — Z95.1 HX OF CABG: ICD-10-CM

## 2021-08-19 DIAGNOSIS — I10 ESSENTIAL HYPERTENSION: ICD-10-CM

## 2021-08-19 DIAGNOSIS — E55.9 VITAMIN D DEFICIENCY: Primary | ICD-10-CM

## 2021-08-19 DIAGNOSIS — K21.00 GASTROESOPHAGEAL REFLUX DISEASE WITH ESOPHAGITIS, UNSPECIFIED WHETHER HEMORRHAGE: ICD-10-CM

## 2021-08-19 DIAGNOSIS — I25.10 CORONARY ARTERY DISEASE INVOLVING NATIVE CORONARY ARTERY OF NATIVE HEART WITHOUT ANGINA PECTORIS: ICD-10-CM

## 2021-08-19 LAB
25(OH)D3+25(OH)D2 SERPL-MCNC: 20 NG/ML (ref 30–96)
ALBUMIN SERPL BCP-MCNC: 3.5 G/DL (ref 3.5–5.2)
ALP SERPL-CCNC: 103 U/L (ref 55–135)
ALT SERPL W/O P-5'-P-CCNC: 9 U/L (ref 10–44)
ANION GAP SERPL CALC-SCNC: 11 MMOL/L (ref 8–16)
ANION GAP SERPL CALC-SCNC: 11 MMOL/L (ref 8–16)
AST SERPL-CCNC: 14 U/L (ref 10–40)
BILIRUB SERPL-MCNC: 0.3 MG/DL (ref 0.1–1)
BILIRUB UR QL STRIP: NEGATIVE
BUN SERPL-MCNC: 15 MG/DL (ref 8–23)
BUN SERPL-MCNC: 15 MG/DL (ref 8–23)
CALCIUM SERPL-MCNC: 9.4 MG/DL (ref 8.7–10.5)
CALCIUM SERPL-MCNC: 9.4 MG/DL (ref 8.7–10.5)
CHLORIDE SERPL-SCNC: 102 MMOL/L (ref 95–110)
CHLORIDE SERPL-SCNC: 102 MMOL/L (ref 95–110)
CHOLEST SERPL-MCNC: 180 MG/DL (ref 120–199)
CHOLEST/HDLC SERPL: 3 {RATIO} (ref 2–5)
CLARITY UR: CLEAR
CO2 SERPL-SCNC: 28 MMOL/L (ref 23–29)
CO2 SERPL-SCNC: 28 MMOL/L (ref 23–29)
COLOR UR: YELLOW
CREAT SERPL-MCNC: 0.8 MG/DL (ref 0.5–1.4)
CREAT SERPL-MCNC: 0.8 MG/DL (ref 0.5–1.4)
ERYTHROCYTE [DISTWIDTH] IN BLOOD BY AUTOMATED COUNT: 19.2 % (ref 11.5–14.5)
EST. GFR  (AFRICAN AMERICAN): >60 ML/MIN/1.73 M^2
EST. GFR  (AFRICAN AMERICAN): >60 ML/MIN/1.73 M^2
EST. GFR  (NON AFRICAN AMERICAN): >60 ML/MIN/1.73 M^2
EST. GFR  (NON AFRICAN AMERICAN): >60 ML/MIN/1.73 M^2
GLUCOSE SERPL-MCNC: 90 MG/DL (ref 70–110)
GLUCOSE SERPL-MCNC: 90 MG/DL (ref 70–110)
GLUCOSE UR QL STRIP: NEGATIVE
HCT VFR BLD AUTO: 38 % (ref 37–48.5)
HDLC SERPL-MCNC: 61 MG/DL (ref 40–75)
HDLC SERPL: 33.9 % (ref 20–50)
HGB BLD-MCNC: 11.6 G/DL (ref 12–16)
HGB UR QL STRIP: NEGATIVE
KETONES UR QL STRIP: NEGATIVE
LDLC SERPL CALC-MCNC: 100.6 MG/DL (ref 63–159)
LEUKOCYTE ESTERASE UR QL STRIP: NEGATIVE
MCH RBC QN AUTO: 24.7 PG (ref 27–31)
MCHC RBC AUTO-ENTMCNC: 30.5 G/DL (ref 32–36)
MCV RBC AUTO: 81 FL (ref 82–98)
NITRITE UR QL STRIP: NEGATIVE
NONHDLC SERPL-MCNC: 119 MG/DL
PH UR STRIP: 6 [PH] (ref 5–8)
PLATELET # BLD AUTO: 270 K/UL (ref 150–450)
PMV BLD AUTO: 9.3 FL (ref 9.2–12.9)
POTASSIUM SERPL-SCNC: 3.8 MMOL/L (ref 3.5–5.1)
POTASSIUM SERPL-SCNC: 3.8 MMOL/L (ref 3.5–5.1)
PROT SERPL-MCNC: 8 G/DL (ref 6–8.4)
PROT UR QL STRIP: NEGATIVE
RBC # BLD AUTO: 4.7 M/UL (ref 4–5.4)
SODIUM SERPL-SCNC: 141 MMOL/L (ref 136–145)
SODIUM SERPL-SCNC: 141 MMOL/L (ref 136–145)
SP GR UR STRIP: 1.01 (ref 1–1.03)
TRIGL SERPL-MCNC: 92 MG/DL (ref 30–150)
TSH SERPL DL<=0.005 MIU/L-ACNC: 3.66 UIU/ML (ref 0.4–4)
TSH SERPL DL<=0.005 MIU/L-ACNC: 3.66 UIU/ML (ref 0.4–4)
URN SPEC COLLECT METH UR: NORMAL
WBC # BLD AUTO: 7.64 K/UL (ref 3.9–12.7)

## 2021-08-19 PROCEDURE — 85027 COMPLETE CBC AUTOMATED: CPT | Performed by: FAMILY MEDICINE

## 2021-08-19 PROCEDURE — 80061 LIPID PANEL: CPT | Performed by: FAMILY MEDICINE

## 2021-08-19 PROCEDURE — 99999 PR PBB SHADOW E&M-EST. PATIENT-LVL V: CPT | Mod: PBBFAC,,, | Performed by: FAMILY MEDICINE

## 2021-08-19 PROCEDURE — 81003 URINALYSIS AUTO W/O SCOPE: CPT | Mod: PO | Performed by: FAMILY MEDICINE

## 2021-08-19 PROCEDURE — 82306 VITAMIN D 25 HYDROXY: CPT | Performed by: NURSE PRACTITIONER

## 2021-08-19 PROCEDURE — 83036 HEMOGLOBIN GLYCOSYLATED A1C: CPT | Performed by: FAMILY MEDICINE

## 2021-08-19 PROCEDURE — 99215 OFFICE O/P EST HI 40 MIN: CPT | Mod: PBBFAC,PO | Performed by: FAMILY MEDICINE

## 2021-08-19 PROCEDURE — 99215 PR OFFICE/OUTPT VISIT, EST, LEVL V, 40-54 MIN: ICD-10-PCS | Mod: S$PBB,,, | Performed by: FAMILY MEDICINE

## 2021-08-19 PROCEDURE — 84443 ASSAY THYROID STIM HORMONE: CPT | Performed by: FAMILY MEDICINE

## 2021-08-19 PROCEDURE — 99999 PR PBB SHADOW E&M-EST. PATIENT-LVL V: ICD-10-PCS | Mod: PBBFAC,,, | Performed by: FAMILY MEDICINE

## 2021-08-19 PROCEDURE — 36415 COLL VENOUS BLD VENIPUNCTURE: CPT | Mod: PO | Performed by: NURSE PRACTITIONER

## 2021-08-19 PROCEDURE — 99215 OFFICE O/P EST HI 40 MIN: CPT | Mod: S$PBB,,, | Performed by: FAMILY MEDICINE

## 2021-08-19 PROCEDURE — 80053 COMPREHEN METABOLIC PANEL: CPT | Performed by: FAMILY MEDICINE

## 2021-08-19 RX ORDER — BISOPROLOL FUMARATE 10 MG/1
10 TABLET, FILM COATED ORAL DAILY
Qty: 90 TABLET | Refills: 4 | Status: SHIPPED | OUTPATIENT
Start: 2021-08-19 | End: 2024-03-11

## 2021-08-19 RX ORDER — CIPROFLOXACIN 500 MG/1
500 TABLET ORAL NIGHTLY
COMMUNITY
Start: 2021-08-02 | End: 2021-08-19

## 2021-08-19 RX ORDER — LEVOTHYROXINE SODIUM 75 UG/1
75 TABLET ORAL
Qty: 90 TABLET | Refills: 4 | Status: SHIPPED | OUTPATIENT
Start: 2021-08-19 | End: 2022-09-02

## 2021-08-19 RX ORDER — ESOMEPRAZOLE MAGNESIUM 40 MG/1
40 CAPSULE, DELAYED RELEASE ORAL
Qty: 180 CAPSULE | Refills: 1 | Status: SHIPPED | OUTPATIENT
Start: 2021-08-19 | End: 2022-05-11

## 2021-08-19 RX ORDER — GABAPENTIN 300 MG/1
300 CAPSULE ORAL
COMMUNITY
Start: 2021-07-31 | End: 2021-08-19

## 2021-08-19 RX ORDER — SUCRALFATE 1 G/1
1 TABLET ORAL
Qty: 120 TABLET | Refills: 0 | Status: SHIPPED | OUTPATIENT
Start: 2021-08-19 | End: 2023-05-09

## 2021-08-20 LAB
ESTIMATED AVG GLUCOSE: 91 MG/DL (ref 68–131)
HBA1C MFR BLD: 4.8 % (ref 4–5.6)

## 2021-08-23 ENCOUNTER — PATIENT MESSAGE (OUTPATIENT)
Dept: CARDIOLOGY | Facility: HOSPITAL | Age: 71
End: 2021-08-23

## 2021-08-26 ENCOUNTER — EXTERNAL HOME HEALTH (OUTPATIENT)
Dept: HOME HEALTH SERVICES | Facility: HOSPITAL | Age: 71
End: 2021-08-26
Payer: MEDICARE

## 2021-08-27 ENCOUNTER — HOSPITAL ENCOUNTER (OUTPATIENT)
Dept: CARDIOLOGY | Facility: HOSPITAL | Age: 71
Discharge: HOME | End: 2021-08-27
Attending: INTERNAL MEDICINE
Payer: MEDICARE

## 2021-08-27 DIAGNOSIS — I49.5 SSS (SICK SINUS SYNDROME): ICD-10-CM

## 2021-08-27 DIAGNOSIS — Z95.0 PACEMAKER: ICD-10-CM

## 2021-08-27 PROCEDURE — 93280 PM DEVICE PROGR EVAL DUAL: CPT | Mod: PO

## 2021-08-27 PROCEDURE — 93280 PM DEVICE PROGR EVAL DUAL: CPT | Mod: 26,,, | Performed by: INTERNAL MEDICINE

## 2021-08-27 PROCEDURE — 93280 CARDIAC DEVICE CHECK - IN CLINIC & HOSPITAL: ICD-10-PCS | Mod: 26,,, | Performed by: INTERNAL MEDICINE

## 2021-08-28 LAB
AV DELAY - LONGEST: 350 MSEC
AV DELAY - SHORTEST: 250 MSEC
IMPEDANCE RA LEAD (NATIVE): 560 OHMS
IMPEDANCE RA LEAD: 567 OHMS
OHS CV DC PP MS1: 0.5 MS
OHS CV DC PP MS2: 0.4 MS
OHS CV DC PP V1: 2 V
OHS CV DC PP V2: NORMAL V
P/R-WAVE RA LEAD (NATIVE): 10.5 MV
P/R-WAVE RA LEAD: 1.1 MV
THRESHOLD MS RA LEAD (NATIVE): 0.4 MS
THRESHOLD MS RA LEAD: 0.5 MS
THRESHOLD V RA LEAD (NATIVE): 1.2 V
THRESHOLD V RA LEAD: 1 V

## 2021-09-15 RX ORDER — ROPINIROLE 5 MG/1
TABLET, FILM COATED ORAL
Qty: 90 TABLET | Refills: 4 | Status: SHIPPED | OUTPATIENT
Start: 2021-09-15 | End: 2022-12-01

## 2021-09-15 RX ORDER — APIXABAN 5 MG/1
TABLET, FILM COATED ORAL
Qty: 180 TABLET | Refills: 4 | Status: SHIPPED | OUTPATIENT
Start: 2021-09-15 | End: 2022-12-01

## 2021-09-29 DIAGNOSIS — E11.9 TYPE 2 DIABETES MELLITUS WITHOUT COMPLICATION: ICD-10-CM

## 2021-10-06 ENCOUNTER — TELEPHONE (OUTPATIENT)
Dept: CARDIOLOGY | Facility: CLINIC | Age: 71
End: 2021-10-06

## 2021-10-10 PROCEDURE — G0179 PR HOME HEALTH MD RECERTIFICATION: ICD-10-PCS | Mod: ,,, | Performed by: FAMILY MEDICINE

## 2021-10-10 PROCEDURE — G0179 MD RECERTIFICATION HHA PT: HCPCS | Mod: ,,, | Performed by: FAMILY MEDICINE

## 2021-10-18 ENCOUNTER — EXTERNAL HOME HEALTH (OUTPATIENT)
Dept: HOME HEALTH SERVICES | Facility: HOSPITAL | Age: 71
End: 2021-10-18
Payer: MEDICARE

## 2021-11-04 ENCOUNTER — TELEPHONE (OUTPATIENT)
Dept: FAMILY MEDICINE | Facility: CLINIC | Age: 71
End: 2021-11-04
Payer: MEDICARE

## 2021-11-04 DIAGNOSIS — R29.6 FALLS FREQUENTLY: Primary | ICD-10-CM

## 2021-11-04 DIAGNOSIS — Z74.09 MOBILITY IMPAIRED: ICD-10-CM

## 2021-11-05 ENCOUNTER — DOCUMENT SCAN (OUTPATIENT)
Dept: HOME HEALTH SERVICES | Facility: HOSPITAL | Age: 71
End: 2021-11-05
Payer: MEDICARE

## 2021-11-10 ENCOUNTER — TELEPHONE (OUTPATIENT)
Dept: FAMILY MEDICINE | Facility: CLINIC | Age: 71
End: 2021-11-10
Payer: MEDICARE

## 2021-11-17 ENCOUNTER — TELEPHONE (OUTPATIENT)
Dept: FAMILY MEDICINE | Facility: CLINIC | Age: 71
End: 2021-11-17
Payer: MEDICARE

## 2021-11-22 ENCOUNTER — PATIENT OUTREACH (OUTPATIENT)
Dept: ADMINISTRATIVE | Facility: HOSPITAL | Age: 71
End: 2021-11-22
Payer: MEDICARE

## 2021-11-23 ENCOUNTER — HOSPITAL ENCOUNTER (OUTPATIENT)
Dept: CARDIOLOGY | Facility: HOSPITAL | Age: 71
Discharge: HOME | End: 2021-11-23
Attending: NURSE PRACTITIONER
Payer: MEDICARE

## 2021-11-23 VITALS — BODY MASS INDEX: 28.34 KG/M2 | WEIGHT: 166 LBS | HEIGHT: 64 IN

## 2021-11-23 DIAGNOSIS — M79.89 SWELLING OF LOWER LEG: ICD-10-CM

## 2021-11-23 DIAGNOSIS — M79.605 PAIN OF LEFT LOWER EXTREMITY: ICD-10-CM

## 2021-11-23 PROCEDURE — 93971 CV US DOPPLER VENOUS LEG LEFT (CUPID ONLY): ICD-10-PCS | Mod: 26,LT,, | Performed by: INTERNAL MEDICINE

## 2021-11-23 PROCEDURE — 93971 EXTREMITY STUDY: CPT | Mod: 26,LT,, | Performed by: INTERNAL MEDICINE

## 2021-11-23 PROCEDURE — 93971 EXTREMITY STUDY: CPT | Mod: PO,LT

## 2021-11-26 ENCOUNTER — TELEPHONE (OUTPATIENT)
Dept: FAMILY MEDICINE | Facility: CLINIC | Age: 71
End: 2021-11-26
Payer: MEDICARE

## 2021-11-26 ENCOUNTER — OFFICE VISIT (OUTPATIENT)
Dept: FAMILY MEDICINE | Facility: CLINIC | Age: 71
End: 2021-11-26
Payer: MEDICARE

## 2021-11-26 DIAGNOSIS — E55.9 VITAMIN D DEFICIENCY: ICD-10-CM

## 2021-11-26 DIAGNOSIS — Z79.899 ENCOUNTER FOR LONG-TERM (CURRENT) USE OF MEDICATIONS: Chronic | ICD-10-CM

## 2021-11-26 DIAGNOSIS — Z12.31 SCREENING MAMMOGRAM FOR BREAST CANCER: Primary | ICD-10-CM

## 2021-11-26 DIAGNOSIS — Z23 NEED FOR VACCINATION FOR STREP PNEUMONIAE: Primary | ICD-10-CM

## 2021-11-26 DIAGNOSIS — Z12.31 ENCOUNTER FOR SCREENING MAMMOGRAM FOR MALIGNANT NEOPLASM OF BREAST: ICD-10-CM

## 2021-11-26 DIAGNOSIS — I10 ESSENTIAL HYPERTENSION: Chronic | ICD-10-CM

## 2021-11-26 PROCEDURE — 99999 PR PBB SHADOW E&M-EST. PATIENT-LVL V: ICD-10-PCS | Mod: PBBFAC,,, | Performed by: STUDENT IN AN ORGANIZED HEALTH CARE EDUCATION/TRAINING PROGRAM

## 2021-11-26 PROCEDURE — 90732 PPSV23 VACC 2 YRS+ SUBQ/IM: CPT | Mod: PBBFAC,PO

## 2021-11-26 PROCEDURE — 99214 OFFICE O/P EST MOD 30 MIN: CPT | Mod: S$PBB,,, | Performed by: STUDENT IN AN ORGANIZED HEALTH CARE EDUCATION/TRAINING PROGRAM

## 2021-11-26 PROCEDURE — 99215 OFFICE O/P EST HI 40 MIN: CPT | Mod: PBBFAC,PO,25 | Performed by: STUDENT IN AN ORGANIZED HEALTH CARE EDUCATION/TRAINING PROGRAM

## 2021-11-26 PROCEDURE — 99999 PR PBB SHADOW E&M-EST. PATIENT-LVL V: CPT | Mod: PBBFAC,,, | Performed by: STUDENT IN AN ORGANIZED HEALTH CARE EDUCATION/TRAINING PROGRAM

## 2021-11-26 PROCEDURE — 99214 PR OFFICE/OUTPT VISIT, EST, LEVL IV, 30-39 MIN: ICD-10-PCS | Mod: S$PBB,,, | Performed by: STUDENT IN AN ORGANIZED HEALTH CARE EDUCATION/TRAINING PROGRAM

## 2021-11-26 PROCEDURE — G0009 ADMIN PNEUMOCOCCAL VACCINE: HCPCS | Mod: PBBFAC,PO

## 2021-11-26 RX ORDER — BENAZEPRIL HYDROCHLORIDE 40 MG/1
40 TABLET ORAL DAILY
Qty: 90 TABLET | Refills: 3 | Status: SHIPPED | OUTPATIENT
Start: 2021-11-26 | End: 2022-12-01

## 2021-11-26 RX ORDER — ERGOCALCIFEROL 1.25 MG/1
CAPSULE ORAL
Qty: 52 CAPSULE | Refills: 0 | Status: SHIPPED | OUTPATIENT
Start: 2021-11-26 | End: 2021-11-26

## 2021-11-26 RX ORDER — ERGOCALCIFEROL 1.25 MG/1
CAPSULE ORAL
Qty: 52 CAPSULE | Refills: 0 | Status: SHIPPED | OUTPATIENT
Start: 2021-11-26

## 2021-11-30 DIAGNOSIS — Z78.9 STATIN INTOLERANCE: ICD-10-CM

## 2021-12-01 ENCOUNTER — DOCUMENT SCAN (OUTPATIENT)
Dept: HOME HEALTH SERVICES | Facility: HOSPITAL | Age: 71
End: 2021-12-01
Payer: MEDICARE

## 2021-12-01 RX ORDER — RANOLAZINE 1000 MG/1
1000 TABLET, EXTENDED RELEASE ORAL 2 TIMES DAILY
Qty: 180 TABLET | Refills: 0 | Status: SHIPPED | OUTPATIENT
Start: 2021-12-01 | End: 2021-12-09 | Stop reason: SDUPTHER

## 2021-12-09 ENCOUNTER — OFFICE VISIT (OUTPATIENT)
Dept: CARDIOLOGY | Facility: CLINIC | Age: 71
End: 2021-12-09
Payer: MEDICARE

## 2021-12-09 VITALS
HEART RATE: 65 BPM | DIASTOLIC BLOOD PRESSURE: 73 MMHG | BODY MASS INDEX: 28.14 KG/M2 | SYSTOLIC BLOOD PRESSURE: 159 MMHG | WEIGHT: 164.81 LBS | OXYGEN SATURATION: 97 % | HEIGHT: 64 IN

## 2021-12-09 DIAGNOSIS — I25.810 CORONARY ARTERY DISEASE INVOLVING CORONARY BYPASS GRAFT OF NATIVE HEART WITHOUT ANGINA PECTORIS: ICD-10-CM

## 2021-12-09 DIAGNOSIS — F17.210 CIGARETTE SMOKER: ICD-10-CM

## 2021-12-09 DIAGNOSIS — Z95.0 PACEMAKER: ICD-10-CM

## 2021-12-09 DIAGNOSIS — I10 ESSENTIAL HYPERTENSION: ICD-10-CM

## 2021-12-09 DIAGNOSIS — Z78.9 STATIN INTOLERANCE: ICD-10-CM

## 2021-12-09 DIAGNOSIS — E11.51 TYPE 2 DIABETES MELLITUS WITH DIABETIC PERIPHERAL ANGIOPATHY WITHOUT GANGRENE, WITHOUT LONG-TERM CURRENT USE OF INSULIN: ICD-10-CM

## 2021-12-09 DIAGNOSIS — E78.00 PURE HYPERCHOLESTEROLEMIA: ICD-10-CM

## 2021-12-09 DIAGNOSIS — Z95.1 HX OF CABG: ICD-10-CM

## 2021-12-09 DIAGNOSIS — I49.5 SSS (SICK SINUS SYNDROME): ICD-10-CM

## 2021-12-09 DIAGNOSIS — Z09 HOSPITAL DISCHARGE FOLLOW-UP: Primary | ICD-10-CM

## 2021-12-09 DIAGNOSIS — E55.9 VITAMIN D DEFICIENCY: ICD-10-CM

## 2021-12-09 DIAGNOSIS — I48.0 PAROXYSMAL ATRIAL FIBRILLATION: ICD-10-CM

## 2021-12-09 DIAGNOSIS — I20.9 ANGINA PECTORIS: ICD-10-CM

## 2021-12-09 PROCEDURE — G0179 MD RECERTIFICATION HHA PT: HCPCS | Mod: ,,, | Performed by: FAMILY MEDICINE

## 2021-12-09 PROCEDURE — 99999 PR PBB SHADOW E&M-EST. PATIENT-LVL V: ICD-10-PCS | Mod: PBBFAC,,, | Performed by: NURSE PRACTITIONER

## 2021-12-09 PROCEDURE — 99215 OFFICE O/P EST HI 40 MIN: CPT | Mod: S$PBB,,, | Performed by: NURSE PRACTITIONER

## 2021-12-09 PROCEDURE — 99999 PR PBB SHADOW E&M-EST. PATIENT-LVL V: CPT | Mod: PBBFAC,,, | Performed by: NURSE PRACTITIONER

## 2021-12-09 PROCEDURE — 99215 PR OFFICE/OUTPT VISIT, EST, LEVL V, 40-54 MIN: ICD-10-PCS | Mod: S$PBB,,, | Performed by: NURSE PRACTITIONER

## 2021-12-09 PROCEDURE — 99215 OFFICE O/P EST HI 40 MIN: CPT | Mod: PBBFAC,PO | Performed by: NURSE PRACTITIONER

## 2021-12-09 PROCEDURE — G0179 PR HOME HEALTH MD RECERTIFICATION: ICD-10-PCS | Mod: ,,, | Performed by: FAMILY MEDICINE

## 2021-12-09 RX ORDER — RANOLAZINE 1000 MG/1
1000 TABLET, EXTENDED RELEASE ORAL 2 TIMES DAILY
Qty: 180 TABLET | Refills: 3 | Status: SHIPPED | OUTPATIENT
Start: 2021-12-09 | End: 2022-01-14 | Stop reason: SDUPTHER

## 2021-12-10 DIAGNOSIS — Z95.1 HX OF CABG: ICD-10-CM

## 2021-12-10 DIAGNOSIS — Z78.9 STATIN INTOLERANCE: Primary | ICD-10-CM

## 2021-12-10 DIAGNOSIS — E78.00 PURE HYPERCHOLESTEROLEMIA: ICD-10-CM

## 2021-12-10 DIAGNOSIS — I25.810 CORONARY ARTERY DISEASE INVOLVING CORONARY BYPASS GRAFT OF NATIVE HEART WITHOUT ANGINA PECTORIS: ICD-10-CM

## 2021-12-10 RX ORDER — EVOLOCUMAB 140 MG/ML
140 INJECTION, SOLUTION SUBCUTANEOUS
Qty: 6 ML | Refills: 3 | Status: SHIPPED | OUTPATIENT
Start: 2021-12-10 | End: 2023-03-16 | Stop reason: SDUPTHER

## 2021-12-16 ENCOUNTER — EXTERNAL HOME HEALTH (OUTPATIENT)
Dept: HOME HEALTH SERVICES | Facility: HOSPITAL | Age: 71
End: 2021-12-16
Payer: MEDICARE

## 2021-12-17 ENCOUNTER — SPECIALTY PHARMACY (OUTPATIENT)
Dept: PHARMACY | Facility: CLINIC | Age: 71
End: 2021-12-17
Payer: MEDICARE

## 2021-12-22 ENCOUNTER — SPECIALTY PHARMACY (OUTPATIENT)
Dept: PHARMACY | Facility: CLINIC | Age: 71
End: 2021-12-22
Payer: MEDICARE

## 2021-12-23 VITALS
SYSTOLIC BLOOD PRESSURE: 180 MMHG | HEIGHT: 64 IN | DIASTOLIC BLOOD PRESSURE: 62 MMHG | TEMPERATURE: 98 F | BODY MASS INDEX: 28.49 KG/M2

## 2022-01-03 DIAGNOSIS — I25.810 CORONARY ARTERY DISEASE INVOLVING CORONARY BYPASS GRAFT OF NATIVE HEART WITHOUT ANGINA PECTORIS: Primary | ICD-10-CM

## 2022-01-03 DIAGNOSIS — Z95.0 PACEMAKER: ICD-10-CM

## 2022-01-03 DIAGNOSIS — R55 SYNCOPE AND COLLAPSE: ICD-10-CM

## 2022-01-03 DIAGNOSIS — I49.5 SSS (SICK SINUS SYNDROME): ICD-10-CM

## 2022-01-04 DIAGNOSIS — Z79.899 ENCOUNTER FOR LONG-TERM (CURRENT) USE OF MEDICATIONS: Chronic | ICD-10-CM

## 2022-01-04 NOTE — TELEPHONE ENCOUNTER
No new care gaps identified.  Powered by [employer]. Reference number: [id number].   1/04/2022 4:32:03 PM CST

## 2022-01-05 RX ORDER — GABAPENTIN 600 MG/1
600 TABLET ORAL 3 TIMES DAILY
COMMUNITY
End: 2022-01-05 | Stop reason: SDUPTHER

## 2022-01-05 RX ORDER — PRAMIPEXOLE DIHYDROCHLORIDE 0.5 MG/1
0.5 TABLET ORAL DAILY
Qty: 90 TABLET | Refills: 3 | Status: SHIPPED | OUTPATIENT
Start: 2022-01-05 | End: 2023-03-10

## 2022-01-05 RX ORDER — ALENDRONATE SODIUM 70 MG/1
TABLET ORAL
Qty: 12 TABLET | Refills: 4 | Status: SHIPPED | OUTPATIENT
Start: 2022-01-05 | End: 2023-03-10

## 2022-01-05 RX ORDER — TEMAZEPAM 30 MG/1
CAPSULE ORAL
Qty: 30 CAPSULE | Refills: 5 | Status: SHIPPED | OUTPATIENT
Start: 2022-01-05 | End: 2022-09-02

## 2022-01-05 RX ORDER — GABAPENTIN 600 MG/1
600 TABLET ORAL 3 TIMES DAILY
Qty: 180 TABLET | Refills: 4 | Status: SHIPPED | OUTPATIENT
Start: 2022-01-05 | End: 2023-03-10

## 2022-01-05 NOTE — TELEPHONE ENCOUNTER
No new care gaps identified.  Powered by [employer]. Reference number: [id number].   1/05/2022 1:08:00 PM CST

## 2022-01-13 NOTE — PROGRESS NOTES
Subjective:    Patient ID:  Kalpana Wright is a 71 y.o. female who presents for follow-up of hospital discharge.      HPI: Ms. Kalpana Wright presents to the clinic for follow up of HTN. She stated that she is doing pretty well. She denied any chest pain or shortness of breath or dyspnea on exertion.  She denied any palpitations, orthopnea, PND, or edema. She stated that she is taking amlodipine, bisoprolol, and benazepril. She stated that when she takes hydralazine she has gagging and vomiting, so she has not been taking it very often.  She stated that she received repatha prescription and has had one dose since receiving it. BP log from [facility]: BP stable; she thinks that she didn't have hydralazine when these readings were taken.  She is taking vitamin D once weekly as directed by Dr. Woodward.    She has Ultracell.   She is observing COVID precautions.  She has transportation issues right now; her daughter is using her vehicle until 1:30 in afternoons.    She stated that she snores at night all the time for many years. No daytime somnolence.    --------------------------------------------------------  Hx: she was admitted to [facility] on November 17, 2021 and discharged on November 19, 2021.  She was apparently found in her car poorly responsive.  She was found by a bystander at primary care clinic.  She was found to be hypotensive in the clinic with a blood pressure of 80/50.  EMS was called; they also noted hypotension at 98/49; she was taken to [facility] and found to be dehydrated.  All antihypertensive medications were held and she was given IV hydration.  CT of the brain was negative.  Troponin was negative x2; EKG was negative according to the note.  His hold and reports that they did interrogate pacemaker during hospitalization but she does not know the results.  I have not found a reference to and interrogation report in the record.  Ms. Wright does not know if she simply fell asleep or she  passed out or what happened.  She had been taking Lasix daily and that has been stopped.  She has seen primary care in follow-up and was told to restart her benazepril.  Ms. Wright reports that she is taking benazepril, hydralazine, and bisoprolol.  She is not taking amlodipine or furosemide.  ---------------------------------------------------------    History:  CAD, CABG and PCI to graft, PVD, PAF, SSS, PPM, HTN, HLP.        Medications: she is taking Brilinta, and Eliquis. Takes lasix prn- last dose on January 9, 2022. She stated that she is taking bisoprolol, benazepril, amlodipine, She stated that she does not take hydralazine regularly due to gagging on it and vomiting. Taking Imdur BID. Has been out of ranexa for a month.  Sodium: she does not add salt to foods when cooking;  she is not reading labels for sodium content. Denied eating salty foods. Recently had cashews from a can  Diet: She denied eating convenience foods. Cooks from scratch. She eats ice all day and night. She cut back on sodas and portion sizes. Yesterday: Doesn't recall what she ate yesterday.  Lemonade. Drinks sweet tea. Typically eats one meal/day.  Exercise: she she stated that she walks in her yard about 1 or 2 days. She collects fishing worms to sell.   Tobacco: smoking cigarettes; one pack lasts 4 days-about 2 cigarettes/day. She smokes half a cigarette at a time. She has been leaving her cigarettes in her room. Stress and seeing people smoke are triggers.   Alcohol: no alcohol use     Weight: 77.2 kg (170 lb 1.6 oz) she states that her daily weights has been stable.Body mass index is 29.2 kg/m².  177# 9 ounces in April 2021.  Wt Readings from Last 3 Encounters:   01/14/22 77.2 kg (170 lb 1.6 oz)   12/09/21 74.8 kg (164 lb 12.8 oz)   11/23/21 75.3 kg (166 lb)     BP log: None. She does not have a machine at home. Has home health.    Review of Systems   Constitutional: Negative for chills, decreased appetite, fever, malaise/fatigue,  night sweats and weight gain.   HENT: Negative for congestion.    Cardiovascular: Negative for chest pain, claudication, cyanosis, dyspnea on exertion, irregular heartbeat, leg swelling, near-syncope, orthopnea, palpitations, paroxysmal nocturnal dyspnea and syncope.   Respiratory: Negative for cough, hemoptysis, shortness of breath, sputum production and wheezing.    Hematologic/Lymphatic: Negative for adenopathy and bleeding problem. Bruises/bleeds easily.   Skin: Negative for color change and nail changes.   Musculoskeletal: Positive for joint pain (chronic pain in the knees due to arthritis.).   Gastrointestinal: Negative for bloating, abdominal pain, change in bowel habit, heartburn, hematochezia, melena, nausea and vomiting.   Genitourinary: Negative for hematuria.   Neurological: Negative for dizziness and light-headedness.   Psychiatric/Behavioral: Negative for altered mental status.       Objective:   Physical Exam  Constitutional:       General: She is not in acute distress.     Appearance: She is well-developed. She is not diaphoretic.   HENT:      Head: Normocephalic and atraumatic.   Eyes:      General: No scleral icterus.     Conjunctiva/sclera: Conjunctivae normal.   Neck:      Thyroid: No thyromegaly.      Vascular: No JVD.      Trachea: No tracheal deviation.   Cardiovascular:      Rate and Rhythm: Normal rate and regular rhythm.      Pulses: Intact distal pulses.      Heart sounds: Normal heart sounds. No murmur heard.  No friction rub. No gallop.    Pulmonary:      Effort: Pulmonary effort is normal. No respiratory distress.      Breath sounds: Wheezing (expiratory wheezes heard anteriorly, bilaterally.) present. No rales.   Chest:      Chest wall: No tenderness.   Abdominal:      General: Bowel sounds are normal. There is no distension.      Palpations: Abdomen is soft. There is no mass.      Tenderness: There is no abdominal tenderness. There is no guarding or rebound.      Comments: Abdomen  obese.   Musculoskeletal:         General: No swelling. Normal range of motion.      Cervical back: Neck supple.      Right lower leg: No edema.      Left lower leg: No edema.      Comments: Ambulatory with a steady gait in hallway.   Lymphadenopathy:      Cervical: No cervical adenopathy.   Skin:     General: Skin is warm and dry.      Coloration: Skin is not pale.      Findings: No erythema or rash.      Comments: [place].   Neurological:      Mental Status: She is alert and oriented to person, place, and time.       Results for MARJORIE VARGAS (MRN [id number]) as of 12/9/2021 17:01   Ref. Range 8/19/2021 14:58 8/19/2021 14:58 11/23/2021 09:34   Sodium Latest Ref Range: 136 - 145 mmol/L 141 141    Potassium Latest Ref Range: 3.5 - 5.1 mmol/L 3.8 3.8    Chloride Latest Ref Range: 95 - 110 mmol/L 102 102    CO2 Latest Ref Range: 23 - 29 mmol/L 28 28    Anion Gap Latest Ref Range: 8 - 16 mmol/L 11 11    BUN Latest Ref Range: 8 - 23 mg/dL 15 15    Creatinine Latest Ref Range: 0.5 - 1.4 mg/dL 0.8 0.8    eGFR if non African American Latest Ref Range: >60 mL/min/1.73 m^2 >60.0 >60.0    eGFR if African American Latest Ref Range: >60 mL/min/1.73 m^2 >60.0 >60.0    Glucose Latest Ref Range: 70 - 110 mg/dL 90 90    Calcium Latest Ref Range: 8.7 - 10.5 mg/dL 9.4 9.4    Alkaline Phosphatase Latest Ref Range: 55 - 135 U/L 103     PROTEIN TOTAL Latest Ref Range: 6.0 - 8.4 g/dL 8.0     Albumin Latest Ref Range: 3.5 - 5.2 g/dL 3.5     BILIRUBIN TOTAL Latest Ref Range: 0.1 - 1.0 mg/dL 0.3     AST Latest Ref Range: 10 - 40 U/L 14     ALT Latest Ref Range: 10 - 44 U/L 9 (L)     Triglycerides Latest Ref Range: 30 - 150 mg/dL 92     Cholesterol Latest Ref Range: 120 - 199 mg/dL 180     HDL Latest Ref Range: 40 - 75 mg/dL 61     HDL/Cholesterol Ratio Latest Ref Range: 20.0 - 50.0 % 33.9     LDL Cholesterol External Latest Ref Range: 63.0 - 159.0 mg/dL 100.6     Non-HDL Cholesterol Latest Units: mg/dL 119     Total Cholesterol/HDL Ratio Latest  Ref Range: 2.0 - 5.0  3.0     Vit D, 25-Hydroxy Latest Ref Range: 30 - 96 ng/mL 20 (L)  17 (L)   Hemoglobin A1C External Latest Ref Range: 4.0 - 5.6 % 4.8     Estimated Avg Glucose Latest Ref Range: 68 - 131 mg/dL 91     TSH Latest Ref Range: 0.400 - 4.000 uIU/mL 3.658 3.658        Results for MARJORIE VARGAS (MRN [id number]) as of 1/6/2021 09:26   Ref. Range 10/21/2020 08:46   Sodium Latest Ref Range: 136 - 145 mmol/L 141   Potassium Latest Ref Range: 3.5 - 5.1 mmol/L 4.0   Chloride Latest Ref Range: 95 - 110 mmol/L 103   CO2 Latest Ref Range: 23 - 29 mmol/L 29   Anion Gap Latest Ref Range: 8 - 16 mmol/L 9   BUN Latest Ref Range: 8 - 23 mg/dL 19   Creatinine Latest Ref Range: 0.5 - 1.4 mg/dL 0.8   eGFR if non African American Latest Ref Range: >60 mL/min/1.73 m^2 >60.0   eGFR if African American Latest Ref Range: >60 mL/min/1.73 m^2 >60.0   Glucose Latest Ref Range: 70 - 110 mg/dL 78   Calcium Latest Ref Range: 8.7 - 10.5 mg/dL 9.0   Phosphorus Latest Ref Range: 2.7 - 4.5 mg/dL 3.4   Albumin Latest Ref Range: 3.5 - 5.2 g/dL 3.0 (L)   Uric Acid Latest Ref Range: 2.4 - 5.7 mg/dL 5.7     Pacemaker interrogation 1/14/22:   19 nonsustained VT-6 beat run on 12/27/21  Atrial tachycardia; atrial flutter. AF burden <1%  Battery longevity 1/5 years.  Tried to look for arrhythmia in November for syncopal episode, but potential November events not available in memory.    Pacemaker interrogation 08/27/2021:Since 2/3/2021:  AMS x 14 - appear to be AT episodes, avg V rate 60-80s, longest was 6 sec on 7/6/21; most recent 8/24/2021.  HVR x 29 - available EGMs c/w brief AT, longest was 9 sec on 8/22/2021.     PPM interrogation 2/3/2021:  Estimated longevity: 2-3 year(s).   Mode Switch: No  Nonsustained VT: No     PPM interrogation 8/17/2020: Device Comments  Wound check comments:   Chronic site.  General comments:   Patient without complaints.[employer] interrogation by Norm.  Nurses's comments:   Patient without complaints.Had  2-3 atrial tachycardia rate 160.No symptoms verbalized.     [facility] [place] 9/6/2020: Done due to chest pain and NSTEMI  SUMMARY OF PROCEDURE:     1. Left coronary angiogram.     2. Saphenous vein graft angiogram.     3. Left heart catheterization.     4. Conscious sedation.     5. IVUS of the left main.     6. Rota to the left main.     7. PCI with drug-eluting stent to the left main.  ASSESSMENT AND PLAN:     1. Severe coronary artery disease as outlined above.     2. Recurrent restenosis of the vein graft to the OM with multiple   layers of stents and suboptimal result with balloon angioplasty.     3. Rotablation to the ostial left main with a drug-eluting stent using   5.0 x 15 Resolute [place], postdilated with a 5.0 balloon at high pressure   with excellent angiographic and intravascular ultrasound-guided results.     4. The patient will be kept on dual antiplatelets.  The patient will be   followed thoroughly.  Further recommendation will be dictated.  Mo Estrada MD    [facility] [place] 11/19/21: Interpretation Summary   Definity contrast used to eval EF.   Ejection Fraction = 60-65%.   There is mild concentric left ventricular hypertrophy.   Grade 1 Diastolic Dysfunction   The left atrium is mildly dilated.   There is mild mitral regurgitation.   There is mild tricuspid regurgitation.       [facility] [place] 8/30/2020:Interpretation Summary  Contrast injection was performed.  Ejection Fraction = 60-65%.  There is mild mitral regurgitation.  Contrast injection was performed.     [facility] 2/19/2018:CONCLUSIONS     1 - Concentric hypertrophy.     2 - No wall motion abnormalities.     3 - Normal left ventricular systolic function (EF 60-65%).     4 - Normal left ventricular diastolic function.     5 - Normal right ventricular systolic function .     6 - The estimated PA systolic pressure is 24 mmHg.      Carotid U/S at [place] 07/30/2021: IMPRESSION:    1.  Bilateral carotid atheromatous plaquing. There  is less than 50% diameter reduction.    2.  High-grade stenosis within the left external carotid artery, unlikely to be of clinical significance.     Venous U/S  Left leg 11/23/21: No DVT in the LLE.       Assessment:      1. Coronary artery disease involving coronary bypass graft of native heart without angina pectoris    2. Hx of CABG    3. Pacemaker    4. SSS (sick sinus syndrome)    5. Paroxysmal atrial fibrillation    6. Essential hypertension    7. Pure hypercholesterolemia    8. Vitamin D deficiency    9. Cigarette smoker    10. Type 2 diabetes mellitus with diabetic peripheral angiopathy without gangrene, without long-term current use of insulin    11. Angina pectoris        Plan:     Coronary artery disease involving coronary bypass graft of native heart without angina pectoris  -     ranolazine (RANEXA) 1,000 mg Tb12; Take 1 tablet (1,000 mg total) by mouth 2 (two) times daily.  Dispense: 180 tablet; Refill: 3    Hx of CABG  -     ranolazine (RANEXA) 1,000 mg Tb12; Take 1 tablet (1,000 mg total) by mouth 2 (two) times daily.  Dispense: 180 tablet; Refill: 3    Pacemaker    SSS (sick sinus syndrome)    Paroxysmal atrial fibrillation    Essential hypertension    Pure hypercholesterolemia    Vitamin D deficiency    Cigarette smoker    Type 2 diabetes mellitus with diabetic peripheral angiopathy without gangrene, without long-term current use of insulin    Angina pectoris  -     ranolazine (RANEXA) 1,000 mg Tb12; Take 1 tablet (1,000 mg total) by mouth 2 (two) times daily.  Dispense: 180 tablet; Refill: 3      Continue benazepril 40 mg p.o. q.day, bisoprolol 10 mg p.o. q.day, and amlodipine 10mg QD -hypertension.  Stop hydralazine. Can use for persistently elevated BP. Instructed to call for persistently elevated BP. She verbalized her understanding of this.  BP goal <130/80 if she is able to tolerate. If not, will adjust to <140/85.  Furosemide prn. Avoid dehydration.  Continue apixaban, bisoprolol -  PAF  Continue brilinta - CAD.  Sodium restriction strongly encouraged.  Get vital signs from [facility] in two weeks.  Continue imdur and ranexa - angina/CAD. Refilled ranexa.  PPM clinic. F/U in 6 months.  Tobacco cessation counseling-continue using delay and distraction techniques; put cigarettes out of reach.  Reduce number of cigarettes by 1-2/day. She does not want to participate in tobacco cessation program.  Encouraged daily movement, such as chair exercises and walking if steady on feet.    Continue vitamin-D supplement.  Follow up in 2 months or call sooner for any problems.  35 minutes spent in counseling time and chart review.  Celia Whaley NP  [facility]

## 2022-01-14 ENCOUNTER — OFFICE VISIT (OUTPATIENT)
Dept: CARDIOLOGY | Facility: CLINIC | Age: 72
End: 2022-01-14
Payer: MEDICARE

## 2022-01-14 ENCOUNTER — HOSPITAL ENCOUNTER (OUTPATIENT)
Dept: CARDIOLOGY | Facility: HOSPITAL | Age: 72
Discharge: HOME | End: 2022-01-14
Attending: INTERNAL MEDICINE
Payer: MEDICARE

## 2022-01-14 VITALS
SYSTOLIC BLOOD PRESSURE: 146 MMHG | DIASTOLIC BLOOD PRESSURE: 70 MMHG | HEIGHT: 64 IN | OXYGEN SATURATION: 96 % | HEART RATE: 81 BPM | BODY MASS INDEX: 29.05 KG/M2 | WEIGHT: 170.13 LBS

## 2022-01-14 DIAGNOSIS — I48.0 PAROXYSMAL ATRIAL FIBRILLATION: ICD-10-CM

## 2022-01-14 DIAGNOSIS — E11.51 TYPE 2 DIABETES MELLITUS WITH DIABETIC PERIPHERAL ANGIOPATHY WITHOUT GANGRENE, WITHOUT LONG-TERM CURRENT USE OF INSULIN: ICD-10-CM

## 2022-01-14 DIAGNOSIS — I49.5 SSS (SICK SINUS SYNDROME): ICD-10-CM

## 2022-01-14 DIAGNOSIS — Z95.0 PACEMAKER: ICD-10-CM

## 2022-01-14 DIAGNOSIS — I10 ESSENTIAL HYPERTENSION: ICD-10-CM

## 2022-01-14 DIAGNOSIS — I20.9 ANGINA PECTORIS: ICD-10-CM

## 2022-01-14 DIAGNOSIS — E55.9 VITAMIN D DEFICIENCY: ICD-10-CM

## 2022-01-14 DIAGNOSIS — R55 SYNCOPE AND COLLAPSE: ICD-10-CM

## 2022-01-14 DIAGNOSIS — I25.810 CORONARY ARTERY DISEASE INVOLVING CORONARY BYPASS GRAFT OF NATIVE HEART WITHOUT ANGINA PECTORIS: ICD-10-CM

## 2022-01-14 DIAGNOSIS — E78.00 PURE HYPERCHOLESTEROLEMIA: ICD-10-CM

## 2022-01-14 DIAGNOSIS — F17.210 CIGARETTE SMOKER: ICD-10-CM

## 2022-01-14 DIAGNOSIS — I25.810 CORONARY ARTERY DISEASE INVOLVING CORONARY BYPASS GRAFT OF NATIVE HEART WITHOUT ANGINA PECTORIS: Primary | ICD-10-CM

## 2022-01-14 DIAGNOSIS — Z95.1 HX OF CABG: ICD-10-CM

## 2022-01-14 LAB
AV DELAY - LONGEST: 350 MSEC
AV DELAY - SHORTEST: 250 MSEC
IMPEDANCE RA LEAD (NATIVE): 584 OHMS
IMPEDANCE RA LEAD: 565 OHMS
OHS CV DC PP MS1: 0.5 MS
OHS CV DC PP MS2: 0.4 MS
OHS CV DC PP V1: 2 V
OHS CV DC PP V2: NORMAL V
P/R-WAVE RA LEAD (NATIVE): 19.5 MV
P/R-WAVE RA LEAD: 1.5 MV
THRESHOLD MS RA LEAD (NATIVE): 0.4 MS
THRESHOLD MS RA LEAD: 0.5 MS
THRESHOLD V RA LEAD (NATIVE): 0.9 V
THRESHOLD V RA LEAD: 0.9 V

## 2022-01-14 PROCEDURE — 93280 PM DEVICE PROGR EVAL DUAL: CPT | Mod: 26,,, | Performed by: INTERNAL MEDICINE

## 2022-01-14 PROCEDURE — 99214 PR OFFICE/OUTPT VISIT, EST, LEVL IV, 30-39 MIN: ICD-10-PCS | Mod: S$PBB,,, | Performed by: NURSE PRACTITIONER

## 2022-01-14 PROCEDURE — 93280 CARDIAC DEVICE CHECK - IN CLINIC & HOSPITAL: ICD-10-PCS | Mod: 26,,, | Performed by: INTERNAL MEDICINE

## 2022-01-14 PROCEDURE — 99215 OFFICE O/P EST HI 40 MIN: CPT | Mod: PBBFAC,PO | Performed by: NURSE PRACTITIONER

## 2022-01-14 PROCEDURE — 93280 PM DEVICE PROGR EVAL DUAL: CPT | Mod: PO

## 2022-01-14 PROCEDURE — 99999 PR PBB SHADOW E&M-EST. PATIENT-LVL V: ICD-10-PCS | Mod: PBBFAC,,, | Performed by: NURSE PRACTITIONER

## 2022-01-14 PROCEDURE — 99999 PR PBB SHADOW E&M-EST. PATIENT-LVL V: CPT | Mod: PBBFAC,,, | Performed by: NURSE PRACTITIONER

## 2022-01-14 PROCEDURE — 99214 OFFICE O/P EST MOD 30 MIN: CPT | Mod: S$PBB,,, | Performed by: NURSE PRACTITIONER

## 2022-01-14 RX ORDER — RANOLAZINE 1000 MG/1
1000 TABLET, EXTENDED RELEASE ORAL 2 TIMES DAILY
Qty: 180 TABLET | Refills: 3 | Status: SHIPPED | OUTPATIENT
Start: 2022-01-14 | End: 2023-03-14 | Stop reason: SDUPTHER

## 2022-01-20 ENCOUNTER — SPECIALTY PHARMACY (OUTPATIENT)
Dept: PHARMACY | Facility: CLINIC | Age: 72
End: 2022-01-20
Payer: MEDICARE

## 2022-01-20 NOTE — TELEPHONE ENCOUNTER
Specialty Pharmacy - Refill Coordination    Specialty Medication Orders Linked to Encounter    Flowsheet Row Most Recent Value   Medication #1 evolocumab (REPATHA SURECLICK) 140 mg/mL PnIj (Order#[id number], Rx#[id number])          Refill Questions - Documented Responses    Flowsheet Row Most Recent Value   Patient Availability and HIPAA Verification    Does patient want to proceed with activity? Yes   HIPAA/medical authority confirmed? Yes   Relationship to patient of person spoken to? Self   Refill Screening Questions    Would patient like to speak to a pharmacist? No   When does the patient need to receive the medication? 01/26/22   Refill Delivery Questions    How will the patient receive the medication? Delivery Danay   When does the patient need to receive the medication? 01/26/22   Shipping Address Home   Address in [facility] confirmed and updated if neccessary? Yes   Expected Copay ($) 4   Is the patient able to afford the medication copay? Yes   Days supply of Refill 28   Supplies needed? No supplies needed   Refill activity completed? Yes   Refill activity plan Refill scheduled   Shipment/Pickup Date: 01/25/22          Current Outpatient Medications   Medication Sig    albuterol-ipratropium (DUO-NEB) 2.5 mg-0.5 mg/3 mL nebulizer solution Take 3 mLs by nebulization every 6 (six) hours while awake.    alendronate (FOSAMAX) 70 MG tablet TAKE 1 TABLET BY MOUTH EVERY 7 DAYS AS DIRECTED.    amitriptyline (ELAVIL) 75 MG tablet Take 1 tablet (75 mg total) by mouth nightly as needed for Insomnia or Pain.    amLODIPine (NORVASC) 10 MG tablet Take 1 tablet (10 mg total) by mouth once daily. (Patient not taking: Reported on 12/9/2021)    benazepriL (LOTENSIN) 40 MG tablet Take 1 tablet (40 mg total) by mouth once daily.    bisoprolol (ZEBETA) 10 MG tablet Take 1 tablet (10 mg total) by mouth once daily.    BRILINTA 90 mg tablet TAKE ONE TABLET BY MOUTH TWICE DAILY    chlorhexidine (PERIDEX) 0.12 %  solution 15 mLs.    cyclobenzaprine (FLEXERIL) 10 MG tablet TAKE ONE TABLET BY MOUTH TWICE DAILY    ELIQUIS 5 mg Tab TAKE ONE TABLET BY MOUTH TWICE DAILY    ergocalciferol (ERGOCALCIFEROL) 50,000 unit Cap Vitamin D2 1,250 mcg (50,000 unit) capsule   Take 1 capsule every week by oral route.    esomeprazole (NEXIUM) 40 MG capsule Take 1 capsule (40 mg total) by mouth 2 (two) times daily before meals.    evolocumab (REPATHA SURECLICK) 140 mg/mL PnIj Inject 1 mL (140 mg total) into the skin every 14 (fourteen) days.    fluticasone/umeclidin/vilanter (TRELEGY ELLIPTA INHL) Inhale 1 Inhaler into the lungs once daily.    furosemide (LASIX) 40 MG tablet Take 80 mg in the am and 80 mg BID x 7 days,then take 80 mg in am and 40 mg in the PM thereafter. (Patient not taking: No sig reported)    gabapentin (NEURONTIN) 600 MG tablet Take 1 tablet (600 mg total) by mouth 3 (three) times daily.    hydrALAZINE (APRESOLINE) 25 MG tablet Take 1 tablet (25 mg total) by mouth 2 (two) times daily.    HYDROcodone-acetaminophen (NORCO)  mg per tablet Take 1 tablet by mouth 2 (two) times daily as needed.    isosorbide mononitrate (IMDUR) 30 MG 24 hr tablet TAKE  ONE TABLET BY MOUTH DAILY    levothyroxine (SYNTHROID) 75 MCG tablet Take 1 tablet (75 mcg total) by mouth before breakfast.    miconazole NITRATE 2 % (ZEASORB, MICONAZOLE,) 2 % top powder Apply topically as needed for Itching. (Patient not taking: No sig reported)    mupirocin (BACTROBAN) 2 % ointment mupirocin 2 % topical ointment    nitroGLYCERIN (NITROSTAT) 0.4 MG SL tablet Place 1 tablet (0.4 mg total) under the tongue every 5 (five) minutes as needed for Chest pain.    potassium chloride (MICRO-K) 10 MEQ CpSR Take 2 capsules in am and 1 in the evening; if taking additional lasix, will need to take one additional potassium capsule in the evening    pramipexole (MIRAPEX) 0.5 MG tablet Take 1 tablet (0.5 mg total) by mouth once daily.    promethazine  "(PHENERGAN) 25 MG tablet Take 1 tablet (25 mg total) by mouth every 6 (six) hours as needed.    ranolazine (RANEXA) 1,000 mg Tb12 Take 1 tablet (1,000 mg total) by mouth 2 (two) times daily.    rOPINIRole (REQUIP) 5 MG tablet TAKE ONE TABLET BY MOUTH DAILY    sucralfate (CARAFATE) 1 gram tablet Take 1 tablet (1 g total) by mouth 4 (four) times daily before meals and nightly.    temazepam (RESTORIL) 30 mg capsule TAKE 1 CAPSULE (30 MG TOTAL) BY MOUTH NIGHTLY AS NEEDED FOR INSOMNIA.    umeclidinium-vilanteroL (ANORO ELLIPTA) 62.5-25 mcg/actuation DsDv Inhale 1 puff into the lungs once daily. Controller    VENTOLIN HFA 90 mcg/actuation inhaler Inhale 2 puff(s) every 4 hours by inhalation route.   Last reviewed on 1/14/2022  1:35 PM by Celia Whaley NP    Review of patient's allergies indicates:   Allergen Reactions    Atorvastatin Other (See Comments)     Severe muscle pain  Other reaction(s): Abdominal Pain  SEVERE MYALGIAS      Azithromycin Other (See Comments)     By shot, closed veins and made large bruises  By shot, closed veins and made large bruises      Latex, natural rubber Rash     "TOURNIQUET ON LEG LEFT HUGE BLISTER THAT TOOK 9 MONTHS OF WOUND CARE TO HEAL."    Meperidine Hives and Anaphylaxis     Other reaction(s): Anaphylaxis  Hives (skin)^  Hives (skin)^      Penicillins Anaphylaxis and Hives     Other reaction(s): Anaphylaxis  Shortness of breath^swelling  Shortness of breath and swelling  Anaphylaxis  Shortness of breath^swelling      Tofacitinib Rash and Swelling     Rash and swelling of face      Lorazepam Anxiety and Other (See Comments)     Made me goofy  CAUSED CONFUSION "Made me goofy."      Pravastatin Other (See Comments)     Severe myalgias.  Cramps/Severe myalgias.    Flu vac 2018 [id number](pf)      Other reaction(s): Other (See Comments)  Flip into aFIB    Nystatin Rash    Pepper (genus capsicum) Other (See Comments)     Reflux and ulcers    Last reviewed on  1/14/2022 " 1:35 PM by Celia Whaley      Tasks added this encounter   No tasks added.   Tasks due within next 3 months   1/18/2022 - Refill Call (Auto Added)     [facility]  [address] [facility] [address]  Phone: [phone]  Fax: [phone]

## 2022-02-07 PROCEDURE — G0179 PR HOME HEALTH MD RECERTIFICATION: ICD-10-PCS | Mod: ,,, | Performed by: FAMILY MEDICINE

## 2022-02-07 PROCEDURE — G0179 MD RECERTIFICATION HHA PT: HCPCS | Mod: ,,, | Performed by: FAMILY MEDICINE

## 2022-02-16 ENCOUNTER — SPECIALTY PHARMACY (OUTPATIENT)
Dept: PHARMACY | Facility: CLINIC | Age: 72
End: 2022-02-16
Payer: MEDICARE

## 2022-02-16 NOTE — TELEPHONE ENCOUNTER
Specialty Pharmacy - Refill Coordination    Specialty Medication Orders Linked to Encounter    Flowsheet Row Most Recent Value   Medication #1 evolocumab (REPATHA SURECLICK) 140 mg/mL PnIj (Order#[id number], Rx#[id number])          Refill Questions - Documented Responses    Flowsheet Row Most Recent Value   Patient Availability and HIPAA Verification    Does patient want to proceed with activity? Yes   HIPAA/medical authority confirmed? Yes   Relationship to patient of person spoken to? Self   Refill Screening Questions    Would patient like to speak to a pharmacist? No   When does the patient need to receive the medication? 02/15/22   Refill Delivery Questions    How will the patient receive the medication? Delivery Danay   When does the patient need to receive the medication? 02/15/22   Shipping Address Home   Address in [facility] confirmed and updated if neccessary? Yes   Expected Copay ($) 4   Is the patient able to afford the medication copay? Yes   Payment Method CC on file   Days supply of Refill 28   Supplies needed? No supplies needed   Refill activity completed? Yes   Refill activity plan Refill scheduled   Shipment/Pickup Date: 02/17/22          Current Outpatient Medications   Medication Sig    albuterol-ipratropium (DUO-NEB) 2.5 mg-0.5 mg/3 mL nebulizer solution Take 3 mLs by nebulization every 6 (six) hours while awake.    alendronate (FOSAMAX) 70 MG tablet TAKE 1 TABLET BY MOUTH EVERY 7 DAYS AS DIRECTED.    amitriptyline (ELAVIL) 75 MG tablet Take 1 tablet (75 mg total) by mouth nightly as needed for Insomnia or Pain.    amLODIPine (NORVASC) 10 MG tablet Take 1 tablet (10 mg total) by mouth once daily. (Patient not taking: Reported on 12/9/2021)    benazepriL (LOTENSIN) 40 MG tablet Take 1 tablet (40 mg total) by mouth once daily.    bisoprolol (ZEBETA) 10 MG tablet Take 1 tablet (10 mg total) by mouth once daily.    BRILINTA 90 mg tablet TAKE ONE TABLET BY MOUTH TWICE DAILY     chlorhexidine (PERIDEX) 0.12 % solution 15 mLs.    cyclobenzaprine (FLEXERIL) 10 MG tablet TAKE ONE TABLET BY MOUTH TWICE DAILY    ELIQUIS 5 mg Tab TAKE ONE TABLET BY MOUTH TWICE DAILY    ergocalciferol (ERGOCALCIFEROL) 50,000 unit Cap Vitamin D2 1,250 mcg (50,000 unit) capsule   Take 1 capsule every week by oral route.    esomeprazole (NEXIUM) 40 MG capsule Take 1 capsule (40 mg total) by mouth 2 (two) times daily before meals.    evolocumab (REPATHA SURECLICK) 140 mg/mL PnIj Inject 1 mL (140 mg total) into the skin every 14 (fourteen) days.    fluticasone/umeclidin/vilanter (TRELEGY ELLIPTA INHL) Inhale 1 Inhaler into the lungs once daily.    furosemide (LASIX) 40 MG tablet Take 80 mg in the am and 80 mg BID x 7 days,then take 80 mg in am and 40 mg in the PM thereafter. (Patient not taking: No sig reported)    gabapentin (NEURONTIN) 600 MG tablet Take 1 tablet (600 mg total) by mouth 3 (three) times daily.    hydrALAZINE (APRESOLINE) 25 MG tablet Take 1 tablet (25 mg total) by mouth 2 (two) times daily.    HYDROcodone-acetaminophen (NORCO)  mg per tablet Take 1 tablet by mouth 2 (two) times daily as needed.    isosorbide mononitrate (IMDUR) 30 MG 24 hr tablet TAKE  ONE TABLET BY MOUTH DAILY    levothyroxine (SYNTHROID) 75 MCG tablet Take 1 tablet (75 mcg total) by mouth before breakfast.    miconazole NITRATE 2 % (ZEASORB, MICONAZOLE,) 2 % top powder Apply topically as needed for Itching. (Patient not taking: No sig reported)    mupirocin (BACTROBAN) 2 % ointment mupirocin 2 % topical ointment    nitroGLYCERIN (NITROSTAT) 0.4 MG SL tablet Place 1 tablet (0.4 mg total) under the tongue every 5 (five) minutes as needed for Chest pain.    potassium chloride (MICRO-K) 10 MEQ CpSR Take 2 capsules in am and 1 in the evening; if taking additional lasix, will need to take one additional potassium capsule in the evening    pramipexole (MIRAPEX) 0.5 MG tablet Take 1 tablet (0.5 mg total) by mouth once  "daily.    promethazine (PHENERGAN) 25 MG tablet Take 1 tablet (25 mg total) by mouth every 6 (six) hours as needed.    ranolazine (RANEXA) 1,000 mg Tb12 Take 1 tablet (1,000 mg total) by mouth 2 (two) times daily.    rOPINIRole (REQUIP) 5 MG tablet TAKE ONE TABLET BY MOUTH DAILY    sucralfate (CARAFATE) 1 gram tablet Take 1 tablet (1 g total) by mouth 4 (four) times daily before meals and nightly.    temazepam (RESTORIL) 30 mg capsule TAKE 1 CAPSULE (30 MG TOTAL) BY MOUTH NIGHTLY AS NEEDED FOR INSOMNIA.    umeclidinium-vilanteroL (ANORO ELLIPTA) 62.5-25 mcg/actuation DsDv Inhale 1 puff into the lungs once daily. Controller    VENTOLIN HFA 90 mcg/actuation inhaler Inhale 2 puff(s) every 4 hours by inhalation route.   Last reviewed on 1/14/2022  1:35 PM by Celia Whaley NP    Review of patient's allergies indicates:   Allergen Reactions    Atorvastatin Other (See Comments)     Severe muscle pain  Other reaction(s): Abdominal Pain  SEVERE MYALGIAS      Azithromycin Other (See Comments)     By shot, closed veins and made large bruises  By shot, closed veins and made large bruises      Latex, natural rubber Rash     "TOURNIQUET ON LEG LEFT HUGE BLISTER THAT TOOK 9 MONTHS OF WOUND CARE TO HEAL."    Meperidine Hives and Anaphylaxis     Other reaction(s): Anaphylaxis  Hives (skin)^  Hives (skin)^      Penicillins Anaphylaxis and Hives     Other reaction(s): Anaphylaxis  Shortness of breath^swelling  Shortness of breath and swelling  Anaphylaxis  Shortness of breath^swelling      Tofacitinib Rash and Swelling     Rash and swelling of face      Lorazepam Anxiety and Other (See Comments)     Made me goofy  CAUSED CONFUSION "Made me goofy."      Pravastatin Other (See Comments)     Severe myalgias.  Cramps/Severe myalgias.    Flu vac 2018 [id number](pf)      Other reaction(s): Other (See Comments)  Flip into aFIB    Nystatin Rash    Pepper (genus capsicum) Other (See Comments)     Reflux and ulcers    Last " reviewed on  1/14/2022 1:35 PM by Celia Whaley      Tasks added this encounter   3/6/2022 - Refill Call (Auto Added)   Tasks due within next 3 months   No tasks due.     [facility]  [address]  [facility] [address]  Phone: [phone]  Fax: [phone]

## 2022-03-02 ENCOUNTER — EXTERNAL HOME HEALTH (OUTPATIENT)
Dept: HOME HEALTH SERVICES | Facility: HOSPITAL | Age: 72
End: 2022-03-02
Payer: MEDICARE

## 2022-03-03 DIAGNOSIS — Z79.899 ENCOUNTER FOR LONG-TERM (CURRENT) USE OF MEDICATIONS: Chronic | ICD-10-CM

## 2022-03-07 ENCOUNTER — SPECIALTY PHARMACY (OUTPATIENT)
Dept: PHARMACY | Facility: CLINIC | Age: 72
End: 2022-03-07
Payer: MEDICARE

## 2022-03-07 DIAGNOSIS — I20.9 ANGINA PECTORIS: Primary | ICD-10-CM

## 2022-03-07 DIAGNOSIS — Z79.899 ENCOUNTER FOR LONG-TERM (CURRENT) USE OF MEDICATIONS: Chronic | ICD-10-CM

## 2022-03-07 DIAGNOSIS — I25.810 CORONARY ARTERY DISEASE INVOLVING CORONARY BYPASS GRAFT OF NATIVE HEART WITHOUT ANGINA PECTORIS: ICD-10-CM

## 2022-03-08 RX ORDER — ISOSORBIDE MONONITRATE 30 MG/1
60 TABLET, EXTENDED RELEASE ORAL 2 TIMES DAILY
Qty: 90 TABLET | Refills: 3 | Status: SHIPPED | OUTPATIENT
Start: 2022-03-08 | End: 2022-03-17 | Stop reason: SDUPTHER

## 2022-03-10 ENCOUNTER — DOCUMENT SCAN (OUTPATIENT)
Dept: HOME HEALTH SERVICES | Facility: HOSPITAL | Age: 72
End: 2022-03-10
Payer: MEDICARE

## 2022-03-11 NOTE — PROGRESS NOTES
Subjective:    Patient ID:  Kalpana Wright is a 72 y.o. female who presents for follow-up of hospital discharge.      HPI: Ms. Kalpana Wright presents to the clinic for follow up of CAD with H/O CABG, PVD, HTN, HLP, PAF, SSS, PPM.  She stated that she had high BP with chest pain on March 14-woke up with chest pain-hard pressure; center of the chest, nonradiating; not associated with N/V/diaphoresis or SOB. BP checked by /89 that day. Took an extra amlodipine that night right after calling EMS and took one Ntg around the same time. Pain resolved within about 30 minutes.  She stated that she was under a great deal of stress that day, and that may have contributed to her high blood pressure.  She denied any further episodes of chest discomfort she denied any shortness of breath or dyspnea on exertion. She denied any palpitations, orthopnea, PND, or edema. She stated that she is taking amlodipine, bisoprolol, and benazepril.  BP log from [facility] shows -160/51-82; average /66.    No recent hospitalizations.    She is taking vitamin D once weekly as directed by Dr. Woodward.    She has Wimdu.   She is observing COVID precautions.    She stated that she snores at night all the time for many years. No daytime somnolence.    --------------------------------------------------------  Hx: she was admitted to [facility] on November 17, 2021 and discharged on November 19, 2021.  She was apparently found in her car poorly responsive.  She was found by a bystander at primary care clinic.  She was found to be hypotensive in the clinic with a blood pressure of 80/50.  EMS was called; they also noted hypotension at 98/49; she was taken to [facility] and found to be dehydrated.  All antihypertensive medications were held and she was given IV hydration.  CT of the brain was negative.  Troponin was negative x2; EKG was negative according to the note.  His hold and reports that they did  interrogate pacemaker during hospitalization but she does not know the results.  I have not found a reference to and interrogation report in the record.  Ms. Wright does not know if she simply fell asleep or she passed out or what happened.  She had been taking Lasix daily and that has been stopped.  She has seen primary care in follow-up and was told to restart her benazepril.  Ms. Wright reports that she is taking benazepril, hydralazine, and bisoprolol.  She is not taking amlodipine or furosemide.  ---------------------------------------------------------    History:  CAD, CABG and PCI to graft, PVD, PAF, SSS, PPM, HTN, HLP.        Medications: she is taking Brilinta, and Eliquis. Takes lasix prn. She stated that she is taking bisoprolol, benazepril, amlodipine, She stated that she does not take hydralazine due to gagging on it and vomiting. Taking Imdur and Ranexa BID.   Sodium: she does not add salt to foods when cooking;  she is not reading labels for sodium content. Denied eating salty foods.   Diet: She denied eating convenience foods. Cooks from scratch. She eats ice all day and night. She cut back on sodas and portion sizes.   Lemonade. Drinks sweet tea. Typically eats one meal/day.  Exercise: she she stated that she walks in her yard about 1 or 2 days.    Tobacco: smoking cigarettes; one pack lasts 4 days-about 2 cigarettes/day. She smokes half a cigarette at a time. She has been leaving her cigarettes in her room. Stress and seeing people smoke are triggers.   Alcohol: no alcohol use     Weight: 81.1 kg (178 lb 12.8 oz) she states that her daily weights has been stable.Body mass index is 30.69 kg/m².  Last weight in Cardiology was 170# on 1/14/22.  Wt Readings from Last 3 Encounters:   03/17/22 81.1 kg (178 lb 12.8 oz)   01/14/22 77.2 kg (170 lb 1.6 oz)   12/09/21 74.8 kg (164 lb 12.8 oz)     BP log: None. She does not have a machine at home. Has home health.    Review of Systems   Constitutional:  Negative for chills, decreased appetite, fever, malaise/fatigue, night sweats and weight gain.   HENT: Negative for congestion.    Cardiovascular: Positive for chest pain (Had elevated blood pressure reading at the time). Negative for claudication, cyanosis, dyspnea on exertion, irregular heartbeat, leg swelling, near-syncope, orthopnea, palpitations, paroxysmal nocturnal dyspnea and syncope.   Respiratory: Negative for cough, hemoptysis, shortness of breath, sputum production and wheezing.    Hematologic/Lymphatic: Negative for adenopathy and bleeding problem. Bruises/bleeds easily.   Skin: Negative for color change and nail changes.   Musculoskeletal: Positive for joint pain (chronic pain in the knees due to arthritis.).        Has a hardened cord like not in left lower extremity that is tender to palpation.  It does not hurt when she walks.   Gastrointestinal: Negative for bloating, abdominal pain, change in bowel habit, heartburn, hematochezia, melena, nausea and vomiting.   Genitourinary: Negative for hematuria.   Neurological: Negative for dizziness and light-headedness.   Psychiatric/Behavioral: Negative for altered mental status.       Objective:   Physical Exam  Constitutional:       General: She is not in acute distress.     Appearance: She is well-developed. She is not diaphoretic.   HENT:      Head: Normocephalic and atraumatic.   Eyes:      General: No scleral icterus.     Conjunctiva/sclera: Conjunctivae normal.   Neck:      Thyroid: No thyromegaly.      Vascular: No JVD.      Trachea: No tracheal deviation.   Cardiovascular:      Rate and Rhythm: Normal rate and regular rhythm.      Pulses: Intact distal pulses.      Heart sounds: Normal heart sounds. No murmur heard.    No friction rub. No gallop.   Pulmonary:      Effort: Pulmonary effort is normal. No respiratory distress.      Breath sounds: No wheezing or rales.   Chest:      Chest wall: No tenderness.   Abdominal:      General: Bowel sounds are  normal. There is no distension.      Palpations: Abdomen is soft. There is no mass.      Tenderness: There is no abdominal tenderness. There is no guarding or rebound.      Comments: Abdomen obese.   Musculoskeletal:         General: No swelling. Normal range of motion.      Cervical back: Neck supple.      Right lower leg: No edema.      Left lower leg: No edema.      Comments: Ambulatory with a steady gait in hallway.  Hard cord like not to left lower extremity- the medial portion of the calf.  There is tenderness to palpation.  There is no erythema or wound or heat noted.    Lymphadenopathy:      Cervical: No cervical adenopathy.   Skin:     General: Skin is warm and dry.      Coloration: Skin is not pale.      Findings: No erythema or rash.      Comments: [place].   Neurological:      Mental Status: She is alert and oriented to person, place, and time.       Results for MARJORIE VARGAS (MRN [id number]) as of 12/9/2021 17:01   Ref. Range 8/19/2021 14:58 8/19/2021 14:58 11/23/2021 09:34   Sodium Latest Ref Range: 136 - 145 mmol/L 141 141    Potassium Latest Ref Range: 3.5 - 5.1 mmol/L 3.8 3.8    Chloride Latest Ref Range: 95 - 110 mmol/L 102 102    CO2 Latest Ref Range: 23 - 29 mmol/L 28 28    Anion Gap Latest Ref Range: 8 - 16 mmol/L 11 11    BUN Latest Ref Range: 8 - 23 mg/dL 15 15    Creatinine Latest Ref Range: 0.5 - 1.4 mg/dL 0.8 0.8    eGFR if non African American Latest Ref Range: >60 mL/min/1.73 m^2 >60.0 >60.0    eGFR if African American Latest Ref Range: >60 mL/min/1.73 m^2 >60.0 >60.0    Glucose Latest Ref Range: 70 - 110 mg/dL 90 90    Calcium Latest Ref Range: 8.7 - 10.5 mg/dL 9.4 9.4    Alkaline Phosphatase Latest Ref Range: 55 - 135 U/L 103     PROTEIN TOTAL Latest Ref Range: 6.0 - 8.4 g/dL 8.0     Albumin Latest Ref Range: 3.5 - 5.2 g/dL 3.5     BILIRUBIN TOTAL Latest Ref Range: 0.1 - 1.0 mg/dL 0.3     AST Latest Ref Range: 10 - 40 U/L 14     ALT Latest Ref Range: 10 - 44 U/L 9 (L)     Triglycerides Latest  Ref Range: 30 - 150 mg/dL 92     Cholesterol Latest Ref Range: 120 - 199 mg/dL 180     HDL Latest Ref Range: 40 - 75 mg/dL 61     HDL/Cholesterol Ratio Latest Ref Range: 20.0 - 50.0 % 33.9     LDL Cholesterol External Latest Ref Range: 63.0 - 159.0 mg/dL 100.6     Non-HDL Cholesterol Latest Units: mg/dL 119     Total Cholesterol/HDL Ratio Latest Ref Range: 2.0 - 5.0  3.0     Vit D, 25-Hydroxy Latest Ref Range: 30 - 96 ng/mL 20 (L)  17 (L)   Hemoglobin A1C External Latest Ref Range: 4.0 - 5.6 % 4.8     Estimated Avg Glucose Latest Ref Range: 68 - 131 mg/dL 91     TSH Latest Ref Range: 0.400 - 4.000 uIU/mL 3.658 3.658      Pacemaker interrogation 1/14/22:   19 nonsustained VT-6 beat run on 12/27/21  Atrial tachycardia; atrial flutter. AF burden <1%  Battery longevity 1/5 years.  Tried to look for arrhythmia in November for syncopal episode, but potential November events not available in memory.    Pacemaker interrogation 08/27/2021:Since 2/3/2021:  AMS x 14 - appear to be AT episodes, avg V rate 60-80s, longest was 6 sec on 7/6/21; most recent 8/24/2021.  HVR x 29 - available EGMs c/w brief AT, longest was 9 sec on 8/22/2021.     PPM interrogation 2/3/2021:  Estimated longevity: 2-3 year(s).   Mode Switch: No  Nonsustained VT: No     PPM interrogation 8/17/2020: Device Comments  Wound check comments:   Chronic site.  General comments:   Patient without complaints.[place] [email or web address] interrogation by Norm.  Nurses's comments:   Patient without complaints.Had 2-3 atrial tachycardia rate 160.No symptoms verbalized.     [facility] 9/6/2020: Done due to chest pain and NSTEMI  SUMMARY OF PROCEDURE:     1. Left coronary angiogram.     2. Saphenous vein graft angiogram.     3. Left heart catheterization.     4. Conscious sedation.     5. IVUS of the left main.     6. Rota to the left main.     7. PCI with drug-eluting stent to the left main.  ASSESSMENT AND PLAN:     1. Severe coronary artery disease as outlined above.      2. Recurrent restenosis of the vein graft to the OM with multiple   layers of stents and suboptimal result with balloon angioplasty.     3. Rotablation to the ostial left main with a drug-eluting stent using   5.0 x 15 [place]olute [place], postdilated with a 5.0 balloon at high pressure   with excellent angiographic and intravascular ultrasound-guided results.     4. The patient will be kept on dual antiplatelets.  The patient will be   followed thoroughly.  Further recommendation will be dictated.  Mo Estrada MD    Echo at [facility] 11/19/21: Interpretation Summary   Definity contrast used to eval EF.   Ejection Fraction = 60-65%.   There is mild concentric left ventricular hypertrophy.   Grade 1 Diastolic Dysfunction   The left atrium is mildly dilated.   There is mild mitral regurgitation.   There is mild tricuspid regurgitation.       Echo at [place] 8/30/2020:Interpretation Summary  Contrast injection was performed.  Ejection Fraction = 60-65%.  There is mild mitral regurgitation.  Contrast injection was performed.     Echo 2/19/2018:CONCLUSIONS     1 - Concentric hypertrophy.     2 - No wall motion abnormalities.     3 - Normal left ventricular systolic function (EF 60-65%).     4 - Normal left ventricular diastolic function.     5 - Normal right ventricular systolic function .     6 - The estimated PA systolic pressure is 24 mmHg.      Carotid U/S at [facility] 07/30/2021: IMPRESSION:    1.  Bilateral carotid atheromatous plaquing. There is less than 50% diameter reduction.    2.  High-grade stenosis within the left external carotid artery, unlikely to be of clinical significance.     Venous U/S  Left leg 11/23/21: No DVT in the LLE.       Assessment:      1. Coronary artery disease involving coronary bypass graft of native heart without angina pectoris    2. Hx of CABG    3. SSS (sick sinus syndrome)    4. Pacemaker    5. Paroxysmal atrial fibrillation    6. Essential hypertension    7. Pure  hypercholesterolemia    8. Vitamin D deficiency    9. Cigarette smoker    10. Type 2 diabetes mellitus with diabetic peripheral angiopathy without gangrene, without long-term current use of insulin    11. Statin intolerance    12. Obstructive sleep apnea syndrome    13. Class 1 obesity with alveolar hypoventilation, serious comorbidity, and body mass index (BMI) of 30.0 to 30.9 in adult    14. Encounter for long-term (current) use of medications    15. Angina pectoris    16. Pain of left lower extremity    17. Swelling of left extremity    18. Chest pain, unspecified type        Plan:     Coronary artery disease involving coronary bypass graft of native heart without angina pectoris  -     isosorbide mononitrate (IMDUR) 30 MG 24 hr tablet; Take 1 tablet (30 mg total) by mouth 2 (two) times daily.  Dispense: 180 tablet; Refill: 3  -     Nuclear Stress - 3rd Party; Future  -     Echo; Future    Hx of CABG  -     Echo; Future    SSS (sick sinus syndrome)    Pacemaker    Paroxysmal atrial fibrillation    Essential hypertension  -     Echo; Future    Pure hypercholesterolemia    Vitamin D deficiency    Cigarette smoker    Type 2 diabetes mellitus with diabetic peripheral angiopathy without gangrene, without long-term current use of insulin    Statin intolerance    Obstructive sleep apnea syndrome    Class 1 obesity with alveolar hypoventilation, serious comorbidity, and body mass index (BMI) of 30.0 to 30.9 in adult    Encounter for long-term (current) use of medications  -     isosorbide mononitrate (IMDUR) 30 MG 24 hr tablet; Take 1 tablet (30 mg total) by mouth 2 (two) times daily.  Dispense: 180 tablet; Refill: 3    Angina pectoris  -     isosorbide mononitrate (IMDUR) 30 MG 24 hr tablet; Take 1 tablet (30 mg total) by mouth 2 (two) times daily.  Dispense: 180 tablet; Refill: 3    Pain of left lower extremity  -     CV Ultrasound doppler venous DVT leg left; Future    Swelling of left extremity  -     CV Ultrasound  doppler venous DVT leg left; Future    Chest pain, unspecified type  -     Nuclear Stress - 3rd Party; Future  -     Echo; Future      Lexiscan and complete echocardiogram.  Venous ultrasound of left lower extremity to evaluate for DVT.  Continue benazepril 40 mg p.o. q.day, bisoprolol 10 mg p.o. q.day, and amlodipine 10mg QD -hypertension.  Furosemide prn. Avoid dehydration.  Continue apixaban, bisoprolol - PAF  Continue brilinta - CAD.  Sodium restriction strongly encouraged.  Get vital signs from [facility] in 4 weeks.  Continue imdur and ranexa - angina/CAD.   PPM clinic.   Tobacco cessation counseling-continue using delay and distraction techniques; put cigarettes out of reach.  Reduce number of cigarettes by 1-2/day. She does not want to participate in tobacco cessation program.  Encouraged daily movement, such as chair exercises and walking.   Continue vitamin-D supplement.  Follow up in 2 months or call sooner for any problems.  35 minutes spent in counseling time and chart review.  Celia Whaley NP  [facility]    Addendum April 14, 2022: Received BP log from [facility] [employer] [facility] (March 8-April 6, 2022). Average /72 Heart rate  BPM. With other communication in the chart from Replaced by [facility] indicating a lot of personal stress, will verify that she is taking all current antihypertensive medications and monitor for the time being.

## 2022-03-17 ENCOUNTER — OFFICE VISIT (OUTPATIENT)
Dept: CARDIOLOGY | Facility: CLINIC | Age: 72
End: 2022-03-17
Payer: MEDICARE

## 2022-03-17 VITALS
DIASTOLIC BLOOD PRESSURE: 72 MMHG | BODY MASS INDEX: 30.53 KG/M2 | HEIGHT: 64 IN | WEIGHT: 178.81 LBS | OXYGEN SATURATION: 96 % | HEART RATE: 70 BPM | SYSTOLIC BLOOD PRESSURE: 156 MMHG

## 2022-03-17 DIAGNOSIS — E66.2 CLASS 1 OBESITY WITH ALVEOLAR HYPOVENTILATION, SERIOUS COMORBIDITY, AND BODY MASS INDEX (BMI) OF 30.0 TO 30.9 IN ADULT: ICD-10-CM

## 2022-03-17 DIAGNOSIS — R07.9 CHEST PAIN, UNSPECIFIED TYPE: ICD-10-CM

## 2022-03-17 DIAGNOSIS — I48.0 PAROXYSMAL ATRIAL FIBRILLATION: ICD-10-CM

## 2022-03-17 DIAGNOSIS — I25.810 CORONARY ARTERY DISEASE INVOLVING CORONARY BYPASS GRAFT OF NATIVE HEART WITHOUT ANGINA PECTORIS: Primary | ICD-10-CM

## 2022-03-17 DIAGNOSIS — E11.51 TYPE 2 DIABETES MELLITUS WITH DIABETIC PERIPHERAL ANGIOPATHY WITHOUT GANGRENE, WITHOUT LONG-TERM CURRENT USE OF INSULIN: ICD-10-CM

## 2022-03-17 DIAGNOSIS — F17.210 CIGARETTE SMOKER: ICD-10-CM

## 2022-03-17 DIAGNOSIS — I49.5 SSS (SICK SINUS SYNDROME): ICD-10-CM

## 2022-03-17 DIAGNOSIS — I10 ESSENTIAL HYPERTENSION: ICD-10-CM

## 2022-03-17 DIAGNOSIS — M79.89 SWELLING OF LEFT EXTREMITY: ICD-10-CM

## 2022-03-17 DIAGNOSIS — Z95.1 HX OF CABG: ICD-10-CM

## 2022-03-17 DIAGNOSIS — Z79.899 ENCOUNTER FOR LONG-TERM (CURRENT) USE OF MEDICATIONS: Chronic | ICD-10-CM

## 2022-03-17 DIAGNOSIS — I20.9 ANGINA PECTORIS: ICD-10-CM

## 2022-03-17 DIAGNOSIS — M79.605 PAIN OF LEFT LOWER EXTREMITY: ICD-10-CM

## 2022-03-17 DIAGNOSIS — Z95.0 PACEMAKER: ICD-10-CM

## 2022-03-17 DIAGNOSIS — E55.9 VITAMIN D DEFICIENCY: ICD-10-CM

## 2022-03-17 DIAGNOSIS — E78.00 PURE HYPERCHOLESTEROLEMIA: ICD-10-CM

## 2022-03-17 DIAGNOSIS — Z78.9 STATIN INTOLERANCE: ICD-10-CM

## 2022-03-17 PROCEDURE — 99214 PR OFFICE/OUTPT VISIT, EST, LEVL IV, 30-39 MIN: ICD-10-PCS | Mod: S$PBB,,, | Performed by: NURSE PRACTITIONER

## 2022-03-17 PROCEDURE — 99999 PR PBB SHADOW E&M-EST. PATIENT-LVL V: ICD-10-PCS | Mod: PBBFAC,,, | Performed by: NURSE PRACTITIONER

## 2022-03-17 PROCEDURE — 99999 PR PBB SHADOW E&M-EST. PATIENT-LVL V: CPT | Mod: PBBFAC,,, | Performed by: NURSE PRACTITIONER

## 2022-03-17 PROCEDURE — 99215 OFFICE O/P EST HI 40 MIN: CPT | Mod: PBBFAC,PO | Performed by: NURSE PRACTITIONER

## 2022-03-17 PROCEDURE — 99214 OFFICE O/P EST MOD 30 MIN: CPT | Mod: S$PBB,,, | Performed by: NURSE PRACTITIONER

## 2022-03-17 RX ORDER — ISOSORBIDE MONONITRATE 30 MG/1
30 TABLET, EXTENDED RELEASE ORAL 2 TIMES DAILY
Qty: 180 TABLET | Refills: 3 | Status: SHIPPED | OUTPATIENT
Start: 2022-03-17 | End: 2024-02-28 | Stop reason: SDUPTHER

## 2022-03-25 ENCOUNTER — HOSPITAL ENCOUNTER (OUTPATIENT)
Dept: CARDIOLOGY | Facility: HOSPITAL | Age: 72
Discharge: HOME | End: 2022-03-25
Attending: NURSE PRACTITIONER
Payer: MEDICARE

## 2022-03-25 ENCOUNTER — TELEPHONE (OUTPATIENT)
Dept: CARDIOLOGY | Facility: CLINIC | Age: 72
End: 2022-03-25
Payer: MEDICARE

## 2022-03-25 VITALS
WEIGHT: 178 LBS | SYSTOLIC BLOOD PRESSURE: 156 MMHG | BODY MASS INDEX: 30.39 KG/M2 | BODY MASS INDEX: 30.39 KG/M2 | HEIGHT: 64 IN | WEIGHT: 178 LBS | HEART RATE: 60 BPM | HEART RATE: 60 BPM | DIASTOLIC BLOOD PRESSURE: 72 MMHG | SYSTOLIC BLOOD PRESSURE: 156 MMHG | HEIGHT: 64 IN | DIASTOLIC BLOOD PRESSURE: 72 MMHG

## 2022-03-25 DIAGNOSIS — M79.605 PAIN OF LEFT LOWER EXTREMITY: ICD-10-CM

## 2022-03-25 DIAGNOSIS — R07.9 CHEST PAIN, UNSPECIFIED TYPE: ICD-10-CM

## 2022-03-25 DIAGNOSIS — M79.89 SWELLING OF LEFT EXTREMITY: ICD-10-CM

## 2022-03-25 DIAGNOSIS — Z95.1 HX OF CABG: ICD-10-CM

## 2022-03-25 DIAGNOSIS — I10 ESSENTIAL HYPERTENSION: ICD-10-CM

## 2022-03-25 DIAGNOSIS — I25.810 CORONARY ARTERY DISEASE INVOLVING CORONARY BYPASS GRAFT OF NATIVE HEART WITHOUT ANGINA PECTORIS: ICD-10-CM

## 2022-03-25 LAB
AORTIC ROOT ANNULUS: 2.98 CM
AV INDEX (PROSTH): 0.53
AV MEAN GRADIENT: 3 MMHG
AV PEAK GRADIENT: 6 MMHG
AV VALVE AREA: 1.82 CM2
AV VELOCITY RATIO: 0.54
BSA FOR ECHO PROCEDURE: 1.91 M2
CV ECHO LV RWT: 0.53 CM
DOP CALC AO PEAK VEL: 1.25 M/S
DOP CALC AO VTI: 27.3 CM
DOP CALC LVOT AREA: 3.4 CM2
DOP CALC LVOT DIAMETER: 2.08 CM
DOP CALC LVOT PEAK VEL: 0.68 M/S
DOP CALC LVOT STROKE VOLUME: 49.58 CM3
DOP CALCLVOT PEAK VEL VTI: 14.6 CM
E WAVE DECELERATION TIME: 197.01 MSEC
E/A RATIO: 1.1
E/E' RATIO: 9.38 M/S
ECHO EF ESTIMATED: 53 %
ECHO LV POSTERIOR WALL: 1.24 CM (ref 0.6–1.1)
EJECTION FRACTION: 55 %
FRACTIONAL SHORTENING: 28 % (ref 28–44)
INTERVENTRICULAR SEPTUM: 1.05 CM (ref 0.6–1.1)
IVRT: 105.61 MSEC
LA MAJOR: 5.84 CM
LA MINOR: 5.66 CM
LA WIDTH: 3.71 CM
LEFT ATRIUM SIZE: 3.78 CM
LEFT ATRIUM VOLUME INDEX MOD: 14.7 ML/M2
LEFT ATRIUM VOLUME INDEX: 36.8 ML/M2
LEFT ATRIUM VOLUME MOD: 27.43 CM3
LEFT ATRIUM VOLUME: 68.52 CM3
LEFT INTERNAL DIMENSION IN SYSTOLE: 3.39 CM (ref 2.1–4)
LEFT VENTRICLE DIASTOLIC VOLUME INDEX: 54.58 ML/M2
LEFT VENTRICLE DIASTOLIC VOLUME: 101.51 ML
LEFT VENTRICLE MASS INDEX: 106 G/M2
LEFT VENTRICLE SYSTOLIC VOLUME INDEX: 25.4 ML/M2
LEFT VENTRICLE SYSTOLIC VOLUME: 47.23 ML
LEFT VENTRICULAR INTERNAL DIMENSION IN DIASTOLE: 4.68 CM (ref 3.5–6)
LEFT VENTRICULAR MASS: 197.04 G
LV LATERAL E/E' RATIO: 7.5 M/S
LV SEPTAL E/E' RATIO: 12.5 M/S
LVOT MG: 0.99 MMHG
LVOT MV: 0.47 CM/S
MV PEAK A VEL: 0.68 M/S
MV PEAK E VEL: 0.75 M/S
MV STENOSIS PRESSURE HALF TIME: 57.13 MS
MV VALVE AREA P 1/2 METHOD: 3.85 CM2
PULM VEIN S/D RATIO: 1.31
PV PEAK D VEL: 0.26 M/S
PV PEAK S VEL: 0.34 M/S
RA MAJOR: 5.6 CM
RA WIDTH: 3.64 CM
RIGHT VENTRICULAR END-DIASTOLIC DIMENSION: 2.22 CM
SINUS: 2.6 CM
STJ: 2.55 CM
TDI LATERAL: 0.1 M/S
TDI SEPTAL: 0.06 M/S
TDI: 0.08 M/S
TRICUSPID ANNULAR PLANE SYSTOLIC EXCURSION: 1.75 CM

## 2022-03-25 PROCEDURE — 93971 EXTREMITY STUDY: CPT | Mod: 26,LT,, | Performed by: INTERNAL MEDICINE

## 2022-03-25 PROCEDURE — 93971 CV US DOPPLER VENOUS LEG LEFT (CUPID ONLY): ICD-10-PCS | Mod: 26,LT,, | Performed by: INTERNAL MEDICINE

## 2022-03-25 PROCEDURE — 93306 TTE W/DOPPLER COMPLETE: CPT | Mod: PO

## 2022-03-25 PROCEDURE — 93306 TTE W/DOPPLER COMPLETE: CPT | Mod: 26,,, | Performed by: INTERNAL MEDICINE

## 2022-03-25 PROCEDURE — 93306 ECHO (CUPID ONLY): ICD-10-PCS | Mod: 26,,, | Performed by: INTERNAL MEDICINE

## 2022-03-25 PROCEDURE — 93971 EXTREMITY STUDY: CPT | Mod: PO,LT

## 2022-03-25 NOTE — TELEPHONE ENCOUNTER
Spoke with pt and discussed results of both tests with pt. Pt verbalized understanding and will call back with any further questions or concerns.      Echo is ok; largely unchanged from last time. Awaiting stress test.     ----- Message from Celia Whaley NP sent at 3/25/2022  3:32 PM CDT -----  No DVT on ultrasound. Can use heat to sore area or ice if that feels better.

## 2022-03-28 ENCOUNTER — SPECIALTY PHARMACY (OUTPATIENT)
Dept: PHARMACY | Facility: CLINIC | Age: 72
End: 2022-03-28
Payer: MEDICARE

## 2022-03-28 NOTE — TELEPHONE ENCOUNTER
Specialty Pharmacy - Refill Coordination    Specialty Medication Orders Linked to Encounter    Flowsheet Row Most Recent Value   Medication #1 evolocumab (REPATHA SURECLICK) 140 mg/mL PnIj (Order#[id number], Rx#[id number])          Refill Questions - Documented Responses    Flowsheet Row Most Recent Value   Patient Availability and HIPAA Verification    Does patient want to proceed with activity? Yes   HIPAA/medical authority confirmed? Yes   Relationship to patient of person spoken to? Self   Refill Screening Questions    Would patient like to speak to a pharmacist? No   When does the patient need to receive the medication? 04/01/22   Refill Delivery Questions    How will the patient receive the medication? Delivery Danay   When does the patient need to receive the medication? 04/01/22   Shipping Address Prescription   Address in [facility] confirmed and updated if neccessary? Yes   Expected Copay ($) 4   Is the patient able to afford the medication copay? Yes   Payment Method CC on file   Days supply of Refill 28   Supplies needed? No supplies needed   Refill activity completed? Yes   Refill activity plan Refill scheduled   Shipment/Pickup Date: 03/30/22          Current Outpatient Medications   Medication Sig    albuterol-ipratropium (DUO-NEB) 2.5 mg-0.5 mg/3 mL nebulizer solution Take 3 mLs by nebulization every 6 (six) hours while awake.    alendronate (FOSAMAX) 70 MG tablet TAKE 1 TABLET BY MOUTH EVERY 7 DAYS AS DIRECTED.    amitriptyline (ELAVIL) 75 MG tablet Take 1 tablet (75 mg total) by mouth nightly as needed for Insomnia or Pain.    amLODIPine (NORVASC) 10 MG tablet Take 1 tablet (10 mg total) by mouth once daily.    benazepriL (LOTENSIN) 40 MG tablet Take 1 tablet (40 mg total) by mouth once daily.    bisoprolol (ZEBETA) 10 MG tablet Take 1 tablet (10 mg total) by mouth once daily.    BRILINTA 90 mg tablet TAKE ONE TABLET BY MOUTH TWICE DAILY    chlorhexidine (PERIDEX) 0.12 % solution 15  mLs.    cyclobenzaprine (FLEXERIL) 10 MG tablet TAKE ONE TABLET BY MOUTH TWICE DAILY    ELIQUIS 5 mg Tab TAKE ONE TABLET BY MOUTH TWICE DAILY    ergocalciferol (ERGOCALCIFEROL) 50,000 unit Cap Vitamin D2 1,250 mcg (50,000 unit) capsule   Take 1 capsule every week by oral route.    esomeprazole (NEXIUM) 40 MG capsule Take 1 capsule (40 mg total) by mouth 2 (two) times daily before meals.    evolocumab (REPATHA SURECLICK) 140 mg/mL PnIj Inject 1 mL (140 mg total) into the skin every 14 (fourteen) days.    fluticasone/umeclidin/vilanter (TRELEGY ELLIPTA INHL) Inhale 1 Inhaler into the lungs once daily.    furosemide (LASIX) 40 MG tablet Take 80 mg in the am and 80 mg BID x 7 days,then take 80 mg in am and 40 mg in the PM thereafter. (Patient not taking: No sig reported)    gabapentin (NEURONTIN) 600 MG tablet Take 1 tablet (600 mg total) by mouth 3 (three) times daily.    hydrALAZINE (APRESOLINE) 25 MG tablet Take 1 tablet (25 mg total) by mouth 2 (two) times daily.    HYDROcodone-acetaminophen (NORCO)  mg per tablet Take 1 tablet by mouth 2 (two) times daily as needed.    isosorbide mononitrate (IMDUR) 30 MG 24 hr tablet Take 1 tablet (30 mg total) by mouth 2 (two) times daily.    levothyroxine (SYNTHROID) 75 MCG tablet Take 1 tablet (75 mcg total) by mouth before breakfast.    miconazole NITRATE 2 % (ZEASORB, MICONAZOLE,) 2 % top powder Apply topically as needed for Itching. (Patient not taking: No sig reported)    mupirocin (BACTROBAN) 2 % ointment mupirocin 2 % topical ointment    nitroGLYCERIN (NITROSTAT) 0.4 MG SL tablet Place 1 tablet (0.4 mg total) under the tongue every 5 (five) minutes as needed for Chest pain.    potassium chloride (MICRO-K) 10 MEQ CpSR Take 2 capsules in am and 1 in the evening; if taking additional lasix, will need to take one additional potassium capsule in the evening    pramipexole (MIRAPEX) 0.5 MG tablet Take 1 tablet (0.5 mg total) by mouth once daily.     "promethazine (PHENERGAN) 25 MG tablet Take 1 tablet (25 mg total) by mouth every 6 (six) hours as needed.    ranolazine (RANEXA) 1,000 mg Tb12 Take 1 tablet (1,000 mg total) by mouth 2 (two) times daily.    rOPINIRole (REQUIP) 5 MG tablet TAKE ONE TABLET BY MOUTH DAILY    sucralfate (CARAFATE) 1 gram tablet Take 1 tablet (1 g total) by mouth 4 (four) times daily before meals and nightly.    temazepam (RESTORIL) 30 mg capsule TAKE 1 CAPSULE (30 MG TOTAL) BY MOUTH NIGHTLY AS NEEDED FOR INSOMNIA.    umeclidinium-vilanteroL (ANORO ELLIPTA) 62.5-25 mcg/actuation DsDv Inhale 1 puff into the lungs once daily. Controller    VENTOLIN HFA 90 mcg/actuation inhaler Inhale 2 puff(s) every 4 hours by inhalation route.   Last reviewed on 3/17/2022  1:38 PM by Celia Whaley NP    Review of patient's allergies indicates:   Allergen Reactions    Atorvastatin Other (See Comments)     Severe muscle pain  Other reaction(s): Abdominal Pain  SEVERE MYALGIAS      Azithromycin Other (See Comments)     By shot, closed veins and made large bruises  By shot, closed veins and made large bruises      Latex, natural rubber Rash     "TOURNIQUET ON LEG LEFT HUGE BLISTER THAT TOOK 9 MONTHS OF WOUND CARE TO HEAL."    Meperidine Hives and Anaphylaxis     Other reaction(s): Anaphylaxis  Hives (skin)^  Hives (skin)^      Penicillins Anaphylaxis and Hives     Other reaction(s): Anaphylaxis  Shortness of breath^swelling  Shortness of breath and swelling  Anaphylaxis  Shortness of breath^swelling      Tofacitinib Rash and Swelling     Rash and swelling of face      Lorazepam Anxiety and Other (See Comments)     Made me goofy  CAUSED CONFUSION "Made me goofy."      Pravastatin Other (See Comments)     Severe myalgias.  Cramps/Severe myalgias.    Flu vac 2018 [id number](pf)      Other reaction(s): Other (See Comments)  Flip into aFIB    Nystatin Rash    Pepper (genus capsicum) Other (See Comments)     Reflux and ulcers    Last reviewed on "  3/17/2022 1:38 PM by Celia Whaley      Tasks added this encounter   4/15/2022 - Refill Call (Auto Added)   Tasks due within next 3 months   No tasks due.     Meg Melissa, PharmD  [facility]  [address] [place] [address]  Phone: [phone]  Fax: [phone]

## 2022-04-04 ENCOUNTER — PATIENT MESSAGE (OUTPATIENT)
Dept: CARDIOLOGY | Facility: CLINIC | Age: 72
End: 2022-04-04
Payer: MEDICARE

## 2022-04-08 PROCEDURE — G0179 MD RECERTIFICATION HHA PT: HCPCS | Mod: ,,, | Performed by: FAMILY MEDICINE

## 2022-04-08 PROCEDURE — G0179 PR HOME HEALTH MD RECERTIFICATION: ICD-10-PCS | Mod: ,,, | Performed by: FAMILY MEDICINE

## 2022-04-12 ENCOUNTER — EXTERNAL HOME HEALTH (OUTPATIENT)
Dept: HOME HEALTH SERVICES | Facility: HOSPITAL | Age: 72
End: 2022-04-12
Payer: MEDICARE

## 2022-04-12 DIAGNOSIS — I73.9 PVD (PERIPHERAL VASCULAR DISEASE): ICD-10-CM

## 2022-04-12 DIAGNOSIS — I10 ESSENTIAL HYPERTENSION: ICD-10-CM

## 2022-04-12 RX ORDER — POTASSIUM CHLORIDE 750 MG/1
CAPSULE, EXTENDED RELEASE ORAL
Qty: 90 CAPSULE | Refills: 11 | Status: SHIPPED | OUTPATIENT
Start: 2022-04-12 | End: 2023-06-16

## 2022-04-12 NOTE — TELEPHONE ENCOUNTER
No new care gaps identified.  Powered by [employer]. Reference number: [id number].   4/12/2022 2:13:33 PM CDT

## 2022-04-18 ENCOUNTER — PATIENT MESSAGE (OUTPATIENT)
Dept: ADMINISTRATIVE | Facility: OTHER | Age: 72
End: 2022-04-18
Payer: MEDICARE

## 2022-04-18 ENCOUNTER — SPECIALTY PHARMACY (OUTPATIENT)
Dept: PHARMACY | Facility: CLINIC | Age: 72
End: 2022-04-18
Payer: MEDICARE

## 2022-04-18 NOTE — TELEPHONE ENCOUNTER
Specialty Pharmacy - Refill Coordination    Specialty Medication Orders Linked to Encounter    Flowsheet Row Most Recent Value   Medication #1 evolocumab (REPATHA SURECLICK) 140 mg/mL PnIj (Order#[id number], Rx#[id number])          Refill Questions - Documented Responses    Flowsheet Row Most Recent Value   Patient Availability and HIPAA Verification    Does patient want to proceed with activity? Yes   HIPAA/medical authority confirmed? Yes   Relationship to patient of person spoken to? Self   Refill Screening Questions    Would patient like to speak to a pharmacist? No   When does the patient need to receive the medication? 04/20/22   Refill Delivery Questions    How will the patient receive the medication? Delivery Danay   When does the patient need to receive the medication? 04/20/22   Shipping Address Home   Address in [facility] confirmed and updated if neccessary? Yes   Expected Copay ($) 4   Payment Method CC on file   Days supply of Refill 28   Supplies needed? No supplies needed   Refill activity completed? Yes   Refill activity plan Refill scheduled   Shipment/Pickup Date: 04/20/22          Current Outpatient Medications   Medication Sig    albuterol-ipratropium (DUO-NEB) 2.5 mg-0.5 mg/3 mL nebulizer solution Take 3 mLs by nebulization every 6 (six) hours while awake.    alendronate (FOSAMAX) 70 MG tablet TAKE 1 TABLET BY MOUTH EVERY 7 DAYS AS DIRECTED.    amitriptyline (ELAVIL) 75 MG tablet Take 1 tablet (75 mg total) by mouth nightly as needed for Insomnia or Pain.    amLODIPine (NORVASC) 10 MG tablet Take 1 tablet (10 mg total) by mouth once daily.    benazepriL (LOTENSIN) 40 MG tablet Take 1 tablet (40 mg total) by mouth once daily.    bisoprolol (ZEBETA) 10 MG tablet Take 1 tablet (10 mg total) by mouth once daily.    BRILINTA 90 mg tablet TAKE ONE TABLET BY MOUTH TWICE DAILY    chlorhexidine (PERIDEX) 0.12 % solution 15 mLs.    cyclobenzaprine (FLEXERIL) 10 MG tablet TAKE ONE TABLET BY  MOUTH TWICE DAILY    ELIQUIS 5 mg Tab TAKE ONE TABLET BY MOUTH TWICE DAILY    ergocalciferol (ERGOCALCIFEROL) 50,000 unit Cap Vitamin D2 1,250 mcg (50,000 unit) capsule   Take 1 capsule every week by oral route.    esomeprazole (NEXIUM) 40 MG capsule Take 1 capsule (40 mg total) by mouth 2 (two) times daily before meals.    evolocumab (REPATHA SURECLICK) 140 mg/mL PnIj Inject 1 mL (140 mg total) into the skin every 14 (fourteen) days.    fluticasone/umeclidin/vilanter (TRELEGY ELLIPTA INHL) Inhale 1 Inhaler into the lungs once daily.    furosemide (LASIX) 40 MG tablet Take 80 mg in the am and 80 mg BID x 7 days,then take 80 mg in am and 40 mg in the PM thereafter. (Patient not taking: No sig reported)    gabapentin (NEURONTIN) 600 MG tablet Take 1 tablet (600 mg total) by mouth 3 (three) times daily.    hydrALAZINE (APRESOLINE) 25 MG tablet Take 1 tablet (25 mg total) by mouth 2 (two) times daily.    HYDROcodone-acetaminophen (NORCO)  mg per tablet Take 1 tablet by mouth 2 (two) times daily as needed.    isosorbide mononitrate (IMDUR) 30 MG 24 hr tablet Take 1 tablet (30 mg total) by mouth 2 (two) times daily.    levothyroxine (SYNTHROID) 75 MCG tablet Take 1 tablet (75 mcg total) by mouth before breakfast.    miconazole NITRATE 2 % (ZEASORB, MICONAZOLE,) 2 % top powder Apply topically as needed for Itching. (Patient not taking: No sig reported)    mupirocin (BACTROBAN) 2 % ointment mupirocin 2 % topical ointment    nitroGLYCERIN (NITROSTAT) 0.4 MG SL tablet Place 1 tablet (0.4 mg total) under the tongue every 5 (five) minutes as needed for Chest pain.    potassium chloride (MICRO-K) 10 MEQ CpSR Take 2 capsules in am and 1 in the evening; if taking additional lasix, will need to take one additional potassium capsule in the evening    pramipexole (MIRAPEX) 0.5 MG tablet Take 1 tablet (0.5 mg total) by mouth once daily.    promethazine (PHENERGAN) 25 MG tablet Take 1 tablet (25 mg total) by  "mouth every 6 (six) hours as needed.    ranolazine (RANEXA) 1,000 mg Tb12 Take 1 tablet (1,000 mg total) by mouth 2 (two) times daily.    rOPINIRole (REQUIP) 5 MG tablet TAKE ONE TABLET BY MOUTH DAILY    sucralfate (CARAFATE) 1 gram tablet Take 1 tablet (1 g total) by mouth 4 (four) times daily before meals and nightly.    temazepam (RESTORIL) 30 mg capsule TAKE 1 CAPSULE (30 MG TOTAL) BY MOUTH NIGHTLY AS NEEDED FOR INSOMNIA.    umeclidinium-vilanteroL (ANORO ELLIPTA) 62.5-25 mcg/actuation DsDv Inhale 1 puff into the lungs once daily. Controller    VENTOLIN HFA 90 mcg/actuation inhaler Inhale 2 puff(s) every 4 hours by inhalation route.   Last reviewed on 4/12/2022  2:12 PM by Ludy Ball MA    Review of patient's allergies indicates:   Allergen Reactions    Atorvastatin Other (See Comments)     Severe muscle pain  Other reaction(s): Abdominal Pain  SEVERE MYALGIAS      Azithromycin Other (See Comments)     By shot, closed veins and made large bruises  By shot, closed veins and made large bruises      Latex, natural rubber Rash     "TOURNIQUET ON LEG LEFT HUGE BLISTER THAT TOOK 9 MONTHS OF WOUND CARE TO HEAL."    Meperidine Hives and Anaphylaxis     Other reaction(s): Anaphylaxis  Hives (skin)^  Hives (skin)^      Penicillins Anaphylaxis and Hives     Other reaction(s): Anaphylaxis  Shortness of breath^swelling  Shortness of breath and swelling  Anaphylaxis  Shortness of breath^swelling      Tofacitinib Rash and Swelling     Rash and swelling of face      Lorazepam Anxiety and Other (See Comments)     Made me goofy  CAUSED CONFUSION "Made me goofy."      Pravastatin Other (See Comments)     Severe myalgias.  Cramps/Severe myalgias.    Flu vac 2018 [id number](pf)      Other reaction(s): Other (See Comments)  Flip into aFIB    Nystatin Rash    Pepper (genus capsicum) Other (See Comments)     Reflux and ulcers    Last reviewed on  4/12/2022 2:11 PM by Ludy Ball      Tasks added this encounter "   5/13/2022 - Refill Call (Auto Added)   Tasks due within next 3 months   No tasks due.     Meg Melissa, PharmD  [facility]  [address]  [facility] [address]  Phone: [phone]  Fax: [phone]

## 2022-04-19 ENCOUNTER — TELEPHONE (OUTPATIENT)
Dept: CARDIOLOGY | Facility: HOSPITAL | Age: 72
End: 2022-04-19
Payer: MEDICARE

## 2022-04-20 ENCOUNTER — TELEPHONE (OUTPATIENT)
Dept: CARDIOLOGY | Facility: HOSPITAL | Age: 72
End: 2022-04-20
Payer: MEDICARE

## 2022-04-20 ENCOUNTER — HOSPITAL ENCOUNTER (OUTPATIENT)
Dept: CARDIOLOGY | Facility: HOSPITAL | Age: 72
Discharge: HOME | End: 2022-04-20
Attending: NURSE PRACTITIONER
Payer: MEDICARE

## 2022-04-20 DIAGNOSIS — I25.810 CORONARY ARTERY DISEASE INVOLVING CORONARY BYPASS GRAFT OF NATIVE HEART WITHOUT ANGINA PECTORIS: ICD-10-CM

## 2022-04-20 DIAGNOSIS — R07.9 CHEST PAIN, UNSPECIFIED TYPE: ICD-10-CM

## 2022-04-20 NOTE — TELEPHONE ENCOUNTER
The patient arrived for nuc and stated she could not stay her legs were bothering her and she wanted to reschedule.Will reschedule and contact patient.

## 2022-04-20 NOTE — TELEPHONE ENCOUNTER
I contacted the patient at home and she said she was lying down and her legs felt better in reclining position.I rescheduled her nuclear and the patient agreed.

## 2022-04-26 ENCOUNTER — PATIENT MESSAGE (OUTPATIENT)
Dept: ADMINISTRATIVE | Facility: HOSPITAL | Age: 72
End: 2022-04-26
Payer: MEDICARE

## 2022-04-27 ENCOUNTER — TELEPHONE (OUTPATIENT)
Dept: CARDIOLOGY | Facility: HOSPITAL | Age: 72
End: 2022-04-27
Payer: MEDICARE

## 2022-05-04 ENCOUNTER — TELEPHONE (OUTPATIENT)
Dept: CARDIOLOGY | Facility: HOSPITAL | Age: 72
End: 2022-05-04
Payer: MEDICARE

## 2022-05-06 ENCOUNTER — HOSPITAL ENCOUNTER (OUTPATIENT)
Dept: CARDIOLOGY | Facility: HOSPITAL | Age: 72
Discharge: HOME | End: 2022-05-06
Attending: NURSE PRACTITIONER
Payer: MEDICARE

## 2022-05-06 VITALS
SYSTOLIC BLOOD PRESSURE: 144 MMHG | DIASTOLIC BLOOD PRESSURE: 88 MMHG | HEART RATE: 62 BPM | HEIGHT: 64 IN | BODY MASS INDEX: 30.39 KG/M2 | WEIGHT: 178 LBS

## 2022-05-06 PROCEDURE — 93016 CV STRESS TEST SUPVJ ONLY: CPT | Mod: ,,, | Performed by: INTERNAL MEDICINE

## 2022-05-06 PROCEDURE — 93018 CV STRESS TEST I&R ONLY: CPT | Mod: ,,, | Performed by: INTERNAL MEDICINE

## 2022-05-06 PROCEDURE — 78452 HT MUSCLE IMAGE SPECT MULT: CPT | Mod: 26,,, | Performed by: INTERNAL MEDICINE

## 2022-05-06 PROCEDURE — 63600175 PHARM REV CODE 636 W HCPCS: Mod: PO | Performed by: NURSE PRACTITIONER

## 2022-05-06 PROCEDURE — A9500 TC99M SESTAMIBI: HCPCS | Mod: PO

## 2022-05-06 PROCEDURE — 93016 NUCLEAR STRESS - 3RD PARTY (CUPID ONLY): ICD-10-PCS | Mod: ,,, | Performed by: INTERNAL MEDICINE

## 2022-05-06 PROCEDURE — 93018 NUCLEAR STRESS - 3RD PARTY (CUPID ONLY): ICD-10-PCS | Mod: ,,, | Performed by: INTERNAL MEDICINE

## 2022-05-06 PROCEDURE — 78452 NUCLEAR STRESS - 3RD PARTY (CUPID ONLY): ICD-10-PCS | Mod: 26,,, | Performed by: INTERNAL MEDICINE

## 2022-05-06 RX ORDER — REGADENOSON 0.08 MG/ML
0.4 INJECTION, SOLUTION INTRAVENOUS ONCE
Status: COMPLETED | OUTPATIENT
Start: 2022-05-06 | End: 2022-05-06

## 2022-05-06 RX ADMIN — REGADENOSON 0.4 MG: 0.08 INJECTION, SOLUTION INTRAVENOUS at 12:05

## 2022-05-07 LAB
CV PHARM DOSE: 0.4 MG
CV STRESS BASE HR: 62 BPM
DIASTOLIC BLOOD PRESSURE: 88 MMHG
NUC REST EJECTION FRACTION: 58
NUC STRESS EJECTION FRACTION: 58 %
OHS CV CPX 1 MINUTE RECOVERY HEART RATE: 63 BPM
OHS CV CPX 85 PERCENT MAX PREDICTED HEART RATE MALE: 120
OHS CV CPX ESTIMATED METS: 1
OHS CV CPX MAX PREDICTED HEART RATE: 142
OHS CV CPX PATIENT IS FEMALE: 1
OHS CV CPX PATIENT IS MALE: 0
OHS CV CPX PEAK DIASTOLIC BLOOD PRESSURE: 82 MMHG
OHS CV CPX PEAK HEAR RATE: 67 BPM
OHS CV CPX PEAK RATE PRESSURE PRODUCT: 9648
OHS CV CPX PEAK SYSTOLIC BLOOD PRESSURE: 144 MMHG
OHS CV CPX PERCENT MAX PREDICTED HEART RATE ACHIEVED: 47
OHS CV CPX RATE PRESSURE PRODUCT PRESENTING: 8928
STRESS ECHO POST EXERCISE DUR MIN: 1 MINUTES
STRESS ECHO POST EXERCISE DUR SEC: 30 SECONDS
SYSTOLIC BLOOD PRESSURE: 144 MMHG

## 2022-05-11 DIAGNOSIS — K21.00 GASTROESOPHAGEAL REFLUX DISEASE WITH ESOPHAGITIS, UNSPECIFIED WHETHER HEMORRHAGE: ICD-10-CM

## 2022-05-11 RX ORDER — ESOMEPRAZOLE MAGNESIUM 40 MG/1
CAPSULE, DELAYED RELEASE ORAL
Qty: 180 CAPSULE | Refills: 4 | Status: SHIPPED | OUTPATIENT
Start: 2022-05-11 | End: 2023-05-09 | Stop reason: SDUPTHER

## 2022-05-11 RX ORDER — HYDROCHLOROTHIAZIDE 25 MG/1
TABLET ORAL
Qty: 90 TABLET | Refills: 4 | Status: SHIPPED | OUTPATIENT
Start: 2022-05-11 | End: 2023-06-16

## 2022-05-11 NOTE — TELEPHONE ENCOUNTER
Refill Routing Note   Medication(s) are not appropriate for processing by [facility] for the following reason(s):      - Outside of protocol  - Medication not active on medication list    [facility] action(s):  Defer  Route       Medication Therapy Plan: TOTAL DAILY DOSE EXCEEDS MAINTENANCE DOSING FOR PPI  Medication reconciliation completed: No     Appointments  past 12m or future 3m with PCP    Date Provider   Last Visit   8/19/2021 Adan Woodward MD   Next Visit   Visit date not found Adan Woodward MD   ED visits in past 90 days: 0        Note composed:11:33 AM 05/11/2022

## 2022-05-11 NOTE — TELEPHONE ENCOUNTER
No new care gaps identified.  [facility] Embedded Care Gaps. Reference number: [id number]. 5/11/2022   9:33:11 AM CDT

## 2022-05-13 ENCOUNTER — SPECIALTY PHARMACY (OUTPATIENT)
Dept: PHARMACY | Facility: CLINIC | Age: 72
End: 2022-05-13
Payer: MEDICARE

## 2022-05-13 ENCOUNTER — PATIENT MESSAGE (OUTPATIENT)
Dept: SMOKING CESSATION | Facility: CLINIC | Age: 72
End: 2022-05-13
Payer: MEDICARE

## 2022-05-20 ENCOUNTER — PATIENT MESSAGE (OUTPATIENT)
Dept: ADMINISTRATIVE | Facility: HOSPITAL | Age: 72
End: 2022-05-20
Payer: MEDICARE

## 2022-05-30 ENCOUNTER — SPECIALTY PHARMACY (OUTPATIENT)
Dept: PHARMACY | Facility: CLINIC | Age: 72
End: 2022-05-30
Payer: MEDICARE

## 2022-05-30 NOTE — TELEPHONE ENCOUNTER
Outgoing call with pt regarding repatha refill. Pt stated she has one on hand for injection today 5/30. Pt stated she had an extra on hand. Pended call out to 6/6.

## 2022-05-31 ENCOUNTER — PATIENT MESSAGE (OUTPATIENT)
Dept: FAMILY MEDICINE | Facility: CLINIC | Age: 72
End: 2022-05-31
Payer: MEDICARE

## 2022-06-06 NOTE — TELEPHONE ENCOUNTER
Specialty Pharmacy - Refill Coordination    Specialty Medication Orders Linked to Encounter    Flowsheet Row Most Recent Value   Medication #1 evolocumab (REPATHA SURECLICK) 140 mg/mL PnIj (Order#[id number], Rx#[id number])          Refill Questions - Documented Responses    Flowsheet Row Most Recent Value   Patient Availability and HIPAA Verification    Does patient want to proceed with activity? Yes   HIPAA/medical authority confirmed? Yes   Relationship to patient of person spoken to? Self   Refill Screening Questions    Would patient like to speak to a pharmacist? No   When does the patient need to receive the medication? 06/13/22   Refill Delivery Questions    How will the patient receive the medication? Delivery Danay   When does the patient need to receive the medication? 06/13/22   Shipping Address Home   Address in [facility] confirmed and updated if neccessary? Yes   Expected Copay ($) 0   Is the patient able to afford the medication copay? Yes   Payment Method zero copay   Days supply of Refill 28   Supplies needed? No supplies needed   Refill activity completed? Yes   Refill activity plan Refill scheduled   Shipment/Pickup Date: 06/08/22          Current Outpatient Medications   Medication Sig    albuterol-ipratropium (DUO-NEB) 2.5 mg-0.5 mg/3 mL nebulizer solution Take 3 mLs by nebulization every 6 (six) hours while awake.    alendronate (FOSAMAX) 70 MG tablet TAKE 1 TABLET BY MOUTH EVERY 7 DAYS AS DIRECTED.    amitriptyline (ELAVIL) 75 MG tablet Take 1 tablet (75 mg total) by mouth nightly as needed for Insomnia or Pain.    amLODIPine (NORVASC) 10 MG tablet Take 1 tablet (10 mg total) by mouth once daily.    benazepriL (LOTENSIN) 40 MG tablet Take 1 tablet (40 mg total) by mouth once daily.    bisoprolol (ZEBETA) 10 MG tablet Take 1 tablet (10 mg total) by mouth once daily.    BRILINTA 90 mg tablet TAKE ONE TABLET BY MOUTH TWICE DAILY    chlorhexidine (PERIDEX) 0.12 % solution 15 mLs.     cyclobenzaprine (FLEXERIL) 10 MG tablet TAKE ONE TABLET BY MOUTH TWICE DAILY    ELIQUIS 5 mg Tab TAKE ONE TABLET BY MOUTH TWICE DAILY    ergocalciferol (ERGOCALCIFEROL) 50,000 unit Cap Vitamin D2 1,250 mcg (50,000 unit) capsule   Take 1 capsule every week by oral route.    esomeprazole (NEXIUM) 40 MG capsule TAKE ONE CAPSULE BY MOUTH TWICE DAILY BEFORE MEALS    evolocumab (REPATHA SURECLICK) 140 mg/mL PnIj Inject 1 mL (140 mg total) into the skin every 14 (fourteen) days.    fluticasone/umeclidin/vilanter (TRELEGY ELLIPTA INHL) Inhale 1 Inhaler into the lungs once daily.    furosemide (LASIX) 40 MG tablet Take 80 mg in the am and 80 mg BID x 7 days,then take 80 mg in am and 40 mg in the PM thereafter. (Patient not taking: No sig reported)    gabapentin (NEURONTIN) 600 MG tablet Take 1 tablet (600 mg total) by mouth 3 (three) times daily.    hydrALAZINE (APRESOLINE) 25 MG tablet Take 1 tablet (25 mg total) by mouth 2 (two) times daily.    hydroCHLOROthiazide (HYDRODIURIL) 25 MG tablet TAKE ONE TABLET BY MOUTH DAILY    HYDROcodone-acetaminophen (NORCO)  mg per tablet Take 1 tablet by mouth 2 (two) times daily as needed.    isosorbide mononitrate (IMDUR) 30 MG 24 hr tablet Take 1 tablet (30 mg total) by mouth 2 (two) times daily.    levothyroxine (SYNTHROID) 75 MCG tablet Take 1 tablet (75 mcg total) by mouth before breakfast.    miconazole NITRATE 2 % (ZEASORB, MICONAZOLE,) 2 % top powder Apply topically as needed for Itching. (Patient not taking: No sig reported)    mupirocin (BACTROBAN) 2 % ointment mupirocin 2 % topical ointment    nitroGLYCERIN (NITROSTAT) 0.4 MG SL tablet Place 1 tablet (0.4 mg total) under the tongue every 5 (five) minutes as needed for Chest pain.    potassium chloride (MICRO-K) 10 MEQ CpSR Take 2 capsules in am and 1 in the evening; if taking additional lasix, will need to take one additional potassium capsule in the evening    pramipexole (MIRAPEX) 0.5 MG tablet Take 1  "tablet (0.5 mg total) by mouth once daily.    promethazine (PHENERGAN) 25 MG tablet Take 1 tablet (25 mg total) by mouth every 6 (six) hours as needed.    ranolazine (RANEXA) 1,000 mg Tb12 Take 1 tablet (1,000 mg total) by mouth 2 (two) times daily.    rOPINIRole (REQUIP) 5 MG tablet TAKE ONE TABLET BY MOUTH DAILY    sucralfate (CARAFATE) 1 gram tablet Take 1 tablet (1 g total) by mouth 4 (four) times daily before meals and nightly.    temazepam (RESTORIL) 30 mg capsule TAKE 1 CAPSULE (30 MG TOTAL) BY MOUTH NIGHTLY AS NEEDED FOR INSOMNIA.    umeclidinium-vilanteroL (ANORO ELLIPTA) 62.5-25 mcg/actuation DsDv Inhale 1 puff into the lungs once daily. Controller    VENTOLIN HFA 90 mcg/actuation inhaler Inhale 2 puff(s) every 4 hours by inhalation route.   Last reviewed on 4/12/2022  2:12 PM by Ludy Ball MA    Review of patient's allergies indicates:   Allergen Reactions    Atorvastatin Other (See Comments)     Severe muscle pain  Other reaction(s): Abdominal Pain  SEVERE MYALGIAS      Azithromycin Other (See Comments)     By shot, closed veins and made large bruises  By shot, closed veins and made large bruises      Latex, natural rubber Rash     "TOURNIQUET ON LEG LEFT HUGE BLISTER THAT TOOK 9 MONTHS OF WOUND CARE TO HEAL."    Meperidine Hives and Anaphylaxis     Other reaction(s): Anaphylaxis  Hives (skin)^  Hives (skin)^      Penicillins Anaphylaxis and Hives     Other reaction(s): Anaphylaxis  Shortness of breath^swelling  Shortness of breath and swelling  Anaphylaxis  Shortness of breath^swelling      Tofacitinib Rash and Swelling     Rash and swelling of face      Lorazepam Anxiety and Other (See Comments)     Made me goofy  CAUSED CONFUSION "Made me goofy."      Pravastatin Other (See Comments)     Severe myalgias.  Cramps/Severe myalgias.    Flu vac 2018 [id number](pf)      Other reaction(s): Other (See Comments)  Flip into aFIB    Nystatin Rash    Pepper (genus capsicum) Other (See " Comments)     Reflux and ulcers    Last reviewed on  5/6/2022 3:31 PM by Louis Knox      Tasks added this encounter   No tasks added.   Tasks due within next 3 months   5/23/2022 - Refill Call (Auto Added)     Sukhdev, PharmD  [facility]  [address]  [facility] [address]  Phone: [phone]  Fax: [phone]

## 2022-06-07 PROCEDURE — G0179 PR HOME HEALTH MD RECERTIFICATION: ICD-10-PCS | Mod: ,,, | Performed by: FAMILY MEDICINE

## 2022-06-07 PROCEDURE — G0179 MD RECERTIFICATION HHA PT: HCPCS | Mod: ,,, | Performed by: FAMILY MEDICINE

## 2022-06-22 ENCOUNTER — EXTERNAL HOME HEALTH (OUTPATIENT)
Dept: HOME HEALTH SERVICES | Facility: HOSPITAL | Age: 72
End: 2022-06-22
Payer: MEDICARE

## 2022-07-06 ENCOUNTER — SPECIALTY PHARMACY (OUTPATIENT)
Dept: PHARMACY | Facility: CLINIC | Age: 72
End: 2022-07-06
Payer: MEDICARE

## 2022-07-06 NOTE — TELEPHONE ENCOUNTER
Patient reported that she is currently in the hospital, so she is not ready for her refill. She stated that she will call [facility] when she is ready. [facility] will follow up next week.

## 2022-07-11 ENCOUNTER — PATIENT OUTREACH (OUTPATIENT)
Dept: ADMINISTRATIVE | Facility: HOSPITAL | Age: 72
End: 2022-07-11
Payer: MEDICARE

## 2022-07-11 NOTE — LETTER
AUTHORIZATION FOR RELEASE OF   CONFIDENTIAL INFORMATION        We are seeing Kalpana Wright, date of birth 1950, in the clinic at [facility]. Adan Woodward MD is the patient's PCP. Kalpana Wright has an outstanding lab/procedure at the time we reviewed her chart. In order to help keep her health information updated, she has authorized us to request the following medical record(s):        (  )  MAMMOGRAM                                      (  )  COLONOSCOPY      (  )  PAP SMEAR                                          (  )  OUTSIDE LAB RESULTS     (  )  DEXA SCAN                                          (X)  DIABETIC EYE EXAM            (  )  FOOT EXAM                                          (  )  ENTIRE RECORD     (  )  OUTSIDE IMMUNIZATIONS                 (  )  _______________         Please fax records to [facility], Brian T Callihan, MD, [phone]    Lizbeth Tinoco [facility]  Care Coordination Department  [facility]  [phone]          Patient Name: Kalpana Wright  : 1950  Patient Phone #: [phone]

## 2022-07-11 NOTE — PROGRESS NOTES
Received Eye Exam from outside facility, uploaded to media    TEN faxed to Dr. Prashanth Oropeza for DM Eye Exam

## 2022-07-14 ENCOUNTER — PATIENT OUTREACH (OUTPATIENT)
Dept: ADMINISTRATIVE | Facility: HOSPITAL | Age: 72
End: 2022-07-14
Payer: MEDICARE

## 2022-07-14 NOTE — PROGRESS NOTES
DM Report: Called Pt to schedule diabetic labs, Pt say she will have them done when she comes in for Hospital f/u next week. Labs scheduled & linked.

## 2022-07-20 ENCOUNTER — PATIENT OUTREACH (OUTPATIENT)
Dept: ADMINISTRATIVE | Facility: HOSPITAL | Age: 72
End: 2022-07-20
Payer: MEDICARE

## 2022-07-20 NOTE — PROGRESS NOTES
Called patient to confirm hospital follow up scheduled on 07/21/22. Unable to confirm appt,  patient's family says she is at hospital and not sure if she will be admitted or sent home.

## 2022-07-25 ENCOUNTER — DOCUMENT SCAN (OUTPATIENT)
Dept: HOME HEALTH SERVICES | Facility: HOSPITAL | Age: 72
End: 2022-07-25
Payer: MEDICARE

## 2022-08-10 ENCOUNTER — OFFICE VISIT (OUTPATIENT)
Dept: CARDIOLOGY | Facility: CLINIC | Age: 72
End: 2022-08-10
Payer: MEDICARE

## 2022-08-10 VITALS
DIASTOLIC BLOOD PRESSURE: 60 MMHG | SYSTOLIC BLOOD PRESSURE: 136 MMHG | BODY MASS INDEX: 29.71 KG/M2 | HEART RATE: 83 BPM | WEIGHT: 174 LBS | OXYGEN SATURATION: 96 % | HEIGHT: 64 IN

## 2022-08-10 DIAGNOSIS — I25.111 ATHEROSCLEROSIS OF NATIVE CORONARY ARTERY OF NATIVE HEART WITH ANGINA PECTORIS WITH DOCUMENTED SPASM: Chronic | ICD-10-CM

## 2022-08-10 DIAGNOSIS — Z95.0 PACEMAKER: ICD-10-CM

## 2022-08-10 DIAGNOSIS — D68.62 LUPUS ANTICOAGULANT SYNDROME: Chronic | ICD-10-CM

## 2022-08-10 DIAGNOSIS — F17.210 CIGARETTE SMOKER: Chronic | ICD-10-CM

## 2022-08-10 DIAGNOSIS — E78.00 PURE HYPERCHOLESTEROLEMIA: Chronic | ICD-10-CM

## 2022-08-10 DIAGNOSIS — I48.0 PAROXYSMAL ATRIAL FIBRILLATION: ICD-10-CM

## 2022-08-10 DIAGNOSIS — I10 ESSENTIAL HYPERTENSION: Chronic | ICD-10-CM

## 2022-08-10 DIAGNOSIS — I49.5 SSS (SICK SINUS SYNDROME): ICD-10-CM

## 2022-08-10 DIAGNOSIS — Z95.1 HX OF CABG: ICD-10-CM

## 2022-08-10 DIAGNOSIS — Z78.9 STATIN INTOLERANCE: ICD-10-CM

## 2022-08-10 DIAGNOSIS — I50.30 DIASTOLIC CONGESTIVE HEART FAILURE, UNSPECIFIED HF CHRONICITY: ICD-10-CM

## 2022-08-10 DIAGNOSIS — I25.810 CORONARY ARTERY DISEASE INVOLVING CORONARY BYPASS GRAFT OF NATIVE HEART WITHOUT ANGINA PECTORIS: ICD-10-CM

## 2022-08-10 DIAGNOSIS — I73.9 PVD (PERIPHERAL VASCULAR DISEASE): Primary | ICD-10-CM

## 2022-08-10 PROCEDURE — 99214 OFFICE O/P EST MOD 30 MIN: CPT | Mod: S$PBB,,, | Performed by: INTERNAL MEDICINE

## 2022-08-10 PROCEDURE — 99214 PR OFFICE/OUTPT VISIT, EST, LEVL IV, 30-39 MIN: ICD-10-PCS | Mod: S$PBB,,, | Performed by: INTERNAL MEDICINE

## 2022-08-10 PROCEDURE — 99999 PR PBB SHADOW E&M-EST. PATIENT-LVL V: CPT | Mod: PBBFAC,,, | Performed by: INTERNAL MEDICINE

## 2022-08-10 PROCEDURE — 99215 OFFICE O/P EST HI 40 MIN: CPT | Mod: PBBFAC,PO | Performed by: INTERNAL MEDICINE

## 2022-08-10 PROCEDURE — 99999 PR PBB SHADOW E&M-EST. PATIENT-LVL V: ICD-10-PCS | Mod: PBBFAC,,, | Performed by: INTERNAL MEDICINE

## 2022-08-10 NOTE — PROGRESS NOTES
Subjective:   Patient ID:  Kalpana Wright is a 72 y.o. female who presents for follow-up of Follow-up  Pt with recent Er visit [facility] - abdominal pain, dx with gastritits  Sx improved with pepcid and PPI  Patient denies CP, angina or anginal equivalent.  Pt stopped smoking 8 years.  Pt has almost lost 100 lbs in 4 years    Hypertension  This is a chronic problem. The current episode started more than 1 year ago. The problem has been gradually improving since onset. The problem is controlled. Pertinent negatives include no chest pain, palpitations or shortness of breath. Past treatments include beta blockers, calcium channel blockers, ACE inhibitors and diuretics. The current treatment provides moderate improvement. There are no compliance problems.    Coronary Artery Disease  Presents for follow-up visit. Pertinent negatives include no chest pain, dizziness, leg swelling, muscle weakness, palpitations, shortness of breath or weight gain. Risk factors include hyperlipidemia. The symptoms have been stable. Compliance with diet is variable. Compliance with exercise is variable. Compliance with medications is good.   Hyperlipidemia  This is a chronic problem. The current episode started more than 1 year ago. The problem is controlled. Pertinent negatives include no chest pain or shortness of breath. Treatments tried: repatha. The current treatment provides moderate improvement of lipids. There are no compliance problems.        Review of Systems   Constitutional: Negative. Negative for weight gain.   HENT: Negative.    Eyes: Negative.    Cardiovascular: Negative.  Negative for chest pain, leg swelling and palpitations.   Respiratory: Negative.  Negative for shortness of breath.    Endocrine: Negative.    Hematologic/Lymphatic: Negative.    Skin: Negative.    Musculoskeletal: Negative for muscle weakness.   Gastrointestinal: Negative.    Genitourinary: Negative.    Neurological: Negative.  Negative for dizziness.    Psychiatric/Behavioral: Negative.    Allergic/Immunologic: Negative.      Family History   Problem Relation Age of Onset    Cancer Mother     Arthritis Mother     Lung cancer Father     Breast cancer Sister     COPD Daughter      Past Medical History:   Diagnosis Date    ACS (acute coronary syndrome) 3/20/2018    Acute on chronic diastolic congestive heart failure 4/27/2015    Acute on chronic respiratory failure with hypercapnia 1/10/2019    Acute renal failure 1/11/2019    Acute systolic heart failure 3/21/2018    JG (acute kidney injury) 1/29/2019    Anticoagulant long-term use     Arthritis     Asthma     Bradycardia 12/16/2013    CHF (congestive heart failure)     Clostridium difficile colitis 9/25/2018    Convulsions 2/11/2021    COPD (chronic obstructive pulmonary disease)     Coronary artery disease     Depression     Encounter for blood transfusion     Fall 7/8/2019    Iniital HPI: New problem acute.  Happened several days ago.  Patient was getting up out of her wheelchair and thought it was locked when it rolled about from under her and she fell and hit her tailbone on the wheelchair.  Patient has been having moderate to severe pain to the point where she cannot sit on her wheelchair for long periods of time.  ==================== 09/05/2019 Fell again on Satur    Gallstones 3/18/2017    History of Pulmonary embolism 7/17/2014    Hospital discharge follow-up 9/21/2020    Hyperlipidemia     Hypertension     Need for vaccination 8/8/2019    Due for pneumonia vaccination.  However patient has latex allergy.  Patient cannot receive pneumococcal vaccine here.  Well patient follow-up at pharmacy for different pneumococcal vaccine    Non-intractable cyclical vomiting with nausea 2/1/2019    Peripheral vascular disease     Pulmonary embolus 9/9/2013    Thyroid disease      Social History     Socioeconomic History    Marital status:    Tobacco Use    Smoking status:  Current Every Day Smoker     Packs/day: 0.25     Years: 45.00     Pack years: 11.25     Types: Cigarettes    Smokeless tobacco: Never Used    Tobacco comment: 43 years smoked - quit past 7 years    Substance and Sexual Activity    Alcohol use: No    Drug use: Never    Sexual activity: Not Currently     Partners: Male     Birth control/protection: Post-menopausal     Current Outpatient Medications on File Prior to Visit   Medication Sig Dispense Refill    albuterol-ipratropium (DUO-NEB) 2.5 mg-0.5 mg/3 mL nebulizer solution INHALE 1 CONTENTS OF VIAL VIA NEBULIZER EVERY 6 HOURS WHILE AWAKE. 270 mL 0    alendronate (FOSAMAX) 70 MG tablet TAKE 1 TABLET BY MOUTH EVERY 7 DAYS AS DIRECTED. 12 tablet 4    amitriptyline (ELAVIL) 75 MG tablet Take 1 tablet (75 mg total) by mouth nightly as needed for Insomnia or Pain. 90 tablet 4    amLODIPine (NORVASC) 10 MG tablet Take 1 tablet (10 mg total) by mouth once daily. 90 tablet 3    benazepriL (LOTENSIN) 40 MG tablet Take 1 tablet (40 mg total) by mouth once daily. 90 tablet 3    bisoprolol (ZEBETA) 10 MG tablet Take 1 tablet (10 mg total) by mouth once daily. 90 tablet 4    BRILINTA 90 mg tablet TAKE ONE TABLET BY MOUTH TWICE DAILY 180 tablet 3    chlorhexidine (PERIDEX) 0.12 % solution 15 mLs.      cyclobenzaprine (FLEXERIL) 10 MG tablet TAKE ONE TABLET BY MOUTH TWICE DAILY 180 tablet 4    ELIQUIS 5 mg Tab TAKE ONE TABLET BY MOUTH TWICE DAILY 180 tablet 4    ergocalciferol (ERGOCALCIFEROL) 50,000 unit Cap Vitamin D2 1,250 mcg (50,000 unit) capsule   Take 1 capsule every week by oral route. 52 capsule 0    esomeprazole (NEXIUM) 40 MG capsule TAKE ONE CAPSULE BY MOUTH TWICE DAILY BEFORE MEALS 180 capsule 4    evolocumab (REPATHA SURECLICK) 140 mg/mL PnIj Inject 1 mL (140 mg total) into the skin every 14 (fourteen) days. 6 mL 3    fluticasone/umeclidin/vilanter (TRELEGY ELLIPTA INHL) Inhale 1 Inhaler into the lungs once daily.      furosemide (LASIX) 40 MG  tablet Take 80 mg in the am and 80 mg BID x 7 days,then take 80 mg in am and 40 mg in the PM thereafter. 180 tablet 3    gabapentin (NEURONTIN) 600 MG tablet Take 1 tablet (600 mg total) by mouth 3 (three) times daily. 180 tablet 4    hydroCHLOROthiazide (HYDRODIURIL) 25 MG tablet TAKE ONE TABLET BY MOUTH DAILY 90 tablet 4    HYDROcodone-acetaminophen (NORCO)  mg per tablet Take 1 tablet by mouth 2 (two) times daily as needed.      isosorbide mononitrate (IMDUR) 30 MG 24 hr tablet Take 1 tablet (30 mg total) by mouth 2 (two) times daily. 180 tablet 3    levothyroxine (SYNTHROID) 75 MCG tablet Take 1 tablet (75 mcg total) by mouth before breakfast. 90 tablet 4    nitroGLYCERIN (NITROSTAT) 0.4 MG SL tablet Place 1 tablet (0.4 mg total) under the tongue every 5 (five) minutes as needed for Chest pain. 25 tablet 11    potassium chloride (MICRO-K) 10 MEQ CpSR Take 2 capsules in am and 1 in the evening; if taking additional lasix, will need to take one additional potassium capsule in the evening 90 capsule 11    pramipexole (MIRAPEX) 0.5 MG tablet Take 1 tablet (0.5 mg total) by mouth once daily. 90 tablet 3    promethazine (PHENERGAN) 25 MG tablet Take 1 tablet (25 mg total) by mouth every 6 (six) hours as needed. 45 tablet 4    ranolazine (RANEXA) 1,000 mg Tb12 Take 1 tablet (1,000 mg total) by mouth 2 (two) times daily. 180 tablet 3    rOPINIRole (REQUIP) 5 MG tablet TAKE ONE TABLET BY MOUTH DAILY 90 tablet 4    sucralfate (CARAFATE) 1 gram tablet Take 1 tablet (1 g total) by mouth 4 (four) times daily before meals and nightly. 120 tablet 0    temazepam (RESTORIL) 30 mg capsule TAKE 1 CAPSULE (30 MG TOTAL) BY MOUTH NIGHTLY AS NEEDED FOR INSOMNIA. 30 capsule 5    umeclidinium-vilanteroL (ANORO ELLIPTA) 62.5-25 mcg/actuation DsDv Inhale 1 puff into the lungs once daily. Controller      VENTOLIN HFA 90 mcg/actuation inhaler Inhale 2 puff(s) every 4 hours by inhalation route.  3    hydrALAZINE  "(APRESOLINE) 25 MG tablet Take 1 tablet (25 mg total) by mouth 2 (two) times daily. 180 tablet 3    miconazole NITRATE 2 % (ZEASORB, MICONAZOLE,) 2 % top powder Apply topically as needed for Itching. (Patient not taking: No sig reported) 85 g 3    mupirocin (BACTROBAN) 2 % ointment mupirocin 2 % topical ointment      [DISCONTINUED] calcium carbonate (OS-VANESSA) 500 mg calcium (1,250 mg) tablet Take 1 tablet (500 mg total) by mouth 3 (three) times daily.  0    [DISCONTINUED] cetirizine (ZYRTEC) 10 MG tablet Take 10 mg by mouth once daily.      [DISCONTINUED] diclofenac sodium (VOLTAREN) 1 % Gel        No current facility-administered medications on file prior to visit.     Review of patient's allergies indicates:   Allergen Reactions    Atorvastatin Other (See Comments)     Severe muscle pain  Other reaction(s): Abdominal Pain  SEVERE MYALGIAS      Azithromycin Other (See Comments)     By shot, closed veins and made large bruises  By shot, closed veins and made large bruises      Latex, natural rubber Rash     "TOURNIQUET ON LEG LEFT HUGE BLISTER THAT TOOK 9 MONTHS OF WOUND CARE TO HEAL."    Meperidine Hives and Anaphylaxis     Other reaction(s): Anaphylaxis  Hives (skin)^  Hives (skin)^      Penicillins Anaphylaxis and Hives     Other reaction(s): Anaphylaxis  Shortness of breath^swelling  Shortness of breath and swelling  Anaphylaxis  Shortness of breath^swelling      Tofacitinib Rash and Swelling     Rash and swelling of face      Lorazepam Anxiety and Other (See Comments)     Made me goofy  CAUSED CONFUSION "Made me goofy."      Pravastatin Other (See Comments)     Severe myalgias.  Cramps/Severe myalgias.    Flu vac 2018 [id number](pf)      Other reaction(s): Other (See Comments)  Flip into aFIB    Nystatin Rash    Pepper (genus capsicum) Other (See Comments)     Reflux and ulcers       Objective:     Physical Exam  Vitals and nursing note reviewed.   Constitutional:       Appearance: She is " well-developed.   HENT:      Head: Normocephalic and atraumatic.   Eyes:      Conjunctiva/sclera: Conjunctivae normal.      Pupils: Pupils are equal, round, and reactive to light.   Cardiovascular:      Rate and Rhythm: Normal rate and regular rhythm.      Pulses: Intact distal pulses.      Heart sounds: Normal heart sounds.   Pulmonary:      Effort: Pulmonary effort is normal.      Breath sounds: Normal breath sounds.   Abdominal:      General: Bowel sounds are normal.      Palpations: Abdomen is soft.   Musculoskeletal:         General: Normal range of motion.      Cervical back: Normal range of motion and neck supple.   Skin:     General: Skin is warm and dry.   Neurological:      Mental Status: She is alert and oriented to person, place, and time.         Assessment:     1. PVD (peripheral vascular disease)    2. History of SSS (sick sinus syndrome)    3. Pacemaker    4. Paroxysmal atrial fibrillation    5. Hx of CABG    6. Diastolic congestive heart failure, unspecified HF chronicity    7. Coronary artery disease involving coronary bypass graft of native heart without angina pectoris    8. Atherosclerosis of native coronary artery of native heart with angina pectoris with documented spasm    9. Pure hypercholesterolemia    10. Essential hypertension    11. Lupus anticoagulant syndrome    12. Cigarette smoker    13. Statin intolerance        Plan:     PVD (peripheral vascular disease)    History of SSS (sick sinus syndrome)    Pacemaker    Paroxysmal atrial fibrillation    Hx of CABG    Diastolic congestive heart failure, unspecified HF chronicity    Coronary artery disease involving coronary bypass graft of native heart without angina pectoris    Atherosclerosis of native coronary artery of native heart with angina pectoris with documented spasm    Pure hypercholesterolemia    Essential hypertension    Lupus anticoagulant syndrome    Cigarette smoker    Statin intolerance      Continue benazepril 40 mg p.o.  q.day, bisoprolol 10 mg p.o. q.day, and amlodipine 10mg QD -hypertension.  Furosemide prn. Avoid dehydration.  Continue apixaban, bisoprolol - PAF  Continue brilinta - CAD.  Sodium restriction strongly encouraged.    Continue imdur and ranexa - angina/CAD.   [facility] clinic.

## 2022-08-15 DIAGNOSIS — I49.5 SSS (SICK SINUS SYNDROME): Primary | ICD-10-CM

## 2022-08-15 DIAGNOSIS — Z95.0 PACEMAKER: ICD-10-CM

## 2022-08-15 DIAGNOSIS — Z95.1 HX OF CABG: ICD-10-CM

## 2022-08-17 ENCOUNTER — TELEPHONE (OUTPATIENT)
Dept: CARDIOLOGY | Facility: HOSPITAL | Age: 72
End: 2022-08-17
Payer: MEDICARE

## 2022-08-19 ENCOUNTER — PATIENT MESSAGE (OUTPATIENT)
Dept: ADMINISTRATIVE | Facility: HOSPITAL | Age: 72
End: 2022-08-19
Payer: MEDICARE

## 2022-08-19 ENCOUNTER — HOSPITAL ENCOUNTER (OUTPATIENT)
Dept: CARDIOLOGY | Facility: HOSPITAL | Age: 72
Discharge: HOME | End: 2022-08-19
Attending: INTERNAL MEDICINE
Payer: MEDICARE

## 2022-08-19 DIAGNOSIS — Z95.0 PACEMAKER: ICD-10-CM

## 2022-08-19 DIAGNOSIS — Z95.1 HX OF CABG: ICD-10-CM

## 2022-08-19 DIAGNOSIS — I49.5 SSS (SICK SINUS SYNDROME): ICD-10-CM

## 2022-08-19 LAB
AV DELAY - LONGEST: 300 MSEC
AV DELAY - SHORTEST: 215 MSEC
IMPEDANCE RA LEAD (NATIVE): 579 OHMS
IMPEDANCE RA LEAD: 569 OHMS
OHS CV DC PP MS1: 0.5 MS
OHS CV DC PP MS2: 0.4 MS
OHS CV DC PP V1: 2 V
OHS CV DC PP V2: NORMAL V
P/R-WAVE RA LEAD (NATIVE): 11.1 MV
P/R-WAVE RA LEAD: 1.1 MV
PV DELAY - LONGEST: 350 MSEC
PV DELAY - SHORTEST: 250 MSEC
THRESHOLD MS RA LEAD (NATIVE): 0.4 MS
THRESHOLD MS RA LEAD: 0.5 MS
THRESHOLD V RA LEAD (NATIVE): 1 V
THRESHOLD V RA LEAD: 0.8 V

## 2022-08-19 PROCEDURE — 93280 PM DEVICE PROGR EVAL DUAL: CPT | Mod: 26,,, | Performed by: INTERNAL MEDICINE

## 2022-08-19 PROCEDURE — 93280 CARDIAC DEVICE CHECK - IN CLINIC & HOSPITAL: ICD-10-PCS | Mod: 26,,, | Performed by: INTERNAL MEDICINE

## 2022-08-19 PROCEDURE — 93280 PM DEVICE PROGR EVAL DUAL: CPT | Mod: PO

## 2022-08-24 RX ORDER — DIPHENOXYLATE HYDROCHLORIDE AND ATROPINE SULFATE 2.5; .025 MG/1; MG/1
1 TABLET ORAL 4 TIMES DAILY PRN
Qty: 20 TABLET | Refills: 0 | Status: SHIPPED | OUTPATIENT
Start: 2022-08-24 | End: 2022-09-03

## 2022-08-24 NOTE — TELEPHONE ENCOUNTER
----- Message from Lashell Isak sent at 8/24/2022 10:14 AM CDT -----  Patient phoned in reference to getting an RX for severe diarrhea.  Please call and advise.  Thanks/c

## 2022-09-09 ENCOUNTER — PATIENT OUTREACH (OUTPATIENT)
Dept: ADMINISTRATIVE | Facility: HOSPITAL | Age: 72
End: 2022-09-09
Payer: MEDICARE

## 2022-09-26 ENCOUNTER — TELEPHONE (OUTPATIENT)
Dept: FAMILY MEDICINE | Facility: CLINIC | Age: 72
End: 2022-09-26
Payer: MEDICARE

## 2022-09-26 NOTE — TELEPHONE ENCOUNTER
Spoke with pt home health nurse. She will discuss the appt times and see what works best for the pt to come in

## 2022-09-26 NOTE — TELEPHONE ENCOUNTER
----- Message from Reta Crawford sent at 9/26/2022  1:53 PM CDT -----  Contact: [phone]  Calling requesting home health orders for pt . Please call back at [phone] . Thanks/dj

## 2022-09-29 ENCOUNTER — PATIENT OUTREACH (OUTPATIENT)
Dept: ADMINISTRATIVE | Facility: HOSPITAL | Age: 72
End: 2022-09-29
Payer: MEDICARE

## 2022-11-09 DIAGNOSIS — Z78.0 MENOPAUSE: ICD-10-CM

## 2023-01-10 ENCOUNTER — PATIENT OUTREACH (OUTPATIENT)
Dept: ADMINISTRATIVE | Facility: HOSPITAL | Age: 73
End: 2023-01-10
Payer: MEDICARE

## 2023-01-25 ENCOUNTER — PATIENT MESSAGE (OUTPATIENT)
Dept: ADMINISTRATIVE | Facility: HOSPITAL | Age: 73
End: 2023-01-25
Payer: MEDICARE

## 2023-01-30 ENCOUNTER — TELEPHONE (OUTPATIENT)
Dept: CARDIOLOGY | Facility: HOSPITAL | Age: 73
End: 2023-01-30
Payer: MEDICARE

## 2023-02-02 ENCOUNTER — HOSPITAL ENCOUNTER (OUTPATIENT)
Dept: CARDIOLOGY | Facility: HOSPITAL | Age: 73
Discharge: HOME | End: 2023-02-02
Attending: INTERNAL MEDICINE
Payer: MEDICARE

## 2023-02-02 DIAGNOSIS — I49.5 SSS (SICK SINUS SYNDROME): ICD-10-CM

## 2023-02-02 DIAGNOSIS — Z95.1 HX OF CABG: ICD-10-CM

## 2023-02-02 DIAGNOSIS — Z95.0 PACEMAKER: ICD-10-CM

## 2023-02-02 PROCEDURE — 93280 PM DEVICE PROGR EVAL DUAL: CPT | Mod: PO

## 2023-02-02 PROCEDURE — 93280 CARDIAC DEVICE CHECK - IN CLINIC & HOSPITAL: ICD-10-PCS | Mod: 26,,, | Performed by: INTERNAL MEDICINE

## 2023-02-02 PROCEDURE — 93280 PM DEVICE PROGR EVAL DUAL: CPT | Mod: 26,,, | Performed by: INTERNAL MEDICINE

## 2023-02-03 LAB
IMPEDANCE RA LEAD (NATIVE): 577 OHMS
IMPEDANCE RA LEAD: 600 OHMS
OHS CV DC PP MS1: 0.5 MS
OHS CV DC PP MS2: 0.5 MS
OHS CV DC PP V1: 2 V
OHS CV DC PP V2: 2 V
P/R-WAVE RA LEAD (NATIVE): 9.2 MV
P/R-WAVE RA LEAD: 0.9 MV
THRESHOLD MS RA LEAD (NATIVE): 0.5 MS
THRESHOLD MS RA LEAD: 0.5 MS
THRESHOLD V RA LEAD (NATIVE): 1.2 V
THRESHOLD V RA LEAD: 0.9 V

## 2023-03-02 ENCOUNTER — LAB VISIT (OUTPATIENT)
Dept: LAB | Facility: HOSPITAL | Age: 73
End: 2023-03-02
Attending: INTERNAL MEDICINE
Payer: MEDICARE

## 2023-03-02 DIAGNOSIS — T82.191A MALFUNCTION OF CARDIAC PACEMAKER BATTERY: ICD-10-CM

## 2023-03-02 DIAGNOSIS — Z95.0 CARDIAC PACEMAKER IN SITU: ICD-10-CM

## 2023-03-02 LAB
BASOPHILS # BLD AUTO: 0.02 K/UL (ref 0–0.2)
BASOPHILS NFR BLD: 0.4 % (ref 0–1.9)
DIFFERENTIAL METHOD: ABNORMAL
EOSINOPHIL # BLD AUTO: 0.1 K/UL (ref 0–0.5)
EOSINOPHIL NFR BLD: 2.5 % (ref 0–8)
ERYTHROCYTE [DISTWIDTH] IN BLOOD BY AUTOMATED COUNT: 15.6 % (ref 11.5–14.5)
HCT VFR BLD AUTO: 44.7 % (ref 37–48.5)
HGB BLD-MCNC: 13.8 G/DL (ref 12–16)
IMM GRANULOCYTES # BLD AUTO: 0.01 K/UL (ref 0–0.04)
IMM GRANULOCYTES NFR BLD AUTO: 0.2 % (ref 0–0.5)
LYMPHOCYTES # BLD AUTO: 1.9 K/UL (ref 1–4.8)
LYMPHOCYTES NFR BLD: 36.8 % (ref 18–48)
MCH RBC QN AUTO: 28.3 PG (ref 27–31)
MCHC RBC AUTO-ENTMCNC: 30.9 G/DL (ref 32–36)
MCV RBC AUTO: 92 FL (ref 82–98)
MONOCYTES # BLD AUTO: 0.4 K/UL (ref 0.3–1)
MONOCYTES NFR BLD: 8.2 % (ref 4–15)
NEUTROPHILS # BLD AUTO: 2.7 K/UL (ref 1.8–7.7)
NEUTROPHILS NFR BLD: 51.9 % (ref 38–73)
NRBC BLD-RTO: 0 /100 WBC
PLATELET # BLD AUTO: 195 K/UL (ref 150–450)
PMV BLD AUTO: 10 FL (ref 9.2–12.9)
RBC # BLD AUTO: 4.88 M/UL (ref 4–5.4)
WBC # BLD AUTO: 5.24 K/UL (ref 3.9–12.7)

## 2023-03-02 PROCEDURE — 80048 BASIC METABOLIC PNL TOTAL CA: CPT | Performed by: INTERNAL MEDICINE

## 2023-03-02 PROCEDURE — 85025 COMPLETE CBC W/AUTO DIFF WBC: CPT | Performed by: INTERNAL MEDICINE

## 2023-03-02 PROCEDURE — 36415 COLL VENOUS BLD VENIPUNCTURE: CPT | Mod: PO | Performed by: INTERNAL MEDICINE

## 2023-03-03 LAB
ANION GAP SERPL CALC-SCNC: 9 MMOL/L (ref 8–16)
BUN SERPL-MCNC: 9 MG/DL (ref 8–23)
CALCIUM SERPL-MCNC: 9.3 MG/DL (ref 8.7–10.5)
CHLORIDE SERPL-SCNC: 101 MMOL/L (ref 95–110)
CO2 SERPL-SCNC: 31 MMOL/L (ref 23–29)
CREAT SERPL-MCNC: 0.8 MG/DL (ref 0.5–1.4)
EST. GFR  (NO RACE VARIABLE): >60 ML/MIN/1.73 M^2
GLUCOSE SERPL-MCNC: 85 MG/DL (ref 70–110)
POTASSIUM SERPL-SCNC: 3.8 MMOL/L (ref 3.5–5.1)
SODIUM SERPL-SCNC: 141 MMOL/L (ref 136–145)

## 2023-03-06 ENCOUNTER — TELEPHONE (OUTPATIENT)
Dept: CARDIOLOGY | Facility: CLINIC | Age: 73
End: 2023-03-06
Payer: MEDICARE

## 2023-03-06 NOTE — TELEPHONE ENCOUNTER
Attempted without success x2 to contact pt to discuss message. Not able to leave voicemail for pt.    ----- Message from Lashell Parisi sent at 3/6/2023 12:18 PM CST -----  Patient needs an rx for new hospital bed.  Please call @ [phone] to advise. Thanks/harinder

## 2023-03-10 ENCOUNTER — HOSPITAL ENCOUNTER (OUTPATIENT)
Facility: HOSPITAL | Age: 73
Discharge: HOME | End: 2023-03-10
Attending: INTERNAL MEDICINE | Admitting: INTERNAL MEDICINE
Payer: MEDICARE

## 2023-03-10 VITALS
HEIGHT: 64 IN | OXYGEN SATURATION: 94 % | SYSTOLIC BLOOD PRESSURE: 170 MMHG | BODY MASS INDEX: 29.7 KG/M2 | TEMPERATURE: 98 F | DIASTOLIC BLOOD PRESSURE: 47 MMHG | HEART RATE: 60 BPM | RESPIRATION RATE: 20 BRPM | WEIGHT: 173.94 LBS

## 2023-03-10 DIAGNOSIS — Z45.010 PACEMAKER GENERATOR END OF LIFE: ICD-10-CM

## 2023-03-10 DIAGNOSIS — Z95.0 CARDIAC PACEMAKER IN SITU: ICD-10-CM

## 2023-03-10 DIAGNOSIS — I49.5 SSS (SICK SINUS SYNDROME): ICD-10-CM

## 2023-03-10 PROCEDURE — 33228 REMV&REPLC PM GEN DUAL LEAD: CPT | Performed by: INTERNAL MEDICINE

## 2023-03-10 PROCEDURE — 99152 MOD SED SAME PHYS/QHP 5/>YRS: CPT | Performed by: INTERNAL MEDICINE

## 2023-03-10 PROCEDURE — C1785 PMKR, DUAL, RATE-RESP: HCPCS | Performed by: INTERNAL MEDICINE

## 2023-03-10 PROCEDURE — 27201423 OPTIME MED/SURG SUP & DEVICES STERILE SUPPLY: Performed by: INTERNAL MEDICINE

## 2023-03-10 PROCEDURE — 33228 REMV&REPLC PM GEN DUAL LEAD: CPT | Mod: ,,, | Performed by: INTERNAL MEDICINE

## 2023-03-10 PROCEDURE — 99152 PR MOD CONSCIOUS SEDATION, SAME PHYS, 5+ YRS, FIRST 15 MIN: ICD-10-PCS | Mod: ,,, | Performed by: INTERNAL MEDICINE

## 2023-03-10 PROCEDURE — 63600175 PHARM REV CODE 636 W HCPCS: Performed by: INTERNAL MEDICINE

## 2023-03-10 PROCEDURE — 25000003 PHARM REV CODE 250: Performed by: INTERNAL MEDICINE

## 2023-03-10 PROCEDURE — 99153 MOD SED SAME PHYS/QHP EA: CPT | Performed by: INTERNAL MEDICINE

## 2023-03-10 PROCEDURE — 33228 PR REMV&REPLC PM GEN DUAL LEAD: ICD-10-PCS | Mod: ,,, | Performed by: INTERNAL MEDICINE

## 2023-03-10 PROCEDURE — 63600175 PHARM REV CODE 636 W HCPCS

## 2023-03-10 PROCEDURE — 99152 MOD SED SAME PHYS/QHP 5/>YRS: CPT | Mod: ,,, | Performed by: INTERNAL MEDICINE

## 2023-03-10 DEVICE — IMPLANTABLE DEVICE: Type: IMPLANTABLE DEVICE | Site: CHEST  WALL | Status: FUNCTIONAL

## 2023-03-10 RX ORDER — ALENDRONATE SODIUM 70 MG/1
TABLET ORAL
Qty: 12 TABLET | Refills: 4 | Status: SHIPPED | OUTPATIENT
Start: 2023-03-10

## 2023-03-10 RX ORDER — LIDOCAINE HCL/EPINEPHRINE/PF 2%-1:200K
VIAL (ML) INJECTION
Status: DISCONTINUED | OUTPATIENT
Start: 2023-03-10 | End: 2023-03-10 | Stop reason: HOSPADM

## 2023-03-10 RX ORDER — MIDAZOLAM HYDROCHLORIDE 1 MG/ML
INJECTION, SOLUTION INTRAMUSCULAR; INTRAVENOUS
Status: DISCONTINUED | OUTPATIENT
Start: 2023-03-10 | End: 2023-03-10 | Stop reason: HOSPADM

## 2023-03-10 RX ORDER — DIPHENHYDRAMINE HYDROCHLORIDE 50 MG/ML
INJECTION INTRAMUSCULAR; INTRAVENOUS
Status: DISCONTINUED | OUTPATIENT
Start: 2023-03-10 | End: 2023-03-10 | Stop reason: HOSPADM

## 2023-03-10 RX ORDER — PRAMIPEXOLE DIHYDROCHLORIDE 0.5 MG/1
TABLET ORAL
Qty: 90 TABLET | Refills: 3 | Status: SHIPPED | OUTPATIENT
Start: 2023-03-10

## 2023-03-10 RX ORDER — MUPIROCIN 20 MG/G
1 OINTMENT TOPICAL
Status: DISCONTINUED | OUTPATIENT
Start: 2023-03-10 | End: 2023-03-10 | Stop reason: HOSPADM

## 2023-03-10 RX ORDER — VANCOMYCIN HYDROCHLORIDE 1 G/20ML
INJECTION, POWDER, LYOPHILIZED, FOR SOLUTION INTRAVENOUS
Status: DISCONTINUED | OUTPATIENT
Start: 2023-03-10 | End: 2023-03-10 | Stop reason: ALTCHOICE

## 2023-03-10 RX ORDER — LIDOCAINE HCL/EPINEPHRINE/PF 2%-1:200K
1 VIAL (ML) INJECTION ONCE
Status: DISCONTINUED | OUTPATIENT
Start: 2023-03-10 | End: 2023-03-10 | Stop reason: HOSPADM

## 2023-03-10 RX ORDER — GABAPENTIN 600 MG/1
TABLET ORAL
Qty: 90 TABLET | Refills: 0 | Status: SHIPPED | OUTPATIENT
Start: 2023-03-10 | End: 2023-06-16

## 2023-03-10 RX ORDER — FENTANYL CITRATE 50 UG/ML
INJECTION, SOLUTION INTRAMUSCULAR; INTRAVENOUS
Status: DISCONTINUED | OUTPATIENT
Start: 2023-03-10 | End: 2023-03-10 | Stop reason: HOSPADM

## 2023-03-10 NOTE — Clinical Note
0 ml of contrast were injected throughout the case. 0 mL of contrast was the total wasted during the case. 0 mL was the total amount used during the case.

## 2023-03-10 NOTE — DISCHARGE INSTRUCTIONS
*IMPORTANT INFORMATION:*   DO NOT TAKE ELIQUIS UNTIL Monday MORNING!!  *IMPORTANT*                                                                                                                                                                                               PACEMAKER/GENERATOR CHANGE INSTRUCTIONS    SAFETY  BECAUSE OF THE AFTEREFFECTS OF THE SEDATION YOU RECEIVED, WE ADVISE YOU TO REFRAIN FROM THE FOLLOWING ACTIVITIES FOR 24 HOURS.   1. DO NOT DRIVE OR OPERATE MACHINERY FOR 24 HOURS   2. DO NOT OPERATE APPLIANCES IF ALONE.     3.DON'T SIGN LEGAL PAPERS FOR 24 HOURS.     4. WE ADVISE THAT SOMEONE STAY WITH YOU AFTER THE PROCEDURE FOR AT LEAST 8 HOURS    DISCOMFORT: FOR GENERAL DISCOMFORT AT THE PUNCTURE, YOU MAY TAKE TYLENOL, 1-2 TABS EVERY 4-6 HOURS AS NEEDED.  DO NOT TAKE MORE THAN 4000 MG  IN 24 HOUR PERIOD.    ACTIVITY/ WOUND INSTRUCTIONS     AFFECTED ARM  DO NOT LIFT ARM ABOVE SHOULDER HEIGHT FOR 7 DAYS.                                  DO NOT  SWING ARM FOR 6 WEEKS                                DO NOT REMOVE DRESSING.  IT WILL BE REMOVED AT FOLLOW UP APPOINTMENT                                YOU MAY SHOWER Christian MORNING.  DO NOT REMOVE THE DRESSING FOR SHOWER.                                 USE HIBICLENS SOAP ON CHEST, UNDER ARMS AND BREASTS AND NECK AREA  FOR 1 WEEK.                                  FACE AWAY FROM SPRAY WHEN SHOWERING. LET WATER SPRAY ON YOUR BACK AND RUN OVER YOUR SHOULDER.                                                                             REMOVE SLING FOR SHOWER, THEN REAPPLY AFTER SHOWER.                                USE ICE PACK FOR 48 HOURS .                                WEAR SLING AND SWATHE FOR 24 HOURS AROUND THE CLOCK THEN ONLY WHILE SLEEPING AT NIGHT UNTIL Wednesday MORNING.              6. CALL YOUR HEALTHCARE PROVIDER IF YOU START TO HAVE THE FOLLOWING SYMPTOMS:                   1. High fever (101 degrees or higher)                 2.  Redness,drainage or swelling  or intense pain at the incision site       3.  Drowsiness that doesn't get better       4. Weakness or dizziness that doesn't get better            5. Repeated vomiting                                                                                                                 NOZIN INSTRUCTIONS     GOAL: TO REDUCE THE RISK OF POST-PROCEDURAL INFECTIONS BY BACTERIA IN THE NASAL CAVITY.  THINK OF IT AS A HAND  FOR YOUR NOSE.     HOW TO USE:  1. SHAKE NOZIN BOTTLE WELL                            2. TAKE A COTTON SWAB AND APPLY FOUR DROPS TO TIP.                            3. INSERT COTTON SWAB INTO ONE NOSTRIL, BEING SURE NOT TO GO DEEPER INTO NOSE THAN THE TIP OF THE SWAB.                            4.SWAB NOSTRIL 6 TIMES CLOCKWISE AND 6 TIMES COUNERCLOCKWISE.  MAKE SURE TO SWAB THE INSIDE FRONT POCKET OF NOSTRIL.                             5. TAKE SWAB OUT AND APPLY 2 DROPS TO THE SAME COTTON TIP.  REPEAT STEPS 3 AND 4 IN THE OTHER NOSTRIL      DO STEPS 1-5 TWICE A DAY FOR 7 DAYS.    *IMPORTANT* DO NOT TAKE ELIQUIS UNTIL Monday MORNING!   *IMPORTANT*

## 2023-03-10 NOTE — PLAN OF CARE
Right wrist and left FA saline locks discontinued with canula intact; tolerated well.  Left chest dressing remains c/d/i and wnl at time of discharge.  Pt able to drink fluids while in CVRU without issue; remains AAOx3 at time of discharge.   Pt ambulated at baseline - in bay - prior to discharge without issue.  Discharge instructions, home medication list, and post discharge follow up appointments discussed with pt and daughter.  Discharged to daughter, via wheelchair, in no apparent distress. All personal belongings taken with pt. Encouraged continued compliance with MD directives.

## 2023-03-10 NOTE — BRIEF OP NOTE
<[facility] - Cath Lab ([facility])  Cardiology Department  Operative Note    SUMMARY     Date of Procedure: 3/10/2023     Procedure: Procedure(s) (LRB):  REPLACEMENT, PACEMAKER GENERATOR (Left)     Surgeon(s) and Role:     * Huber Kapoor MD - Primary    Assisting Surgeon: None    Pre-Operative Diagnosis: Pacemaker generator end of life [Z45.010]  Cardiac pacemaker in situ [Z95.0]  SSS (sick sinus syndrome) [I49.5]    Post-Operative Diagnosis: Post-Op Diagnosis Codes:     * Pacemaker generator end of life [Z45.010]     * Cardiac pacemaker in situ [Z95.0]     * SSS (sick sinus syndrome) [I49.5]    Anesthesia: RN IV Sedation    Technical Procedures Used: PPM generator change, dx: MINE    Description of the Findings of the Procedure: PPM generator change, site L prepectoral    Significant Surgical Tasks Conducted by the Assistant(s), if Applicable: none    Complications: No    Estimated Blood Loss (EBL): < 50 cc           Implants: * No implants in log *    Specimens:   Specimen (24h ago, onward)      None                    Condition: Good    Disposition: PACU - hemodynamically stable.    Attestation: I was present and scrubbed for the entire procedure.

## 2023-03-10 NOTE — H&P
"Consult Note  Cardiology    Consult Requested By:  Reason for Consult:     SUBJECTIVE:     History of Present Illness:  Patient is a 73 y.o. female presents with PPM at [facility]  Pt presents for PPM generator change.  Patient denies CP, angina or anginal equivalent.     Review of patient's allergies indicates:   Allergen Reactions    Atorvastatin Other (See Comments)     Severe muscle pain  Other reaction(s): Abdominal Pain  SEVERE MYALGIAS      Azithromycin Other (See Comments)     By shot, closed veins and made large bruises  By shot, closed veins and made large bruises      Latex, natural rubber Rash     "TOURNIQUET ON LEG LEFT HUGE BLISTER THAT TOOK 9 MONTHS OF WOUND CARE TO HEAL."    Meperidine Hives and Anaphylaxis     Other reaction(s): Anaphylaxis  Hives (skin)^  Hives (skin)^      Penicillins Anaphylaxis and Hives     Other reaction(s): Anaphylaxis  Shortness of breath^swelling  Shortness of breath and swelling  Anaphylaxis  Shortness of breath^swelling      Tofacitinib Rash and Swelling     Rash and swelling of face      Lorazepam Anxiety and Other (See Comments)     Made me goofy  CAUSED CONFUSION "Made me goofy."      Pravastatin Other (See Comments)     Severe myalgias.  Cramps/Severe myalgias.    Flu vac 2018 [id number](pf)      Other reaction(s): Other (See Comments)  Flip into aFIB    Nystatin Rash    Pepper (genus capsicum) Other (See Comments)     Reflux and ulcers       Past Medical History:   Diagnosis Date    ACS (acute coronary syndrome) 3/20/2018    Acute on chronic diastolic congestive heart failure 4/27/2015    Acute on chronic respiratory failure with hypercapnia 1/10/2019    Acute renal failure 1/11/2019    Acute systolic heart failure 3/21/2018    JG (acute kidney injury) 1/29/2019    Anticoagulant long-term use     Arthritis     Asthma     Bradycardia 12/16/2013    CHF (congestive heart failure)     Clostridium difficile colitis 9/25/2018    Convulsions 2/11/2021    COPD (chronic " obstructive pulmonary disease)     Coronary artery disease     Depression     Encounter for blood transfusion     Fall 7/8/2019    Iniital HPI: New problem acute.  Happened several days ago.  Patient was getting up out of her wheelchair and thought it was locked when it rolled about from under her and she fell and hit her tailbone on the wheelchair.  Patient has been having moderate to severe pain to the point where she cannot sit on her wheelchair for long periods of time.  ==================== 09/05/2019 Fell again on Satur    Gallstones 3/18/2017    History of Pulmonary embolism 7/17/2014    Hospital discharge follow-up 9/21/2020    Hyperlipidemia     Hypertension     Need for vaccination 8/8/2019    Due for pneumonia vaccination.  However patient has latex allergy.  Patient cannot receive pneumococcal vaccine here.  Well patient follow-up at pharmacy for different pneumococcal vaccine    Non-intractable cyclical vomiting with nausea 2/1/2019    Peripheral vascular disease     Pulmonary embolus 9/9/2013    Thyroid disease      Past Surgical History:   Procedure Laterality Date    CARDIAC SURGERY      CHOLECYSTECTOMY      CORONARY ARTERY BYPASS GRAFT  04/16/2015    EYE SURGERY      HYSTERECTOMY      INSERT / REPLACE / REMOVE PACEMAKER      LEFT HEART CATHETERIZATION Left 11/14/2018    Procedure: CATHETERIZATION, HEART, LEFT;  Surgeon: Huber Kapoor MD;  Location: [facility] CATH LAB;  Service: Cardiology;  Laterality: Left;    OOPHORECTOMY      peripheral angiogram      peripheral stenting       Family History   Problem Relation Age of Onset    Cancer Mother     Arthritis Mother     Lung cancer Father     Breast cancer Sister     COPD Daughter      Social History     Tobacco Use    Smoking status: Every Day     Packs/day: 0.25     Years: 45.00     Pack years: 11.25     Types: Cigarettes    Smokeless tobacco: Never    Tobacco comments:     43 years smoked - quit past 7 years    Substance Use Topics    Alcohol use:  No    Drug use: Never        Review of Systems:  Constitutional: negative  Eyes: negative  Ears, nose, mouth, throat, and face: negative  Respiratory: negative  Cardiovascular: negative  Gastrointestinal: negative  Genitourinary:negative  Hematologic/lymphatic: negative  Musculoskeletal:negative,   Neurological: negative  Behavioral/Psych: negative  Endocrine: negative  Allergic/Immunologic: negative    OBJECTIVE:     Vital Signs (Most Recent)       Vital Signs Range (Last 24H):       No current facility-administered medications for this encounter.     Current Outpatient Medications   Medication Sig    albuterol-ipratropium (DUO-NEB) 2.5 mg-0.5 mg/3 mL nebulizer solution INHALE 1 CONTENTS OF VIAL VIA NEBULIZER EVERY 6 HOURS WHILE AWAKE.    alendronate (FOSAMAX) 70 MG tablet TAKE 1 TABLET BY MOUTH EVERY 7 DAYS AS DIRECTED.    amitriptyline (ELAVIL) 75 MG tablet TAKE ONE TABLET BY MOUTH ONE TIME DAILY IN THE EVENING AS NEEDED    amLODIPine (NORVASC) 10 MG tablet Take 1 tablet (10 mg total) by mouth once daily.    benazepriL (LOTENSIN) 40 MG tablet TAKE ONE TABLET BY MOUTH DAILY    bisoprolol (ZEBETA) 10 MG tablet Take 1 tablet (10 mg total) by mouth once daily.    BRILINTA 90 mg tablet TAKE ONE TABLET BY MOUTH TWICE DAILY    chlorhexidine (PERIDEX) 0.12 % solution 15 mLs.    ELIQUIS 5 mg Tab TAKE ONE TABLET BY MOUTH TWICE DAILY    ergocalciferol (ERGOCALCIFEROL) 50,000 unit Cap Vitamin D2 1,250 mcg (50,000 unit) capsule   Take 1 capsule every week by oral route.    esomeprazole (NEXIUM) 40 MG capsule TAKE ONE CAPSULE BY MOUTH TWICE DAILY BEFORE MEALS    evolocumab (REPATHA SURECLICK) 140 mg/mL PnIj Inject 1 mL (140 mg total) into the skin every 14 (fourteen) days.    fluticasone/umeclidin/vilanter (TRELEGY ELLIPTA INHL) Inhale 1 Inhaler into the lungs once daily.    furosemide (LASIX) 40 MG tablet Take 80 mg in the am and 80 mg BID x 7 days,then take 80 mg in am and 40 mg in the PM thereafter.    gabapentin  (NEURONTIN) 600 MG tablet Take 1 tablet (600 mg total) by mouth 3 (three) times daily.    hydrALAZINE (APRESOLINE) 25 MG tablet Take 1 tablet (25 mg total) by mouth 2 (two) times daily.    hydroCHLOROthiazide (HYDRODIURIL) 25 MG tablet TAKE ONE TABLET BY MOUTH DAILY    HYDROcodone-acetaminophen (NORCO)  mg per tablet Take 1 tablet by mouth 2 (two) times daily as needed.    isosorbide mononitrate (IMDUR) 30 MG 24 hr tablet Take 1 tablet (30 mg total) by mouth 2 (two) times daily.    levothyroxine (SYNTHROID) 75 MCG tablet TAKE ONE TABLET BY MOUTH IN THE MORNING BEFORE BREAKFAST    miconazole NITRATE 2 % (ZEASORB, MICONAZOLE,) 2 % top powder Apply topically as needed for Itching. (Patient not taking: No sig reported)    mupirocin (BACTROBAN) 2 % ointment mupirocin 2 % topical ointment    nitroGLYCERIN (NITROSTAT) 0.4 MG SL tablet Place 1 tablet (0.4 mg total) under the tongue every 5 (five) minutes as needed for Chest pain.    potassium chloride (MICRO-K) 10 MEQ CpSR Take 2 capsules in am and 1 in the evening; if taking additional lasix, will need to take one additional potassium capsule in the evening    pramipexole (MIRAPEX) 0.5 MG tablet Take 1 tablet (0.5 mg total) by mouth once daily.    promethazine (PHENERGAN) 25 MG tablet Take 1 tablet (25 mg total) by mouth every 6 (six) hours as needed.    ranolazine (RANEXA) 1,000 mg Tb12 Take 1 tablet (1,000 mg total) by mouth 2 (two) times daily.    rOPINIRole (REQUIP) 5 MG tablet TAKE  ONE TABLET BY MOUTH DAILY    sucralfate (CARAFATE) 1 gram tablet Take 1 tablet (1 g total) by mouth 4 (four) times daily before meals and nightly.    temazepam (RESTORIL) 30 mg capsule TAKE ONE CAPSULE BY MOUTH AT BEDTIME AS NEEDED    umeclidinium-vilanteroL (ANORO ELLIPTA) 62.5-25 mcg/actuation DsDv Inhale 1 puff into the lungs once daily. Controller    VENTOLIN HFA 90 mcg/actuation inhaler Inhale 2 puff(s) every 4 hours by inhalation route.     No current facility-administered  medications on file prior to encounter.     Current Outpatient Medications on File Prior to Encounter   Medication Sig    albuterol-ipratropium (DUO-NEB) 2.5 mg-0.5 mg/3 mL nebulizer solution INHALE 1 CONTENTS OF VIAL VIA NEBULIZER EVERY 6 HOURS WHILE AWAKE.    alendronate (FOSAMAX) 70 MG tablet TAKE 1 TABLET BY MOUTH EVERY 7 DAYS AS DIRECTED.    amitriptyline (ELAVIL) 75 MG tablet TAKE ONE TABLET BY MOUTH ONE TIME DAILY IN THE EVENING AS NEEDED    amLODIPine (NORVASC) 10 MG tablet Take 1 tablet (10 mg total) by mouth once daily.    benazepriL (LOTENSIN) 40 MG tablet TAKE ONE TABLET BY MOUTH DAILY    bisoprolol (ZEBETA) 10 MG tablet Take 1 tablet (10 mg total) by mouth once daily.    BRILINTA 90 mg tablet TAKE ONE TABLET BY MOUTH TWICE DAILY    chlorhexidine (PERIDEX) 0.12 % solution 15 mLs.    ELIQUIS 5 mg Tab TAKE ONE TABLET BY MOUTH TWICE DAILY    ergocalciferol (ERGOCALCIFEROL) 50,000 unit Cap Vitamin D2 1,250 mcg (50,000 unit) capsule   Take 1 capsule every week by oral route.    esomeprazole (NEXIUM) 40 MG capsule TAKE ONE CAPSULE BY MOUTH TWICE DAILY BEFORE MEALS    evolocumab (REPATHA SURECLICK) 140 mg/mL PnIj Inject 1 mL (140 mg total) into the skin every 14 (fourteen) days.    fluticasone/umeclidin/vilanter (TRELEGY ELLIPTA INHL) Inhale 1 Inhaler into the lungs once daily.    furosemide (LASIX) 40 MG tablet Take 80 mg in the am and 80 mg BID x 7 days,then take 80 mg in am and 40 mg in the PM thereafter.    gabapentin (NEURONTIN) 600 MG tablet Take 1 tablet (600 mg total) by mouth 3 (three) times daily.    hydrALAZINE (APRESOLINE) 25 MG tablet Take 1 tablet (25 mg total) by mouth 2 (two) times daily.    hydroCHLOROthiazide (HYDRODIURIL) 25 MG tablet TAKE ONE TABLET BY MOUTH DAILY    HYDROcodone-acetaminophen (NORCO)  mg per tablet Take 1 tablet by mouth 2 (two) times daily as needed.    isosorbide mononitrate (IMDUR) 30 MG 24 hr tablet Take 1 tablet (30 mg total) by mouth 2 (two) times daily.     levothyroxine (SYNTHROID) 75 MCG tablet TAKE ONE TABLET BY MOUTH IN THE MORNING BEFORE BREAKFAST    miconazole NITRATE 2 % (ZEASORB, MICONAZOLE,) 2 % top powder Apply topically as needed for Itching. (Patient not taking: No sig reported)    mupirocin (BACTROBAN) 2 % ointment mupirocin 2 % topical ointment    nitroGLYCERIN (NITROSTAT) 0.4 MG SL tablet Place 1 tablet (0.4 mg total) under the tongue every 5 (five) minutes as needed for Chest pain.    potassium chloride (MICRO-K) 10 MEQ CpSR Take 2 capsules in am and 1 in the evening; if taking additional lasix, will need to take one additional potassium capsule in the evening    pramipexole (MIRAPEX) 0.5 MG tablet Take 1 tablet (0.5 mg total) by mouth once daily.    promethazine (PHENERGAN) 25 MG tablet Take 1 tablet (25 mg total) by mouth every 6 (six) hours as needed.    ranolazine (RANEXA) 1,000 mg Tb12 Take 1 tablet (1,000 mg total) by mouth 2 (two) times daily.    rOPINIRole (REQUIP) 5 MG tablet TAKE  ONE TABLET BY MOUTH DAILY    sucralfate (CARAFATE) 1 gram tablet Take 1 tablet (1 g total) by mouth 4 (four) times daily before meals and nightly.    temazepam (RESTORIL) 30 mg capsule TAKE ONE CAPSULE BY MOUTH AT BEDTIME AS NEEDED    umeclidinium-vilanteroL (ANORO ELLIPTA) 62.5-25 mcg/actuation DsDv Inhale 1 puff into the lungs once daily. Controller    VENTOLIN HFA 90 mcg/actuation inhaler Inhale 2 puff(s) every 4 hours by inhalation route.    [DISCONTINUED] calcium carbonate (OS-VANESSA) 500 mg calcium (1,250 mg) tablet Take 1 tablet (500 mg total) by mouth 3 (three) times daily.    [DISCONTINUED] cetirizine (ZYRTEC) 10 MG tablet Take 10 mg by mouth once daily.    [DISCONTINUED] diclofenac sodium (VOLTAREN) 1 % Gel        Physical Exam:  General appearance: alert, appears stated age and cooperative  Head: Normocephalic, without obvious abnormality, atraumatic  Eyes:  conjunctivae/corneas clear. PERRL, EOM's intact. Fundi benign.  Nose: no discharge  Throat: normal  findings: tongue midline and normal  Neck: no adenopathy, no carotid bruit, no JVD, supple, symmetrical, trachea midline and thyroid not enlarged, symmetric, no tenderness/mass/nodules  Back:  no skin lesions, erythema, or scars  Lungs:  clear to auscultation bilaterally  Chest wall: no tenderness  Heart: regular rate and rhythm, S1, S2 normal, no murmur, click, rub or gallop  Abdomen: soft, non-tender; bowel sounds normal; no masses,  no organomegaly  Extremities: extremities normal, atraumatic, no cyanosis or edema  Pulses: 2+ and symmetric  Skin: Skin color, texture, turgor normal. No rashes or lesions  Neurologic: Grossly normal    Laboratory:  Chemistry:   Lab Results   Component Value Date     03/02/2023    K 3.8 03/02/2023     03/02/2023    CO2 31 (H) 03/02/2023    BUN 9 03/02/2023    CREATININE 0.8 03/02/2023    CALCIUM 9.3 03/02/2023     Cardiac Markers:   Lab Results   Component Value Date    CKMB 1.5 01/19/2013    TROPONINI 0.103 (H) 04/26/2015    TROPONINI 0.23 (H) 01/19/2013     Cardiac Markers (Last 3):   Lab Results   Component Value Date    CKMB 1.5 01/19/2013    CKMB 1.6 01/19/2013    CKMB 2.0 01/18/2013    TROPONINI 0.103 (H) 04/26/2015    TROPONINI 0.105 (H) 04/26/2015    TROPONINI 0.131 (H) 04/25/2015    TROPONINI 0.23 (H) 01/19/2013    TROPONINI 0.40 (H) 01/19/2013    TROPONINI 0.43 (H) 01/18/2013     CBC:   Lab Results   Component Value Date    WBC 5.24 03/02/2023    HGB 13.8 03/02/2023    HCT 44.7 03/02/2023    HCT 29 (L) 04/16/2015    MCV 92 03/02/2023     03/02/2023     Lipids:   Lab Results   Component Value Date    CHOL 180 08/19/2021    TRIG 92 08/19/2021    HDL 61 08/19/2021     Coagulation:   Lab Results   Component Value Date    INR 1.0 11/12/2018    INR 1.36 08/25/2014    APTT 23.8 11/12/2018       Diagnostic Results:  ECG: Reviewed  X-Ray: Reviewed  US: Reviewed  CT: Reviewed  Echo: Reviewed      ASSESSMENT/PLAN:     Patient Active Problem List   Diagnosis    PVD  (peripheral vascular disease)    Atherosclerosis of native coronary artery of native heart with angina pectoris with documented spasm    Hyperlipidemia    History of smoking 30 or more pack years    History of SSS (sick sinus syndrome)    Pacemaker    Long term current use of anticoagulant therapy    Atrial fibrillation    Pulmonary emphysema    Hx of CABG    Hypothyroidism    Essential hypertension    Insomnia    Congestive heart failure    History of insertion of stent into coronary artery bypass graft    Angina pectoris    Menopause    Anxiety    Chronic respiratory failure with hypercapnia    Constipation    Coronary artery disease involving coronary bypass graft of native heart without angina pectoris    Hypokalemia    Iron deficiency anemia    Luetscher's syndrome    Neuropathy    Personal history of coronary artery disease    Restless legs    Sleep apnea    Status post coronary artery bypass graft    Morbid obesity    Need for hepatitis C screening test    Vitamin D deficiency    Encounter for screening mammogram for breast cancer    Age-related osteoporosis without current pathological fracture    Recurrent UTI    Statin intolerance    Need for vaccination for Strep pneumoniae    Encounter for long-term (current) use of medications    Gastroesophageal reflux disease with esophagitis    Cigarette smoker    Proteinuria    Type 2 diabetes mellitus with diabetic peripheral angiopathy without gangrene, without long-term current use of insulin    Other specified rheumatoid arthritis, multiple sites    Lupus anticoagulant syndrome    Hypertensive heart and chronic kidney disease with heart failure and stage 1 through stage 4 chronic kidney disease, or unspecified chronic kidney disease    Falls frequently    Mobility impaired    Abdominal pain    Hemoptysis        Plan:  PPM at [facility]

## 2023-03-10 NOTE — Clinical Note
----- Message from RG Sanchez sent at 7/1/2021  3:45 PM CDT -----  Pelase call with labs, passed dms but is anemia - should start iron. Thanks sherrill   The left chest was prepped. The site was prepped with ChloraPrep. The site was clipped. The patient was draped. The patient was positioned supine. The patient was secured using safety straps.

## 2023-03-10 NOTE — PROGRESS NOTES
Subjective:    Patient ID:  Kalpana Wright is a 73 y.o. female who presents for follow-up of hospital discharge.      HPI: Ms. Kalpana Wright presents to the clinic for follow up after PPM generator change for MINE. She has H/O CAD with H/O CABG, PVD, HTN, HLP, PAF, SSS, PPM, diastolic heart failure, tobacco use, statin intolerance, lupus anticoagulant.    she denies any pain, bleeding, or discharge from the chest incision.  She denied any chest pain or SOB. She denied orthopnea, PND, or edema. She denied any palpitations, lightheadedness, dizziness, or near syncope. She is taking all medications as directed with the exception of hydralazine which she has difficultly swallowing and keeping down, so she takes it once daily. She does not have a BP monitor at home.    She did move herself out of her home to another location and then had to move again. She believes that she has an abdominal hernia with abdominal pain that is worse with eating.    She is also requesting a prescription for a new hospital bed to elevate her legs and the head of her bed.    She is stressed out lately and she went to [facility] two days ago for HTN. She is currently living at her aunt's house because she does not have a place to live. EMS found her BP to be over 200 systolic, and by the time she got to hospital, it had come down to 173/83. The ER note indicates that she complained of SOB and chest pain, and her pulse ox was 88% on room air. She was given supplement oxygen. .7; troponin <0.03; Mg 1.9. CXR with no acute findings. EKG (according to ER physician) with atrial pacing @ 63 BPM; nonspecific ST changes; prolonged CO interval. Normal QT. Unable to see EKG myself. She had wheezing concerning for COPD exacerbation. She was given albuterol and atrovent nebulizer treatment with a decadron injection. She improved with treatments provided and was discharged to home in stable condition.    She no longer has home health.    Last visit with  Dr. Kapoor On 8/10/22:  Pt with recent Er visit [facility] - abdominal pain, dx with gastritits  Sx improved with pepcid and PPI  Patient denies CP, angina or anginal equivalent.  Pt stopped smoking 8 years.  Pt has almost lost 100 lbs in 4 years  PVD (peripheral vascular disease)   History of SSS (sick sinus syndrome)   Pacemaker   Paroxysmal atrial fibrillation   Hx of CABG   Diastolic congestive heart failure, unspecified HF chronicity   Coronary artery disease involving coronary bypass graft of native heart without angina pectoris   Atherosclerosis of native coronary artery of native heart with angina pectoris with documented spasm   Pure hypercholesterolemia   Essential hypertension   Lupus anticoagulant syndrome   Cigarette smoker   Statin intolerance   Continue benazepril 40 mg p.o. q.day, bisoprolol 10 mg p.o. q.day, and amlodipine 10mg QD -hypertension.  Furosemide prn. Avoid dehydration.  Continue apixaban, bisoprolol - PAF  Continue brilinta - CAD.  Sodium restriction strongly encouraged.   Continue imdur and ranexa - angina/CAD.   [facility] clinic.      --------------------------------------------------------  Hx: she was admitted to [facility] on November 17, 2021 and discharged on November 19, 2021.  She was apparently found in her car poorly responsive.  She was found by a bystander at primary care clinic.  She was found to be hypotensive in the clinic with a blood pressure of 80/50.  EMS was called; they also noted hypotension at 98/49; she was taken to [facility] and found to be dehydrated.  All antihypertensive medications were held and she was given IV hydration.  CT of the brain was negative.  Troponin was negative x2; EKG was negative according to the note.  His hold and reports that they did interrogate pacemaker during hospitalization but she does not know the results.  I have not found a reference to and interrogation report in the record.  Ms. Wright does not know if she simply fell asleep or she  passed out or what happened.  She had been taking Lasix daily and that has been stopped.  She has seen primary care in follow-up and was told to restart her benazepril.  Ms. Wright reports that she is taking benazepril, hydralazine, and bisoprolol.  She is not taking amlodipine or furosemide.  ---------------------------------------------------------    History:  CAD, CABG and PCI to graft, PVD, PAF, SSS, PPM, HTN, HLP.        Medications: she is taking Brilinta, and Eliquis. Takes lasix prn-none in the last week because she doesn't have any swelling. She stated that she is taking bisoprolol, benazepril, amlodipine, HCTZ, and she is taking hydralazine once a day due to gagging on it and vomiting. Taking Imdur and Ranexa BID. Repatha stopped coming, so she has been off of it for a while.  Sodium: she does not add salt to foods when cooking;  she is not reading labels for sodium content. Denied eating salty foods.   Diet: She cut back on sodas. Lemonade. Drinks sweet tea. Typically eats two meals/day.  Exercise: she  stated that she walks her dog 2 times a day for a few minutes each time.     Tobacco:  smoking cigarettes; one pack lasts 4 days. She smokes half a cigarette at a time.  Stress and seeing people smoke are triggers.   Alcohol: no alcohol use     Weight: 82.4 kg (181 lb 11.2 oz) she states that her daily weights  has been stable.Body mass index is 31.19 kg/m².  Last weight in Cardiology was 174# on 8/10/22.  Wt Readings from Last 3 Encounters:   03/16/23 82.4 kg (181 lb 11.2 oz)   03/10/23 78.9 kg (173 lb 15.1 oz)   08/10/22 78.9 kg (174 lb)     BP log: None. She does not have a machine at home.     Review of Systems   Constitutional: Negative for chills, decreased appetite, fever, malaise/fatigue, night sweats, weight gain and weight loss.   HENT:  Negative for congestion.    Cardiovascular:  Negative for chest pain, claudication, cyanosis, dyspnea on exertion, irregular heartbeat, leg swelling,  near-syncope, orthopnea, palpitations, paroxysmal nocturnal dyspnea and syncope.   Respiratory:  Positive for wheezing. Negative for cough, hemoptysis, shortness of breath and sputum production.    Hematologic/Lymphatic: Negative for adenopathy and bleeding problem. Bruises/bleeds easily.   Skin:  Negative for color change and nail changes.   Musculoskeletal:  Positive for joint pain (chronic pain in the knees due to arthritis.).   Gastrointestinal:  Positive for abdominal pain (worsened with eating.). Negative for bloating, change in bowel habit, heartburn, hematochezia, melena, nausea and vomiting.   Genitourinary:  Negative for hematuria.   Neurological:  Negative for dizziness and light-headedness.   Psychiatric/Behavioral:  Negative for altered mental status.      Objective:   Physical Exam  Constitutional:       General: She is not in acute distress.     Appearance: She is well-developed. She is not diaphoretic.   HENT:      Head: Normocephalic and atraumatic.   Eyes:      General: No scleral icterus.     Conjunctiva/sclera: Conjunctivae normal.   Neck:      Thyroid: No thyromegaly.      Vascular: No JVD.      Trachea: No tracheal deviation.   Cardiovascular:      Rate and Rhythm: Normal rate and regular rhythm.      Pulses: Intact distal pulses.      Heart sounds: Normal heart sounds. No murmur heard.    No friction rub. No gallop.      Comments: Incision found to the left upper chest; there is no erythema, heat, swelling, tenderness, or discharge noted at the site.     Pulmonary:      Effort: Pulmonary effort is normal. No respiratory distress.      Breath sounds: Wheezing present. No rales.   Chest:      Chest wall: No tenderness.   Abdominal:      General: Bowel sounds are normal. There is no distension.      Palpations: Abdomen is soft. There is mass (Possible hernia.).      Tenderness: There is abdominal tenderness. There is no guarding or rebound.      Hernia: A hernia (possible hernia) is present.       Comments: Abdomen obese.   Musculoskeletal:         General: No swelling. Normal range of motion.      Cervical back: Neck supple.      Right lower leg: No edema.      Left lower leg: No edema.      Comments: Ambulatory with a steady gait in hallway.  Trace to 1+ edema to left pretibial area with tenderness to palpation of the tibia. This is chronic. Bypass grafts came from this leg. No edema to right lower leg.   Lymphadenopathy:      Cervical: No cervical adenopathy.   Skin:     General: Skin is warm and dry.      Coloration: Skin is not pale.      Findings: No erythema or rash.      Comments: [place].   Neurological:      Mental Status: She is alert and oriented to person, place, and time.      Latest Reference Range & Units 03/02/23 10:11   WBC 3.90 - 12.70 K/uL 5.24   RBC 4.00 - 5.40 M/uL 4.88   Hemoglobin 12.0 - 16.0 g/dL 13.8   Hematocrit 37.0 - 48.5 % 44.7   MCV 82 - 98 fL 92   MCH 27.0 - 31.0 pg 28.3   MCHC 32.0 - 36.0 g/dL 30.9 (L)   RDW 11.5 - 14.5 % 15.6 (H)   Platelets 150 - 450 K/uL 195   (L): Data is abnormally low  (H): Data is abnormally high   Latest Reference Range & Units 03/02/23 10:11   Sodium 136 - 145 mmol/L 141   Potassium 3.5 - 5.1 mmol/L 3.8   Chloride 95 - 110 mmol/L 101   CO2 23 - 29 mmol/L 31 (H)   Anion Gap 8 - 16 mmol/L 9   BUN 8 - 23 mg/dL 9   Creatinine 0.5 - 1.4 mg/dL 0.8   eGFR >60 mL/min/1.73 m^2 >60.0   Glucose 70 - 110 mg/dL 85   Calcium 8.7 - 10.5 mg/dL 9.3   (H): Data is abnormally high     Latest Reference Range & Units 02/08/23 03:40   Hemoglobin A1C External 4.6 - 6.2 % 4.8 (E)   (E): External lab result     Latest Reference Range & Units 08/19/21 14:58   Cholesterol 120 - 199 mg/dL 180   HDL 40 - 75 mg/dL 61   HDL/Cholesterol Ratio 20.0 - 50.0 % 33.9   LDL Cholesterol External 63.0 - 159.0 mg/dL 100.6   Non-HDL Cholesterol mg/dL 119   Total Cholesterol/HDL Ratio 2.0 - 5.0  3.0   Triglycerides 30 - 150 mg/dL 92     Lexiscan 5/6/22: Conclusion     Abnormal myocardial perfusion  scan.    There is a mild intensity, fixed perfusion abnormality consistent with scar in the basal to mid inferior wall(s).    The gated perfusion images showed an ejection fraction of 58% at rest. The gated perfusion images showed an ejection fraction of 58% post stress.    There is normal wall motion at rest and post stress.    LV cavity size is normal at rest and normal at stress.    The EKG portion of this study is negative for ischemia.    The patient reported no chest pain during the stress test.      LHC at [place] 9/6/2020: Done due to chest pain and NSTEMI  SUMMARY OF PROCEDURE:     1. Left coronary angiogram.     2. Saphenous vein graft angiogram.     3. Left heart catheterization.     4. Conscious sedation.     5. IVUS of the left main.     6. Rota to the left main.     7. PCI with drug-eluting stent to the left main.  ASSESSMENT AND PLAN:     1. Severe coronary artery disease as outlined above.     2. Recurrent restenosis of the vein graft to the OM with multiple   layers of stents and suboptimal result with balloon angioplasty.     3. Rotablation to the ostial left main with a drug-eluting stent using   5.0 x 15 Resolute [place], postdilated with a 5.0 balloon at high pressure   with excellent angiographic and intravascular ultrasound-guided results.     4. The patient will be kept on dual antiplatelets.  The patient will be   followed thoroughly.  Further recommendation will be dictated.  Mo Estrada MD    Echo at [place] 11/19/21: Interpretation Summary   Definity contrast used to eval EF.   Ejection Fraction = 60-65%.   There is mild concentric left ventricular hypertrophy.   Grade 1 Diastolic Dysfunction   The left atrium is mildly dilated.   There is mild mitral regurgitation.   There is mild tricuspid regurgitation.       Echo at [place] 8/30/2020:Interpretation Summary  Contrast injection was performed.  Ejection Fraction = 60-65%.  There is mild mitral regurgitation.  Contrast  injection was performed.     Echo 2/19/2018:CONCLUSIONS     1 - Concentric hypertrophy.     2 - No wall motion abnormalities.     3 - Normal left ventricular systolic function (EF 60-65%).     4 - Normal left ventricular diastolic function.     5 - Normal right ventricular systolic function .     6 - The estimated PA systolic pressure is 24 mmHg.      Carotid U/S at [place] 07/30/2021: IMPRESSION:    1.  Bilateral carotid atheromatous plaquing. There is less than 50% diameter reduction.    2.  High-grade stenosis within the left external carotid artery, unlikely to be of clinical significance.     Venous U/S  Left leg 11/23/21: No DVT in the LLE.       Assessment:      1. Pacemaker    2. SSS (sick sinus syndrome)    3. Coronary artery disease involving coronary bypass graft of native heart without angina pectoris    4. Atherosclerosis of native coronary artery of native heart with angina pectoris with documented spasm    5. Hx of CABG    6. Chronic diastolic congestive heart failure    7. Paroxysmal atrial fibrillation    8. PVD (peripheral vascular disease)    9. Essential hypertension    10. Pure hypercholesterolemia    11. Lupus anticoagulant syndrome    12. Cigarette smoker    13. Statin intolerance    14. Abdominal hernia without obstruction and without gangrene, recurrence not specified, unspecified hernia type    15. Vitamin D deficiency    16. Hospital discharge follow-up          Plan:     Pacemaker    SSS (sick sinus syndrome)    Coronary artery disease involving coronary bypass graft of native heart without angina pectoris  -     evolocumab (REPATHA SURECLICK) 140 mg/mL PnIj; Inject 1 mL (140 mg total) into the skin every 14 (fourteen) days.  Dispense: 6 mL; Refill: 3    Atherosclerosis of native coronary artery of native heart with angina pectoris with documented spasm    Hx of CABG  -     evolocumab (REPATHA SURECLICK) 140 mg/mL PnIj; Inject 1 mL (140 mg total) into the skin every 14 (fourteen) days.   Dispense: 6 mL; Refill: 3    Chronic diastolic congestive heart failure    Paroxysmal atrial fibrillation    PVD (peripheral vascular disease)    Essential hypertension    Pure hypercholesterolemia  -     evolocumab (REPATHA SURECLICK) 140 mg/mL PnIj; Inject 1 mL (140 mg total) into the skin every 14 (fourteen) days.  Dispense: 6 mL; Refill: 3    Lupus anticoagulant syndrome    Cigarette smoker    Statin intolerance  -     evolocumab (REPATHA SURECLICK) 140 mg/mL PnIj; Inject 1 mL (140 mg total) into the skin every 14 (fourteen) days.  Dispense: 6 mL; Refill: 3    Abdominal hernia without obstruction and without gangrene, recurrence not specified, unspecified hernia type    Vitamin D deficiency    Hospital discharge follow-up        Reviewed ER record from [place].  Ambulatory referral to primary care for evaluation for possible hernia and for prescription for hospital bed.  Continue benazepril 40 mg p.o. q.day, bisoprolol 10 mg p.o. q.day, amlodipine 10mg QD, HCTZ-hypertension. Stop hydralazine due to intolerance.  After abdominal pain is resolved, will reevaluate BP and change to indapamide if she needs more intense BP control.  Furosemide prn. Avoid dehydration.  Continue apixaban, bisoprolol - PAF  Continue brilinta - CAD.  Sodium restriction strongly encouraged.  Continue imdur and ranexa - angina/CAD.   Restart repatha- CAD, HLP  PPM clinic.   Tobacco cessation counseling-She does not want to participate in tobacco cessation program. She does not want to quit.  Continue vitamin-D supplement.  Follow up in 2 months or call sooner for any problems.  45 minutes spent in counseling time and chart review.  Celia Whaley NP  [facility]    This note has been prepared using a combination of August dictation device and typing.  It has been checked for errors but some errors may still exist within the note as a result of speech recognition errors and/or typographical errors.

## 2023-03-14 DIAGNOSIS — I20.9 ANGINA PECTORIS: ICD-10-CM

## 2023-03-14 DIAGNOSIS — Z95.1 HX OF CABG: ICD-10-CM

## 2023-03-14 DIAGNOSIS — I25.810 CORONARY ARTERY DISEASE INVOLVING CORONARY BYPASS GRAFT OF NATIVE HEART WITHOUT ANGINA PECTORIS: ICD-10-CM

## 2023-03-15 RX ORDER — RANOLAZINE 1000 MG/1
1000 TABLET, EXTENDED RELEASE ORAL 2 TIMES DAILY
Qty: 180 TABLET | Refills: 3 | Status: SHIPPED | OUTPATIENT
Start: 2023-03-15 | End: 2024-03-27 | Stop reason: SDUPTHER

## 2023-03-16 ENCOUNTER — OFFICE VISIT (OUTPATIENT)
Dept: CARDIOLOGY | Facility: CLINIC | Age: 73
End: 2023-03-16
Payer: MEDICARE

## 2023-03-16 ENCOUNTER — SPECIALTY PHARMACY (OUTPATIENT)
Dept: PHARMACY | Facility: CLINIC | Age: 73
End: 2023-03-16
Payer: MEDICARE

## 2023-03-16 VITALS
OXYGEN SATURATION: 94 % | WEIGHT: 181.69 LBS | HEIGHT: 64 IN | SYSTOLIC BLOOD PRESSURE: 140 MMHG | DIASTOLIC BLOOD PRESSURE: 76 MMHG | HEART RATE: 58 BPM | BODY MASS INDEX: 31.02 KG/M2

## 2023-03-16 DIAGNOSIS — I48.0 PAROXYSMAL ATRIAL FIBRILLATION: ICD-10-CM

## 2023-03-16 DIAGNOSIS — I10 ESSENTIAL HYPERTENSION: ICD-10-CM

## 2023-03-16 DIAGNOSIS — I50.32 CHRONIC DIASTOLIC CONGESTIVE HEART FAILURE: ICD-10-CM

## 2023-03-16 DIAGNOSIS — Z95.1 HX OF CABG: ICD-10-CM

## 2023-03-16 DIAGNOSIS — Z95.0 PACEMAKER: Primary | ICD-10-CM

## 2023-03-16 DIAGNOSIS — I25.111 ATHEROSCLEROSIS OF NATIVE CORONARY ARTERY OF NATIVE HEART WITH ANGINA PECTORIS WITH DOCUMENTED SPASM: ICD-10-CM

## 2023-03-16 DIAGNOSIS — D68.62 LUPUS ANTICOAGULANT SYNDROME: ICD-10-CM

## 2023-03-16 DIAGNOSIS — E78.00 PURE HYPERCHOLESTEROLEMIA: ICD-10-CM

## 2023-03-16 DIAGNOSIS — Z09 HOSPITAL DISCHARGE FOLLOW-UP: ICD-10-CM

## 2023-03-16 DIAGNOSIS — K46.9 ABDOMINAL HERNIA WITHOUT OBSTRUCTION AND WITHOUT GANGRENE, RECURRENCE NOT SPECIFIED, UNSPECIFIED HERNIA TYPE: ICD-10-CM

## 2023-03-16 DIAGNOSIS — I25.810 CORONARY ARTERY DISEASE INVOLVING CORONARY BYPASS GRAFT OF NATIVE HEART WITHOUT ANGINA PECTORIS: ICD-10-CM

## 2023-03-16 DIAGNOSIS — Z78.9 STATIN INTOLERANCE: ICD-10-CM

## 2023-03-16 DIAGNOSIS — I49.5 SSS (SICK SINUS SYNDROME): ICD-10-CM

## 2023-03-16 DIAGNOSIS — E55.9 VITAMIN D DEFICIENCY: ICD-10-CM

## 2023-03-16 DIAGNOSIS — F17.210 CIGARETTE SMOKER: ICD-10-CM

## 2023-03-16 DIAGNOSIS — I73.9 PVD (PERIPHERAL VASCULAR DISEASE): ICD-10-CM

## 2023-03-16 PROCEDURE — 99999 PR PBB SHADOW E&M-EST. PATIENT-LVL V: ICD-10-PCS | Mod: PBBFAC,,, | Performed by: NURSE PRACTITIONER

## 2023-03-16 PROCEDURE — 99215 OFFICE O/P EST HI 40 MIN: CPT | Mod: PBBFAC,PO | Performed by: NURSE PRACTITIONER

## 2023-03-16 PROCEDURE — 99999 PR PBB SHADOW E&M-EST. PATIENT-LVL V: CPT | Mod: PBBFAC,,, | Performed by: NURSE PRACTITIONER

## 2023-03-16 PROCEDURE — 99214 OFFICE O/P EST MOD 30 MIN: CPT | Mod: S$PBB,,, | Performed by: NURSE PRACTITIONER

## 2023-03-16 PROCEDURE — 99214 PR OFFICE/OUTPT VISIT, EST, LEVL IV, 30-39 MIN: ICD-10-PCS | Mod: S$PBB,,, | Performed by: NURSE PRACTITIONER

## 2023-03-16 RX ORDER — EVOLOCUMAB 140 MG/ML
140 INJECTION, SOLUTION SUBCUTANEOUS
Qty: 6 ML | Refills: 3 | Status: SHIPPED | OUTPATIENT
Start: 2023-03-16 | End: 2023-03-16 | Stop reason: SDUPTHER

## 2023-03-16 RX ORDER — EVOLOCUMAB 140 MG/ML
140 INJECTION, SOLUTION SUBCUTANEOUS
Qty: 6 ML | Refills: 3 | Status: SHIPPED | OUTPATIENT
Start: 2023-03-16 | End: 2024-03-11

## 2023-03-16 NOTE — TELEPHONE ENCOUNTER
Repatha order received. PA already on file from 9/18/21 until further notice.     Janis, this is Jane Flood with [facility].  We are working on your prescription that your doctor has sent us. We will be working with your insurance to get this approved for you. We will be calling you along the way with updates on your medication.  If you have any questions, you can reach us at [phone].    Welcome call outcome: Patient/caregiver reached

## 2023-03-16 NOTE — TELEPHONE ENCOUNTER
BI: COMPLETE     MEDICARE PLAN: [employer]  Estimated copay: $4.30  Benefits Stage: Coverage Gap  In Network: yes  PA approval on file: 9/18/21 until further notice  LIS level: 2      FA not required. Pending initial consult.

## 2023-03-20 ENCOUNTER — SPECIALTY PHARMACY (OUTPATIENT)
Dept: PHARMACY | Facility: CLINIC | Age: 73
End: 2023-03-20
Payer: MEDICARE

## 2023-03-31 NOTE — TELEPHONE ENCOUNTER
Pt was here getting echo done and wanted an rx for phenergan. Told her Celia Whaley, NP was not available. I took her temp oral and axillary and both were below 96 degree. C/o on feeling horrible, N&V, and in pain. She was flushed in the face. Since I did not have a provider in the clinic advised to seek urgent care. Told her I would get a list of her meds the next day. Daughter Kendra with pt when this was explained.olu  
no

## 2023-04-11 DIAGNOSIS — I49.5 SSS (SICK SINUS SYNDROME): ICD-10-CM

## 2023-04-11 DIAGNOSIS — Z95.0 PACEMAKER: Primary | ICD-10-CM

## 2023-04-12 ENCOUNTER — SPECIALTY PHARMACY (OUTPATIENT)
Dept: PHARMACY | Facility: CLINIC | Age: 73
End: 2023-04-12
Payer: MEDICARE

## 2023-04-12 DIAGNOSIS — E11.9 TYPE 2 DIABETES MELLITUS WITHOUT COMPLICATION: ICD-10-CM

## 2023-04-12 NOTE — TELEPHONE ENCOUNTER
Specialty Pharmacy - Refill Coordination    Specialty Medication Orders Linked to Encounter      Flowsheet Row Most Recent Value   Medication #1 evolocumab (REPATHA SURECLICK) 140 mg/mL PnIj (Order#[id number], Rx#[id number])            Refill Questions - Documented Responses      Flowsheet Row Most Recent Value   Patient Availability and HIPAA Verification    Does patient want to proceed with activity? Yes   HIPAA/medical authority confirmed? Yes   Relationship to patient of person spoken to? Self   Refill Screening Questions    Would patient like to speak to a pharmacist? No   When does the patient need to receive the medication? 04/19/23   Refill Delivery Questions    How will the patient receive the medication? MEDRx   When does the patient need to receive the medication? 04/19/23   Shipping Address Home   Address in [facility] confirmed and updated if neccessary? Yes   Expected Copay ($) 4.3   Is the patient able to afford the medication copay? Yes   Payment Method CC on file   Days supply of Refill 28   Supplies needed? No supplies needed   Refill activity completed? Yes   Refill activity plan Refill scheduled   Shipment/Pickup Date: 04/13/23            Current Outpatient Medications   Medication Sig    albuterol-ipratropium (DUO-NEB) 2.5 mg-0.5 mg/3 mL nebulizer solution INHALE 1 CONTENTS OF VIAL VIA NEBULIZER EVERY 6 HOURS WHILE AWAKE.    alendronate (FOSAMAX) 70 MG tablet TAKE 1 TABLET BY MOUTH ONCE WEEKLY ON SAME DAY IN THE MORNING WITH GLASS OF WATER, REMAIN UPRIGHT FOR 30 MIN.    amitriptyline (ELAVIL) 75 MG tablet TAKE ONE TABLET BY MOUTH ONE TIME DAILY IN THE EVENING AS NEEDED    amLODIPine (NORVASC) 10 MG tablet Take 1 tablet (10 mg total) by mouth once daily.    apixaban (ELIQUIS) 5 mg Tab Take 1 tablet (5 mg total) by mouth 2 (two) times daily.    benazepriL (LOTENSIN) 40 MG tablet TAKE ONE TABLET BY MOUTH DAILY    bisoprolol (ZEBETA) 10 MG tablet Take 1 tablet (10 mg total) by mouth once  daily.    BRILINTA 90 mg tablet TAKE ONE TABLET BY MOUTH TWICE DAILY    chlorhexidine (PERIDEX) 0.12 % solution 15 mLs.    cyclobenzaprine (FLEXERIL) 10 MG tablet TAKE ONE TABLET BY MOUTH TWICE DAILY AS NEEDED    ergocalciferol (ERGOCALCIFEROL) 50,000 unit Cap Vitamin D2 1,250 mcg (50,000 unit) capsule   Take 1 capsule every week by oral route.    esomeprazole (NEXIUM) 40 MG capsule TAKE ONE CAPSULE BY MOUTH TWICE DAILY BEFORE MEALS    evolocumab (REPATHA SURECLICK) 140 mg/mL PnIj Inject 1 mL (140 mg total) into the skin every 14 (fourteen) days.    fluticasone/umeclidin/vilanter (TRELEGY ELLIPTA INHL) Inhale 1 Inhaler into the lungs once daily.    furosemide (LASIX) 40 MG tablet Take 80 mg in the am and 80 mg BID x 7 days,then take 80 mg in am and 40 mg in the PM thereafter.    gabapentin (NEURONTIN) 600 MG tablet TAKE ONE TABLET BY MOUTH THREE TIMES DAILY.    hydrALAZINE (APRESOLINE) 25 MG tablet Take 1 tablet (25 mg total) by mouth 2 (two) times daily.    hydroCHLOROthiazide (HYDRODIURIL) 25 MG tablet TAKE ONE TABLET BY MOUTH DAILY    HYDROcodone-acetaminophen (NORCO)  mg per tablet Take 1 tablet by mouth 2 (two) times daily as needed.    isosorbide mononitrate (IMDUR) 30 MG 24 hr tablet Take 1 tablet (30 mg total) by mouth 2 (two) times daily.    levothyroxine (SYNTHROID) 75 MCG tablet TAKE ONE TABLET BY MOUTH IN THE MORNING BEFORE BREAKFAST    miconazole NITRATE 2 % (ZEASORB, MICONAZOLE,) 2 % top powder Apply topically as needed for Itching.    mupirocin (BACTROBAN) 2 % ointment mupirocin 2 % topical ointment    nitroGLYCERIN (NITROSTAT) 0.4 MG SL tablet Place 1 tablet (0.4 mg total) under the tongue every 5 (five) minutes as needed for Chest pain.    potassium chloride (MICRO-K) 10 MEQ CpSR Take 2 capsules in am and 1 in the evening; if taking additional lasix, will need to take one additional potassium capsule in the evening    pramipexole (MIRAPEX) 0.5 MG tablet TAKE  ONE TABLET BY MOUTH DAILY     "promethazine (PHENERGAN) 25 MG tablet Take 1 tablet (25 mg total) by mouth every 6 (six) hours as needed.    ranolazine (RANEXA) 1,000 mg Tb12 Take 1 tablet (1,000 mg total) by mouth 2 (two) times daily.    rOPINIRole (REQUIP) 5 MG tablet TAKE  ONE TABLET BY MOUTH DAILY    sucralfate (CARAFATE) 1 gram tablet Take 1 tablet (1 g total) by mouth 4 (four) times daily before meals and nightly.    temazepam (RESTORIL) 30 mg capsule TAKE ONE CAPSULE BY MOUTH AT BEDTIME AS NEEDED    umeclidinium-vilanteroL (ANORO ELLIPTA) 62.5-25 mcg/actuation DsDv Inhale 1 puff into the lungs once daily. Controller    VENTOLIN HFA 90 mcg/actuation inhaler Inhale 2 puff(s) every 4 hours by inhalation route.   Last reviewed on 3/16/2023  9:50 AM by Celia Whaley NP    Review of patient's allergies indicates:   Allergen Reactions    Atorvastatin Other (See Comments)     Severe muscle pain  Other reaction(s): Abdominal Pain  SEVERE MYALGIAS      Azithromycin Other (See Comments)     By shot, closed veins and made large bruises  By shot, closed veins and made large bruises      Latex, natural rubber Rash     "TOURNIQUET ON LEG LEFT HUGE BLISTER THAT TOOK 9 MONTHS OF WOUND CARE TO HEAL."    Meperidine Hives and Anaphylaxis     Other reaction(s): Anaphylaxis  Hives (skin)^  Hives (skin)^      Penicillins Anaphylaxis and Hives     Other reaction(s): Anaphylaxis  Shortness of breath^swelling  Shortness of breath and swelling  Anaphylaxis  Shortness of breath^swelling      Tofacitinib Rash and Swelling     Rash and swelling of face      Lorazepam Anxiety and Other (See Comments)     Made me goofy  CAUSED CONFUSION "Made me goofy."      Pravastatin Other (See Comments)     Severe myalgias.  Cramps/Severe myalgias.    Flu vac 2018 [id number](pf)      Other reaction(s): Other (See Comments)  Flip into aFIB    Nystatin Rash    Pepper (genus capsicum) Other (See Comments)     Reflux and ulcers    Last reviewed on  3/20/2023 7:23 AM by Lawanda " Trang      Tasks added this encounter   5/10/2023 - Refill Call (Auto Added)   Tasks due within next 3 months   No tasks due.     [facility]  [address]  [facility] [address]  Phone: [phone]  Fax: [phone]

## 2023-04-19 NOTE — TELEPHONE ENCOUNTER
----- Message from Rody Alvarado sent at 4/19/2023  1:32 PM CDT -----  Regarding: Refill  Type: RX Refill Request    Who Called:Burton (home health Nurse    RX Name and Strength:furosemide (LASIX) 40 MG tablet    Preferred Pharmacy with phone number:[id number] [place] [place] [address] [facility] [place]    Would the patient rather a call back or a response via [facility]?call back    Best Call Back Number:[phone]    Additional Information:

## 2023-04-20 RX ORDER — FUROSEMIDE 40 MG/1
40 TABLET ORAL 2 TIMES DAILY
Qty: 60 TABLET | Refills: 3 | Status: SHIPPED | OUTPATIENT
Start: 2023-04-20

## 2023-04-25 ENCOUNTER — TELEPHONE (OUTPATIENT)
Dept: CARDIOLOGY | Facility: HOSPITAL | Age: 73
End: 2023-04-25
Payer: MEDICARE

## 2023-04-26 ENCOUNTER — TELEPHONE (OUTPATIENT)
Dept: FAMILY MEDICINE | Facility: CLINIC | Age: 73
End: 2023-04-26
Payer: MEDICARE

## 2023-04-26 NOTE — TELEPHONE ENCOUNTER
----- Message from Ryan Tinoco sent at 4/26/2023  1:23 PM CDT -----  Contact: Home Health  Becca would like to consult with a nurse in regards to pt. She stated that the pt is needing a refill on her lasix sent over to the [facility] pharmacy. Pt has not had medication since being discharged and has had a 9 pound weight gain. Please call her back at [phone], if any questions. Thanks KB

## 2023-04-26 NOTE — TELEPHONE ENCOUNTER
Noted.  Start Lasix as prescribed.  Follow-up closely with Cardiology and myself.  ER precautions for severe symptoms.   Overweight

## 2023-04-26 NOTE — TELEPHONE ENCOUNTER
Advised  nurse that rx was sent in by cardio on 4/20. She asked that we let you know that Mrs. Wright had a 9lb weight gain in the past week. She has a hosp fu with cardio on 4/28 and then sees you on 5/5.

## 2023-04-28 NOTE — PROGRESS NOTES
Subjective:    Patient ID:  Kalpana Wright is a 73 y.o. female who presents for follow-up of hospital discharge.      HPI: Ms. Kalpana Wright presents to the clinic for follow up of hospital discharge. She was brought to [facility] on April 1, 2023 for lethargy and hypoxemia. O2 sat was reportedly in the 70s. Her BNP was 457. She also had peripheral edema. She was treated with BiPAP, antibiotics, steroids, nebs, and IV lasix. She apparently qualified for supplemental oxygen at home after 6-minute walk.  Discharge summary indicates that she was sent home with home health for PT and OT.  No d/c BNP.  Serum Mg 1.9-2.2.  She had worsening of kidney function over the course of admission.      She denied any chest pain or SOB. She denied orthopnea, PND, or edema. She denied any palpitations, lightheadedness, dizziness, or near syncope. She is taking all medications as directed with the exception of hydralazine -she takes it once daily. She does not have a BP monitor at home.  She stated she has ordered 1 that is supposed to be in the mail.    She complains of abdominal pain that is worse with eating.  She stated that she is eating much less food due to that fact.  She also reports black stools for the past 2 months.  She is not on iron therapy.  She denied seeing any terra blood in her stool, just that her stools are black.  I do not see that this was addressed in her recent hospitalization-she indicated that she had no bowel movement when she was in the hospital.  Her anemia was very mild on labs.  She denies any other unusual bleeding; however, she does bruise very easily.  She is on Brilinta and Eliquis.    She has H/O CAD with H/O CABG, PVD, HTN, HLP, PAF, SSS, PPM, diastolic heart failure, tobacco use, statin intolerance, lupus anticoagulant.    ---------------------------------------------------------------------------------------  Last visit with Dr. Kapoor On 8/10/22:  Pt with recent Er visit [facility] -  abdominal pain, dx with gastritits  Sx improved with pepcid and PPI  Patient denies CP, angina or anginal equivalent.  Pt stopped smoking 8 years.  Pt has almost lost 100 lbs in 4 years  PVD (peripheral vascular disease)   History of SSS (sick sinus syndrome)   Pacemaker   Paroxysmal atrial fibrillation   Hx of CABG   Diastolic congestive heart failure, unspecified HF chronicity   Coronary artery disease involving coronary bypass graft of native heart without angina pectoris   Atherosclerosis of native coronary artery of native heart with angina pectoris with documented spasm   Pure hypercholesterolemia   Essential hypertension   Lupus anticoagulant syndrome   Cigarette smoker   Statin intolerance   Continue benazepril 40 mg p.o. q.day, bisoprolol 10 mg p.o. q.day, and amlodipine 10mg QD -hypertension.  Furosemide prn. Avoid dehydration.  Continue apixaban, bisoprolol - PAF  Continue brilinta - CAD.  Sodium restriction strongly encouraged.   Continue imdur and ranexa - angina/CAD.   PPM clinic.      Hx: she was admitted to [place] on November 17, 2021 and discharged on November 19, 2021.  She was apparently found in her car poorly responsive.  She was found by a bystander at primary care clinic.  She was found to be hypotensive in the clinic with a blood pressure of 80/50.  EMS was called; they also noted hypotension at 98/49; she was taken to [facility] [facility] and found to be dehydrated.  All antihypertensive medications were held and she was given IV hydration.  CT of the brain was negative.  Troponin was negative x2; EKG was negative according to the note.  His hold and reports that they did interrogate pacemaker during hospitalization but she does not know the results.  I have not found a reference to and interrogation report in the record.  Ms. Wright does not know if she simply fell asleep or she passed out or what happened.  She had been taking Lasix daily and that has been stopped.  She has seen primary care in  follow-up and was told to restart her benazepril.  Ms. Wright reports that she is taking benazepril, hydralazine, and bisoprolol.  She is not taking amlodipine or furosemide.  ---------------------------------------------------------        Medications: she is taking Brilinta, and Eliquis. Takes lasix 40mg BID with potassium 1 tablet BID. She stated that she is taking bisoprolol, benazepril, amlodipine, HCTZ, and she is taking hydralazine once a day. Taking Imdur and Ranexa BID.  Taking Repatha as directed.   Sodium: she does not add salt to foods when cooking;  she is not reading labels for sodium content. Denied eating salty foods.   Diet: Abdominal pain with bites of food; decreased appetite.  Exercise: she  stated that she walks her dog 2 times a day for a few minutes each time.     Tobacco:  smoking cigarettes; one pack lasts 4 days. She smokes half a cigarette at a time.  Stress and seeing people smoke are triggers.   Alcohol: no alcohol use     Weight: 81.2 kg (179 lb) she states that her daily weights  has decreased. Body mass index is 30.73 kg/m².    Wt Readings from Last 3 Encounters:   05/05/23 81.2 kg (179 lb)   03/16/23 82.4 kg (181 lb 11.2 oz)   03/10/23 78.9 kg (173 lb 15.1 oz)     BP log: None. She does not have a machine at home.  She has 1 on order that should be coming in the mail.    Review of Systems   Constitutional: Negative for chills, decreased appetite, fever, malaise/fatigue, night sweats, weight gain and weight loss.   HENT:  Negative for congestion.    Cardiovascular:  Negative for chest pain, claudication, cyanosis, dyspnea on exertion, irregular heartbeat, leg swelling, near-syncope, orthopnea, palpitations, paroxysmal nocturnal dyspnea and syncope.   Respiratory:  Negative for cough, hemoptysis, shortness of breath, sputum production and wheezing.    Hematologic/Lymphatic: Negative for adenopathy and bleeding problem. Bruises/bleeds easily.   Skin:  Negative for color change and nail  changes.   Musculoskeletal:  Positive for joint pain (chronic pain in the knees due to arthritis.).   Gastrointestinal:  Positive for abdominal pain (worsened with eating.) and nausea. Negative for bloating, change in bowel habit, heartburn, hematochezia and vomiting.        Stated that her stools are black for two months.   Genitourinary:  Negative for hematuria.   Neurological:  Negative for dizziness and light-headedness.   Psychiatric/Behavioral:  Negative for altered mental status.      Objective:   Physical Exam  Constitutional:       General: She is not in acute distress.     Appearance: She is well-developed. She is not diaphoretic.   HENT:      Head: Normocephalic and atraumatic.   Eyes:      General: No scleral icterus.     Conjunctiva/sclera: Conjunctivae normal.   Neck:      Thyroid: No thyromegaly.      Vascular: No JVD.      Trachea: No tracheal deviation.   Cardiovascular:      Rate and Rhythm: Normal rate and regular rhythm.      Pulses: Intact distal pulses.      Heart sounds: Normal heart sounds. No murmur heard.    No friction rub. No gallop.      Comments:    Pulmonary:      Effort: Pulmonary effort is normal. No respiratory distress.      Breath sounds: No stridor. No wheezing, rhonchi or rales.      Comments: Diminished breath sounds bilaterally.  Chest:      Chest wall: No tenderness.   Abdominal:      General: Bowel sounds are normal. There is no distension.      Palpations: Abdomen is soft.      Tenderness: There is no guarding or rebound.      Comments: Abdomen obese.   Musculoskeletal:         General: No swelling. Normal range of motion.      Cervical back: Neck supple.      Right lower leg: No edema.      Left lower leg: No edema.   Lymphadenopathy:      Cervical: No cervical adenopathy.   Skin:     General: Skin is warm and dry.      Coloration: Skin is not pale.      Findings: No erythema or rash.      Comments: Margaret.   Neurological:      Mental Status: She is alert and oriented to  person, place, and time.     Related to Comprehensive metabolic panel  Component 04/06/23 04/05/23 04/04/23 04/03/23 04/02/23 04/01/23    Glucose 92 84 78 92 153 High  101 High    Sodium 140 141 143 142 141 142   Potassium 3.7 3.5 Low  3.1 Low  3.2 Low  3.4 Low  3.8   Chloride 93 Low  90 Low  95 Low  97 Low  96 Low  99 Low    CO2 40 High  41 High Panic   39 High  39 High  33 High  35 High    BUN 46 High  50 High  48 High  46 High  35 High  32 High    Calcium 8.6 Low  8.7 Low  8.6 Low  9.2 9.5 9.4   Creatinine 1.17 High  1.24 High  1.01 1.00 1.11 High  0.97   Albumin 2.9 Low  2.9 Low  2.9 Low  3.3 Low  3.3 Low  3.4 Low    Total Bilirubin 0.5 0.3 Low  0.2 Low  0.3 Low  0.2 Low  0.2 Low    ALKP 50 57 52 59 66 69   Total Protein 5.0 Low  5.0 Low  5.0 Low  6.0 Low  7.0 6.0 Low    ALT 8 8 8 9 9 10   AST 9 Low  10 10 11 12 13   Anion Gap 7 10 9 6 Low  12 8          Latest Reference Range & Units 03/02/23 10:11   WBC 3.90 - 12.70 K/uL 5.24   RBC 4.00 - 5.40 M/uL 4.88   Hemoglobin 12.0 - 16.0 g/dL 13.8   Hematocrit 37.0 - 48.5 % 44.7   MCV 82 - 98 fL 92   MCH 27.0 - 31.0 pg 28.3   MCHC 32.0 - 36.0 g/dL 30.9 (L)   RDW 11.5 - 14.5 % 15.6 (H)   Platelets 150 - 450 K/uL 195   (L): Data is abnormally low  (H): Data is abnormally high   Latest Reference Range & Units 03/02/23 10:11   Sodium 136 - 145 mmol/L 141   Potassium 3.5 - 5.1 mmol/L 3.8   Chloride 95 - 110 mmol/L 101   CO2 23 - 29 mmol/L 31 (H)   Anion Gap 8 - 16 mmol/L 9   BUN 8 - 23 mg/dL 9   Creatinine 0.5 - 1.4 mg/dL 0.8   eGFR >60 mL/min/1.73 m^2 >60.0   Glucose 70 - 110 mg/dL 85   Calcium 8.7 - 10.5 mg/dL 9.3   (H): Data is abnormally high     Latest Reference Range & Units 02/08/23 03:40   Hemoglobin A1C External 4.6 - 6.2 % 4.8 (E)   (E): External lab result     Latest Reference Range & Units 08/19/21 14:58   Cholesterol 120 - 199 mg/dL 180   HDL 40 - 75 mg/dL 61   HDL/Cholesterol Ratio 20.0 - 50.0 % 33.9   LDL Cholesterol External 63.0 - 159.0 mg/dL 100.6    Non-HDL Cholesterol mg/dL 119   Total Cholesterol/HDL Ratio 2.0 - 5.0  3.0   Triglycerides 30 - 150 mg/dL 92     PPM interrogation 5/3/23:  2-3 seconds of atrial tachycardia on March 16. A: 211; V 106    Lexiscan 5/6/22: Conclusion     Abnormal myocardial perfusion scan.    There is a mild intensity, fixed perfusion abnormality consistent with scar in the basal to mid inferior wall(s).    The gated perfusion images showed an ejection fraction of 58% at rest. The gated perfusion images showed an ejection fraction of 58% post stress.    There is normal wall motion at rest and post stress.    LV cavity size is normal at rest and normal at stress.    The EKG portion of this study is negative for ischemia.    The patient reported no chest pain during the stress test.      [facility] [place] 9/6/2020: Done due to chest pain and NSTEMI  SUMMARY OF PROCEDURE:     1. Left coronary angiogram.     2. Saphenous vein graft angiogram.     3. Left heart catheterization.     4. Conscious sedation.     5. IVUS of the left main.     6. Rota to the left main.     7. PCI with drug-eluting stent to the left main.  ASSESSMENT AND PLAN:     1. Severe coronary artery disease as outlined above.     2. Recurrent restenosis of the vein graft to the OM with multiple   layers of stents and suboptimal result with balloon angioplasty.     3. Rotablation to the ostial left main with a drug-eluting stent using   5.0 x 15 Resolute [place], postdilated with a 5.0 balloon at high pressure   with excellent angiographic and intravascular ultrasound-guided results.     4. The patient will be kept on dual antiplatelets.  The patient will be   followed thoroughly.  Further recommendation will be dictated.  Mo Estrada MD    Echo at [place] 11/19/21: Interpretation Summary   Definity contrast used to eval EF.   Ejection Fraction = 60-65%.   There is mild concentric left ventricular hypertrophy.   Grade 1 Diastolic Dysfunction   The left atrium is  mildly dilated.   There is mild mitral regurgitation.   There is mild tricuspid regurgitation.       E[facility] at [place] 8/30/2020:Interpretation Summary  Contrast injection was performed.  Ejection Fraction = 60-65%.  There is mild mitral regurgitation.  Contrast injection was performed.     [facility] 2/19/2018:CONCLUSIONS     1 - Concentric hypertrophy.     2 - No wall motion abnormalities.     3 - Normal left ventricular systolic function (EF 60-65%).     4 - Normal left ventricular diastolic function.     5 - Normal right ventricular systolic function .     6 - The estimated PA systolic pressure is 24 mmHg.      Carotid U/S at [place] 07/30/2021: IMPRESSION:    1.  Bilateral carotid atheromatous plaquing. There is less than 50% diameter reduction.    2.  High-grade stenosis within the left external carotid artery, unlikely to be of clinical significance.     Venous U/S  Left leg 11/23/21: No DVT in the LLE.       Assessment:      1. Hospital discharge follow-up    2. Chronic diastolic congestive heart failure    3. Coronary artery disease involving coronary bypass graft of native heart without angina pectoris    4. Hx of CABG    5. SSS (sick sinus syndrome)    6. Pacemaker    7. Paroxysmal atrial fibrillation    8. Essential hypertension    9. Pure hypercholesterolemia    10. Cigarette smoker    11. Statin intolerance    12. Vitamin D deficiency    13. Black stools      Plan:     Hospital discharge follow-up    Chronic diastolic congestive heart failure  -     Comprehensive Metabolic Panel; Future; Expected date: 05/05/2023  -     BNP; Future; Expected date: 05/05/2023  -     Magnesium, RBC; Future; Expected date: 05/05/2023  -     CBC Auto Differential; Future; Expected date: 05/05/2023    Coronary artery disease involving coronary bypass graft of native heart without angina pectoris    Hx of CABG    SSS (sick sinus syndrome)    Pacemaker    Paroxysmal atrial fibrillation    Essential hypertension    Pure  hypercholesterolemia    Cigarette smoker    Statin intolerance    Vitamin D deficiency    Black stools  -     CBC Auto Differential; Future; Expected date: 05/05/2023  -     Iron and TIBC; Future; Expected date: 05/05/2023  -     FERRITIN; Future; Expected date: 05/05/2023       Reviewed ER record from [place].  Labs today: BMP, BNP, CBC, Mg, iron studies.  Follow up with Dr. Woodward regarding black stools and abdominal pain.  Continue benazepril 40 mg p.o. q.day, bisoprolol 10 mg p.o. q.day, HCTZ 25mg QD, and amlodipine 10mg QD and lasix 40mg BID-hypertension. Increase hydralazine to BID schedule.   Continue apixaban, bisoprolol - PAF  Continue brilinta - CAD.  Sodium restriction strongly encouraged.  Continue imdur and ranexa - angina/CAD.   Continue repatha- CAD, HLP  PPM clinic.   Tobacco cessation counseling-She does not want to participate in tobacco cessation program. She does not want to quit.  Continue vitamin-D supplement.  Follow up in 1 month or call sooner for any problems.  45 minutes spent in counseling time and chart review.  Celia Whaley NP  [facility]    This note has been prepared using a combination of MModaL dictation device and typing.  It has been checked for errors but some errors may still exist within the note as a result of speech recognition errors and/or typographical errors.

## 2023-05-03 ENCOUNTER — CLINICAL SUPPORT (OUTPATIENT)
Dept: CARDIOLOGY | Facility: HOSPITAL | Age: 73
End: 2023-05-03
Attending: INTERNAL MEDICINE
Payer: MEDICARE

## 2023-05-03 DIAGNOSIS — Z95.0 PACEMAKER: ICD-10-CM

## 2023-05-03 DIAGNOSIS — I49.5 SSS (SICK SINUS SYNDROME): ICD-10-CM

## 2023-05-03 PROCEDURE — 93288 INTERROG EVL PM/LDLS PM IP: CPT | Mod: PO

## 2023-05-03 PROCEDURE — 93288 INTERROG EVL PM/LDLS PM IP: CPT | Mod: 26,,, | Performed by: INTERNAL MEDICINE

## 2023-05-03 PROCEDURE — 99999 PR PBB SHADOW E&M-EST. PATIENT-LVL I: CPT | Mod: PBBFAC,,,

## 2023-05-03 PROCEDURE — 99211 OFF/OP EST MAY X REQ PHY/QHP: CPT | Mod: PBBFAC,PO

## 2023-05-03 PROCEDURE — 99999 PR PBB SHADOW E&M-EST. PATIENT-LVL I: ICD-10-PCS | Mod: PBBFAC,,,

## 2023-05-03 PROCEDURE — 93288 CARDIAC DEVICE CHECK - IN CLINIC & HOSPITAL: ICD-10-PCS | Mod: 26,,, | Performed by: INTERNAL MEDICINE

## 2023-05-05 ENCOUNTER — OFFICE VISIT (OUTPATIENT)
Dept: CARDIOLOGY | Facility: CLINIC | Age: 73
End: 2023-05-05
Payer: MEDICARE

## 2023-05-05 ENCOUNTER — LAB VISIT (OUTPATIENT)
Dept: LAB | Facility: HOSPITAL | Age: 73
End: 2023-05-05
Attending: NURSE PRACTITIONER
Payer: MEDICARE

## 2023-05-05 VITALS
SYSTOLIC BLOOD PRESSURE: 142 MMHG | BODY MASS INDEX: 30.56 KG/M2 | DIASTOLIC BLOOD PRESSURE: 60 MMHG | WEIGHT: 179 LBS | OXYGEN SATURATION: 96 % | HEART RATE: 62 BPM | HEIGHT: 64 IN

## 2023-05-05 DIAGNOSIS — I50.32 CHRONIC DIASTOLIC CONGESTIVE HEART FAILURE: ICD-10-CM

## 2023-05-05 DIAGNOSIS — K92.1 BLACK STOOLS: ICD-10-CM

## 2023-05-05 DIAGNOSIS — E78.00 PURE HYPERCHOLESTEROLEMIA: ICD-10-CM

## 2023-05-05 DIAGNOSIS — I49.5 SSS (SICK SINUS SYNDROME): ICD-10-CM

## 2023-05-05 DIAGNOSIS — Z09 HOSPITAL DISCHARGE FOLLOW-UP: Primary | ICD-10-CM

## 2023-05-05 DIAGNOSIS — Z95.0 PACEMAKER: ICD-10-CM

## 2023-05-05 DIAGNOSIS — Z95.1 HX OF CABG: ICD-10-CM

## 2023-05-05 DIAGNOSIS — I48.0 PAROXYSMAL ATRIAL FIBRILLATION: ICD-10-CM

## 2023-05-05 DIAGNOSIS — F17.210 CIGARETTE SMOKER: ICD-10-CM

## 2023-05-05 DIAGNOSIS — Z78.9 STATIN INTOLERANCE: ICD-10-CM

## 2023-05-05 DIAGNOSIS — I10 ESSENTIAL HYPERTENSION: ICD-10-CM

## 2023-05-05 DIAGNOSIS — E55.9 VITAMIN D DEFICIENCY: ICD-10-CM

## 2023-05-05 DIAGNOSIS — I25.810 CORONARY ARTERY DISEASE INVOLVING CORONARY BYPASS GRAFT OF NATIVE HEART WITHOUT ANGINA PECTORIS: ICD-10-CM

## 2023-05-05 LAB
ALBUMIN SERPL BCP-MCNC: 3.4 G/DL (ref 3.5–5.2)
ALP SERPL-CCNC: 78 U/L (ref 55–135)
ALT SERPL W/O P-5'-P-CCNC: 7 U/L (ref 10–44)
ANION GAP SERPL CALC-SCNC: 11 MMOL/L (ref 8–16)
AST SERPL-CCNC: 13 U/L (ref 10–40)
BASOPHILS # BLD AUTO: 0.04 K/UL (ref 0–0.2)
BASOPHILS NFR BLD: 0.5 % (ref 0–1.9)
BILIRUB SERPL-MCNC: 0.3 MG/DL (ref 0.1–1)
BNP SERPL-MCNC: 191 PG/ML (ref 0–99)
BUN SERPL-MCNC: 31 MG/DL (ref 8–23)
CALCIUM SERPL-MCNC: 9.3 MG/DL (ref 8.7–10.5)
CHLORIDE SERPL-SCNC: 99 MMOL/L (ref 95–110)
CO2 SERPL-SCNC: 30 MMOL/L (ref 23–29)
CREAT SERPL-MCNC: 1.2 MG/DL (ref 0.5–1.4)
DIFFERENTIAL METHOD: ABNORMAL
EOSINOPHIL # BLD AUTO: 0.2 K/UL (ref 0–0.5)
EOSINOPHIL NFR BLD: 2 % (ref 0–8)
ERYTHROCYTE [DISTWIDTH] IN BLOOD BY AUTOMATED COUNT: 16.4 % (ref 11.5–14.5)
EST. GFR  (NO RACE VARIABLE): 47.5 ML/MIN/1.73 M^2
FERRITIN SERPL-MCNC: 34 NG/ML (ref 20–300)
GLUCOSE SERPL-MCNC: 88 MG/DL (ref 70–110)
HCT VFR BLD AUTO: 41 % (ref 37–48.5)
HGB BLD-MCNC: 12.2 G/DL (ref 12–16)
IMM GRANULOCYTES # BLD AUTO: 0.01 K/UL (ref 0–0.04)
IMM GRANULOCYTES NFR BLD AUTO: 0.1 % (ref 0–0.5)
IRON SERPL-MCNC: 33 UG/DL (ref 30–160)
LYMPHOCYTES # BLD AUTO: 2.2 K/UL (ref 1–4.8)
LYMPHOCYTES NFR BLD: 27.4 % (ref 18–48)
MCH RBC QN AUTO: 26.2 PG (ref 27–31)
MCHC RBC AUTO-ENTMCNC: 29.8 G/DL (ref 32–36)
MCV RBC AUTO: 88 FL (ref 82–98)
MONOCYTES # BLD AUTO: 0.5 K/UL (ref 0.3–1)
MONOCYTES NFR BLD: 6.6 % (ref 4–15)
NEUTROPHILS # BLD AUTO: 5.2 K/UL (ref 1.8–7.7)
NEUTROPHILS NFR BLD: 63.4 % (ref 38–73)
NRBC BLD-RTO: 0 /100 WBC
PLATELET # BLD AUTO: 238 K/UL (ref 150–450)
PMV BLD AUTO: 10 FL (ref 9.2–12.9)
POTASSIUM SERPL-SCNC: 4 MMOL/L (ref 3.5–5.1)
PROT SERPL-MCNC: 6.8 G/DL (ref 6–8.4)
RBC # BLD AUTO: 4.66 M/UL (ref 4–5.4)
SATURATED IRON: 8 % (ref 20–50)
SODIUM SERPL-SCNC: 140 MMOL/L (ref 136–145)
TOTAL IRON BINDING CAPACITY: 407 UG/DL (ref 250–450)
TRANSFERRIN SERPL-MCNC: 275 MG/DL (ref 200–375)
WBC # BLD AUTO: 8.15 K/UL (ref 3.9–12.7)

## 2023-05-05 PROCEDURE — 84466 ASSAY OF TRANSFERRIN: CPT | Performed by: NURSE PRACTITIONER

## 2023-05-05 PROCEDURE — 99214 OFFICE O/P EST MOD 30 MIN: CPT | Mod: S$PBB,,, | Performed by: NURSE PRACTITIONER

## 2023-05-05 PROCEDURE — 85025 COMPLETE CBC W/AUTO DIFF WBC: CPT | Performed by: NURSE PRACTITIONER

## 2023-05-05 PROCEDURE — 83880 ASSAY OF NATRIURETIC PEPTIDE: CPT | Performed by: NURSE PRACTITIONER

## 2023-05-05 PROCEDURE — 99999 PR PBB SHADOW E&M-EST. PATIENT-LVL V: ICD-10-PCS | Mod: PBBFAC,,, | Performed by: NURSE PRACTITIONER

## 2023-05-05 PROCEDURE — 80053 COMPREHEN METABOLIC PANEL: CPT | Performed by: NURSE PRACTITIONER

## 2023-05-05 PROCEDURE — 99999 PR PBB SHADOW E&M-EST. PATIENT-LVL V: CPT | Mod: PBBFAC,,, | Performed by: NURSE PRACTITIONER

## 2023-05-05 PROCEDURE — 99214 PR OFFICE/OUTPT VISIT, EST, LEVL IV, 30-39 MIN: ICD-10-PCS | Mod: S$PBB,,, | Performed by: NURSE PRACTITIONER

## 2023-05-05 PROCEDURE — 83735 ASSAY OF MAGNESIUM: CPT | Performed by: NURSE PRACTITIONER

## 2023-05-05 PROCEDURE — 82728 ASSAY OF FERRITIN: CPT | Performed by: NURSE PRACTITIONER

## 2023-05-05 PROCEDURE — 36415 COLL VENOUS BLD VENIPUNCTURE: CPT | Mod: PO | Performed by: NURSE PRACTITIONER

## 2023-05-05 PROCEDURE — 99215 OFFICE O/P EST HI 40 MIN: CPT | Mod: PBBFAC,PO | Performed by: NURSE PRACTITIONER

## 2023-05-09 ENCOUNTER — OFFICE VISIT (OUTPATIENT)
Dept: FAMILY MEDICINE | Facility: CLINIC | Age: 73
End: 2023-05-09
Payer: MEDICARE

## 2023-05-09 VITALS
HEIGHT: 64 IN | OXYGEN SATURATION: 90 % | HEART RATE: 67 BPM | TEMPERATURE: 97 F | BODY MASS INDEX: 30.56 KG/M2 | WEIGHT: 179 LBS

## 2023-05-09 DIAGNOSIS — E11.51 TYPE 2 DIABETES MELLITUS WITH DIABETIC PERIPHERAL ANGIOPATHY WITHOUT GANGRENE, WITHOUT LONG-TERM CURRENT USE OF INSULIN: ICD-10-CM

## 2023-05-09 DIAGNOSIS — S51.012D SKIN TEAR OF LEFT ELBOW WITHOUT COMPLICATION, SUBSEQUENT ENCOUNTER: ICD-10-CM

## 2023-05-09 DIAGNOSIS — E61.1 IRON DEFICIENCY: ICD-10-CM

## 2023-05-09 DIAGNOSIS — Z79.899 ENCOUNTER FOR LONG-TERM (CURRENT) USE OF MEDICATIONS: Chronic | ICD-10-CM

## 2023-05-09 DIAGNOSIS — E03.9 HYPOTHYROIDISM, UNSPECIFIED TYPE: ICD-10-CM

## 2023-05-09 DIAGNOSIS — F51.01 PRIMARY INSOMNIA: Chronic | ICD-10-CM

## 2023-05-09 DIAGNOSIS — K21.00 GASTROESOPHAGEAL REFLUX DISEASE WITH ESOPHAGITIS, UNSPECIFIED WHETHER HEMORRHAGE: ICD-10-CM

## 2023-05-09 DIAGNOSIS — E66.2 CLASS 1 OBESITY WITH ALVEOLAR HYPOVENTILATION, SERIOUS COMORBIDITY, AND BODY MASS INDEX (BMI) OF 30.0 TO 30.9 IN ADULT: ICD-10-CM

## 2023-05-09 DIAGNOSIS — Z09 HOSPITAL DISCHARGE FOLLOW-UP: Primary | ICD-10-CM

## 2023-05-09 PROBLEM — Z79.891 CHRONIC PRESCRIPTION OPIATE USE: Status: ACTIVE | Noted: 2021-07-30

## 2023-05-09 PROBLEM — R09.02 HYPOXEMIA: Status: ACTIVE | Noted: 2023-04-01

## 2023-05-09 PROBLEM — Z91.89 AT RISK FOR POLYPHARMACY: Status: ACTIVE | Noted: 2021-07-30

## 2023-05-09 PROBLEM — Z87.19 HISTORY OF GI BLEED: Status: ACTIVE | Noted: 2021-07-30

## 2023-05-09 PROBLEM — Z86.711 PERSONAL HISTORY OF PULMONARY EMBOLISM: Status: ACTIVE | Noted: 2023-04-07

## 2023-05-09 PROBLEM — Z91.81 AT MODERATE RISK FOR FALL: Status: ACTIVE | Noted: 2023-05-09

## 2023-05-09 PROBLEM — S51.012A SKIN TEAR OF LEFT ELBOW WITHOUT COMPLICATION: Status: ACTIVE | Noted: 2023-05-09

## 2023-05-09 PROBLEM — I25.2 OLD MYOCARDIAL INFARCTION: Status: ACTIVE | Noted: 2023-04-07

## 2023-05-09 PROBLEM — I65.23 BILATERAL CAROTID ARTERY STENOSIS: Status: ACTIVE | Noted: 2021-06-08

## 2023-05-09 PROBLEM — D64.9 CHRONIC ANEMIA: Status: ACTIVE | Noted: 2023-05-09

## 2023-05-09 PROBLEM — K21.9 GASTROESOPHAGEAL REFLUX DISEASE WITHOUT ESOPHAGITIS: Status: ACTIVE | Noted: 2019-01-10

## 2023-05-09 PROCEDURE — 99215 PR OFFICE/OUTPT VISIT, EST, LEVL V, 40-54 MIN: ICD-10-PCS | Mod: S$PBB,,, | Performed by: FAMILY MEDICINE

## 2023-05-09 PROCEDURE — 99999 PR PBB SHADOW E&M-EST. PATIENT-LVL V: ICD-10-PCS | Mod: PBBFAC,,, | Performed by: FAMILY MEDICINE

## 2023-05-09 PROCEDURE — 99999 PR PBB SHADOW E&M-EST. PATIENT-LVL V: CPT | Mod: PBBFAC,,, | Performed by: FAMILY MEDICINE

## 2023-05-09 PROCEDURE — 99215 OFFICE O/P EST HI 40 MIN: CPT | Mod: S$PBB,,, | Performed by: FAMILY MEDICINE

## 2023-05-09 PROCEDURE — 99215 OFFICE O/P EST HI 40 MIN: CPT | Mod: PBBFAC,PO | Performed by: FAMILY MEDICINE

## 2023-05-09 RX ORDER — FERROUS SULFATE 325(65) MG
325 TABLET, DELAYED RELEASE (ENTERIC COATED) ORAL DAILY
Qty: 90 TABLET | Refills: 4 | Status: SHIPPED | OUTPATIENT
Start: 2023-05-09

## 2023-05-09 RX ORDER — TEMAZEPAM 30 MG/1
30 CAPSULE ORAL NIGHTLY
Qty: 30 CAPSULE | Refills: 5 | Status: SHIPPED | OUTPATIENT
Start: 2023-05-09 | End: 2024-03-11 | Stop reason: SDUPTHER

## 2023-05-09 RX ORDER — ESOMEPRAZOLE MAGNESIUM 40 MG/1
40 CAPSULE, DELAYED RELEASE ORAL
Qty: 180 CAPSULE | Refills: 4 | Status: SHIPPED | OUTPATIENT
Start: 2023-05-09

## 2023-05-09 RX ORDER — FLUOROMETHOLONE 1 MG/ML
SUSPENSION/ DROPS OPHTHALMIC
COMMUNITY
Start: 2023-03-10

## 2023-05-09 RX ORDER — TOBRAMYCIN AND DEXAMETHASONE 3; 1 MG/ML; MG/ML
SUSPENSION/ DROPS OPHTHALMIC
COMMUNITY
Start: 2023-04-06

## 2023-05-09 RX ORDER — LEVOTHYROXINE SODIUM 75 UG/1
TABLET ORAL
Qty: 90 TABLET | Refills: 4 | Status: SHIPPED | OUTPATIENT
Start: 2023-05-09

## 2023-05-09 NOTE — PROGRESS NOTES
PLAN:      Problem List Items Addressed This Visit       Hypothyroidism (Chronic)     Please be advised of hypothyroidism disease course.  We will plan to monitor thyroid labs at routine intervals.  Please be advised of risks and benefits of medication use, see medication insert for complete details.  ER precautions.  Common complaints from hypothyroidism include weight gain, fatigue, cold intolerance, constipation, dry and flaky shin, coarse or loss of hair, and trouble with memory.     Complaints with hyperthyroidism are weight loss or decrease in appetite, weakness and fatigue, visual changes, fast heart rate, decreased heat tolerance, thinning scalp hair, change in bowel habits, and palpations.           Relevant Medications    levothyroxine (SYNTHROID) 75 MCG tablet    Other Relevant Orders    TSH    Hemoglobin A1C (Completed)    Class 1 obesity with alveolar hypoventilation, serious comorbidity, and body mass index (BMI) of 30.0 to 30.9 in adult (Chronic)     Discussed lifestyle modification with diet and exercise.  Monitoring BMI.           Relevant Orders    Ambulatory referral/consult to Outpatient Case Management    Hemoglobin A1C (Completed)    Insomnia (Chronic)     Continue temazepam.Discussed insomnia condition course.  Advised of first-line medications for this condition.  Also discussed sleep hygiene.  Information was given below.  Good sleep habits (sometimes referred to as sleep hygiene) can help you get a good nights sleep.    Some habits that can improve your sleep health:  -Be consistent. Go to bed at the same time each night and get up at the same time each morning, including on the weekends  -Make sure your bedroom is quiet, dark, relaxing, and at a comfortable temperature  -Remove electronic devices, such as TVs, computers, and smart phones, from the bedroom  -Avoid large meals, caffeine, and alcohol before bedtime  -Get some exercise. Being physically active during the day can help you  fall asleep more easily at night.           Relevant Medications    temazepam (RESTORIL) 30 mg capsule    Other Relevant Orders    Hemoglobin A1C (Completed)    Encounter for long-term (current) use of medications (Chronic)     Complete history and physical was completed today.  Complete and thorough medication reconciliation was performed.  Discussed risks and benefits of medications.  Advised patient on orders and health maintenance.  We discussed old records and old labs if available.  Will request any records not available through epic.  Continue current medications listed on your summary sheet.             Relevant Medications    temazepam (RESTORIL) 30 mg capsule    Other Relevant Orders    Hemoglobin A1C (Completed)    Gastroesophageal reflux disease with esophagitis (Chronic)     Follow-up with GI.  Continue Nexium.  GERD RECOMMENDATIONS  Please be advised of condition course.  - Take PPI in the morning 30-60 minutes before breakfast  - I recommend ongoing Education for lifestyle modifications to help control/reduce reflux/abdominal pain including: avoid large meals, avoid eating within 2-3 hours of bedtime (avoid late night eating & lying down soon after eating), elevate head of bed if nocturnal symptoms are present, smoking cessation (if current smoker), & weight loss (if overweight).   - please be advised to avoid known foods which trigger reflux symptoms & to minimize/avoid high-fat foods, chocolate, caffeine, citrus, alcohol, & tomato products.  - Advised to avoid/limit use of NSAID's, since they can cause GI upset, bleeding, and/or ulcers. If needed, take with food.            Relevant Medications    esomeprazole (NEXIUM) 40 MG capsule    Other Relevant Orders    Ambulatory referral/consult to Gastroenterology    Hemoglobin A1C (Completed)    Type 2 diabetes mellitus with diabetic peripheral angiopathy without gangrene, without long-term current use of insulin (Chronic)     We will plan to monitor  hemoglobin A1c at designated intervals 3 to 6 months.  I recommend ongoing Education for diabetic diet and exercise protocol.  We will continue to monitor for side effects.    Please be advised of symptoms to monitor for and to notify me immediately if persistent or worsening.  Follow up with Ophthalmology/Optometry and Podiatry at least annually.             Relevant Orders    Ambulatory referral/consult to Outpatient Case Management    Hemoglobin A1C (Completed)    Hospital discharge follow-up - Primary     Continue home health.  Follow-up with specialist.  Transitional Care Note    Family and/or Caretaker present at visit?  No.  Diagnostic tests reviewed/disposition: No diagnosic tests pending after this hospitalization.  Disease/illness education: Pneumonia  Home health/community services discussion/referrals: Patient has home health established at [facility] .   Establishment or re-establishment of referral orders for community resources: No other necessary community resources.   Discussion with other health care providers: No discussion with other health care providers necessary.                  Relevant Orders    Ambulatory referral/consult to Outpatient Case Management    Hemoglobin A1C (Completed)    Skin tear of left elbow without complication     No bleeding.  Patient recently wrapped the area and she declines removal.  No picture is available at this time.  Referral to wound care.  Patient is getting home health also for monitoring.           Relevant Orders    Ambulatory referral/consult to Wound Clinic    Hemoglobin A1C (Completed)    Iron deficiency     Check anemia panel.  Start iron supplement.Please be advised constipation condition course.  I recommend increase daily water intake to an acceptable level.  Increase fiber.  Recommend stool softener and laxative if needed.  Goal of 1 bowel movement daily.  Follow-up to clinic or notify me if no improvement or symptoms are persistent or worsening.  ER  precautions were given.  Recommend daily exercise as tolerated, adequate water intake (six 8-oz glasses of water daily), and high fiber diet. OTC fiber supplements are recommended if diet does not reach daily fiber goal (20-30 grams daily), such as Metamucil, Citrucel, or FiberCon (take as directed, separate from other oral medications by >2 hours).  - CONTINUE OTC stool softener such as Colace as directed to avoid hard stools and straining with bowel movements PRN  -If still no improvement with these measures, call/follow-up           Relevant Medications    ferrous sulfate 325 (65 FE) MG EC tablet    Other Relevant Orders    Hemoglobin A1C (Completed)     Future Appointments       Date Provider Specialty Appt Notes    6/8/2023 Celia Whaley NP Cardiology 1 month f/u           Medication Management for assessment above:   Medication List with Changes/Refills   New Medications    FERROUS SULFATE 325 (65 FE) MG EC TABLET    Take 1 tablet (325 mg total) by mouth once daily.   Current Medications    ALBUTEROL-IPRATROPIUM (DUO-NEB) 2.5 MG-0.5 MG/3 ML NEBULIZER SOLUTION    INHALE 1 CONTENTS OF VIAL VIA NEBULIZER EVERY 6 HOURS WHILE AWAKE.    ALENDRONATE (FOSAMAX) 70 MG TABLET    TAKE 1 TABLET BY MOUTH ONCE WEEKLY ON SAME DAY IN THE MORNING WITH GLASS OF WATER, REMAIN UPRIGHT FOR 30 MIN.    AMITRIPTYLINE (ELAVIL) 75 MG TABLET    TAKE ONE TABLET BY MOUTH ONE TIME DAILY IN THE EVENING AS NEEDED    AMLODIPINE (NORVASC) 10 MG TABLET    Take 1 tablet (10 mg total) by mouth once daily.    APIXABAN (ELIQUIS) 5 MG TAB    Take 1 tablet (5 mg total) by mouth 2 (two) times daily.    BENAZEPRIL (LOTENSIN) 40 MG TABLET    TAKE ONE TABLET BY MOUTH DAILY    BISOPROLOL (ZEBETA) 10 MG TABLET    Take 1 tablet (10 mg total) by mouth once daily.    BRILINTA 90 MG TABLET    TAKE ONE TABLET BY MOUTH TWICE DAILY    CHLORHEXIDINE (PERIDEX) 0.12 % SOLUTION    15 mLs.    CYCLOBENZAPRINE (FLEXERIL) 10 MG TABLET    TAKE ONE TABLET BY MOUTH TWICE  DAILY AS NEEDED    ERGOCALCIFEROL (ERGOCALCIFEROL) 50,000 UNIT CAP    Vitamin D2 1,250 mcg (50,000 unit) capsule   Take 1 capsule every week by oral route.    EVOLOCUMAB (REPATHA SURECLICK) 140 MG/ML PNIJ    Inject 1 mL (140 mg total) into the skin every 14 (fourteen) days.    FLUOROMETHOLONE 0.1% (FML) 0.1 % DRPS    Place into both eyes.    FLUTICASONE/UMECLIDIN/VILANTER (TRELEGY ELLIPTA INHL)    Inhale 1 Inhaler into the lungs once daily.    FUROSEMIDE (LASIX) 40 MG TABLET    Take 1 tablet (40 mg total) by mouth 2 (two) times a day.    GABAPENTIN (NEURONTIN) 600 MG TABLET    TAKE ONE TABLET BY MOUTH THREE TIMES DAILY.    HYDRALAZINE (APRESOLINE) 25 MG TABLET    Take 1 tablet (25 mg total) by mouth 2 (two) times daily.    HYDROCHLOROTHIAZIDE (HYDRODIURIL) 25 MG TABLET    TAKE ONE TABLET BY MOUTH DAILY    HYDROCODONE-ACETAMINOPHEN (NORCO)  MG PER TABLET    Take 1 tablet by mouth 2 (two) times daily as needed.    ISOSORBIDE MONONITRATE (IMDUR) 30 MG 24 HR TABLET    Take 1 tablet (30 mg total) by mouth 2 (two) times daily.    MICONAZOLE NITRATE 2 % (ZEASORB, MICONAZOLE,) 2 % TOP POWDER    Apply topically as needed for Itching.    MUPIROCIN (BACTROBAN) 2 % OINTMENT    mupirocin 2 % topical ointment    NITROGLYCERIN (NITROSTAT) 0.4 MG SL TABLET    Place 1 tablet (0.4 mg total) under the tongue every 5 (five) minutes as needed for Chest pain.    POTASSIUM CHLORIDE (MICRO-K) 10 MEQ CPSR    Take 2 capsules in am and 1 in the evening; if taking additional lasix, will need to take one additional potassium capsule in the evening    PRAMIPEXOLE (MIRAPEX) 0.5 MG TABLET    TAKE  ONE TABLET BY MOUTH DAILY    PROMETHAZINE (PHENERGAN) 25 MG TABLET    Take 1 tablet (25 mg total) by mouth every 6 (six) hours as needed.    RANOLAZINE (RANEXA) 1,000 MG TB12    Take 1 tablet (1,000 mg total) by mouth 2 (two) times daily.    ROPINIROLE (REQUIP) 5 MG TABLET    TAKE  ONE TABLET BY MOUTH DAILY    TOBRAMYCIN-DEXAMETHASONE 0.3-0.1%  (TOBRADEX) 0.3-0.1 % DRPS        UMECLIDINIUM-VILANTEROL (ANORO ELLIPTA) 62.5-25 MCG/ACTUATION DSDV    Inhale 1 puff into the lungs once daily. Controller    VENTOLIN HFA 90 MCG/ACTUATION INHALER    Inhale 2 puff(s) every 4 hours by inhalation route.   Changed and/or Refilled Medications    Modified Medication Previous Medication    ESOMEPRAZOLE (NEXIUM) 40 MG CAPSULE esomeprazole (NEXIUM) 40 MG capsule       Take 1 capsule (40 mg total) by mouth 2 (two) times daily before meals.    TAKE ONE CAPSULE BY MOUTH TWICE DAILY BEFORE MEALS    LEVOTHYROXINE (SYNTHROID) 75 MCG TABLET levothyroxine (SYNTHROID) 75 MCG tablet       TAKE ONE TABLET BY MOUTH IN THE MORNING BEFORE BREAKFAST    TAKE ONE TABLET BY MOUTH IN THE MORNING BEFORE BREAKFAST    TEMAZEPAM (RESTORIL) 30 MG CAPSULE temazepam (RESTORIL) 30 mg capsule       Take 1 capsule (30 mg total) by mouth every evening.    TAKE ONE CAPSULE BY MOUTH AT BEDTIME AS NEEDED   Discontinued Medications    SUCRALFATE (CARAFATE) 1 GRAM TABLET    Take 1 tablet (1 g total) by mouth 4 (four) times daily before meals and nightly.       Adan Woodward M.D.  ==========================================================================  Subjective:   Patient ID: Kalpana Wright is a 73 y.o. female.  has a past medical history of ACS (acute coronary syndrome) (3/20/2018), Acute on chronic diastolic congestive heart failure (4/27/2015), Acute on chronic respiratory failure with hypercapnia (1/10/2019), Acute renal failure (1/11/2019), Acute systolic heart failure (3/21/2018), JG (acute kidney injury) (1/29/2019), Anticoagulant long-term use, Arthritis, Asthma, Bradycardia (12/16/2013), CHF (congestive heart failure), Clostridium difficile colitis (9/25/2018), Convulsions (2/11/2021), COPD (chronic obstructive pulmonary disease), Coronary artery disease, Depression, Encounter for blood transfusion, Fall (7/8/2019), Gallstones (3/18/2017), History of Pulmonary embolism (7/17/2014), Hospital  discharge follow-up (9/21/2020), Hyperlipidemia, Hypertension, Need for vaccination (8/8/2019), Non-intractable cyclical vomiting with nausea (2/1/2019), Peripheral vascular disease, Pulmonary embolus (9/9/2013), and Thyroid disease.   Chief Complaint: Follow-up      Problem List Items Addressed This Visit       Hypothyroidism (Chronic)    Overview     Patient with history of thyroid disorder.  Associated with fatigue.  Chronic.  Stable.  Reviewed recent thyroid labs.  Patient reports control with levothyroxine.  Lab Results   Component Value Date    TSH 2.462 05/09/2023    K1QXCQA 8.5 08/08/2019    FREET4 0.86 07/01/2020              Current Assessment & Plan     Please be advised of hypothyroidism disease course.  We will plan to monitor thyroid labs at routine intervals.  Please be advised of risks and benefits of medication use, see medication insert for complete details.  ER precautions.  Common complaints from hypothyroidism include weight gain, fatigue, cold intolerance, constipation, dry and flaky shin, coarse or loss of hair, and trouble with memory.     Complaints with hyperthyroidism are weight loss or decrease in appetite, weakness and fatigue, visual changes, fast heart rate, decreased heat tolerance, thinning scalp hair, change in bowel habits, and palpations.           Class 1 obesity with alveolar hypoventilation, serious comorbidity, and body mass index (BMI) of 30.0 to 30.9 in adult (Chronic)    Overview     BMI Readings from Last 10 Encounters:   05/09/23 30.73 kg/m²   05/05/23 30.73 kg/m²   03/16/23 31.19 kg/m²   03/10/23 29.86 kg/m²   08/10/22 29.87 kg/m²   05/06/22 30.55 kg/m²   03/25/22 30.55 kg/m²   03/25/22 30.55 kg/m²   03/17/22 30.69 kg/m²   01/14/22 29.20 kg/m²            Current Assessment & Plan     Discussed lifestyle modification with diet and exercise.  Monitoring BMI.           Insomnia (Chronic)    Overview     Chronic.  May 2023:  Patient requesting refill on temazepam.  Reports  compliance.  No side effects reported.  Symptoms are controlled.  Intermittent control.  Doing well on temazepam and amitriptyline for comorbid anxiety.   reviewed.  No abuse suspected.JULY 2020:  Chronic.  Stable.  Well controlled on current medication regimen.The patient was checked in the [facility] Board of Pharmacy's  Prescription Monitoring Program. There appears to be no incongruities with the patient's verbalized history.   May 2021:  Stable on current medication regimen.   reviewed.  No abuse suspected.           Current Assessment & Plan     Continue temazepam.Discussed insomnia condition course.  Advised of first-line medications for this condition.  Also discussed sleep hygiene.  Information was given below.  Good sleep habits (sometimes referred to as sleep hygiene) can help you get a good nights sleep.    Some habits that can improve your sleep health:  -Be consistent. Go to bed at the same time each night and get up at the same time each morning, including on the weekends  -Make sure your bedroom is quiet, dark, relaxing, and at a comfortable temperature  -Remove electronic devices, such as TVs, computers, and smart phones, from the bedroom  -Avoid large meals, caffeine, and alcohol before bedtime  -Get some exercise. Being physically active during the day can help you fall asleep more easily at night.           Encounter for long-term (current) use of medications (Chronic)    Overview     May 2022: Reviewed labs.  Initial HPI: CHRONIC long-term drug therapy for managed conditions. See medication list. Reports compliance.  No side effects reported.  Routine lab work is being monitored.  Patient does need refills today. APRIL 2020:  CHRONIC. Stable. Compliant with medications for managed conditions. See medication list. No SE reported. Routine lab analysis is being monitored. Refills were addressed.JULY 2020:  CHRONIC. Stable. Compliant with medications for managed conditions. See  medication list. No SE reported. Routine lab analysis is being monitored. Refills were addressed.  May 2021:  Reviewed labs.  August 2021:  Reviewed labs  Lab Results   Component Value Date    WBC 8.15 05/05/2023    HGB 12.2 05/05/2023    HCT 41.0 05/05/2023    MCV 88 05/05/2023     05/05/2023       Chemistry        Component Value Date/Time     05/05/2023 1020    K 4.0 05/05/2023 1020    CL 99 05/05/2023 1020    CO2 30 (H) 05/05/2023 1020    BUN 31 (H) 05/05/2023 1020    CREATININE 1.2 05/05/2023 1020    GLU 88 05/05/2023 1020        Component Value Date/Time    CALCIUM 9.3 05/05/2023 1020    ALKPHOS 78 05/05/2023 1020    AST 13 05/05/2023 1020    ALT 7 (L) 05/05/2023 1020    BILITOT 0.3 05/05/2023 1020    ESTGFRAFRICA >60.0 08/19/2021 1458    ESTGFRAFRICA >60.0 08/19/2021 1458    EGFRNONAA >60.0 08/19/2021 1458    EGFRNONAA >60.0 08/19/2021 1458          Lab Results   Component Value Date    TSH 2.462 05/09/2023    H9KTXOD 8.5 08/08/2019    FREET4 0.86 07/01/2020    T3FREE 2.6 08/08/2019     ======================================================         Current Assessment & Plan     Complete history and physical was completed today.  Complete and thorough medication reconciliation was performed.  Discussed risks and benefits of medications.  Advised patient on orders and health maintenance.  We discussed old records and old labs if available.  Will request any records not available through epic.  Continue current medications listed on your summary sheet.             Gastroesophageal reflux disease with esophagitis (Chronic)    Overview     Chronic.  May 2023:  Patient reports that she is having darker stools and abdominal pain.    Previous history:  Uncontrolled.  Patient needs a refill on her Nexium.  She reports that is uncontrolled when she does not take this medication.   August 2021:  [facility]           Current Assessment & Plan      Follow-up with GI.  Continue Nexium.  GERD RECOMMENDATIONS  Please be advised of condition course.  - Take PPI in the morning 30-60 minutes before breakfast  - I recommend ongoing Education for lifestyle modifications to help control/reduce reflux/abdominal pain including: avoid large meals, avoid eating within 2-3 hours of bedtime (avoid late night eating & lying down soon after eating), elevate head of bed if nocturnal symptoms are present, smoking cessation (if current smoker), & weight loss (if overweight).   - please be advised to avoid known foods which trigger reflux symptoms & to minimize/avoid high-fat foods, chocolate, caffeine, citrus, alcohol, & tomato products.  - Advised to avoid/limit use of NSAID's, since they can cause GI upset, bleeding, and/or ulcers. If needed, take with food.            Type 2 diabetes mellitus with diabetic peripheral angiopathy without gangrene, without long-term current use of insulin (Chronic)    Overview       May 2023:  Patient denies any history of thyroid cancer, pancreatitis, MEN syndrome.  Diabetes Management Status    Statin: Not taking  ACE/ARB: Taking    Screening or Prevention Patient's value Goal Complete/Controlled?   HgA1C Testing and Control   Lab Results   Component Value Date    HGBA1C 5.2 05/09/2023      Annually/Less than 8% Yes   Lipid profile : 08/19/2021 Annually No   LDL control Lab Results   Component Value Date    LDLCALC 100.6 08/19/2021    Annually/Less than 100 mg/dl  No   Nephropathy screening Lab Results   Component Value Date    LABMICR 54.0 05/09/2023     Lab Results   Component Value Date    PROTEINUA Negative 08/19/2021     Lab Results   Component Value Date    UTPCR 0.12 10/21/2020      Annually Yes   Blood pressure BP Readings from Last 1 Encounters:   05/05/23 (!) 142/60    Less than 140/90 No   Dilated retinal exam : 12/19/2022 Annually Yes   Foot exam   : 05/20/2021 Annually No              Current Assessment & Plan     We will plan to  monitor hemoglobin A1c at designated intervals 3 to 6 months.  I recommend ongoing Education for diabetic diet and exercise protocol.  We will continue to monitor for side effects.    Please be advised of symptoms to monitor for and to notify me immediately if persistent or worsening.  Follow up with Ophthalmology/Optometry and Podiatry at least annually.             Hospital discharge follow-up - Primary    Overview     [facility] DISCHARGE SUMMARY    Patient ID:  Kalpana Wright  [id number]  73 y.o.  1950    Admit Date:   4/1/2023 3:51 PM    Discharge Date:   Discharge Today: 4/6/2023    Admitting Physician:   Ty Shipley MD     Discharge Physician:   JOAQUIM BAUMANN MD    Consultants/Treatment Team:    Reason for Admission/Admission Diagnoses:   Present on Admission:   Hypoxemia   Acute on chronic diastolic heart failure    Discharge Diagnoses:   Active Hospital Problems   Diagnosis Date Noted    Hypoxemia 04/01/2023    Acute on chronic diastolic heart failure 04/03/2023     Resolved Hospital Problems     History of Present Illness:   Please see H&P from this admission.     Hospital Course and Treatment:   Admission Information   Date & Time  4/1/2023 Provider  Joaquim Baumann MD [facility] Dept. Phone  [phone]       Admitted with acute on chronic hypoxemic and hypercapnic respiratory failure due to COPD and acute on chronic heart failure preserved EF. She was treated with antibiotics, bronchodilators, diuresis and has steadily improved. She is still requiring oxygen at the time of discharge, home O2 has been arranged. She will also be discharged with home health for PT and OT at home. I have minimized sedating medications as much as possible at home discharge, advised her to continue weaning from these with her PCP on follow-up. Also discussed return to the ED for any new, worsening, or recurrent symptoms.    I discussed with the patient disease process and  treatment.     I have personally seen and examined the patient, Kalpana Wright, in a face to face encounter on the date of discharge.     She is cleared for discharge with instructions to follow up as directed.   Total time in the care and discharge planning of this patient was greater than 30 minutes.    Significant Diagnostic Studies:  Recent Imaging and Procedure Results   None       Patient's Condition On Discharge:   Good    Physical Exam  Vitals and nursing note reviewed.   Constitutional:   General: She is not in acute distress.  HENT:   Head: Normocephalic and atraumatic.   Mouth/Throat:   Mouth: Mucous membranes are moist.   Pharynx: Oropharynx is clear.   Eyes:   General: No scleral icterus.  Pupils: Pupils are equal, round, and reactive to light.   Cardiovascular:   Rate and Rhythm: Normal rate and regular rhythm.   Pulmonary:   Effort: Pulmonary effort is normal. No respiratory distress.   Breath sounds: Wheezing present.   Abdominal:   General: Abdomen is flat. There is no distension.   Tenderness: There is no abdominal tenderness.   Musculoskeletal:   General: No swelling or tenderness.   Skin:  Coloration: Skin is not jaundiced.   Neurological:   General: No focal deficit present.   Mental Status: She is alert and oriented to person, place, and time.   Psychiatric:   Mood and Affect: Mood normal.   Behavior: Behavior normal.     Discharge Disposition:   Order for Discharge (From admission, onward)   Start Ordered   04/06/23 1200 Discharge Disposition to: Home Health Once   Expected Discharge Date: 04/06/23     Question: Discharge Disposition to Answer: Home Health   04/06/23 1202   04/06/23 0000 Follow-up with PCP   04/06/23 1202   04/06/23 0000 Follow-up with: MARCIAL PATTERSON   Question: with Answer: MARCIAL PATTERSON   04/06/23 1202               Discharge Orders:  Contact information for after-discharge care   Durable Medical Equipment   [facility] .   Service: Oxygen  Equipment and Accessories  Contact information:  [address] [facility] [address] [phone]            Home Medical Care   [facility] .   Service: Home Health Services  Contact information:  [address]  [place] [address] [phone]                    Future Appointments:  Future Appointments   Provider Department Dept Phone Center   5/25/2023 1:00 PM Chris Rodriges MD [place] [facility] [phone] [place]       Immunizations Administered for This Admission   No immunizations given this admission.         Medication List     START taking these medications   levoFLOXacin 500 MG Tab tablet  Commonly known as: LEVAQUIN  Take 1 tablet (500 mg total) by mouth daily  Start taking on: April 7, 2023      CONTINUE taking these medications   amitriptyline 75 MG Tab tablet  Commonly known as: ELAVIL    amLODIPine 10 MG Tab tablet  Commonly known as: NORVASC    apixaban 5 mg Tab  Commonly known as: ELIQUIS  Take 1 tablet (5 mg total) by mouth 2 (two) times daily    benazepriL 40 MG Tab tablet  Commonly known as: LOTENSIN    cetirizine 10 MG Tab tablet  Commonly known as: ZyrTEC  Take 1 tablet (10 mg total) by mouth daily as needed for Allergies or Rhinitis (and congestion and cough)    esomeprazole 40 MG Cpdr capsule  Commonly known as: NexIUM    furosemide 40 MG Tab tablet  Commonly known as: LASIX  Take 1 tablet (40 mg total) by mouth as needed (weight gain or fluid retention)    gabapentin 600 MG Tab tablet  Commonly known as: NEURONTIN    hydrALAZINE 25 MG Tab tablet  Commonly known as: APRESOLINE    HYDROcodone-acetaminophen  mg Tab per tablet  Commonly known as: NORCO    isosorbide mononitrate 30 MG Tb24 24 hr tablet  Commonly known as: IMDUR    levothyroxine 75 MCG Tab tablet  Commonly known as: SYNTHROID    nebulizer and compressor Yisel  Use as directed    nitroglycerin 0.4 MG Subl SL tablet  Commonly known as: NITROSTAT    Praluent Pen 75 mg/mL Pnij  pen injector  Generic drug: alirocumab    pramipexole 0.5 MG Tab tablet  Commonly known as: MIRAPEX    promethazine 25 MG Tab tablet  Commonly known as: PHENERGAN    ranolazine 1,000 mg Tb12 SR tablet  Commonly known as: RANEXA    rOPINIRole 5 MG Tab tablet  Commonly known as: REQUIP    sucralfate 1 gram Tab tablet  Commonly known as: CARAFATE    temazepam 30 mg Cap capsule  Commonly known as: RESTORIL    ticagrelor 90 mg Tab  Commonly known as: BRILINTA    Trelegy Ellipta 100-62.5-25 mcg Dsdv inhaler  Generic drug: fluticasone-umeclidin-vilanter  Inhale 1 puff into the lungs daily    * Ventolin HFA 90 mcg/actuation Hfaa inhaler  Generic drug: albuterol    * albuterol sulfate 2.5 mg /3 mL (0.083 %) Nebu nebulizer solution  Commonly known as: PROVENTIL  Take 3 mLs (2.5 mg total) by nebulization every 4 (four) hours as needed for Wheezing or Shortness of Breath      * This list has 2 medication(s) that are the same as other medications prescribed for you. Read the directions carefully, and ask your doctor or other care provider to review them with you.         STOP taking these medications   cyclobenzaprine 10 MG Tab tablet  Commonly known as: FLEXERIL    hydroCHLOROthiazide 25 MG Tab tablet  Commonly known as: HYDRODIURIL        Where to Get Your Medications     These medications were sent to [id number] - [place], [place]  [address] [address]   Phone: [phone]   · levoFLOXacin 500 MG Tab tablet      Discharge Orders   Future Labs/Procedures Expected by Expires   Activity as tolerated As directed   Diet (Select type) Cardiac/Low Chol/IZABELLA As directed   Questions:   Diet Type: Cardiac/Low Chol/IZABELLA   Restriction(s):   Solid Consistency:   Liquid Consistency:   Sodium Restriction:   Fluid restriction:   Follow-up with PCP As directed   Questions:   Schedule for:   when:   Follow-up with: MARCIAL PATTERSON As directed   Questions:   with: MARCIAL PATTERSON   Schedule for:   when:          Electronically signed by Joaquim Baumann MD at 04/06/2023 5:53 PM CDT           Current Assessment & Plan     Continue home health.  Follow-up with specialist.  Transitional Care Note    Family and/or Caretaker present at visit?  No.  Diagnostic tests reviewed/disposition: No diagnosic tests pending after this hospitalization.  Disease/illness education: Pneumonia  Home health/community services discussion/referrals: Patient has home health established at [facility] .   Establishment or re-establishment of referral orders for community resources: No other necessary community resources.   Discussion with other health care providers: No discussion with other health care providers necessary.                  Skin tear of left elbow without complication    Overview     New problem.  Patient states that she fell and has a skin tear on her left forearm.  Patient has a wrapped with Coban.           Current Assessment & Plan     No bleeding.  Patient recently wrapped the area and she declines removal.  No picture is available at this time.  Referral to wound care.  Patient is getting home health also for monitoring.           Iron deficiency    Overview     Lab Results   Component Value Date    IRON 33 05/05/2023    TRANSFERRIN 275 05/05/2023    TIBC 407 05/05/2023    FESATURATED 8 (L) 05/05/2023      No results found for: [id number]  No results found for: FOLATE  Lab Results   Component Value Date    WBC 8.15 05/05/2023    HGB 12.2 05/05/2023    HCT 41.0 05/05/2023    MCV 88 05/05/2023     05/05/2023              Current Assessment & Plan     Check anemia panel.  Start iron supplement.Please be advised constipation condition course.  I recommend increase daily water intake to an acceptable level.  Increase fiber.  Recommend stool softener and laxative if needed.  Goal of 1 bowel movement daily.  Follow-up to clinic or notify me if no improvement or symptoms are persistent or worsening.  ER precautions  "were given.  Recommend daily exercise as tolerated, adequate water intake (six 8-oz glasses of water daily), and high fiber diet. OTC fiber supplements are recommended if diet does not reach daily fiber goal (20-30 grams daily), such as Metamucil, Citrucel, or FiberCon (take as directed, separate from other oral medications by >2 hours).  - CONTINUE OTC stool softener such as Colace as directed to avoid hard stools and straining with bowel movements PRN  -If still no improvement with these measures, call/follow-up               Review of patient's allergies indicates:   Allergen Reactions    Atorvastatin Other (See Comments)     Severe muscle pain  Other reaction(s): Abdominal Pain  SEVERE MYALGIAS      Azithromycin Other (See Comments)     By shot, closed veins and made large bruises  By shot, closed veins and made large bruises      Latex, natural rubber Rash     "TOURNIQUET ON LEG LEFT HUGE BLISTER THAT TOOK 9 MONTHS OF WOUND CARE TO HEAL."    Meperidine Hives and Anaphylaxis     Other reaction(s): Anaphylaxis  Hives (skin)^  Hives (skin)^      Penicillins Anaphylaxis and Hives     Other reaction(s): Anaphylaxis  Shortness of breath^swelling  Shortness of breath and swelling  Anaphylaxis  Shortness of breath^swelling      Tofacitinib Rash and Swelling     Rash and swelling of face      Lorazepam Anxiety and Other (See Comments)     Made me goofy  CAUSED CONFUSION "Made me goofy."      Pravastatin Other (See Comments)     Severe myalgias.  Cramps/Severe myalgias.    Flu vac 2018 [id number]-[id number](pf)      Other reaction(s): Other (See Comments)  Flip into aFIB    Nystatin Rash    Pepper (genus capsicum) Other (See Comments)     Reflux and ulcers     Current Outpatient Medications   Medication Instructions    albuterol-ipratropium (DUO-NEB) 2.5 mg-0.5 mg/3 mL nebulizer solution INHALE 1 CONTENTS OF VIAL VIA NEBULIZER EVERY 6 HOURS WHILE AWAKE.    alendronate (FOSAMAX) 70 MG tablet TAKE 1 TABLET BY MOUTH ONCE WEEKLY ON " SAME DAY IN THE MORNING WITH GLASS OF WATER, REMAIN UPRIGHT FOR 30 MIN.    amitriptyline (ELAVIL) 75 MG tablet TAKE ONE TABLET BY MOUTH ONE TIME DAILY IN THE EVENING AS NEEDED    amLODIPine (NORVASC) 10 mg, Oral, Daily    apixaban (ELIQUIS) 5 mg, Oral, 2 times daily    benazepriL (LOTENSIN) 40 MG tablet TAKE ONE TABLET BY MOUTH DAILY    bisoprolol (ZEBETA) 10 mg, Oral, Daily    BRILINTA 90 mg tablet TAKE ONE TABLET BY MOUTH TWICE DAILY    chlorhexidine (PERIDEX) 0.12 % solution 15 mLs    cyclobenzaprine (FLEXERIL) 10 MG tablet TAKE ONE TABLET BY MOUTH TWICE DAILY AS NEEDED    ergocalciferol (ERGOCALCIFEROL) 50,000 unit Cap Vitamin D2 1,250 mcg (50,000 unit) capsule<BR> Take 1 capsule every week by oral route.    esomeprazole (NEXIUM) 40 mg, Oral, 2 times daily before meals    ferrous sulfate 325 mg, Oral, Daily    fluorometholone 0.1% (FML) 0.1 % DrpS Both Eyes    fluticasone/umeclidin/vilanter (TRELEGY ELLIPTA INHL) 1 Inhaler, Inhalation, Daily    furosemide (LASIX) 40 mg, Oral, 2 times daily    gabapentin (NEURONTIN) 600 MG tablet TAKE ONE TABLET BY MOUTH THREE TIMES DAILY.    hydrALAZINE (APRESOLINE) 25 mg, Oral, 2 times daily    hydroCHLOROthiazide (HYDRODIURIL) 25 MG tablet TAKE ONE TABLET BY MOUTH DAILY    HYDROcodone-acetaminophen (NORCO)  mg per tablet 1 tablet, Oral, 2 times daily PRN    isosorbide mononitrate (IMDUR) 30 mg, Oral, 2 times daily    levothyroxine (SYNTHROID) 75 MCG tablet TAKE ONE TABLET BY MOUTH IN THE MORNING BEFORE BREAKFAST    miconazole NITRATE 2 % (ZEASORB, MICONAZOLE,) 2 % top powder Topical (Top), As needed (PRN)    mupirocin (BACTROBAN) 2 % ointment mupirocin 2 % topical ointment    nitroGLYCERIN (NITROSTAT) 0.4 mg, Sublingual, Every 5 min PRN    potassium chloride (MICRO-K) 10 MEQ CpSR Take 2 capsules in am and 1 in the evening; if taking additional lasix, will need to take one additional potassium capsule in the evening    pramipexole (MIRAPEX) 0.5 MG tablet TAKE  ONE TABLET  "BY MOUTH DAILY    promethazine (PHENERGAN) 25 mg, Oral, Every 6 hours PRN    ranolazine (RANEXA) 1,000 mg, Oral, 2 times daily    REPATHA SURECLICK 140 mg, Subcutaneous, Every 14 days    rOPINIRole (REQUIP) 5 MG tablet TAKE  ONE TABLET BY MOUTH DAILY    temazepam (RESTORIL) 30 mg, Oral, Nightly    tobramycin-dexAMETHasone 0.3-0.1% (TOBRADEX) 0.3-0.1 % DrpS No dose, route, or frequency recorded.    umeclidinium-vilanteroL (ANORO ELLIPTA) 62.5-25 mcg/actuation DsDv 1 puff, Inhalation, Daily, Controller     VENTOLIN HFA 90 mcg/actuation inhaler Inhale 2 puff(s) every 4 hours by inhalation route.      I have reviewed the PMH, social history, FamilyHx, surgical history, allergies and medications documented / confirmed by the patient at the time of this visit.  Review of Systems   Constitutional:  Positive for fatigue. Negative for chills, fever and unexpected weight change.   HENT:  Negative for ear pain and sore throat.    Eyes:  Negative for redness and visual disturbance.   Respiratory:  Negative for cough and shortness of breath.    Cardiovascular:  Negative for chest pain and palpitations.   Gastrointestinal:  Positive for abdominal pain. Negative for abdominal distention, anal bleeding, blood in stool, constipation, diarrhea, nausea, rectal pain and vomiting.   Genitourinary:  Negative for difficulty urinating and hematuria.   Musculoskeletal:  Positive for arthralgias and back pain. Negative for myalgias.   Skin:  Negative for rash and wound.   Neurological:  Positive for weakness. Negative for headaches.   Hematological:  Bruises/bleeds easily.   Psychiatric/Behavioral:  Negative for sleep disturbance. The patient is not nervous/anxious.    Objective:   Pulse 67   Temp 97.3 °F (36.3 °C)   Ht 5' 4" (1.626 m)   Wt 81.2 kg (179 lb)   LMP  (LMP Unknown)   SpO2 (!) 90%   BMI 30.73 kg/m²   Physical Exam  Vitals and nursing note reviewed.   Constitutional:       General: She is not in acute distress.     " Appearance: She is well-developed.      Comments: Sitting in wheelchair no acute distress   HENT:      Head: Normocephalic and atraumatic.   Eyes:      Pupils: Pupils are equal, round, and reactive to light.   Cardiovascular:      Rate and Rhythm: Normal rate and regular rhythm.      Heart sounds: Normal heart sounds. No murmur heard.     Comments: Pacemaker present  Pulmonary:      Effort: Pulmonary effort is normal. No respiratory distress.      Breath sounds: Normal breath sounds. No wheezing.   Abdominal:      General: Bowel sounds are normal. There is no distension.      Palpations: Abdomen is soft. There is no mass.      Tenderness: There is no abdominal tenderness. There is no right CVA tenderness, left CVA tenderness or guarding.      Hernia: No hernia is present.   Musculoskeletal:         General: Tenderness present. Normal range of motion.      Cervical back: Normal range of motion and neck supple.   Skin:     General: Skin is warm and dry.      Capillary Refill: Capillary refill takes less than 2 seconds.      Findings: Bruising present.      Comments: Left arm wrapped in coband, no bleeding present    Neurological:      Mental Status: She is alert and oriented to person, place, and time.      Cranial Nerves: No cranial nerve deficit.   Psychiatric:         Behavior: Behavior normal.     Patient presents alone.  Assessment:     1. Hospital discharge follow-up    2. Type 2 diabetes mellitus with diabetic peripheral angiopathy without gangrene, without long-term current use of insulin    3. Class 1 obesity with alveolar hypoventilation, serious comorbidity, and body mass index (BMI) of 30.0 to 30.9 in adult    4. Skin tear of left elbow without complication, subsequent encounter    5. Encounter for long-term (current) use of medications    6. Primary insomnia    7. Gastroesophageal reflux disease with esophagitis, unspecified whether hemorrhage    8. Hypothyroidism, unspecified type    9. Iron deficiency       MDM:   Moderate medical risk. Moderate complexity.   Total time: 32 minutes.  This includes total time spent on the encounter, which includes face to face time and non-face to face time preparing to see the patient (eg, review of previous medical records, tests), Obtaining and/or reviewing separately obtained history, documenting clinical information in the electronic or other health record, independently interpreting results (not separately reported)/communicating results to the patient/family/caregiver, and/or care coordination (not separately reported).    I have Reviewed and summarized old records.  I have performed thorough medication reconciliation today and discussed risk and benefits of medications.  I have reviewed labs and discussed with patient.  All questions were answered.  I am requesting old records and will review them once they are available.    I have signed for the following orders AND/OR meds.  Orders Placed This Encounter   Procedures    TSH     Standing Status:   Standing     Number of Occurrences:   99     Standing Expiration Date:   5/4/2043    Hemoglobin A1C     Standing Status:   Future     Number of Occurrences:   1     Standing Expiration Date:   7/7/2024    Ambulatory referral/consult to Outpatient Case Management     Referral Priority:   Routine     Referral Type:   Consultation     Referral Reason:   Specialty Services Required     Number of Visits Requested:   1    Ambulatory referral/consult to Wound Clinic     Standing Status:   Future     Standing Expiration Date:   6/9/2024     Referral Priority:   Urgent     Referral Type:   Consultation     Referral Reason:   Specialty Services Required     Referred to Provider:   [facility]     Requested Specialty:   Wound Care     Number of Visits Requested:   1    Ambulatory referral/consult to Gastroenterology     Standing Status:   Future     Standing Expiration Date:   6/9/2024     Referral Priority:   Routine     Referral  Type:   Consultation     Referral Reason:   Specialty Services Required     Referred to Provider:   Jorge Luis Bo Jr., MD     Requested Specialty:   Gastroenterology     Number of Visits Requested:   1     Medications Ordered This Encounter   Medications    esomeprazole (NEXIUM) 40 MG capsule     Sig: Take 1 capsule (40 mg total) by mouth 2 (two) times daily before meals.     Dispense:  180 capsule     Refill:  4    ferrous sulfate 325 (65 FE) MG EC tablet     Sig: Take 1 tablet (325 mg total) by mouth once daily.     Dispense:  90 tablet     Refill:  4    levothyroxine (SYNTHROID) 75 MCG tablet     Sig: TAKE ONE TABLET BY MOUTH IN THE MORNING BEFORE BREAKFAST     Dispense:  90 tablet     Refill:  4    temazepam (RESTORIL) 30 mg capsule     Sig: Take 1 capsule (30 mg total) by mouth every evening.     Dispense:  30 capsule     Refill:  5        Follow up in about 3 months (around 8/9/2023), or if symptoms worsen or fail to improve, for Med refills.  Future Appointments       Date Provider Specialty Appt Notes    6/8/2023 Celia Whaley NP Cardiology 1 month f/u          If no improvement in symptoms or symptoms worsen, advised to call/follow-up at clinic or go to ER. Patient voiced understanding and all questions/concerns were addressed.   DISCLAIMER: This note was compiled by using a speech recognition dictation system and therefore please be aware that typographical / speech recognition errors can and do occur.  Please contact me if you see any errors specifically.    Adan Woodward M.D.       Office: [phone]   [address] [place] [address]  FAX: [phone]

## 2023-05-09 NOTE — ASSESSMENT & PLAN NOTE
Continue home health.  Follow-up with specialist.  Transitional Care Note    Family and/or Caretaker present at visit?  No.  Diagnostic tests reviewed/disposition: No diagnosic tests pending after this hospitalization.  Disease/illness education: Pneumonia  Home health/community services discussion/referrals: Patient has home health established at [facility] .   Establishment or re-establishment of referral orders for community resources: No other necessary community resources.   Discussion with other health care providers: No discussion with other health care providers necessary.

## 2023-05-09 NOTE — PATIENT INSTRUCTIONS
Follow up in about 3 months (around 8/9/2023), or if symptoms worsen or fail to improve, for Med refills.     Dear patient,   As a result of recent federal legislation (The Federal Cures Act), you may receive lab or pathology results from your visit in your [facility] account before your physician is able to contact you. Your physician or their representative will relay the results to you with their recommendations at their soonest availability.     If no improvement in symptoms or symptoms worsen, please be advised to call MD, follow-up at clinic and/or go to ER if becomes severe.    Adan Woodward M.D.        We Offer TELEHEALTH & Same Day Appointments!   Book your Telehealth appointment with me through my nurse or   Clinic appointments on [email or web address]    [address] [place] [address]    Office: [phone]   FAX: [phone]    Check out my Facebook Page and Follow Me at: [email or web address]    Check out my website at [employer] by clicking on: [email or web address]    To Schedule appointments online, go to [email or web address]: [email or web address]

## 2023-05-10 ENCOUNTER — OUTPATIENT CASE MANAGEMENT (OUTPATIENT)
Dept: ADMINISTRATIVE | Facility: OTHER | Age: 73
End: 2023-05-10
Payer: MEDICARE

## 2023-05-10 ENCOUNTER — SPECIALTY PHARMACY (OUTPATIENT)
Dept: PHARMACY | Facility: CLINIC | Age: 73
End: 2023-05-10
Payer: MEDICARE

## 2023-05-10 NOTE — ASSESSMENT & PLAN NOTE
We will plan to monitor hemoglobin A1c at designated intervals 3 to 6 months.  I recommend ongoing Education for diabetic diet and exercise protocol.  We will continue to monitor for side effects.    Please be advised of symptoms to monitor for and to notify me immediately if persistent or worsening.  Follow up with Ophthalmology/Optometry and Podiatry at least annually.

## 2023-05-10 NOTE — TELEPHONE ENCOUNTER
Specialty Pharmacy - Refill Coordination    Specialty Medication Orders Linked to Encounter      Flowsheet Row Most Recent Value   Medication #1 evolocumab (REPATHA SURECLICK) 140 mg/mL PnIj (Order#[id number], Rx#[id number])            Refill Questions - Documented Responses      Flowsheet Row Most Recent Value   Patient Availability and HIPAA Verification    Does patient want to proceed with activity? Yes   HIPAA/medical authority confirmed? Yes   Relationship to patient of person spoken to? Self   Refill Screening Questions    Would patient like to speak to a pharmacist? No   When does the patient need to receive the medication? 05/13/23   Refill Delivery Questions    How will the patient receive the medication? MEDRx   When does the patient need to receive the medication? 05/13/23   Shipping Address Home   Address in [facility] confirmed and updated if neccessary? Yes   Expected Copay ($) 4.3   Is the patient able to afford the medication copay? Yes   Payment Method CC on file   Days supply of Refill 28   Supplies needed? No supplies needed   Refill activity completed? Yes   Refill activity plan Refill scheduled   Shipment/Pickup Date: 05/11/23            Current Outpatient Medications   Medication Sig    albuterol-ipratropium (DUO-NEB) 2.5 mg-0.5 mg/3 mL nebulizer solution INHALE 1 CONTENTS OF VIAL VIA NEBULIZER EVERY 6 HOURS WHILE AWAKE.    alendronate (FOSAMAX) 70 MG tablet TAKE 1 TABLET BY MOUTH ONCE WEEKLY ON SAME DAY IN THE MORNING WITH GLASS OF WATER, REMAIN UPRIGHT FOR 30 MIN.    amitriptyline (ELAVIL) 75 MG tablet TAKE ONE TABLET BY MOUTH ONE TIME DAILY IN THE EVENING AS NEEDED    amLODIPine (NORVASC) 10 MG tablet Take 1 tablet (10 mg total) by mouth once daily.    apixaban (ELIQUIS) 5 mg Tab Take 1 tablet (5 mg total) by mouth 2 (two) times daily.    benazepriL (LOTENSIN) 40 MG tablet TAKE ONE TABLET BY MOUTH DAILY    bisoprolol (ZEBETA) 10 MG tablet Take 1 tablet (10 mg total) by mouth once  daily.    BRILINTA 90 mg tablet TAKE ONE TABLET BY MOUTH TWICE DAILY    chlorhexidine (PERIDEX) 0.12 % solution 15 mLs.    cyclobenzaprine (FLEXERIL) 10 MG tablet TAKE ONE TABLET BY MOUTH TWICE DAILY AS NEEDED    ergocalciferol (ERGOCALCIFEROL) 50,000 unit Cap Vitamin D2 1,250 mcg (50,000 unit) capsule   Take 1 capsule every week by oral route.    esomeprazole (NEXIUM) 40 MG capsule Take 1 capsule (40 mg total) by mouth 2 (two) times daily before meals.    evolocumab (REPATHA SURECLICK) 140 mg/mL PnIj Inject 1 mL (140 mg total) into the skin every 14 (fourteen) days.    ferrous sulfate 325 (65 FE) MG EC tablet Take 1 tablet (325 mg total) by mouth once daily.    fluorometholone 0.1% (FML) 0.1 % DrpS Place into both eyes.    fluticasone/umeclidin/vilanter (TRELEGY ELLIPTA INHL) Inhale 1 Inhaler into the lungs once daily.    furosemide (LASIX) 40 MG tablet Take 1 tablet (40 mg total) by mouth 2 (two) times a day.    gabapentin (NEURONTIN) 600 MG tablet TAKE ONE TABLET BY MOUTH THREE TIMES DAILY.    hydrALAZINE (APRESOLINE) 25 MG tablet Take 1 tablet (25 mg total) by mouth 2 (two) times daily.    hydroCHLOROthiazide (HYDRODIURIL) 25 MG tablet TAKE ONE TABLET BY MOUTH DAILY    HYDROcodone-acetaminophen (NORCO)  mg per tablet Take 1 tablet by mouth 2 (two) times daily as needed.    isosorbide mononitrate (IMDUR) 30 MG 24 hr tablet Take 1 tablet (30 mg total) by mouth 2 (two) times daily.    levothyroxine (SYNTHROID) 75 MCG tablet TAKE ONE TABLET BY MOUTH IN THE MORNING BEFORE BREAKFAST    miconazole NITRATE 2 % (ZEASORB, MICONAZOLE,) 2 % top powder Apply topically as needed for Itching.    mupirocin (BACTROBAN) 2 % ointment mupirocin 2 % topical ointment    nitroGLYCERIN (NITROSTAT) 0.4 MG SL tablet Place 1 tablet (0.4 mg total) under the tongue every 5 (five) minutes as needed for Chest pain.    potassium chloride (MICRO-K) 10 MEQ CpSR Take 2 capsules in am and 1 in the evening; if taking additional lasix, will  "need to take one additional potassium capsule in the evening    pramipexole (MIRAPEX) 0.5 MG tablet TAKE  ONE TABLET BY MOUTH DAILY    promethazine (PHENERGAN) 25 MG tablet Take 1 tablet (25 mg total) by mouth every 6 (six) hours as needed.    ranolazine (RANEXA) 1,000 mg Tb12 Take 1 tablet (1,000 mg total) by mouth 2 (two) times daily.    rOPINIRole (REQUIP) 5 MG tablet TAKE  ONE TABLET BY MOUTH DAILY    temazepam (RESTORIL) 30 mg capsule Take 1 capsule (30 mg total) by mouth every evening.    tobramycin-dexAMETHasone 0.3-0.1% (TOBRADEX) 0.3-0.1 % DrpS     umeclidinium-vilanteroL (ANORO ELLIPTA) 62.5-25 mcg/actuation DsDv Inhale 1 puff into the lungs once daily. Controller    VENTOLIN HFA 90 mcg/actuation inhaler Inhale 2 puff(s) every 4 hours by inhalation route.   Last reviewed on 5/9/2023  8:35 AM by Adan Woodward MD    Review of patient's allergies indicates:   Allergen Reactions    Atorvastatin Other (See Comments)     Severe muscle pain  Other reaction(s): Abdominal Pain  SEVERE MYALGIAS      Azithromycin Other (See Comments)     By shot, closed veins and made large bruises  By shot, closed veins and made large bruises      Latex, natural rubber Rash     "TOURNIQUET ON LEG LEFT HUGE BLISTER THAT TOOK 9 MONTHS OF WOUND CARE TO HEAL."    Meperidine Hives and Anaphylaxis     Other reaction(s): Anaphylaxis  Hives (skin)^  Hives (skin)^      Penicillins Anaphylaxis and Hives     Other reaction(s): Anaphylaxis  Shortness of breath^swelling  Shortness of breath and swelling  Anaphylaxis  Shortness of breath^swelling      Tofacitinib Rash and Swelling     Rash and swelling of face      Lorazepam Anxiety and Other (See Comments)     Made me goofy  CAUSED CONFUSION "Made me goofy."      Pravastatin Other (See Comments)     Severe myalgias.  Cramps/Severe myalgias.    Flu vac 2018 [id number](pf)      Other reaction(s): Other (See Comments)  Flip into aFIB    Nystatin Rash    Pepper (genus capsicum) Other (See " Comments)     Reflux and ulcers    Last reviewed on  5/9/2023 8:35 AM by Adan Woodward      Tasks added this encounter   No tasks added.   Tasks due within next 3 months   5/10/2023 - Refill Coordination Outreach (1 time occurrence)     [facility]  [address]  [facility] [address]  Phone: [phone]  Fax: [phone]

## 2023-05-10 NOTE — LETTER
May 10, 2023    Kalpana Wright  [address] [place] [address]             [facility] [address] [place] [address] Welcome to [facility]s Complex Care Management Program.  It was a pleasure talking with you today.  My name is Lashell Powell. I look forward to working with you as your Care Manager.  My goal is to help you function at the healthiest and highest level possible.  You can contact me directly at [phone].    As an [facility] patient, some of the services we may be able to provide include:      Development of an individualized care plan with a Registered Nurse    Connection with a    Connection with available resources and services     Coordinate communication among your care team members    Provide coaching and education    Help you understand your doctors treatment plan   Help you obtain information about your insurance coverage.     All services provided by [facility]s Complex Care Managers and other care team members are coordinated with and communicated to your primary care team.    As part of your enrollment, you will be receiving education materials and more information about these services in your [facility] account, by phone or through the mail.  If you do not wish to participate or receive information, please contact our office at [phone].      Sincerely,        Lashell Powell RN CCM [facility]   Out-patient RN Complex Care Manager

## 2023-05-10 NOTE — ASSESSMENT & PLAN NOTE
No bleeding.  Patient recently wrapped the area and she declines removal.  No picture is available at this time.  Referral to wound care.  Patient is getting home health also for monitoring.

## 2023-05-10 NOTE — ASSESSMENT & PLAN NOTE
Continue temazepam.Discussed insomnia condition course.  Advised of first-line medications for this condition.  Also discussed sleep hygiene.  Information was given below.  Good sleep habits (sometimes referred to as sleep hygiene) can help you get a good nights sleep.    Some habits that can improve your sleep health:  -Be consistent. Go to bed at the same time each night and get up at the same time each morning, including on the weekends  -Make sure your bedroom is quiet, dark, relaxing, and at a comfortable temperature  -Remove electronic devices, such as TVs, computers, and smart phones, from the bedroom  -Avoid large meals, caffeine, and alcohol before bedtime  -Get some exercise. Being physically active during the day can help you fall asleep more easily at night.

## 2023-05-10 NOTE — PROGRESS NOTES
Outpatient Care Management  Initial Patient Assessment    Patient: Kalpana Wright  MRN: [id number]  Date of Service: 05/10/2023  Completed by: Lashell Powell RN  Referral Date: 05/09/2023  Program: High Risk  Status: Ongoing  Effective Dates: 5/10/2023 - present  Responsible Staff: No information to display        Reason for Visit   Patient presents with    OP[facility] Enrollment Call       Brief Summary:  Kalpana Wright was referred by Dr RIC Woodward for T2DM and obesity. Patient qualifies for program based on risk score of 96%.   Active problem list, medical, surgical and social history reviewed. Active comorbidities include neuropathy, RLS, anxiety, emphysema, CAD with hx of CABG and stent placements, a fib with hx of pacemaker placement, hx of PE's on long term oral anticoagulation, HTN, CHF, T2DM with current A1c of 5.2, obesity, RA of multiple sites, anemia, frequent falls with recent fall causing L arm skin tear and current smoker. Areas of need identified by patient include education about cardio diagnoses including CAD and CHF and assistance with obtaining affordable housing.   Next steps: Mail educational literature about CHF and CAD and follow up next week. Refer to the LCSW to assist with housing needs and complete SDOH screen. Lashell Powell RN    Disability Status  Is the patient alert and oriented (person, place, time, and situation)?: Alert and oriented x 4  Hearing Difficulty or Deaf: yes  Hearing Management: -- (Reports she cannot hear.)  Visual Difficulty or Blind: yes  Difficulty Concentrating, Remembering or Making Decisions: no  Communication Difficulty: no  Eating/Swallowing Difficulty: no  Walking or Climbing Stairs Difficulty: yes  Walking or Climbing Stairs: ambulation difficulty, requires equipment  Dressing/Bathing Difficulty: yes  Dressing/Bathing: bathing difficulty, requires equipment; bathing difficulty, assistance 1 person  Difficulty Managing Errands Independently: yes  Equipment Currently Used  at Home: rollator; nebulizer; wound care supplies  Change in Functional Status Since Onset of Current Illness/Injury: no        Spiritual Beliefs  Spiritual, Cultural Beliefs, Lutheran Practices, Values that Affect Care: no      Social History     Socioeconomic History    Marital status:    Tobacco Use    Smoking status: Every Day     Packs/day: 0.25     Years: 45.00     Pack years: 11.25     Types: Cigarettes    Smokeless tobacco: Never    Tobacco comments:     43 years smoked - quit past 7 years    Substance and Sexual Activity    Alcohol use: No    Drug use: Never    Sexual activity: Not Currently     Partners: Male     Birth control/protection: Post-menopausal       Roles and Relationships  Primary Source of Support/Comfort: child(chu); extended family  Name of Support/Comfort Primary Source: -- (Kendra)  Secondary Source of Support/Comfort: extended family           Patient Reported Insurance  Verified current insurance plan:: Medicare; Medicaid        Depression Patient Health Questionnaire 5/10/2023 5/9/2023 11/5/2019 11/5/2019 9/5/2019 8/8/2019   Over the last two weeks how often have you been bothered by little interest or pleasure in doing things Not at all Not at all More than half the days More than half the days More than half the days Several days   Over the last two weeks how often have you been bothered by feeling down, depressed or hopeless Not at all Not at all More than half the days More than half the days More than half the days Several days   PHQ-2 Total Score 0 0 4 4 4 2   Over the last two weeks how often have you been bothered by trouble falling or staying asleep, or sleeping too much - - - More than half the days More than half the days -   Over the last two weeks how often have you been bothered by feeling tired or having little energy - - - More than half the days More than half the days -   Over the last two weeks how often have you been bothered by a poor appetite or overeating  - - - More than half the days More than half the days -   Over the last two weeks how often have you been bothered by feeling bad about yourself - or that you are a failure or have let yourself or your family down - - - More than half the days More than half the days -   Over the last two weeks how often have you been bothered by trouble concentrating on things, such as reading the newspaper or watching television - - - More than half the days More than half the days -   Over the last two weeks how often have you been bothered by moving or speaking so slowly that other people could have noticed. Or the opposite - being so fidgety or restless that you have been moving around a lot more than usual. - - - More than half the days More than half the days -   Over the last two weeks how often have you been bothered by thoughts that you would be better off dead, or of hurting yourself - - - More than half the days More than half the days -   If you checked off any problems, how difficult have these problems made it for you to do your work, take care of things at home or get along with other people? - - - Very difficult Somewhat difficult -   Total Score - - - 18 18 -       Learning Assessment       05/10/2023 1619 [facility] (5/10/2023 - Present)   Created by Lashell Powell, RN - RN (Nurse) Status: Complete                 PRIMARY LEARNER     Primary Learner Name:  Kalpana Wright  - 05/10/2023 1619    Relationship:  Patient  - 05/10/2023 1619    Does the primary learner have any barriers to learning?:  No Barriers  - 05/10/2023 1619    What is the preferred language of the primary learner?:  English [facility] 05/10/2023 1619    Is an  required?:  No  - 05/10/2023 1619    How does the primary learner prefer to learn new concepts?:  Listening, Reading  - 05/10/2023 1619    How often do you need to have someone help you read instructions, pamphlets, or written material from your doctor or  pharmacy?:  Never  - 05/10/2023 1618        CO-LEARNER #1     No question answered        CO-LEARNER #2     No question answered        SPECIAL TOPICS     No question answered        ANSWERED BY:     No question answered        Edit History       Lashell Powell, RN - RN (Nurse)   05/10/2023 0025

## 2023-05-10 NOTE — ASSESSMENT & PLAN NOTE
Check anemia panel.  Start iron supplement.Please be advised constipation condition course.  I recommend increase daily water intake to an acceptable level.  Increase fiber.  Recommend stool softener and laxative if needed.  Goal of 1 bowel movement daily.  Follow-up to clinic or notify me if no improvement or symptoms are persistent or worsening.  ER precautions were given.  Recommend daily exercise as tolerated, adequate water intake (six 8-oz glasses of water daily), and high fiber diet. OTC fiber supplements are recommended if diet does not reach daily fiber goal (20-30 grams daily), such as Metamucil, Citrucel, or FiberCon (take as directed, separate from other oral medications by >2 hours).  - CONTINUE OTC stool softener such as Colace as directed to avoid hard stools and straining with bowel movements PRN  -If still no improvement with these measures, call/follow-up

## 2023-05-10 NOTE — ASSESSMENT & PLAN NOTE
Follow-up with GI.  Continue Nexium.  GERD RECOMMENDATIONS  Please be advised of condition course.  - Take PPI in the morning 30-60 minutes before breakfast  - I recommend ongoing Education for lifestyle modifications to help control/reduce reflux/abdominal pain including: avoid large meals, avoid eating within 2-3 hours of bedtime (avoid late night eating & lying down soon after eating), elevate head of bed if nocturnal symptoms are present, smoking cessation (if current smoker), & weight loss (if overweight).   - please be advised to avoid known foods which trigger reflux symptoms & to minimize/avoid high-fat foods, chocolate, caffeine, citrus, alcohol, & tomato products.  - Advised to avoid/limit use of NSAID's, since they can cause GI upset, bleeding, and/or ulcers. If needed, take with food.

## 2023-05-12 ENCOUNTER — OUTPATIENT CASE MANAGEMENT (OUTPATIENT)
Dept: ADMINISTRATIVE | Facility: OTHER | Age: 73
End: 2023-05-12
Payer: MEDICARE

## 2023-05-12 ENCOUNTER — TELEPHONE (OUTPATIENT)
Dept: CARDIOLOGY | Facility: CLINIC | Age: 73
End: 2023-05-12
Payer: MEDICARE

## 2023-05-12 NOTE — TELEPHONE ENCOUNTER
Spoke with pt and discussed lab results/med recommendations. Pt verbalized understanding and will call back with any further questions or concerns.     ----- Message from Celia Whaley NP sent at 5/12/2023  4:54 PM CDT -----  Would like her to hold lasix for one day, then start taking lasix once daily with second dose only if she has edema or weight gain of more than 2 pounds in one day. Dr. Woodward has already started an iron supplement for iron deficiency.

## 2023-05-12 NOTE — PROGRESS NOTES
Would like her to hold lasix for one day, then start taking lasix once daily with second dose only if she has edema or weight gain of more than 2 pounds in one day. Dr. Woodward has already started an iron supplement for iron deficiency.

## 2023-05-12 NOTE — PROGRESS NOTES
Outpatient Care Management   - High Risk Patient Assessment    Patient: Kalpana Wright  MRN:  [id number]  Date of Service:  5/12/2023  Completed by:  Deja Roldan LMSW  Referral Date: 05/09/2023    Reason for Visit   Patient presents with    Social Work Assessment - High Risk    [facility] [place] [place]    Plan Of Care       Brief Summary:  received a referral from OPCM RN Lashell Powell RN for the following patient identified psycho-social needs; Housing. [facility] completed assessment with patient. Patient reports they had lived in their rental home for 12 years until recently. They are now living with her aunt temporarily. They were donated a plot of land to put a trailer on. Pt reports being independent for the most part. Pt does not do shopping any more, her daughter does. If patient needs help her Daughter assists. Pt discussed losses over he life, denied counseling services. Reports she has a cousin she will go fishing with and they talk about things. Patient denies having ACP documents, would be interested. Reports she would need to speak with daughter first. Is interested in [employer]. Reports needing hearing aids but insurance will not cover them, would like resources for hearing aids. Pt's last rental home was $500/month. Would like to be in that area for cost. Care plan was created in collaboration with patient/caregiver input.  completed the SDOH questionnaire.

## 2023-05-16 ENCOUNTER — OUTPATIENT CASE MANAGEMENT (OUTPATIENT)
Dept: ADMINISTRATIVE | Facility: OTHER | Age: 73
End: 2023-05-16
Payer: MEDICARE

## 2023-05-19 ENCOUNTER — OUTPATIENT CASE MANAGEMENT (OUTPATIENT)
Dept: ADMINISTRATIVE | Facility: OTHER | Age: 73
End: 2023-05-19
Payer: MEDICARE

## 2023-05-23 ENCOUNTER — OUTPATIENT CASE MANAGEMENT (OUTPATIENT)
Dept: ADMINISTRATIVE | Facility: OTHER | Age: 73
End: 2023-05-23
Payer: MEDICARE

## 2023-06-01 ENCOUNTER — SPECIALTY PHARMACY (OUTPATIENT)
Dept: PHARMACY | Facility: CLINIC | Age: 73
End: 2023-06-01
Payer: MEDICARE

## 2023-06-01 NOTE — TELEPHONE ENCOUNTER
Outgoing call to pt regarding repatha refill. Pt has a dose on hand for injection 6/2. Pt denied missing any doses. Will follow up on 6/9 for refill.

## 2023-06-02 NOTE — PROGRESS NOTES
Subjective:    Patient ID:  Kalpana Wright is a 73 y.o. female who presents for follow-up of hospital discharge.      HPI: Ms. Kalpana Wright presents to the clinic for preop exam and follow up of test results, diastolic heart failure. She is planning endoscopy with Dr. Betzy rios to evaluate iron deficiency and abdominal pain.  She stands to cook and works in garden. She walks up and down ramp at home using a walker. She denied any chest pain or SOB or ROSA. She denied any lightheadedness, dizziness, or near syncope.  She reports decreased appetite. Abdominal pain with eating bites of food. She denied any usual bleeding. She denied any palpitations, orthopnea, or PND.    She is wearing compression stockings every day which has improved her LE edema. She is not wearing them today because they are in the washing machine. She is taking lasix 80 Q am and 40mg Q pm; potassium 20mEq in am and 10mEq in pm.  She is smoking 3-4 cigarettes/day. She does not want to quit. She stated that she is under a lot of stress.    Has Home Health with [facility] ([facility]  She has h/o RJ, but has not been using CPAP; her machine was taken back years ago. She has appointment with Dr. Rodriges for testing and treatment.    ----------------------------------------------------  Previous visit: F/U hospital discharge. She was brought to [facility] on April 1, 2023 for lethargy and hypoxemia. O2 sat was reportedly in the 70s. Her BNP was 457. She also had peripheral edema. She was treated with BiPAP, antibiotics, steroids, nebs, and IV lasix. She apparently qualified for supplemental oxygen at home after 6-minute walk.  Discharge summary indicates that she was sent home with [facility] for PT and OT.  No d/c BNP.  Serum Mg 1.9-2.2.  She had worsening of kidney function over the course of admission.    She has H/O CAD with H/O CABG, PVD, HTN, HLP, PAF, SSS, PPM, diastolic heart failure, tobacco use, statin intolerance, lupus  anticoagulant.    Last visit with Dr. Kapoor On 8/10/22:  Pt with recent Er visit [facility] - abdominal pain, dx with gastritits  Sx improved with pepcid and PPI  Patient denies CP, angina or anginal equivalent.  Pt stopped smoking 8 years.  Pt has almost lost 100 lbs in 4 years  PVD (peripheral vascular disease)   History of SSS (sick sinus syndrome)   Pacemaker   Paroxysmal atrial fibrillation   Hx of CABG   Diastolic congestive heart failure, unspecified HF chronicity   Coronary artery disease involving coronary bypass graft of native heart without angina pectoris   Atherosclerosis of native coronary artery of native heart with angina pectoris with documented spasm   Pure hypercholesterolemia   Essential hypertension   Lupus anticoagulant syndrome   Cigarette smoker   Statin intolerance   Continue benazepril 40 mg p.o. q.day, bisoprolol 10 mg p.o. q.day, and amlodipine 10mg QD -hypertension.  Furosemide prn. Avoid dehydration.  Continue apixaban, bisoprolol - PAF  Continue brilinta - CAD.  Sodium restriction strongly encouraged.   Continue imdur and ranexa - angina/CAD.   PPM clinic.      Hx: she was admitted to [place] on November 17, 2021 and discharged on November 19, 2021.  She was apparently found in her car poorly responsive.  She was found by a bystander at primary care clinic.  She was found to be hypotensive in the clinic with a blood pressure of 80/50.  EMS was called; they also noted hypotension at 98/49; she was taken to [facility] and found to be dehydrated.  All antihypertensive medications were held and she was given IV hydration.  CT of the brain was negative.  Troponin was negative x2; EKG was negative according to the note.  His hold and reports that they did interrogate pacemaker during hospitalization but she does not know the results.  I have not found a reference to and interrogation report in the record.  Ms. Wright does not know if she simply fell asleep or she passed out or what happened.   She had been taking Lasix daily and that has been stopped.  She has seen primary care in follow-up and was told to restart her benazepril.  Ms. Wright reports that she is taking benazepril, hydralazine, and bisoprolol.  She is not taking amlodipine or furosemide.  ---------------------------------------------------------        Medications: she is taking Brilinta, and Eliquis. Takes lasix 40mg BID with potassium BID. She stated that she is taking bisoprolol, benazepril, amlodipine, HCTZ, and she is not taking hydralazine due to difficulty swallowing and vomiting afterward. Taking Imdur and Ranexa BID.  Taking Repatha as directed.   Sodium: she does not add salt to foods when cooking;  she is not reading labels for sodium content. Denied eating salty foods.   Diet: Abdominal pain with bites of food; decreased appetite.  Exercise: she  stated that she walks her dog 2 times a day for a few minutes each time. She is walking on her ramp at home using a walker.     Tobacco:  smoking 3-4 cigarettes/day.  Stress and seeing people smoke are triggers. She does not want to quit.  Stress management: working in garden.  Alcohol: no alcohol use     Weight: 82.1 kg (181 lb) she states that her daily weights  has been stable. Body mass index is 31.07 kg/m².    Wt Readings from Last 3 Encounters:   06/08/23 82.1 kg (181 lb)   05/09/23 81.2 kg (179 lb)   05/05/23 81.2 kg (179 lb)     BP log: None. She forgot her log at home.      Review of Systems   Constitutional: Negative for chills, decreased appetite, fever, malaise/fatigue, night sweats, weight gain and weight loss.   HENT:  Negative for congestion.    Cardiovascular:  Negative for chest pain, claudication, cyanosis, dyspnea on exertion, irregular heartbeat, leg swelling, near-syncope, orthopnea, palpitations, paroxysmal nocturnal dyspnea and syncope.   Respiratory:  Negative for cough, hemoptysis, shortness of breath, sputum production and wheezing.    Hematologic/Lymphatic:  Negative for adenopathy and bleeding problem. Bruises/bleeds easily.   Skin:  Negative for color change and nail changes.   Musculoskeletal:  Positive for joint pain (chronic pain in the knees due to arthritis.).   Gastrointestinal:  Positive for abdominal pain (worsened with eating.), nausea and vomiting. Negative for bloating, change in bowel habit, heartburn and hematochezia.   Genitourinary:  Negative for hematuria.   Neurological:  Negative for dizziness and light-headedness.   Psychiatric/Behavioral:  Negative for altered mental status.      Objective:   Physical Exam  Constitutional:       General: She is not in acute distress.     Appearance: She is well-developed. She is not diaphoretic.   HENT:      Head: Normocephalic and atraumatic.   Eyes:      General: No scleral icterus.     Conjunctiva/sclera: Conjunctivae normal.   Neck:      Thyroid: No thyromegaly.      Vascular: No JVD.      Trachea: No tracheal deviation.   Cardiovascular:      Rate and Rhythm: Normal rate and regular rhythm.      Pulses: Intact distal pulses.      Heart sounds: Normal heart sounds. No murmur heard.    No friction rub. No gallop.      Comments:    Pulmonary:      Effort: Pulmonary effort is normal. No respiratory distress.      Breath sounds: No stridor. No wheezing, rhonchi or rales.      Comments: Some bibasilar crackles bilaterally; otherwise CTA.  Chest:      Chest wall: No tenderness.   Abdominal:      General: Bowel sounds are normal. There is no distension.      Palpations: Abdomen is soft.      Tenderness: There is no guarding or rebound.      Comments: Abdomen obese.   Musculoskeletal:         General: No swelling. Normal range of motion.      Cervical back: Neck supple.      Right lower leg: No edema.      Left lower leg: No edema.   Lymphadenopathy:      Cervical: No cervical adenopathy.   Skin:     General: Skin is warm and dry.      Coloration: Skin is not pale.      Findings: No erythema or rash.      Comments:  [place].   Neurological:      Mental Status: She is alert and oriented to person, place, and time.      Latest Reference Range & Units 05/05/23 10:20 05/09/23 09:06 05/09/23 09:11   WBC 3.90 - 12.70 K/uL 8.15     RBC 4.00 - 5.40 M/uL 4.66     Hemoglobin 12.0 - 16.0 g/dL 12.2     Hematocrit 37.0 - 48.5 % 41.0     MCV 82 - 98 fL 88     MCH 27.0 - 31.0 pg 26.2 (L)     MCHC 32.0 - 36.0 g/dL 29.8 (L)     RDW 11.5 - 14.5 % 16.4 (H)     Platelets 150 - 450 K/uL 238     MPV 9.2 - 12.9 fL 10.0     Gran % 38.0 - 73.0 % 63.4     Lymph % 18.0 - 48.0 % 27.4     Mono % 4.0 - 15.0 % 6.6     Eosinophil % 0.0 - 8.0 % 2.0     Basophil % 0.0 - 1.9 % 0.5     Immature Granulocytes 0.0 - 0.5 % 0.1     Gran # (ANC) 1.8 - 7.7 K/uL 5.2     Lymph # 1.0 - 4.8 K/uL 2.2     Mono # 0.3 - 1.0 K/uL 0.5     Eos # 0.0 - 0.5 K/uL 0.2     Baso # 0.00 - 0.20 K/uL 0.04     Immature Grans (Abs) 0.00 - 0.04 K/uL 0.01     nRBC 0 /100 WBC 0     Differential Method  Automated     Iron 30 - 160 ug/dL 33     TIBC 250 - 450 ug/dL 407     Saturated Iron 20 - 50 % 8 (L)     Transferrin 200 - 375 mg/dL 275     Ferritin 20.0 - 300.0 ng/mL 34     Sodium 136 - 145 mmol/L 140     Potassium 3.5 - 5.1 mmol/L 4.0     Chloride 95 - 110 mmol/L 99     CO2 23 - 29 mmol/L 30 (H)     Anion Gap 8 - 16 mmol/L 11     BUN 8 - 23 mg/dL 31 (H)     Creatinine 0.5 - 1.4 mg/dL 1.2     eGFR >60 mL/min/1.73 m^2 47.5 !     Glucose 70 - 110 mg/dL 88     Calcium 8.7 - 10.5 mg/dL 9.3     Alkaline Phosphatase 55 - 135 U/L 78     PROTEIN TOTAL 6.0 - 8.4 g/dL 6.8     Albumin 3.5 - 5.2 g/dL 3.4 (L)     BILIRUBIN TOTAL 0.1 - 1.0 mg/dL 0.3     AST 10 - 40 U/L 13     ALT 10 - 44 U/L 7 (L)     BNP 0 - 99 pg/mL 191 (H)     Hemoglobin A1C External 4.0 - 5.6 %   5.2   Estimated Avg Glucose 68 - 131 mg/dL   103   TSH 0.400 - 4.000 uIU/mL   2.462   Creatinine, Urine 15.0 - 325.0 mg/dL  89.0    Urine Microalbumin ug/mL  54.0    MICROALB/CREAT RATIO 0.0 - 30.0 ug/mg  60.7 (H)    (L): Data is abnormally  low  (H): Data is abnormally high  !: Data is abnormal        Related to Comprehensive metabolic panel  Component 04/06/23 04/05/23 04/04/23 04/03/23 04/02/23 04/01/23    Glucose 92 84 78 92 153 High  101 High    Sodium 140 141 143 142 141 142   Potassium 3.7 3.5 Low  3.1 Low  3.2 Low  3.4 Low  3.8   Chloride 93 Low  90 Low  95 Low  97 Low  96 Low  99 Low    CO2 40 High  41 High Panic   39 High  39 High  33 High  35 High    BUN 46 High  50 High  48 High  46 High  35 High  32 High    Calcium 8.6 Low  8.7 Low  8.6 Low  9.2 9.5 9.4   Creatinine 1.17 High  1.24 High  1.01 1.00 1.11 High  0.97   Albumin 2.9 Low  2.9 Low  2.9 Low  3.3 Low  3.3 Low  3.4 Low    Total Bilirubin 0.5 0.3 Low  0.2 Low  0.3 Low  0.2 Low  0.2 Low    ALKP 50 57 52 59 66 69   Total Protein 5.0 Low  5.0 Low  5.0 Low  6.0 Low  7.0 6.0 Low    ALT 8 8 8 9 9 10   AST 9 Low  10 10 11 12 13   Anion Gap 7 10 9 6 Low  12 8          Latest Reference Range & Units 03/02/23 10:11   WBC 3.90 - 12.70 K/uL 5.24   RBC 4.00 - 5.40 M/uL 4.88   Hemoglobin 12.0 - 16.0 g/dL 13.8   Hematocrit 37.0 - 48.5 % 44.7   MCV 82 - 98 fL 92   MCH 27.0 - 31.0 pg 28.3   MCHC 32.0 - 36.0 g/dL 30.9 (L)   RDW 11.5 - 14.5 % 15.6 (H)   Platelets 150 - 450 K/uL 195   (L): Data is abnormally low  (H): Data is abnormally high   Latest Reference Range & Units 03/02/23 10:11   Sodium 136 - 145 mmol/L 141   Potassium 3.5 - 5.1 mmol/L 3.8   Chloride 95 - 110 mmol/L 101   CO2 23 - 29 mmol/L 31 (H)   Anion Gap 8 - 16 mmol/L 9   BUN 8 - 23 mg/dL 9   Creatinine 0.5 - 1.4 mg/dL 0.8   eGFR >60 mL/min/1.73 m^2 >60.0   Glucose 70 - 110 mg/dL 85   Calcium 8.7 - 10.5 mg/dL 9.3   (H): Data is abnormally high     Latest Reference Range & Units 02/08/23 03:40   Hemoglobin A1C External 4.6 - 6.2 % 4.8 (E)   (E): External lab result     Latest Reference Range & Units 08/19/21 14:58   Cholesterol 120 - 199 mg/dL 180   HDL 40 - 75 mg/dL 61   HDL/Cholesterol Ratio 20.0 - 50.0 % 33.9   LDL Cholesterol  External 63.0 - 159.0 mg/dL 100.6   Non-HDL Cholesterol mg/dL 119   Total Cholesterol/HDL Ratio 2.0 - 5.0  3.0   Triglycerides 30 - 150 mg/dL 92     PPM interrogation 5/3/23:  2-3 seconds of atrial tachycardia on March 16. A: 211; V 106    Lexiscan 5/6/22: Conclusion     Abnormal myocardial perfusion scan.    There is a mild intensity, fixed perfusion abnormality consistent with scar in the basal to mid inferior wall(s).    The gated perfusion images showed an ejection fraction of 58% at rest. The gated perfusion images showed an ejection fraction of 58% post stress.    There is normal wall motion at rest and post stress.    LV cavity size is normal at rest and normal at stress.    The EKG portion of this study is negative for ischemia.    The patient reported no chest pain during the stress test.      [facility] [place] 9/6/2020: Done due to chest pain and NSTEMI  SUMMARY OF PROCEDURE:     1. Left coronary angiogram.     2. Saphenous vein graft angiogram.     3. Left heart catheterization.     4. Conscious sedation.     5. IVUS of the left main.     6. Rota to the left main.     7. PCI with drug-eluting stent to the left main.  ASSESSMENT AND PLAN:     1. Severe coronary artery disease as outlined above.     2. Recurrent restenosis of the vein graft to the OM with multiple   layers of stents and suboptimal result with balloon angioplasty.     3. Rotablation to the ostial left main with a drug-eluting stent using   5.0 x 15 Resolute [place], postdilated with a 5.0 balloon at high pressure   with excellent angiographic and intravascular ultrasound-guided results.     4. The patient will be kept on dual antiplatelets.  The patient will be   followed thoroughly.  Further recommendation will be dictated.  Mo Estrada MD    [facility] [place] 11/19/21: Interpretation Summary   Definity contrast used to eval EF.   Ejection Fraction = 60-65%.   There is mild concentric left ventricular hypertrophy.   Grade 1 Diastolic  Dysfunction   The left atrium is mildly dilated.   There is mild mitral regurgitation.   There is mild tricuspid regurgitation.       [facility] [place] 8/30/2020:Interpretation Summary  Contrast injection was performed.  Ejection Fraction = 60-65%.  There is mild mitral regurgitation.  Contrast injection was performed.     [facility] 2/19/2018:CONCLUSIONS     1 - Concentric hypertrophy.     2 - No wall motion abnormalities.     3 - Normal left ventricular systolic function (EF 60-65%).     4 - Normal left ventricular diastolic function.     5 - Normal right ventricular systolic function .     6 - The estimated PA systolic pressure is 24 mmHg.      Carotid U/S at [place] 07/30/2021: IMPRESSION:    1.  Bilateral carotid atheromatous plaquing. There is less than 50% diameter reduction.    2.  High-grade stenosis within the left external carotid artery, unlikely to be of clinical significance.     Venous U/S  Left leg 11/23/21: No DVT in the LLE.       Assessment:      1. Chronic diastolic congestive heart failure    2. Preop cardiovascular exam    3. Coronary artery disease involving coronary bypass graft of native heart without angina pectoris    4. Hx of CABG    5. SSS (sick sinus syndrome)    6. Pacemaker    7. Paroxysmal atrial fibrillation    8. Essential hypertension    9. Pure hypercholesterolemia    10. Cigarette smoker    11. Statin intolerance    12. Vitamin D deficiency    13. PVD (peripheral vascular disease)      Plan:     Chronic diastolic congestive heart failure  -     Lipid Panel; Future; Expected date: 06/09/2023  -     Comprehensive Metabolic Panel; Future; Expected date: 06/09/2023  -     Magnesium, RBC; Future; Expected date: 06/09/2023  -     BNP; Future; Expected date: 06/08/2023    Preop cardiovascular exam    Coronary artery disease involving coronary bypass graft of native heart without angina pectoris    Hx of CABG  -     Lipid Panel; Future; Expected date: 06/09/2023  -     Comprehensive  Metabolic Panel; Future; Expected date: 06/09/2023  -     Magnesium, RBC; Future; Expected date: 06/09/2023    SSS (sick sinus syndrome)    Pacemaker    Paroxysmal atrial fibrillation    Essential hypertension  -     Comprehensive Metabolic Panel; Future; Expected date: 06/09/2023  -     Magnesium, RBC; Future; Expected date: 06/09/2023  -     amLODIPine (NORVASC) 10 MG tablet; Take 1 tablet (10 mg total) by mouth once daily.  Dispense: 90 tablet; Refill: 3    Pure hypercholesterolemia  -     Lipid Panel; Future; Expected date: 06/09/2023  -     Comprehensive Metabolic Panel; Future; Expected date: 06/09/2023  -     Magnesium, RBC; Future; Expected date: 06/09/2023    Cigarette smoker    Statin intolerance    Vitamin D deficiency    PVD (peripheral vascular disease)      Ms. Wright is in stable condition from cardiac standpoint for endoscopy. She can hold Eliquis for 2 days and brilinta for 5 days prior to procedure and resume both ASAP afterward. Note to Dr. Bo.   She has H/O RJ and is not on CPAP, but she does report having an appointment with him for evaluation and treatment. Encouraged her to keep this appointment.   Labs next week: CMP, FLP, BNP, Mg.  Continue benazepril 40 mg p.o. q.day, bisoprolol 10 mg p.o. q.day, HCTZ 25mg QD, and amlodipine 10mg QD and lasix 40mg BID-hypertension. Will D/C hydralazine due to intolerance-vomiting and difficulty swallowing it.  Monitor blood pressure at home.  BP log to next visit.  Continue apixaban, bisoprolol - PAF  Continue brilinta - CAD.  Sodium restriction strongly encouraged.  Continue imdur and ranexa - angina/CAD.   Continue repatha- CAD, HLP  PPM clinic.  She does not have a box for remote box for monitoring at home.  Tobacco cessation counseling-She does not want to participate in tobacco cessation program. She does not want to quit.  Continue vitamin-D supplement.  Follow up with in 2 months or call sooner for any problems.  Sheri Porter, NP [facility]    This note has been prepared using a combination of MModaL dictation device and typing.  It has been checked for errors but some errors may still exist within the note as a result of speech recognition errors and/or typographical errors.

## 2023-06-06 PROCEDURE — G0179 MD RECERTIFICATION HHA PT: HCPCS | Mod: ,,, | Performed by: FAMILY MEDICINE

## 2023-06-06 PROCEDURE — G0179 PR HOME HEALTH MD RECERTIFICATION: ICD-10-PCS | Mod: ,,, | Performed by: FAMILY MEDICINE

## 2023-06-07 ENCOUNTER — TELEPHONE (OUTPATIENT)
Dept: CARDIOLOGY | Facility: CLINIC | Age: 73
End: 2023-06-07
Payer: MEDICARE

## 2023-06-07 NOTE — TELEPHONE ENCOUNTER
Attempted without success to contact Gaye to further discuss pt clearance. Left voicemail for Gaye to call back.    ----- Message from Aixa Frost sent at 6/7/2023  2:45 PM CDT -----  Gaye with with [facility] called regarding a clearance that was faxed over for blood thinners. Call Gaye back at [phone]. Thx EL

## 2023-06-08 ENCOUNTER — CLINICAL SUPPORT (OUTPATIENT)
Dept: CARDIOLOGY | Facility: HOSPITAL | Age: 73
End: 2023-06-08
Payer: MEDICARE

## 2023-06-08 ENCOUNTER — OFFICE VISIT (OUTPATIENT)
Dept: CARDIOLOGY | Facility: CLINIC | Age: 73
End: 2023-06-08
Payer: MEDICARE

## 2023-06-08 ENCOUNTER — TELEPHONE (OUTPATIENT)
Dept: CARDIOLOGY | Facility: CLINIC | Age: 73
End: 2023-06-08
Payer: MEDICARE

## 2023-06-08 VITALS
HEIGHT: 64 IN | DIASTOLIC BLOOD PRESSURE: 60 MMHG | WEIGHT: 181 LBS | BODY MASS INDEX: 30.9 KG/M2 | HEART RATE: 58 BPM | SYSTOLIC BLOOD PRESSURE: 110 MMHG | OXYGEN SATURATION: 95 %

## 2023-06-08 DIAGNOSIS — E55.9 VITAMIN D DEFICIENCY: ICD-10-CM

## 2023-06-08 DIAGNOSIS — F17.210 CIGARETTE SMOKER: ICD-10-CM

## 2023-06-08 DIAGNOSIS — Z01.810 PREOP CARDIOVASCULAR EXAM: ICD-10-CM

## 2023-06-08 DIAGNOSIS — I73.9 PVD (PERIPHERAL VASCULAR DISEASE): ICD-10-CM

## 2023-06-08 DIAGNOSIS — Z95.1 HX OF CABG: ICD-10-CM

## 2023-06-08 DIAGNOSIS — I48.0 PAROXYSMAL ATRIAL FIBRILLATION: ICD-10-CM

## 2023-06-08 DIAGNOSIS — E78.00 PURE HYPERCHOLESTEROLEMIA: ICD-10-CM

## 2023-06-08 DIAGNOSIS — Z95.0 PACEMAKER: ICD-10-CM

## 2023-06-08 DIAGNOSIS — Z95.0 PRESENCE OF CARDIAC PACEMAKER: ICD-10-CM

## 2023-06-08 DIAGNOSIS — I10 ESSENTIAL HYPERTENSION: ICD-10-CM

## 2023-06-08 DIAGNOSIS — Z78.9 STATIN INTOLERANCE: ICD-10-CM

## 2023-06-08 DIAGNOSIS — I25.810 CORONARY ARTERY DISEASE INVOLVING CORONARY BYPASS GRAFT OF NATIVE HEART WITHOUT ANGINA PECTORIS: ICD-10-CM

## 2023-06-08 DIAGNOSIS — I49.5 SSS (SICK SINUS SYNDROME): ICD-10-CM

## 2023-06-08 DIAGNOSIS — I50.32 CHRONIC DIASTOLIC CONGESTIVE HEART FAILURE: Primary | ICD-10-CM

## 2023-06-08 PROCEDURE — 93294 REM INTERROG EVL PM/LDLS PM: CPT | Mod: ,,, | Performed by: INTERNAL MEDICINE

## 2023-06-08 PROCEDURE — 99214 PR OFFICE/OUTPT VISIT, EST, LEVL IV, 30-39 MIN: ICD-10-PCS | Mod: S$PBB,,, | Performed by: NURSE PRACTITIONER

## 2023-06-08 PROCEDURE — 99215 OFFICE O/P EST HI 40 MIN: CPT | Mod: PBBFAC,PO | Performed by: NURSE PRACTITIONER

## 2023-06-08 PROCEDURE — 93294 CARDIAC DEVICE CHECK - REMOTE: ICD-10-PCS | Mod: ,,, | Performed by: INTERNAL MEDICINE

## 2023-06-08 PROCEDURE — 99999 PR PBB SHADOW E&M-EST. PATIENT-LVL V: CPT | Mod: PBBFAC,,, | Performed by: NURSE PRACTITIONER

## 2023-06-08 PROCEDURE — 99999 PR PBB SHADOW E&M-EST. PATIENT-LVL V: ICD-10-PCS | Mod: PBBFAC,,, | Performed by: NURSE PRACTITIONER

## 2023-06-08 PROCEDURE — 99214 OFFICE O/P EST MOD 30 MIN: CPT | Mod: S$PBB,,, | Performed by: NURSE PRACTITIONER

## 2023-06-08 RX ORDER — AMLODIPINE BESYLATE 10 MG/1
10 TABLET ORAL DAILY
Qty: 90 TABLET | Refills: 3 | Status: SHIPPED | OUTPATIENT
Start: 2023-06-08 | End: 2024-06-07

## 2023-06-09 ENCOUNTER — OUTPATIENT CASE MANAGEMENT (OUTPATIENT)
Dept: ADMINISTRATIVE | Facility: OTHER | Age: 73
End: 2023-06-09
Payer: MEDICARE

## 2023-06-09 NOTE — TELEPHONE ENCOUNTER
Specialty Pharmacy - Refill Coordination    Specialty Medication Orders Linked to Encounter      Flowsheet Row Most Recent Value   Medication #1 evolocumab (REPATHA SURECLICK) 140 mg/mL PnIj (Order#[id number], Rx#[id number])            Refill Questions - Documented Responses      Flowsheet Row Most Recent Value   Patient Availability and HIPAA Verification    Does patient want to proceed with activity? Yes   HIPAA/medical authority confirmed? Yes   Relationship to patient of person spoken to? Self   Refill Screening Questions    Would patient like to speak to a pharmacist? No   When does the patient need to receive the medication? 06/14/23   Refill Delivery Questions    How will the patient receive the medication? MEDRx   When does the patient need to receive the medication? 06/14/23   Shipping Address Home   Address in [facility] confirmed and updated if neccessary? Yes   Expected Copay ($) 4.3   Is the patient able to afford the medication copay? Yes   Payment Method CC on file   Days supply of Refill 28   Supplies needed? No supplies needed   Refill activity completed? Yes   Refill activity plan Refill scheduled   Shipment/Pickup Date: 06/14/23            Current Outpatient Medications   Medication Sig    albuterol-ipratropium (DUO-NEB) 2.5 mg-0.5 mg/3 mL nebulizer solution INHALE 1 CONTENTS OF VIAL VIA NEBULIZER EVERY 6 HOURS WHILE AWAKE.    alendronate (FOSAMAX) 70 MG tablet TAKE 1 TABLET BY MOUTH ONCE WEEKLY ON SAME DAY IN THE MORNING WITH GLASS OF WATER, REMAIN UPRIGHT FOR 30 MIN.    amitriptyline (ELAVIL) 75 MG tablet TAKE ONE TABLET BY MOUTH ONE TIME DAILY IN THE EVENING AS NEEDED    amLODIPine (NORVASC) 10 MG tablet Take 1 tablet (10 mg total) by mouth once daily.    apixaban (ELIQUIS) 5 mg Tab Take 1 tablet (5 mg total) by mouth 2 (two) times daily.    benazepriL (LOTENSIN) 40 MG tablet TAKE ONE TABLET BY MOUTH DAILY    bisoprolol (ZEBETA) 10 MG tablet Take 1 tablet (10 mg total) by mouth once  daily.    BRILINTA 90 mg tablet TAKE ONE TABLET BY MOUTH TWICE DAILY    chlorhexidine (PERIDEX) 0.12 % solution 15 mLs.    cyclobenzaprine (FLEXERIL) 10 MG tablet TAKE ONE TABLET BY MOUTH TWICE DAILY AS NEEDED    ergocalciferol (ERGOCALCIFEROL) 50,000 unit Cap Vitamin D2 1,250 mcg (50,000 unit) capsule   Take 1 capsule every week by oral route.    esomeprazole (NEXIUM) 40 MG capsule Take 1 capsule (40 mg total) by mouth 2 (two) times daily before meals.    evolocumab (REPATHA SURECLICK) 140 mg/mL PnIj Inject 1 mL (140 mg total) into the skin every 14 (fourteen) days.    ferrous sulfate 325 (65 FE) MG EC tablet Take 1 tablet (325 mg total) by mouth once daily.    fluorometholone 0.1% (FML) 0.1 % DrpS Place into both eyes.    fluticasone/umeclidin/vilanter (TRELEGY ELLIPTA INHL) Inhale 1 Inhaler into the lungs once daily.    furosemide (LASIX) 40 MG tablet Take 1 tablet (40 mg total) by mouth 2 (two) times a day.    gabapentin (NEURONTIN) 600 MG tablet TAKE ONE TABLET BY MOUTH THREE TIMES DAILY. (Patient not taking: Reported on 5/10/2023)    hydroCHLOROthiazide (HYDRODIURIL) 25 MG tablet TAKE ONE TABLET BY MOUTH DAILY    HYDROcodone-acetaminophen (NORCO)  mg per tablet Take 1 tablet by mouth 2 (two) times daily as needed.    isosorbide mononitrate (IMDUR) 30 MG 24 hr tablet Take 1 tablet (30 mg total) by mouth 2 (two) times daily.    levothyroxine (SYNTHROID) 75 MCG tablet TAKE ONE TABLET BY MOUTH IN THE MORNING BEFORE BREAKFAST    miconazole NITRATE 2 % (ZEASORB, MICONAZOLE,) 2 % top powder Apply topically as needed for Itching.    mupirocin (BACTROBAN) 2 % ointment mupirocin 2 % topical ointment    nitroGLYCERIN (NITROSTAT) 0.4 MG SL tablet Place 1 tablet (0.4 mg total) under the tongue every 5 (five) minutes as needed for Chest pain.    potassium chloride (MICRO-K) 10 MEQ CpSR Take 2 capsules in am and 1 in the evening; if taking additional lasix, will need to take one additional potassium capsule in the  "evening    pramipexole (MIRAPEX) 0.5 MG tablet TAKE  ONE TABLET BY MOUTH DAILY    promethazine (PHENERGAN) 25 MG tablet Take 1 tablet (25 mg total) by mouth every 6 (six) hours as needed.    ranolazine (RANEXA) 1,000 mg Tb12 Take 1 tablet (1,000 mg total) by mouth 2 (two) times daily.    rOPINIRole (REQUIP) 5 MG tablet TAKE  ONE TABLET BY MOUTH DAILY    temazepam (RESTORIL) 30 mg capsule Take 1 capsule (30 mg total) by mouth every evening.    tobramycin-dexAMETHasone 0.3-0.1% (TOBRADEX) 0.3-0.1 % DrpS     umeclidinium-vilanteroL (ANORO ELLIPTA) 62.5-25 mcg/actuation DsDv Inhale 1 puff into the lungs once daily. Controller    VENTOLIN HFA 90 mcg/actuation inhaler Inhale 2 puff(s) every 4 hours by inhalation route.   Last reviewed on 6/8/2023 12:26 PM by Celia Whaley NP    Review of patient's allergies indicates:   Allergen Reactions    Atorvastatin Other (See Comments)     Severe muscle pain  Other reaction(s): Abdominal Pain  SEVERE MYALGIAS      Azithromycin Other (See Comments)     By shot, closed veins and made large bruises  By shot, closed veins and made large bruises      Latex, natural rubber Rash     "TOURNIQUET ON LEG LEFT HUGE BLISTER THAT TOOK 9 MONTHS OF WOUND CARE TO HEAL."    Meperidine Hives and Anaphylaxis     Other reaction(s): Anaphylaxis  Hives (skin)^  Hives (skin)^      Penicillins Anaphylaxis and Hives     Other reaction(s): Anaphylaxis  Shortness of breath^swelling  Shortness of breath and swelling  Anaphylaxis  Shortness of breath^swelling      Tofacitinib Rash and Swelling     Rash and swelling of face      Hydralazine analogues Other (See Comments)     Difficulty swallowing and vomiting.    Lorazepam Anxiety and Other (See Comments)     Made me goofy  CAUSED CONFUSION "Made me goofy."      Pravastatin Other (See Comments)     Severe myalgias.  Cramps/Severe myalgias.    Flu vac 2018 [id number](pf)      Other reaction(s): Other (See Comments)  Flip into aFIB    Nystatin Rash    Pepper " (genus capsicum) Other (See Comments)     Reflux and ulcers    Last reviewed on  6/8/2023 12:26 PM by Celia Whaley      Tasks added this encounter   No tasks added.   Tasks due within next 3 months   No tasks due.     Rocio Agustin, PharmD  [facility]  [address] [place] [id number]  Phone: [phone]  Fax: [phone]

## 2023-06-15 ENCOUNTER — DOCUMENT SCAN (OUTPATIENT)
Dept: HOME HEALTH SERVICES | Facility: HOSPITAL | Age: 73
End: 2023-06-15
Payer: MEDICARE

## 2023-06-16 DIAGNOSIS — I73.9 PVD (PERIPHERAL VASCULAR DISEASE): ICD-10-CM

## 2023-06-16 DIAGNOSIS — I10 ESSENTIAL HYPERTENSION: ICD-10-CM

## 2023-06-16 RX ORDER — POTASSIUM CHLORIDE 750 MG/1
CAPSULE, EXTENDED RELEASE ORAL
Qty: 270 CAPSULE | Refills: 3 | Status: SHIPPED | OUTPATIENT
Start: 2023-06-16

## 2023-06-16 RX ORDER — GABAPENTIN 600 MG/1
TABLET ORAL
Qty: 90 TABLET | Refills: 0 | Status: SHIPPED | OUTPATIENT
Start: 2023-06-16

## 2023-06-16 RX ORDER — HYDROCHLOROTHIAZIDE 25 MG/1
TABLET ORAL
Qty: 90 TABLET | Refills: 3 | Status: SHIPPED | OUTPATIENT
Start: 2023-06-16

## 2023-06-16 NOTE — TELEPHONE ENCOUNTER
Refill Routing Note   Medication(s) are not appropriate for processing by [facility] for the following reason(s):      Medication outside of protocol    [facility] action(s):  Approve  Route Care Due:  None identified          Appointments  past 12m or future 3m with PCP    Date Provider   Last Visit   5/9/2023 Adan Woodward MD   Next Visit   Visit date not found Adan Woodward MD   ED visits in past 90 days: 0        Note composed:10:59 AM 06/16/2023

## 2023-06-16 NOTE — TELEPHONE ENCOUNTER
No care due was identified.  [facility] Embedded Care Due Messages. Reference number: [id number].   6/16/2023 10:53:00 AM CDT

## 2023-06-19 ENCOUNTER — DOCUMENT SCAN (OUTPATIENT)
Dept: HOME HEALTH SERVICES | Facility: HOSPITAL | Age: 73
End: 2023-06-19
Payer: MEDICARE

## 2023-06-23 ENCOUNTER — OUTPATIENT CASE MANAGEMENT (OUTPATIENT)
Dept: ADMINISTRATIVE | Facility: OTHER | Age: 73
End: 2023-06-23
Payer: MEDICARE

## 2023-06-23 NOTE — PROGRESS NOTES
Outpatient Care Management  Plan of Care Follow Up Visit    Patient: Kalpana Wright  MRN: [id number]  Date of Service: 06/23/2023  Completed by: Lashell Powell RN  Referral Date: 05/09/2023    Reason for Visit   Patient presents with    [facility] RN Follow Up Call       Brief Summary: Phone contact today for follow up and d/c of nursing [facility]. Kalpana voices good understanding of CAD and CHF disease processes, symptoms of exacerbation of each and measures to manage both and is in agreement with completion of nursing plan of care.   Next Steps: Monitor chart and d/c from [facility] when LCSW completes her plan of care. Lashell Powell RN

## 2023-06-26 ENCOUNTER — EXTERNAL HOME HEALTH (OUTPATIENT)
Dept: HOME HEALTH SERVICES | Facility: HOSPITAL | Age: 73
End: 2023-06-26
Payer: MEDICARE

## 2023-06-29 ENCOUNTER — DOCUMENT SCAN (OUTPATIENT)
Dept: HOME HEALTH SERVICES | Facility: HOSPITAL | Age: 73
End: 2023-06-29
Payer: MEDICARE

## 2023-07-05 ENCOUNTER — SPECIALTY PHARMACY (OUTPATIENT)
Dept: PHARMACY | Facility: CLINIC | Age: 73
End: 2023-07-05

## 2023-07-05 NOTE — TELEPHONE ENCOUNTER
Outgoing call regarding refill Repatha. Pt miss 3 doses due to difficult circumstances do to moving in different homes. [facility] is aware and pt will start retaking Sunday 7/9. Pt has 3 doses on hand, will be do for next refill 8/20. Will follow up.

## 2023-07-13 ENCOUNTER — OUTPATIENT CASE MANAGEMENT (OUTPATIENT)
Dept: ADMINISTRATIVE | Facility: OTHER | Age: 73
End: 2023-07-13
Payer: MEDICARE

## 2023-07-18 ENCOUNTER — OUTPATIENT CASE MANAGEMENT (OUTPATIENT)
Dept: ADMINISTRATIVE | Facility: OTHER | Age: 73
End: 2023-07-18
Payer: MEDICARE

## 2023-07-24 ENCOUNTER — PES CALL (OUTPATIENT)
Dept: ADMINISTRATIVE | Facility: CLINIC | Age: 73
End: 2023-07-24
Payer: MEDICARE

## 2023-07-26 ENCOUNTER — TELEPHONE (OUTPATIENT)
Dept: ADMINISTRATIVE | Facility: CLINIC | Age: 73
End: 2023-07-26
Payer: MEDICARE

## 2023-07-27 ENCOUNTER — OUTPATIENT CASE MANAGEMENT (OUTPATIENT)
Dept: ADMINISTRATIVE | Facility: OTHER | Age: 73
End: 2023-07-27
Payer: MEDICARE

## 2023-08-05 PROCEDURE — G0179 PR HOME HEALTH MD RECERTIFICATION: ICD-10-PCS | Mod: ,,, | Performed by: FAMILY MEDICINE

## 2023-08-05 PROCEDURE — G0179 MD RECERTIFICATION HHA PT: HCPCS | Mod: ,,, | Performed by: FAMILY MEDICINE

## 2023-08-08 ENCOUNTER — EXTERNAL HOME HEALTH (OUTPATIENT)
Dept: HOME HEALTH SERVICES | Facility: HOSPITAL | Age: 73
End: 2023-08-08
Payer: MEDICARE

## 2023-08-08 ENCOUNTER — OUTPATIENT CASE MANAGEMENT (OUTPATIENT)
Dept: ADMINISTRATIVE | Facility: OTHER | Age: 73
End: 2023-08-08
Payer: MEDICARE

## 2023-08-08 ENCOUNTER — PES CALL (OUTPATIENT)
Dept: ADMINISTRATIVE | Facility: CLINIC | Age: 73
End: 2023-08-08
Payer: MEDICARE

## 2023-08-10 ENCOUNTER — OUTPATIENT CASE MANAGEMENT (OUTPATIENT)
Dept: ADMINISTRATIVE | Facility: OTHER | Age: 73
End: 2023-08-10
Payer: MEDICARE

## 2023-08-14 PROBLEM — Z09 HOSPITAL DISCHARGE FOLLOW-UP: Status: RESOLVED | Noted: 2020-09-21 | Resolved: 2023-08-14

## 2023-08-14 NOTE — TELEPHONE ENCOUNTER
Outgoing call to patient for Repatha refill. Patient stated she does not want to continue filling Repatha at this time and will reach out to [facility] when she wants it. Ending refill management.

## 2023-08-31 ENCOUNTER — OUTPATIENT CASE MANAGEMENT (OUTPATIENT)
Dept: ADMINISTRATIVE | Facility: OTHER | Age: 73
End: 2023-08-31
Payer: MEDICARE

## 2023-09-05 DIAGNOSIS — F41.9 ANXIETY: ICD-10-CM

## 2023-09-05 RX ORDER — AMITRIPTYLINE HYDROCHLORIDE 75 MG/1
75 TABLET ORAL NIGHTLY
Qty: 90 TABLET | Refills: 2 | Status: SHIPPED | OUTPATIENT
Start: 2023-09-05 | End: 2024-03-11 | Stop reason: SDUPTHER

## 2023-09-05 NOTE — TELEPHONE ENCOUNTER
Refill Decision Note   Kalpana Wright  is requesting a refill authorization.  Brief Assessment and Rationale for Refill:  Defer     Medication Therapy Plan:       Medication Reconciliation Completed: No   Comments:     No Care Gaps recommended.     Note composed:3:21 PM 09/05/2023

## 2023-09-05 NOTE — TELEPHONE ENCOUNTER
Refill Routing Note   Medication(s) are not appropriate for processing by [facility] for the following reason(s):      Drug-disease interaction    [facility] action(s):  Defer Care Due:  None identified     Medication Therapy Plan: amitriptyline and Old myocardial infarction; Angina pectoris; Coronary artery disease involving coronary bypass graft of native heart without angina pectoris; Atherosclerosis of native coronary artery of native heart with angina pectoris with documented spasm    Pharmacist review requested: Yes     Appointments  past 12m or future 3m with PCP    Date Provider   Last Visit   5/9/2023 Adan Woodward MD   Next Visit   Visit date not found Adan Woodward MD   ED visits in past 90 days: 0        Note composed:3:05 PM 09/05/2023

## 2023-09-05 NOTE — TELEPHONE ENCOUNTER
No care due was identified.  [facility] Embedded Care Due Messages. Reference number: [id number].   9/05/2023 3:04:31 PM CDT

## 2023-09-06 ENCOUNTER — CLINICAL SUPPORT (OUTPATIENT)
Dept: CARDIOLOGY | Facility: HOSPITAL | Age: 73
End: 2023-09-06
Payer: MEDICARE

## 2023-09-06 DIAGNOSIS — Z95.0 PRESENCE OF CARDIAC PACEMAKER: ICD-10-CM

## 2023-09-11 ENCOUNTER — TELEPHONE (OUTPATIENT)
Dept: CARDIOLOGY | Facility: HOSPITAL | Age: 73
End: 2023-09-11
Payer: MEDICARE

## 2023-09-11 NOTE — TELEPHONE ENCOUNTER
Called pt in reference to [facility] consult report received. RN made pt aware her home monitor has been disconnected since May. Pt states it is in storage and she will set it up when she moves in the next month. pt aware she will need device check in Nov if she doesn't transmit. Pt asymptomatic to atrial events. Can't see egms due to latitude consult and HM is disconnected.

## 2023-09-13 ENCOUNTER — OUTPATIENT CASE MANAGEMENT (OUTPATIENT)
Dept: ADMINISTRATIVE | Facility: OTHER | Age: 73
End: 2023-09-13
Payer: MEDICARE

## 2023-09-13 NOTE — LETTER
Kalpana Wright  [id number] [facility] [id number]      Dear Kalpana Wright,     I am writing from the Outpatient Care Management Department at [facility]. I have been unsuccessful at reaching you since we spoke on 8/21.  I hope you found the assistance previously provided to you helpful.     Please contact me at [phone] from 8:00AM to 4:30 PM on Monday thru Friday.     As you know, [facility] also has a program with a nurse available 24/7 to answer questions or provide medical advice.  [facility] can be reached at [phone].    Sincerely,       Deja Roldan JEROMY  Outpatient Care Management

## 2023-09-14 ENCOUNTER — TELEPHONE (OUTPATIENT)
Dept: CARDIOLOGY | Facility: HOSPITAL | Age: 73
End: 2023-09-14
Payer: MEDICARE

## 2023-09-14 ENCOUNTER — OUTPATIENT CASE MANAGEMENT (OUTPATIENT)
Dept: ADMINISTRATIVE | Facility: OTHER | Age: 73
End: 2023-09-14
Payer: MEDICARE

## 2023-09-14 ENCOUNTER — NURSE TRIAGE (OUTPATIENT)
Dept: ADMINISTRATIVE | Facility: CLINIC | Age: 73
End: 2023-09-14
Payer: MEDICARE

## 2023-09-14 ENCOUNTER — TELEPHONE (OUTPATIENT)
Dept: FAMILY MEDICINE | Facility: CLINIC | Age: 73
End: 2023-09-14
Payer: MEDICARE

## 2023-09-14 NOTE — TELEPHONE ENCOUNTER
FYI patient had [facility] triage recommend and I called as well to go to ER.She is going to ER at [facility].Bonnie

## 2023-09-14 NOTE — TELEPHONE ENCOUNTER
Saw note from patient to oncall nurse.I contacted patient . She stated she has had numbness left leg with shock like sensation 6 to 7 times per day in left leg.Noting also bruise approximately 7-8 of them noted now on skin after each shock.Been going on for 4-5 days.Recommended she go to ER.Daughter to bring to [facility] per patient.Notified ER.

## 2023-09-14 NOTE — TELEPHONE ENCOUNTER
Pt states that she has been receiving a shock to her left leg every hour. States that every 5-6 mins after the shock a bruise appears. Pt also c/o numbness to left leg. States that she no longer has feeling to left leg. Pt also has a pacemake. Denies feeling shock from pacemaker. Advised to call 911 per protocol. Verbalized understanding. Encounter routed to provider.       Reason for Disposition   New neurologic deficit that is present NOW, sudden onset of ANY of the following: * Weakness of the face, arm, or leg on one side of the body* Numbness of the face, arm, or leg on one side of the body* Loss of speech or garbled speech    Additional Information   Negative: Difficult to awaken or acting confused (e.g., disoriented, slurred speech)    Protocols used: Neurologic Deficit-A-OH

## 2023-09-14 NOTE — TELEPHONE ENCOUNTER
Noted.  I agree with ER evaluation.  Patient should follow-up with us after emergent conditions have been ruled out.   no diplopia/no photophobia/no lacrimation L/no lacrimation R/no blurred vision L/no blurred vision R/no discharge L/no discharge R

## 2023-09-18 ENCOUNTER — TELEPHONE (OUTPATIENT)
Dept: CARDIOLOGY | Facility: HOSPITAL | Age: 73
End: 2023-09-18
Payer: MEDICARE

## 2023-09-18 NOTE — TELEPHONE ENCOUNTER
The patient was contacted and she stated she waited 4 hours in ER at [facility] and was not seen except an EKG only,but left due to waiting.Needs an appointment with Celia Whaley. Device check to be set up at next appointment.

## 2023-09-21 ENCOUNTER — OFFICE VISIT (OUTPATIENT)
Dept: CARDIOLOGY | Facility: CLINIC | Age: 73
End: 2023-09-21
Payer: MEDICARE

## 2023-09-21 VITALS
DIASTOLIC BLOOD PRESSURE: 68 MMHG | HEIGHT: 64 IN | OXYGEN SATURATION: 99 % | SYSTOLIC BLOOD PRESSURE: 168 MMHG | WEIGHT: 189 LBS | BODY MASS INDEX: 32.27 KG/M2 | HEART RATE: 90 BPM

## 2023-09-21 DIAGNOSIS — I25.810 CORONARY ARTERY DISEASE INVOLVING CORONARY BYPASS GRAFT OF NATIVE HEART WITHOUT ANGINA PECTORIS: ICD-10-CM

## 2023-09-21 DIAGNOSIS — I48.0 PAROXYSMAL ATRIAL FIBRILLATION: ICD-10-CM

## 2023-09-21 DIAGNOSIS — I10 ESSENTIAL HYPERTENSION: ICD-10-CM

## 2023-09-21 DIAGNOSIS — Z95.1 HX OF CABG: ICD-10-CM

## 2023-09-21 DIAGNOSIS — Z74.09 MOBILITY IMPAIRED: Primary | ICD-10-CM

## 2023-09-21 DIAGNOSIS — G89.29 CHRONIC BILATERAL LOW BACK PAIN WITH LEFT-SIDED SCIATICA: ICD-10-CM

## 2023-09-21 DIAGNOSIS — M54.42 CHRONIC BILATERAL LOW BACK PAIN WITH LEFT-SIDED SCIATICA: ICD-10-CM

## 2023-09-21 DIAGNOSIS — I50.32 CHRONIC DIASTOLIC CONGESTIVE HEART FAILURE: ICD-10-CM

## 2023-09-21 DIAGNOSIS — J43.9 PULMONARY EMPHYSEMA, UNSPECIFIED EMPHYSEMA TYPE: Chronic | ICD-10-CM

## 2023-09-21 PROCEDURE — 99214 OFFICE O/P EST MOD 30 MIN: CPT | Mod: S$PBB,,, | Performed by: NURSE PRACTITIONER

## 2023-09-21 PROCEDURE — 99215 OFFICE O/P EST HI 40 MIN: CPT | Mod: PBBFAC,PO | Performed by: NURSE PRACTITIONER

## 2023-09-21 PROCEDURE — 99214 PR OFFICE/OUTPT VISIT, EST, LEVL IV, 30-39 MIN: ICD-10-PCS | Mod: S$PBB,,, | Performed by: NURSE PRACTITIONER

## 2023-09-21 PROCEDURE — 99999 PR PBB SHADOW E&M-EST. PATIENT-LVL V: ICD-10-PCS | Mod: PBBFAC,,, | Performed by: NURSE PRACTITIONER

## 2023-09-21 PROCEDURE — 99999 PR PBB SHADOW E&M-EST. PATIENT-LVL V: CPT | Mod: PBBFAC,,, | Performed by: NURSE PRACTITIONER

## 2023-09-21 RX ORDER — NITROGLYCERIN 0.4 MG/1
0.4 TABLET SUBLINGUAL EVERY 5 MIN PRN
Qty: 25 TABLET | Refills: 11 | Status: SHIPPED | OUTPATIENT
Start: 2023-09-21

## 2023-09-21 NOTE — PROGRESS NOTES
"Subjective:    Patient ID:  Kalpana Wright is a 73 y.o. female who presents for follow-up of hospital discharge.      HPI: Ms. Kalpana Wright presents to the clinic for evaluation of pain in left leg. She reports a sharp, shooting pain from hip down lateral aspect of left thigh to the knee. She is usually seated when this happens. She stated that it feels like a shock and wanted to know if it is her defibrillator. She reports that she has numbness to the left thigh that is chronic for several weeks. She sees pain management, Dr. Knox, for back pain. She stated that pain management is the only option for her back. She also complains of pain to both calves when standing or walking. She is not walking very much, and not for long distances due to pain in the calves.She stated that if she doesn't sit down when her legs hurt, she will fall down. She uses a walker to assist with ambulation.  She denied any chest pain or SOB or ROSA. She denied any palpitations, lightheadedness, dizziness, or near syncope. She denied orthopnea, PND, or edema.    She went to ER for pain in left thigh:  [facility] ER note on 9/15/23: Pt to ED c/o L upper leg pain x3-4 days. Pt reports pacemaker/defib battery changed approx. 3 months ago. Pt reports L upper leg numbness x2-3 weeks. Pt reports she now has "shocking" sensation to L upper leg x3-4 days. Pt reports she notices bruising to area after "shock" happens to leg. Pt denies CP/SOB.     She has known severe PAD. Last arterial U/S 12/9/15: The right BOAZ is .53. The left BOAZ is .56. BILATERAL BOAZ SUGGESTIVE OF SEVERE DISEASE   THE WAVEFORMS ARE MONOPHASIC BILATERALLY WITH EVIDENCE OF SIGNIFICANT RT EXTERNAL ILIAC DISEASE IN THE 50-99% RANGE AS WELL AS BILATERAL SFA OCCLUSIONS.     She had ER visit on 9/5/23 for decreased responsiveness thought to be related to narcotic OD. She was given narcan with good response. She complained of pain in her legs, which is what is causing her to take percocet " tablets. She is under the care of pain management physician: Harry Knox.  Venous U/S bilateral lower extremities 9/5/23 at [place]: IMPRESSION:   No evidence of deep venous thrombosis of the lower extremities.          She has h/o RJ, but has not been using CPAP; her machine was taken back years ago.     ----------------------------------------------------  Previous visit: F/U hospital discharge. She was brought to [facility] on April 1, 2023 for lethargy and hypoxemia. O2 sat was reportedly in the 70s. Her BNP was 457. She also had peripheral edema. She was treated with BiPAP, antibiotics, steroids, nebs, and IV lasix. She apparently qualified for supplemental oxygen at home after 6-minute walk.  Discharge summary indicates that she was sent home with home health for PT and OT.  No d/c BNP.  Serum Mg 1.9-2.2.  She had worsening of kidney function over the course of admission.    She has H/O CAD with H/O CABG, PVD, HTN, HLP, PAF, SSS, PPM, diastolic heart failure, tobacco use, statin intolerance, lupus anticoagulant.    Last visit with Dr. Kapoor On 8/10/22:  Pt with recent Er visit [facility] - abdominal pain, dx with gastritits  Sx improved with pepcid and PPI  Patient denies CP, angina or anginal equivalent.  Pt stopped smoking 8 years.  Pt has almost lost 100 lbs in 4 years  PVD (peripheral vascular disease)   History of SSS (sick sinus syndrome)   Pacemaker   Paroxysmal atrial fibrillation   Hx of CABG   Diastolic congestive heart failure, unspecified HF chronicity   Coronary artery disease involving coronary bypass graft of native heart without angina pectoris   Atherosclerosis of native coronary artery of native heart with angina pectoris with documented spasm   Pure hypercholesterolemia   Essential hypertension   Lupus anticoagulant syndrome   Cigarette smoker   Statin intolerance   Continue benazepril 40 mg p.o. q.day, bisoprolol 10 mg p.o. q.day, and amlodipine 10mg QD -hypertension.  Furosemide  prn. Avoid dehydration.  Continue apixaban, bisoprolol - PAF  Continue brilinta - CAD.  Sodium restriction strongly encouraged.   Continue imdur and ranexa - angina/CAD.   [facility] clinic.      Hx: she was admitted to [facility] on 2021 and discharged on 2021.  She was apparently found in her car poorly responsive.  She was found by a bystander at primary care clinic.  She was found to be hypotensive in the clinic with a blood pressure of 80/50.  EMS was called; they also noted hypotension at 98/49; she was taken to [facility] and found to be dehydrated.  All antihypertensive medications were held and she was given IV hydration.  CT of the brain was negative.  Troponin was negative x2; EKG was negative according to the note.  His hold and reports that they did interrogate pacemaker during hospitalization but she does not know the results.  I have not found a reference to and interrogation report in the record.  Ms. Wright does not know if she simply fell asleep or she passed out or what happened.  She had been taking Lasix daily and that has been stopped.  She has seen primary care in follow-up and was told to restart her benazepril.  Ms. Wright reports that she is taking benazepril, hydralazine, and bisoprolol.  She is not taking amlodipine or furosemide.  ---------------------------------------------------------        Medications: she is taking Brilinta, and Eliquis. Takes lasix 40mg and potassium. She stated that she is taking bisoprolol, benazepril, amlodipine, HCTZ. Taking Imdur and Ranexa BID.  Taking Repatha as directed. Her prescription for ntg is . She carries it in her pocket.  Sodium: she does not add salt to foods when cooking;  she is not reading labels for sodium content. Denied eating salty foods.   Exercise: she  does not    Tobacco:  smoking 3-4 cigarettes/day.  Stress and seeing people smoke are triggers. She does not want to quit.  Stress management: working in  garden.  Alcohol: no alcohol use     Weight: 85.7 kg (189 lb) she states that her daily weights  has been stable. Body mass index is 32.44 kg/m².    Wt Readings from Last 3 Encounters:   09/21/23 85.7 kg (189 lb)   06/08/23 82.1 kg (181 lb)   05/09/23 81.2 kg (179 lb)     BP log: None.  She does not have a BP monitor.    Review of Systems   Constitutional: Negative for chills, decreased appetite, fever, malaise/fatigue, night sweats, weight gain and weight loss.   HENT:  Negative for congestion.    Cardiovascular:  Negative for chest pain, claudication, cyanosis, dyspnea on exertion, irregular heartbeat, leg swelling, near-syncope, orthopnea, palpitations, paroxysmal nocturnal dyspnea and syncope.   Respiratory:  Negative for cough, hemoptysis, shortness of breath, sputum production and wheezing.    Hematologic/Lymphatic: Negative for adenopathy and bleeding problem. Bruises/bleeds easily.   Skin:  Negative for color change and nail changes.   Musculoskeletal:  Positive for joint pain (chronic pain in the knees due to arthritis.).        Pain in calves with standing and walking. Pain to left lateral thigh-intermittent, recurrent, sharp.   Gastrointestinal:  Positive for nausea and vomiting. Negative for bloating, change in bowel habit, heartburn and hematochezia.   Genitourinary:  Negative for hematuria.   Neurological:  Negative for dizziness and light-headedness.   Psychiatric/Behavioral:  Negative for altered mental status.        Objective:   Physical Exam  Constitutional:       General: She is not in acute distress.     Appearance: She is well-developed. She is not diaphoretic.   HENT:      Head: Normocephalic and atraumatic.   Eyes:      General: No scleral icterus.     Conjunctiva/sclera: Conjunctivae normal.   Neck:      Thyroid: No thyromegaly.      Vascular: No JVD.      Trachea: No tracheal deviation.   Cardiovascular:      Rate and Rhythm: Normal rate and regular rhythm.      Pulses:           Radial  pulses are 2+ on the right side and 2+ on the left side.        Dorsalis pedis pulses are 1+ on the right side and 1+ on the left side.        Posterior tibial pulses are 1+ on the right side and 1+ on the left side.      Heart sounds: Normal heart sounds. No murmur heard.     No friction rub. No gallop.      Comments:  Purple toes that improve with movement of toes, but does not become pink; bruises noted in all extremities, including the feet/toes. Skin of feet/toes and legs warm, dry, and pink.  Pulmonary:      Effort: Pulmonary effort is normal. No respiratory distress.      Breath sounds: No stridor. No wheezing, rhonchi or rales.      Comments: Some bibasilar crackles bilaterally; otherwise CTA.  Chest:      Chest wall: No tenderness.   Abdominal:      General: Bowel sounds are normal. There is no distension.      Palpations: Abdomen is soft.      Tenderness: There is no guarding or rebound.      Comments: Abdomen obese.   Musculoskeletal:         General: No swelling. Normal range of motion.      Cervical back: Neck supple.      Right lower leg: No edema.      Left lower leg: No edema.   Lymphadenopathy:      Cervical: No cervical adenopathy.   Skin:     General: Skin is warm and dry.      Coloration: Skin is not pale.      Findings: No erythema or rash.      Comments: [place].   Neurological:      Mental Status: She is alert and oriented to person, place, and time.        Latest Reference Range & Units 09/05/23 09:05   Sodium 136 - 144 mmol/L 144 (E)   Potassium 3.6 - 5.1 mmol/L 4.7 (E)   Chloride 101 - 111 mmol/L 105 (E)   CO2 22 - 32 mmol/L 30 (E)   Anion Gap 7 - 16 mmol/L 9 (E)   BUN 8 - 20 mg/dL 43 (H) (E)   Creatinine 0.60 - 1.10 mg/dL 1.58 (H) (E)   Glucose 65 - 99 mg/dL 106 (H) (E)   Calcium 8.9 - 10.3 mg/dL 8.9 (E)   Magnesium  1.8 - 2.5 mg/dL 2.2 (E)   Alkaline Phosphatase 28 - 116 U/L 74 (E)   Total Protein 6.1 - 7.9 g/dL 6.0 (L) (E)   Albumin 3.5 - 4.8 g/dL 3.2 (L) (E)   BILIRUBIN TOTAL 0.4 - 2.0  mg/dL 0.3 (L) (E)   AST 10 - 34 U/L 20 (E)   ALT 5 - 41 U/L 11 (E)   (H): Data is abnormally high  (L): Data is abnormally low  (E): External lab result     Latest Reference Range & Units 05/05/23 10:20 05/09/23 09:06 05/09/23 09:11   WBC 3.90 - 12.70 K/uL 8.15     RBC 4.00 - 5.40 M/uL 4.66     Hemoglobin 12.0 - 16.0 g/dL 12.2     Hematocrit 37.0 - 48.5 % 41.0     MCV 82 - 98 fL 88     MCH 27.0 - 31.0 pg 26.2 (L)     MCHC 32.0 - 36.0 g/dL 29.8 (L)     RDW 11.5 - 14.5 % 16.4 (H)     Platelets 150 - 450 K/uL 238     MPV 9.2 - 12.9 fL 10.0     Gran % 38.0 - 73.0 % 63.4     Lymph % 18.0 - 48.0 % 27.4     Mono % 4.0 - 15.0 % 6.6     Eosinophil % 0.0 - 8.0 % 2.0     Basophil % 0.0 - 1.9 % 0.5     Immature Granulocytes 0.0 - 0.5 % 0.1     Gran # (ANC) 1.8 - 7.7 K/uL 5.2     Lymph # 1.0 - 4.8 K/uL 2.2     Mono # 0.3 - 1.0 K/uL 0.5     Eos # 0.0 - 0.5 K/uL 0.2     Baso # 0.00 - 0.20 K/uL 0.04     Immature Grans (Abs) 0.00 - 0.04 K/uL 0.01     nRBC 0 /100 WBC 0     Differential Method  Automated     Iron 30 - 160 ug/dL 33     TIBC 250 - 450 ug/dL 407     Saturated Iron 20 - 50 % 8 (L)     Transferrin 200 - 375 mg/dL 275     Ferritin 20.0 - 300.0 ng/mL 34     Sodium 136 - 145 mmol/L 140     Potassium 3.5 - 5.1 mmol/L 4.0     Chloride 95 - 110 mmol/L 99     CO2 23 - 29 mmol/L 30 (H)     Anion Gap 8 - 16 mmol/L 11     BUN 8 - 23 mg/dL 31 (H)     Creatinine 0.5 - 1.4 mg/dL 1.2     eGFR >60 mL/min/1.73 m^2 47.5 !     Glucose 70 - 110 mg/dL 88     Calcium 8.7 - 10.5 mg/dL 9.3     Alkaline Phosphatase 55 - 135 U/L 78     PROTEIN TOTAL 6.0 - 8.4 g/dL 6.8     Albumin 3.5 - 5.2 g/dL 3.4 (L)     BILIRUBIN TOTAL 0.1 - 1.0 mg/dL 0.3     AST 10 - 40 U/L 13     ALT 10 - 44 U/L 7 (L)     BNP 0 - 99 pg/mL 191 (H)     Hemoglobin A1C External 4.0 - 5.6 %   5.2   Estimated Avg Glucose 68 - 131 mg/dL   103   TSH 0.400 - 4.000 uIU/mL   2.462   Creatinine, Urine 15.0 - 325.0 mg/dL  89.0    Urine Microalbumin ug/mL  54.0    MICROALB/CREAT RATIO 0.0  - 30.0 ug/mg  60.7 (H)    (L): Data is abnormally low  (H): Data is abnormally high  !: Data is abnormal        Related to Comprehensive metabolic panel  Component 04/06/23 04/05/23 04/04/23 04/03/23 04/02/23 04/01/23    Glucose 92 84 78 92 153 High  101 High    Sodium 140 141 143 142 141 142   Potassium 3.7 3.5 Low  3.1 Low  3.2 Low  3.4 Low  3.8   Chloride 93 Low  90 Low  95 Low  97 Low  96 Low  99 Low    CO2 40 High  41 High Panic   39 High  39 High  33 High  35 High    BUN 46 High  50 High  48 High  46 High  35 High  32 High    Calcium 8.6 Low  8.7 Low  8.6 Low  9.2 9.5 9.4   Creatinine 1.17 High  1.24 High  1.01 1.00 1.11 High  0.97   Albumin 2.9 Low  2.9 Low  2.9 Low  3.3 Low  3.3 Low  3.4 Low    Total Bilirubin 0.5 0.3 Low  0.2 Low  0.3 Low  0.2 Low  0.2 Low    ALKP 50 57 52 59 66 69   Total Protein 5.0 Low  5.0 Low  5.0 Low  6.0 Low  7.0 6.0 Low    ALT 8 8 8 9 9 10   AST 9 Low  10 10 11 12 13   Anion Gap 7 10 9 6 Low  12 8          Latest Reference Range & Units 03/02/23 10:11   WBC 3.90 - 12.70 K/uL 5.24   RBC 4.00 - 5.40 M/uL 4.88   Hemoglobin 12.0 - 16.0 g/dL 13.8   Hematocrit 37.0 - 48.5 % 44.7   MCV 82 - 98 fL 92   MCH 27.0 - 31.0 pg 28.3   MCHC 32.0 - 36.0 g/dL 30.9 (L)   RDW 11.5 - 14.5 % 15.6 (H)   Platelets 150 - 450 K/uL 195   (L): Data is abnormally low  (H): Data is abnormally high   Latest Reference Range & Units 02/08/23 03:40   Hemoglobin A1C External 4.6 - 6.2 % 4.8 (E)   (E): External lab result     Latest Reference Range & Units 08/19/21 14:58   Cholesterol 120 - 199 mg/dL 180   HDL 40 - 75 mg/dL 61   HDL/Cholesterol Ratio 20.0 - 50.0 % 33.9   LDL Cholesterol External 63.0 - 159.0 mg/dL 100.6   Non-HDL Cholesterol mg/dL 119   Total Cholesterol/HDL Ratio 2.0 - 5.0  3.0   Triglycerides 30 - 150 mg/dL 92     PPM interrogation 5/3/23:  2-3 seconds of atrial tachycardia on March 16. A: 211; V 106    Lexiscan 5/6/22: Conclusion     Abnormal myocardial perfusion scan.    There is a mild  intensity, fixed perfusion abnormality consistent with scar in the basal to mid inferior wall(s).    The gated perfusion images showed an ejection fraction of 58% at rest. The gated perfusion images showed an ejection fraction of 58% post stress.    There is normal wall motion at rest and post stress.    LV cavity size is normal at rest and normal at stress.    The EKG portion of this study is negative for ischemia.    The patient reported no chest pain during the stress test.      [facility] [place] 9/6/2020: Done due to chest pain and NSTEMI  SUMMARY OF PROCEDURE:     1. Left coronary angiogram.     2. Saphenous vein graft angiogram.     3. Left heart catheterization.     4. Conscious sedation.     5. IVUS of the left main.     6. Rota to the left main.     7. PCI with drug-eluting stent to the left main.  ASSESSMENT AND PLAN:     1. Severe coronary artery disease as outlined above.     2. Recurrent restenosis of the vein graft to the OM with multiple   layers of stents and suboptimal result with balloon angioplasty.     3. Rotablation to the ostial left main with a drug-eluting stent using   5.0 x 15 Resolute [place], postdilated with a 5.0 balloon at high pressure   with excellent angiographic and intravascular ultrasound-guided results.     4. The patient will be kept on dual antiplatelets.  The patient will be   followed thoroughly.  Further recommendation will be dictated.  Mo Estrada MD    [facility] at [place] 11/19/21: Interpretation Summary   Definity contrast used to eval EF.   Ejection Fraction = 60-65%.   There is mild concentric left ventricular hypertrophy.   Grade 1 Diastolic Dysfunction   The left atrium is mildly dilated.   There is mild mitral regurgitation.   There is mild tricuspid regurgitation.       [facility] [place] 8/30/2020:Interpretation Summary  Contrast injection was performed.  Ejection Fraction = 60-65%.  There is mild mitral regurgitation.  Contrast injection was performed.      [facility] 2/19/2018:CONCLUSIONS     1 - Concentric hypertrophy.     2 - No wall motion abnormalities.     3 - Normal left ventricular systolic function (EF 60-65%).     4 - Normal left ventricular diastolic function.     5 - Normal right ventricular systolic function .     6 - The estimated PA systolic pressure is 24 mmHg.      Carotid U/S at [place] 07/30/2021: IMPRESSION:    1.  Bilateral carotid atheromatous plaquing. There is less than 50% diameter reduction.    2.  High-grade stenosis within the left external carotid artery, unlikely to be of clinical significance.     Venous U/S  Left leg 11/23/21: No DVT in the LLE.       Assessment:      1. Mobility impaired    2. Paroxysmal atrial fibrillation    3. Essential hypertension    4. Hx of CABG    5. Coronary artery disease involving coronary bypass graft of native heart without angina pectoris    6. Chronic bilateral low back pain with left-sided sciatica    7. Chronic diastolic congestive heart failure    8. Pulmonary emphysema, unspecified emphysema type        Plan:     Mobility impaired  -     HOSPITAL BED FOR HOME USE    Paroxysmal atrial fibrillation  -     nitroGLYCERIN (NITROSTAT) 0.4 MG SL tablet; Place 1 tablet (0.4 mg total) under the tongue every 5 (five) minutes as needed for Chest pain.  Dispense: 25 tablet; Refill: 11    Essential hypertension  -     nitroGLYCERIN (NITROSTAT) 0.4 MG SL tablet; Place 1 tablet (0.4 mg total) under the tongue every 5 (five) minutes as needed for Chest pain.  Dispense: 25 tablet; Refill: 11    Hx of CABG  -     nitroGLYCERIN (NITROSTAT) 0.4 MG SL tablet; Place 1 tablet (0.4 mg total) under the tongue every 5 (five) minutes as needed for Chest pain.  Dispense: 25 tablet; Refill: 11  -     HOSPITAL BED FOR HOME USE    Coronary artery disease involving coronary bypass graft of native heart without angina pectoris  -     nitroGLYCERIN (NITROSTAT) 0.4 MG SL tablet; Place 1 tablet (0.4 mg total) under the tongue every 5  (five) minutes as needed for Chest pain.  Dispense: 25 tablet; Refill: 11    Chronic bilateral low back pain with left-sided sciatica  -     HOSPITAL BED FOR HOME USE    Chronic diastolic congestive heart failure  -     HOSPITAL BED FOR HOME USE    Pulmonary emphysema, unspecified emphysema type  -     HOSPITAL BED FOR HOME USE        Continue benazepril 40 mg p.o. q.day, bisoprolol 10 mg p.o. q.day, HCTZ 25mg QD, and amlodipine 10mg QD and lasix 40mg BID-hypertension.   Monitor blood pressure at home.    Continue apixaban, bisoprolol - PAF  Continue brilinta - CAD.  Sodium restriction strongly encouraged.  Continue imdur and ranexa - angina/CAD.   Continue repatha- CAD, HLP  PPM clinic.  She does not have a box for remote box for monitoring at home.  Tobacco cessation counseling-She does not want to participate in tobacco cessation program. She does not want to quit.  The pain in the thigh sounds like nerve pain; discuss with pain management.  Chair exercises reviewed and encouraged her to do them daily.  Follow up with Dr. Kapoor at earliest appointment for evaluation of feet/toes/PAD or call sooner for any problems.  Celia Whaley NP  [facility]    This note has been prepared using a combination of MModaL dictation device and typing.  It has been checked for errors but some errors may still exist within the note as a result of speech recognition errors and/or typographical errors.

## 2023-09-27 ENCOUNTER — OUTPATIENT CASE MANAGEMENT (OUTPATIENT)
Dept: ADMINISTRATIVE | Facility: OTHER | Age: 73
End: 2023-09-27
Payer: MEDICARE

## 2023-09-28 NOTE — PROGRESS NOTES
Outpatient Care Management   - Care Plan Follow Up    Patient: Kalpana Wright  MRN:  [id number]  Date of Service:  9/28/2023  Completed by:  Deja Roldna LMSW  Referral Date: 05/09/2023    Reason for Visit   Patient presents with    Other     9/13/2023  1st attempt to complete Follow-Up  for Outpatient Care Management,unable to leave message (mailbox full). Mailed letter.   09/20/2023  2nd attempt to complete Follow-Up  for Outpatient Care Management,unable to leave message (mailbox full).     9/27/2023  3rd attempt to complete Follow-Up  for Outpatient Care Management. PT is currently in the car. Would like [facility] to call back a different day.      [facility]     09/28/2023      Case Closure     09/28/2023         Brief Summary: CARMINE PINZON will close case.  Patient agreeable denies needing assistance from  at this time. Confirmed she has resources sent by  ( [employer], [employer], hearing aide resources and affordable housing) but has not followed up on them. Pt reports things have been too chaotic for her to follow up on the resources. Confirmed they have a home and will be placing it on their land, once they get the last bit of money together. Patient feels comfortable with case closure. Agreeable to [facility] in the future if needed. Will close case. [facility] notified Lashell Powell RN of case closure.    Complex Care Plan     Care plan was discussed and completed today with input from patient and/or caregiver.    Patient Instructions     No follow-ups on file.

## 2023-10-04 PROCEDURE — G0179 MD RECERTIFICATION HHA PT: HCPCS | Mod: ,,, | Performed by: FAMILY MEDICINE

## 2023-10-04 PROCEDURE — G0179 PR HOME HEALTH MD RECERTIFICATION: ICD-10-PCS | Mod: ,,, | Performed by: FAMILY MEDICINE

## 2023-10-11 ENCOUNTER — OFFICE VISIT (OUTPATIENT)
Dept: CARDIOLOGY | Facility: CLINIC | Age: 73
End: 2023-10-11
Payer: MEDICARE

## 2023-10-11 VITALS
OXYGEN SATURATION: 98 % | SYSTOLIC BLOOD PRESSURE: 138 MMHG | DIASTOLIC BLOOD PRESSURE: 88 MMHG | HEART RATE: 78 BPM | BODY MASS INDEX: 32.61 KG/M2 | WEIGHT: 191 LBS | HEIGHT: 64 IN

## 2023-10-11 DIAGNOSIS — Z86.711 PERSONAL HISTORY OF PULMONARY EMBOLISM: ICD-10-CM

## 2023-10-11 DIAGNOSIS — I65.23 BILATERAL CAROTID ARTERY STENOSIS: ICD-10-CM

## 2023-10-11 DIAGNOSIS — I10 ESSENTIAL HYPERTENSION: Chronic | ICD-10-CM

## 2023-10-11 DIAGNOSIS — F17.210 CIGARETTE SMOKER: Chronic | ICD-10-CM

## 2023-10-11 DIAGNOSIS — E78.00 PURE HYPERCHOLESTEROLEMIA: Chronic | ICD-10-CM

## 2023-10-11 DIAGNOSIS — Z95.0 PACEMAKER: ICD-10-CM

## 2023-10-11 DIAGNOSIS — Z79.891 CHRONIC PRESCRIPTION OPIATE USE: Primary | ICD-10-CM

## 2023-10-11 DIAGNOSIS — D68.62 LUPUS ANTICOAGULANT SYNDROME: Chronic | ICD-10-CM

## 2023-10-11 DIAGNOSIS — J96.12 CHRONIC RESPIRATORY FAILURE WITH HYPERCAPNIA: Chronic | ICD-10-CM

## 2023-10-11 DIAGNOSIS — I48.0 PAROXYSMAL ATRIAL FIBRILLATION: ICD-10-CM

## 2023-10-11 DIAGNOSIS — I25.810 CORONARY ARTERY DISEASE INVOLVING CORONARY BYPASS GRAFT OF NATIVE HEART WITHOUT ANGINA PECTORIS: ICD-10-CM

## 2023-10-11 DIAGNOSIS — I49.5 SSS (SICK SINUS SYNDROME): ICD-10-CM

## 2023-10-11 DIAGNOSIS — I13.0 HYPERTENSIVE HEART AND CHRONIC KIDNEY DISEASE WITH HEART FAILURE AND STAGE 1 THROUGH STAGE 4 CHRONIC KIDNEY DISEASE, OR UNSPECIFIED CHRONIC KIDNEY DISEASE: Chronic | ICD-10-CM

## 2023-10-11 DIAGNOSIS — J43.9 PULMONARY EMPHYSEMA, UNSPECIFIED EMPHYSEMA TYPE: Chronic | ICD-10-CM

## 2023-10-11 DIAGNOSIS — E11.51 TYPE 2 DIABETES MELLITUS WITH DIABETIC PERIPHERAL ANGIOPATHY WITHOUT GANGRENE, WITHOUT LONG-TERM CURRENT USE OF INSULIN: Chronic | ICD-10-CM

## 2023-10-11 DIAGNOSIS — I50.30 DIASTOLIC CONGESTIVE HEART FAILURE, UNSPECIFIED HF CHRONICITY: ICD-10-CM

## 2023-10-11 DIAGNOSIS — I73.9 PVD (PERIPHERAL VASCULAR DISEASE): ICD-10-CM

## 2023-10-11 DIAGNOSIS — Z95.1 HX OF CABG: ICD-10-CM

## 2023-10-11 DIAGNOSIS — I20.9 ANGINA PECTORIS: ICD-10-CM

## 2023-10-11 DIAGNOSIS — D64.9 CHRONIC ANEMIA: ICD-10-CM

## 2023-10-11 DIAGNOSIS — F41.9 ANXIETY: Chronic | ICD-10-CM

## 2023-10-11 DIAGNOSIS — Z95.1 HISTORY OF INSERTION OF STENT INTO CORONARY ARTERY BYPASS GRAFT: ICD-10-CM

## 2023-10-11 DIAGNOSIS — Z78.9 STATIN INTOLERANCE: ICD-10-CM

## 2023-10-11 DIAGNOSIS — Z79.01 LONG TERM CURRENT USE OF ANTICOAGULANT THERAPY: ICD-10-CM

## 2023-10-11 PROCEDURE — 99214 OFFICE O/P EST MOD 30 MIN: CPT | Mod: S$PBB,,, | Performed by: INTERNAL MEDICINE

## 2023-10-11 PROCEDURE — 99999 PR PBB SHADOW E&M-EST. PATIENT-LVL V: CPT | Mod: PBBFAC,,, | Performed by: INTERNAL MEDICINE

## 2023-10-11 PROCEDURE — 99214 PR OFFICE/OUTPT VISIT, EST, LEVL IV, 30-39 MIN: ICD-10-PCS | Mod: S$PBB,,, | Performed by: INTERNAL MEDICINE

## 2023-10-11 PROCEDURE — 99215 OFFICE O/P EST HI 40 MIN: CPT | Mod: PBBFAC,PO | Performed by: INTERNAL MEDICINE

## 2023-10-11 PROCEDURE — 99999 PR PBB SHADOW E&M-EST. PATIENT-LVL V: ICD-10-PCS | Mod: PBBFAC,,, | Performed by: INTERNAL MEDICINE

## 2023-10-11 NOTE — PROGRESS NOTES
8/31/23 - Will continue to monitor chart and will d/c from [facility] when LCSW completes her plan of care with Kalpana. Lashell Powell RN     Patient ID: Valentino is a 65 year old male.      Patient Care Team:  Pcp, Verify as PCP - Daily Naidu MD as Referring Provider (Gastroenterology)    Chief Complaint   Patient presents with   • Migraine       HPI  The patient is a 65 year old male coming today for migraine. States he has had a headache x 15 days. Today HA is more severe, nausea, light sensitivity. No numbness/tingling or facial droop. No vision change. Seen in the ER 10/05/2023, had a Toradol shot which was helpful.   Has tried ibuprofen but is not helping. Is living in a homeless shelter until he goes to [facility].   Is continuing to abstain from ETOH.       BP Readings from Last 3 Encounters:   10/11/23 124/78   10/06/23 115/68   09/30/23 136/80       PAST MEDICAL HISTORY:  Past Medical History:   Diagnosis Date   • Arthritis    • Asthma    • Avila esophagus    • COPD (chronic obstructive pulmonary disease) (CMD)    • Diverticulitis, colon 07/27/2018   • Fracture    • Gastroesophageal reflux disease      Patient Active Problem List   Diagnosis   • Emphysema/COPD (CMD)   • Lumbar pain   • Obesity   • Prostate enlargement   • Pulmonary nodules   • Tobacco use disorder, severe, in early remission   • Benign paroxysmal vertigo   • Esophageal obstruction due to food impaction   • Esophageal stricture   • Acute hypoxemic respiratory failure due to COVID-19 (CMD)   • Seizure-like activity (CMD)   • Bilateral wrist pain   • Bilateral hand pain   • Chronic atrial fibrillation with RVR (CMD)   • Psychogenic nonepileptic seizure   • Acute respiratory failure with hypoxia (CMD)   • Impaired mobility and activities of daily living   • Debility   • Oropharyngeal dysphagia   • Alcohol abuse with alcohol-induced disorder (CMD)   • Persistent headaches   • Hospital discharge follow-up       MEDICATIONS:  Current Outpatient Medications   Medication Sig   • propRANolol (INDERAL) 20 MG tablet Take 1 tablet by mouth in the  morning and 1 tablet in the evening.   • escitalopram (LEXAPRO) 10 MG tablet Take 1 tablet by mouth daily. Do not start before October 1, 2023.   • busPIRone (BUSPAR) 15 MG tablet Take 1 tablet by mouth in the morning and 1 tablet in the evening.   • folic acid (FOLATE) 1 MG tablet Take 1 tablet by mouth daily.   • thiamine (VITAMIN B1) 100 MG tablet Take 1 tablet by mouth daily. Do not start before September 30, 2023.     Current Facility-Administered Medications   Medication   • ketorolac (TORADOL) injection 30 mg       ALLERGIES:  ALLERGIES:   Allergen Reactions   • Azithromycin THROAT SWELLING   • Ciprofloxacin THROAT SWELLING   • Demerol HIVES   • Fentanyl HIVES   • Morphine HIVES   • Sulfadiazine HIVES   • Tamsulosin HIVES       PAST SURGICAL HISTORY:  Past Surgical History:   Procedure Laterality Date   • Back surgery  1979   • Cholecystectomy     • Eye surgery     • Fracture surgery     • Removal gallbladder     • Rotator cuff repair Bilateral    • Tonsillectomy         FAMILY HISTORY:  Family History   Problem Relation Age of Onset   • Diabetes Father    • Hypertension Father    • Myocardial Infarction Father    • Cancer Father    • Cancer Mother        SOCIAL HISTORY:  Social History     Tobacco Use   • Smoking status: Every Day     Current packs/day: 0.25     Average packs/day: 0.5 packs/day for 23.3 years (11.6 ttl pk-yrs)     Types: Cigarettes     Start date: 2000     Last attempt to quit: 3/9/2023   • Smokeless tobacco: Never   • Tobacco comments:     quit 2.5 months ago   Vaping Use   • Vaping Use: never used   Substance Use Topics   • Alcohol use: Not Currently     Alcohol/week: 0.0 - 1.0 standard drinks of alcohol     Comment: none since beginning of 1/2021 - was drinking 1 pint a day   • Drug use: No       ROS  Review of Systems   All other systems reviewed and are negative.      OBJECTIVE  Visit Vitals  /78   Pulse 61   Temp 98 °F (36.7 °C) (Temporal)   Resp 18   Ht 6' (1.829 m)   Wt 92 kg  (202 lb 12.8 oz)   SpO2 98%   BMI 27.50 kg/m²     Physical Exam  Vitals and nursing note reviewed.   Constitutional:       General: He is not in acute distress.     Appearance: Normal appearance. He is not ill-appearing, toxic-appearing or diaphoretic.   Cardiovascular:      Rate and Rhythm: Normal rate and regular rhythm.      Heart sounds: Normal heart sounds.   Pulmonary:      Effort: Pulmonary effort is normal.      Breath sounds: Normal breath sounds.   Neurological:      General: No focal deficit present.      Mental Status: He is alert and oriented to person, place, and time.      Cranial Nerves: No cranial nerve deficit.      Motor: No weakness.      Coordination: Coordination normal.      Gait: Gait normal.   Psychiatric:         Mood and Affect: Mood normal.         Behavior: Behavior normal.         Thought Content: Thought content normal.         Judgment: Judgment normal.         ASSESSMENT  Valentino was seen today for migraine.    Diagnoses and all orders for this visit:    Intractable chronic migraine with aura with status migrainosus  -     propRANolol (INDERAL) 20 MG tablet; Take 1 tablet by mouth in the morning and 1 tablet in the evening.  -     Discontinue: SUMAtriptan (IMITREX) injection 6 mg  -     ketorolac (TORADOL) injection 30 mg        PLAN:  65 year old male here with chronic migraine headache. Last ER visit 10/05/2023 given benadryl, toradol, had improvement.    Will give Toradol 30 mg IM in the office today. Patient reports improvement. No SE/AR to Toradol.   Will start Propranolol 20 mg BID   Avoid NSAIDs.   RTO one week for follow up.   If the symptoms become more severe go to the Er.   Call with any questions or concerns      Patients questions are answered to the best of my ability.   Patient verbalized agreement with the above plan.    Return in about 1 week (around 10/18/2023).    Chantal Horowitz PA-C

## 2023-10-11 NOTE — PROGRESS NOTES
"Subjective:   Patient ID:  Kalpana Wright is a 73 y.o. female who presents for follow-up of Follow-up  Pt did not take BP meds this am. BP 130s/80s.  Pt with numbness of LLE. "Feels like shock"  Pt with hx of calf claudication at rest or exertion.  Patient denies CP, angina or anginal equivalent. No ulcers    Hypertension  This is a chronic problem. The current episode started more than 1 year ago. The problem has been gradually improving since onset. The problem is controlled. Pertinent negatives include no chest pain, palpitations or shortness of breath. Past treatments include beta blockers, calcium channel blockers, ACE inhibitors and diuretics. The current treatment provides moderate improvement. There are no compliance problems.    Coronary Artery Disease  Presents for follow-up visit. Pertinent negatives include no chest pain, dizziness, leg swelling, muscle weakness, palpitations, shortness of breath or weight gain. The symptoms have been stable. Compliance with diet is variable. Compliance with exercise is variable. Compliance with medications is good.   Hyperlipidemia  This is a chronic problem. The current episode started more than 1 year ago. The problem is controlled. Pertinent negatives include no chest pain or shortness of breath. Treatments tried: repatha. The current treatment provides moderate improvement of lipids. There are no compliance problems.        Review of Systems   Constitutional: Negative. Negative for weight gain.   HENT: Negative.     Eyes: Negative.    Cardiovascular: Negative.  Negative for chest pain, leg swelling and palpitations.   Respiratory: Negative.  Negative for shortness of breath.    Endocrine: Negative.    Hematologic/Lymphatic: Negative.    Skin: Negative.    Musculoskeletal:  Negative for muscle weakness.   Gastrointestinal: Negative.    Genitourinary: Negative.    Neurological: Negative.  Negative for dizziness.   Psychiatric/Behavioral: Negative.   "   Allergic/Immunologic: Negative.    All other systems reviewed and are negative.    Family History   Problem Relation Age of Onset    Cancer Mother     Arthritis Mother     Lung cancer Father     Breast cancer Sister     COPD Daughter      Past Medical History:   Diagnosis Date    ACS (acute coronary syndrome) 3/20/2018    Acute on chronic diastolic congestive heart failure 4/27/2015    Acute on chronic respiratory failure with hypercapnia 1/10/2019    Acute renal failure 1/11/2019    Acute systolic heart failure 3/21/2018    JG (acute kidney injury) 1/29/2019    Anticoagulant long-term use     Arthritis     Asthma     Bradycardia 12/16/2013    CHF (congestive heart failure)     Clostridium difficile colitis 9/25/2018    Convulsions 2/11/2021    COPD (chronic obstructive pulmonary disease)     Coronary artery disease     Depression     Encounter for blood transfusion     Fall 7/8/2019    Iniital HPI: New problem acute.  Happened several days ago.  Patient was getting up out of her wheelchair and thought it was locked when it rolled about from under her and she fell and hit her tailbone on the wheelchair.  Patient has been having moderate to severe pain to the point where she cannot sit on her wheelchair for long periods of time.  ==================== 09/05/2019 Fell again on Satur    Gallstones 3/18/2017    History of Pulmonary embolism 7/17/2014    Hospital discharge follow-up 9/21/2020    Hyperlipidemia     Hypertension     Need for vaccination 8/8/2019    Due for pneumonia vaccination.  However patient has latex allergy.  Patient cannot receive pneumococcal vaccine here.  Well patient follow-up at pharmacy for different pneumococcal vaccine    Non-intractable cyclical vomiting with nausea 2/1/2019    Peripheral vascular disease     Pulmonary embolus 9/9/2013    Thyroid disease      Social History     Socioeconomic History    Marital status:    Tobacco Use    Smoking status: Every Day     Current  packs/day: 0.25     Average packs/day: 0.3 packs/day for 45.0 years (11.3 total pack years)     Types: Cigarettes    Smokeless tobacco: Never    Tobacco comments:     43 years smoked - quit past 7 years    Substance and Sexual Activity    Alcohol use: No    Drug use: Never    Sexual activity: Not Currently     Partners: Male     Birth control/protection: Post-menopausal     Social Determinants of Health     Financial Resource Strain: High Risk (5/12/2023)    Overall Financial Resource Strain (CARDIA)     Difficulty of Paying Living Expenses: Very hard   Food Insecurity: No Food Insecurity (5/12/2023)    Hunger Vital Sign     Worried About Running Out of Food in the Last Year: Never true     Ran Out of Food in the Last Year: Never true   Transportation Needs: No Transportation Needs (5/12/2023)    PRAPARE - Transportation     Lack of Transportation (Medical): No     Lack of Transportation (Non-Medical): No   Physical Activity: Inactive (5/12/2023)    Exercise Vital Sign     Days of Exercise per Week: 0 days     Minutes of Exercise per Session: 0 min   Stress: Stress Concern Present (5/12/2023)    [employer] [place] [employer] - Occupational Stress Questionnaire     Feeling of Stress : To some extent   Social Connections: Socially Isolated (5/12/2023)    Social Connection and Isolation Panel [NHANES]     Frequency of Communication with Friends and Family: Once a week     Frequency of Social Gatherings with Friends and Family: Once a week     Attends [employer] Services: Never     Active Member of Clubs or Organizations: No     Attends Club or Organization Meetings: Never     Marital Status:    Housing Stability: Low Risk  (5/12/2023)    Housing Stability Vital Sign     Unable to Pay for Housing in the Last Year: No     Number of Places Lived in the Last Year: 2     Unstable Housing in the Last Year: No     Current Outpatient Medications on File Prior to Visit   Medication Sig Dispense Refill     albuterol-ipratropium (DUO-NEB) 2.5 mg-0.5 mg/3 mL nebulizer solution INHALE 1 CONTENTS OF VIAL VIA NEBULIZER EVERY 6 HOURS WHILE AWAKE. 270 mL 0    alendronate (FOSAMAX) 70 MG tablet TAKE 1 TABLET BY MOUTH ONCE WEEKLY ON SAME DAY IN THE MORNING WITH GLASS OF WATER, REMAIN UPRIGHT FOR 30 MIN. 12 tablet 4    amitriptyline (ELAVIL) 75 MG tablet TAKE ONE TABLET BY MOUTH IN THE EVENING AS NEEDED 90 tablet 2    amLODIPine (NORVASC) 10 MG tablet Take 1 tablet (10 mg total) by mouth once daily. 90 tablet 3    apixaban (ELIQUIS) 5 mg Tab Take 1 tablet (5 mg total) by mouth 2 (two) times daily. 180 tablet 4    benazepriL (LOTENSIN) 40 MG tablet TAKE ONE TABLET BY MOUTH DAILY 90 tablet 3    bisoprolol (ZEBETA) 10 MG tablet Take 1 tablet (10 mg total) by mouth once daily. 90 tablet 4    BRILINTA 90 mg tablet TAKE ONE TABLET BY MOUTH TWICE DAILY 180 tablet 3    chlorhexidine (PERIDEX) 0.12 % solution 15 mLs.      cyclobenzaprine (FLEXERIL) 10 MG tablet TAKE ONE TABLET BY MOUTH TWICE DAILY AS NEEDED 180 tablet 0    ergocalciferol (ERGOCALCIFEROL) 50,000 unit Cap Vitamin D2 1,250 mcg (50,000 unit) capsule   Take 1 capsule every week by oral route. 52 capsule 0    esomeprazole (NEXIUM) 40 MG capsule Take 1 capsule (40 mg total) by mouth 2 (two) times daily before meals. 180 capsule 4    evolocumab (REPATHA SURECLICK) 140 mg/mL PnIj Inject 1 mL (140 mg total) into the skin every 14 (fourteen) days. 6 mL 3    ferrous sulfate 325 (65 FE) MG EC tablet Take 1 tablet (325 mg total) by mouth once daily. 90 tablet 4    fluorometholone 0.1% (FML) 0.1 % DrpS Place into both eyes.      fluticasone/umeclidin/vilanter (TRELEGY ELLIPTA INHL) Inhale 1 Inhaler into the lungs once daily.      furosemide (LASIX) 40 MG tablet Take 1 tablet (40 mg total) by mouth 2 (two) times a day. 60 tablet 3    gabapentin (NEURONTIN) 600 MG tablet TAKE ONE TABLET BY MOUTH THREE TIMES DAILY. 90 tablet 0    hydroCHLOROthiazide (HYDRODIURIL) 25 MG tablet TAKE ONE  TABLET BY MOUTH DAILY 90 tablet 3    HYDROcodone-acetaminophen (NORCO)  mg per tablet Take 1 tablet by mouth 2 (two) times daily as needed.      isosorbide mononitrate (IMDUR) 30 MG 24 hr tablet Take 1 tablet (30 mg total) by mouth 2 (two) times daily. 180 tablet 3    levothyroxine (SYNTHROID) 75 MCG tablet TAKE ONE TABLET BY MOUTH IN THE MORNING BEFORE BREAKFAST 90 tablet 4    miconazole NITRATE 2 % (ZEASORB, MICONAZOLE,) 2 % top powder Apply topically as needed for Itching. 85 g 3    mupirocin (BACTROBAN) 2 % ointment mupirocin 2 % topical ointment      nitroGLYCERIN (NITROSTAT) 0.4 MG SL tablet Place 1 tablet (0.4 mg total) under the tongue every 5 (five) minutes as needed for Chest pain. 25 tablet 11    potassium chloride (MICRO-K) 10 MEQ CpSR TAKE TWO CAPSULES BY MOUTH IN THE MORNING AND ONE IN THE EVENING AS DIRECTED. 270 capsule 3    pramipexole (MIRAPEX) 0.5 MG tablet TAKE  ONE TABLET BY MOUTH DAILY 90 tablet 3    promethazine (PHENERGAN) 25 MG tablet Take 1 tablet (25 mg total) by mouth every 6 (six) hours as needed. 45 tablet 4    ranolazine (RANEXA) 1,000 mg Tb12 Take 1 tablet (1,000 mg total) by mouth 2 (two) times daily. 180 tablet 3    rOPINIRole (REQUIP) 5 MG tablet TAKE  ONE TABLET BY MOUTH DAILY 90 tablet 4    temazepam (RESTORIL) 30 mg capsule Take 1 capsule (30 mg total) by mouth every evening. 30 capsule 5    tobramycin-dexAMETHasone 0.3-0.1% (TOBRADEX) 0.3-0.1 % DrpS       umeclidinium-vilanteroL (ANORO ELLIPTA) 62.5-25 mcg/actuation DsDv Inhale 1 puff into the lungs once daily. Controller      VENTOLIN HFA 90 mcg/actuation inhaler Inhale 2 puff(s) every 4 hours by inhalation route.  3    [DISCONTINUED] calcium carbonate (OS-VANESSA) 500 mg calcium (1,250 mg) tablet Take 1 tablet (500 mg total) by mouth 3 (three) times daily.  0    [DISCONTINUED] cetirizine (ZYRTEC) 10 MG tablet Take 10 mg by mouth once daily.      [DISCONTINUED] diclofenac sodium (VOLTAREN) 1 % Gel        No current  "facility-administered medications on file prior to visit.     Review of patient's allergies indicates:   Allergen Reactions    Atorvastatin Other (See Comments)     Severe muscle pain  Other reaction(s): Abdominal Pain  SEVERE MYALGIAS      Azithromycin Other (See Comments)     By shot, closed veins and made large bruises  By shot, closed veins and made large bruises      Latex, natural rubber Rash     "TOURNIQUET ON LEG LEFT HUGE BLISTER THAT TOOK 9 MONTHS OF WOUND CARE TO HEAL."    Meperidine Hives and Anaphylaxis     Other reaction(s): Anaphylaxis  Hives (skin)^  Hives (skin)^      Penicillins Anaphylaxis and Hives     Other reaction(s): Anaphylaxis  Shortness of breath^swelling  Shortness of breath and swelling  Anaphylaxis  Shortness of breath^swelling      Tofacitinib Rash and Swelling     Rash and swelling of face      Hydralazine analogues Other (See Comments)     Difficulty swallowing and vomiting.    Lorazepam Anxiety and Other (See Comments)     Made me goofy  CAUSED CONFUSION "Made me goofy."      Pravastatin Other (See Comments)     Severe myalgias.  Cramps/Severe myalgias.    Flu vac 2018 [id number](pf)      Other reaction(s): Other (See Comments)  Flip into aFIB    Nystatin Rash    Pepper (genus capsicum) Other (See Comments)     Reflux and ulcers       Objective:     Physical Exam  Vitals and nursing note reviewed.   Constitutional:       Appearance: She is well-developed.   HENT:      Head: Normocephalic and atraumatic.   Eyes:      Conjunctiva/sclera: Conjunctivae normal.      Pupils: Pupils are equal, round, and reactive to light.   Cardiovascular:      Rate and Rhythm: Normal rate and regular rhythm.      Pulses: Intact distal pulses.      Heart sounds: Normal heart sounds.   Pulmonary:      Effort: Pulmonary effort is normal.      Breath sounds: Normal breath sounds.   Abdominal:      General: Bowel sounds are normal.      Palpations: Abdomen is soft.   Musculoskeletal:         General: " Normal range of motion.      Cervical back: Normal range of motion and neck supple.   Skin:     General: Skin is warm and dry.   Neurological:      Mental Status: She is alert and oriented to person, place, and time.         Assessment:     1. Chronic prescription opiate use    2. Anxiety    3. Pulmonary emphysema, unspecified emphysema type    4. Chronic respiratory failure with hypercapnia    5. PVD (peripheral vascular disease)    6. History of SSS (sick sinus syndrome)    7. Pacemaker    8. Paroxysmal atrial fibrillation    9. Hx of CABG    10. Diastolic congestive heart failure, unspecified HF chronicity    11. History of insertion of stent into coronary artery bypass graft    12. Angina pectoris    13. Coronary artery disease involving coronary bypass graft of native heart without angina pectoris    14. Bilateral carotid artery stenosis    15. Pure hypercholesterolemia    16. Essential hypertension    17. Hypertensive heart and chronic kidney disease with heart failure and stage 1 through stage 4 chronic kidney disease, or unspecified chronic kidney disease    18. Personal history of pulmonary embolism    19. Long term current use of anticoagulant therapy    20. Lupus anticoagulant syndrome    21. Chronic anemia    22. Type 2 diabetes mellitus with diabetic peripheral angiopathy without gangrene, without long-term current use of insulin    23. Statin intolerance    24. Cigarette smoker        Plan:     Chronic prescription opiate use    Anxiety    Pulmonary emphysema, unspecified emphysema type    Chronic respiratory failure with hypercapnia    PVD (peripheral vascular disease)    History of SSS (sick sinus syndrome)    Pacemaker    Paroxysmal atrial fibrillation    Hx of CABG    Diastolic congestive heart failure, unspecified HF chronicity    History of insertion of stent into coronary artery bypass graft    Angina pectoris    Coronary artery disease involving coronary bypass graft of native heart without  angina pectoris    Bilateral carotid artery stenosis    Pure hypercholesterolemia    Essential hypertension    Hypertensive heart and chronic kidney disease with heart failure and stage 1 through stage 4 chronic kidney disease, or unspecified chronic kidney disease    Personal history of pulmonary embolism    Long term current use of anticoagulant therapy    Lupus anticoagulant syndrome    Chronic anemia    Type 2 diabetes mellitus with diabetic peripheral angiopathy without gangrene, without long-term current use of insulin    Statin intolerance    Cigarette smoker      LE arterial us  Continue benazepril 40 mg p.o. q.day, bisoprolol 10 mg p.o. q.day, HCTZ 25mg QD, and amlodipine 10mg QD and lasix 40mg BID-hypertension.   Monitor blood pressure at home.    Continue apixaban, bisoprolol - PAF  Continue brilinta - CAD.  Sodium restriction strongly encouraged.  Continue imdur and ranexa - angina/CAD.   Continue repatha- CAD, HLP  PPM clinic.  She does not have a box for remote box for monitoring at home.  Tobacco cessation counseling-She does not want to participate in tobacco cessation program. She does not want to quit.

## 2023-10-12 ENCOUNTER — OUTPATIENT CASE MANAGEMENT (OUTPATIENT)
Dept: ADMINISTRATIVE | Facility: OTHER | Age: 73
End: 2023-10-12
Payer: MEDICARE

## 2023-10-20 ENCOUNTER — DOCUMENT SCAN (OUTPATIENT)
Dept: HOME HEALTH SERVICES | Facility: HOSPITAL | Age: 73
End: 2023-10-20
Payer: MEDICARE

## 2023-10-23 ENCOUNTER — TELEPHONE (OUTPATIENT)
Dept: CARDIOLOGY | Facility: HOSPITAL | Age: 73
End: 2023-10-23
Payer: MEDICARE

## 2023-10-25 ENCOUNTER — EXTERNAL HOME HEALTH (OUTPATIENT)
Dept: HOME HEALTH SERVICES | Facility: HOSPITAL | Age: 73
End: 2023-10-25
Payer: MEDICARE

## 2023-10-26 ENCOUNTER — HOSPITAL ENCOUNTER (OUTPATIENT)
Dept: CARDIOLOGY | Facility: HOSPITAL | Age: 73
Discharge: HOME | End: 2023-10-26
Attending: INTERNAL MEDICINE
Payer: MEDICARE

## 2023-10-26 VITALS
WEIGHT: 182 LBS | BODY MASS INDEX: 32.25 KG/M2 | SYSTOLIC BLOOD PRESSURE: 148 MMHG | HEIGHT: 63 IN | DIASTOLIC BLOOD PRESSURE: 75 MMHG

## 2023-10-26 DIAGNOSIS — I73.9 PVD (PERIPHERAL VASCULAR DISEASE): ICD-10-CM

## 2023-10-26 LAB
LEFT ANT TIBIAL SYS PSV: 26 CM/S
LEFT CFA PSV: 147 CM/S
LEFT EXTERNAL ILIAC PSV: 2.6 CM/S
LEFT PERONEAL SYS PSV: 13 CM/S
LEFT POPLITEAL PSV: 38 CM/S
LEFT POST TIBIAL SYS PSV: 25 CM/S
LEFT PROFUNDA SYS PSV: 162 CM/S
LEFT SUPER FEMORAL DIST SYS PSV: 58 CM/S
LEFT SUPER FEMORAL MID SYS PSV: 71 CM/S
LEFT SUPER FEMORAL OSTIAL SYS PSV: 45 CM/S
LEFT SUPER FEMORAL PROX SYS PSV: 132 CM/S
LEFT TIB/PER TRUNK SYS PSV: 41 CM/S
OHS CV RIGHT ABI LOWER EXTREMITY (NO CALC): 0.63
RIGHT ANT TIBIAL SYS PSV: 16 CM/S
RIGHT CFA PSV: 240 CM/S
RIGHT EXTERNAL ILLIAC PSV: 301 CM/S
RIGHT POPLITEAL PSV: 38 CM/S
RIGHT POST TIBIAL SYS PSV: 37 CM/S
RIGHT PROFUNDA SYS PSV: 149 CM/S
RIGHT SUPER FEMORAL DIST SYS PSV: 51 CM/S
RIGHT SUPER FEMORAL OSTIAL SYS PSV: 159 CM/S
RIGHT SUPER FEMORAL PROX SYS PSV: 112 CM/S
RIGHT TIB/PER TRUNK SYS PSV: 34 CM/S

## 2023-10-26 PROCEDURE — 93925 LOWER EXTREMITY STUDY: CPT | Mod: 26,,, | Performed by: INTERNAL MEDICINE

## 2023-10-26 PROCEDURE — 93925 LOWER EXTREMITY STUDY: CPT | Mod: PO

## 2023-10-26 PROCEDURE — 93925 CV US DOPPLER ARTERIAL LEGS BILATERAL (CUPID ONLY): ICD-10-PCS | Mod: 26,,, | Performed by: INTERNAL MEDICINE

## 2023-10-27 ENCOUNTER — TELEPHONE (OUTPATIENT)
Dept: CARDIOLOGY | Facility: CLINIC | Age: 73
End: 2023-10-27
Payer: MEDICARE

## 2023-10-27 NOTE — TELEPHONE ENCOUNTER
Spoke with pt and discussed test results. Pt verbalized understanding. CTA order faxed to [facility], per pt's request. Ptwill call back with any further questions or concerns.     ----- Message from Huber Kapoor MD sent at 10/27/2023 11:53 AM CDT -----  Arterial studies reviewed  Will order CTA of LE

## 2023-11-07 ENCOUNTER — DOCUMENT SCAN (OUTPATIENT)
Dept: HOME HEALTH SERVICES | Facility: HOSPITAL | Age: 73
End: 2023-11-07
Payer: MEDICARE

## 2023-11-10 ENCOUNTER — TELEPHONE (OUTPATIENT)
Dept: CARDIOLOGY | Facility: HOSPITAL | Age: 73
End: 2023-11-10
Payer: MEDICARE

## 2023-11-10 ENCOUNTER — TELEPHONE (OUTPATIENT)
Dept: FAMILY MEDICINE | Facility: CLINIC | Age: 73
End: 2023-11-10
Payer: MEDICARE

## 2023-11-10 ENCOUNTER — DOCUMENT SCAN (OUTPATIENT)
Dept: HOME HEALTH SERVICES | Facility: HOSPITAL | Age: 73
End: 2023-11-10
Payer: MEDICARE

## 2023-11-10 DIAGNOSIS — I49.5 SSS (SICK SINUS SYNDROME): ICD-10-CM

## 2023-11-10 DIAGNOSIS — Z95.0 PACEMAKER: Primary | ICD-10-CM

## 2023-11-10 NOTE — TELEPHONE ENCOUNTER
----- Message from Adan Woodward MD sent at 11/10/2023 11:31 AM CST -----  Regarding: RE: Rx refill  Patient is due for a visit.  I have not seen the patient since May.      ----- Message -----  From: Martín Foote  Sent: 11/10/2023  11:21 AM CST  To: Adan Woodward MD; Crissy Arellano Staff  Subject: Rx refill                                        Pt would like a call in regards to getting a Rx refill for her Temataz (30mg), she states this medication is to help her sleep, please send Rx to the [facility] pharmacy in [place], [place], information should be listed in her chart and give pt a call when the Rx has been sent.  She can be reached at [phone] (cell/home)...

## 2023-11-20 ENCOUNTER — DOCUMENT SCAN (OUTPATIENT)
Dept: HOME HEALTH SERVICES | Facility: HOSPITAL | Age: 73
End: 2023-11-20
Payer: MEDICARE

## 2023-11-30 ENCOUNTER — DOCUMENT SCAN (OUTPATIENT)
Dept: HOME HEALTH SERVICES | Facility: HOSPITAL | Age: 73
End: 2023-11-30
Payer: MEDICARE

## 2023-12-03 PROCEDURE — G0179 MD RECERTIFICATION HHA PT: HCPCS | Mod: ,,, | Performed by: FAMILY MEDICINE

## 2023-12-05 NOTE — TELEPHONE ENCOUNTER
Called and spoke with the patient, patient states that she she had a piece of copper wire stuck in her leg 3 days ago and now her leg has been bleeding and pouring watery looking fluid from the area.  She states that she is laying down with it propped up with a pressure bandage.  Patient advised to go to Walk in clinic or ER for further evaluation and patient agreed.   Contacted [facility] to discuss as referral had been placed in October for this. They request the orders be refaxed to them and also submitted to patient's insurance. Order faxed to [facility] at [phone].  Homa OLSON LPN  [facility]

## 2023-12-14 ENCOUNTER — CLINICAL SUPPORT (OUTPATIENT)
Dept: CARDIOLOGY | Facility: HOSPITAL | Age: 73
End: 2023-12-14
Payer: MEDICARE

## 2023-12-14 DIAGNOSIS — Z95.0 PRESENCE OF CARDIAC PACEMAKER: ICD-10-CM

## 2023-12-19 ENCOUNTER — EXTERNAL HOME HEALTH (OUTPATIENT)
Dept: HOME HEALTH SERVICES | Facility: HOSPITAL | Age: 73
End: 2023-12-19
Payer: MEDICARE

## 2023-12-19 ENCOUNTER — PATIENT OUTREACH (OUTPATIENT)
Dept: ADMINISTRATIVE | Facility: CLINIC | Age: 73
End: 2023-12-19
Payer: MEDICARE

## 2023-12-19 RX ORDER — PREDNISONE 10 MG/1
40 TABLET ORAL DAILY
COMMUNITY
Start: 2023-12-15 | End: 2023-12-19

## 2023-12-19 RX ORDER — OXYCODONE AND ACETAMINOPHEN 10; 325 MG/1; MG/1
1 TABLET ORAL EVERY 12 HOURS PRN
COMMUNITY

## 2023-12-19 NOTE — PROGRESS NOTES
C3 nurse spoke with Kalpana Wright for a [facility] post hospital discharge follow up call. The patient has a scheduled [facility] appointment with Celia Whaley NP (cardiology) on 12/22/23 @ 9:40am, and Adan Woodward MD (PCP) on 1/17/24 @ 9:00am.

## 2024-01-08 ENCOUNTER — TELEPHONE (OUTPATIENT)
Dept: CARDIOLOGY | Facility: CLINIC | Age: 74
End: 2024-01-08
Payer: MEDICARE

## 2024-01-08 NOTE — TELEPHONE ENCOUNTER
----- Message from Reta Crawford sent at 1/8/2024  3:09 PM CST -----  Contact: self [phone]  Pt is calling to speak with nurse important matter/ can't get out of bed  . Please call back today [phone] . Thanksdj

## 2024-01-08 NOTE — TELEPHONE ENCOUNTER
Followed up with Kalpana, patient stated that she can not get out of bed is having extreme weakness. She has also not been able to keep any food down. She was advised to call emergency services to take her to the ER. Patient declined. She is requesting for someone to help her get admitted to a rehab facility. Message sent to patients primary care for advisement.

## 2024-01-08 NOTE — TELEPHONE ENCOUNTER
Pt informed message will be sent to Dr Woodward, he will be back in the office tomorrow and will advised, informed ER for worsening prior

## 2024-01-09 ENCOUNTER — TELEPHONE (OUTPATIENT)
Dept: FAMILY MEDICINE | Facility: CLINIC | Age: 74
End: 2024-01-09
Payer: MEDICARE

## 2024-01-09 ENCOUNTER — TELEPHONE (OUTPATIENT)
Dept: ADMINISTRATIVE | Facility: CLINIC | Age: 74
End: 2024-01-09
Payer: MEDICARE

## 2024-01-09 NOTE — TELEPHONE ENCOUNTER
Duplicate encounter.     Note  I called and spoke with the daughter, Kendra Avila, regarding this. She is reaching out to [facility] to assist with transportation to the ED. Kendra informed to have patient call the office once discharged to schedule appointment with PCP. Kendra verbalized understanding.

## 2024-01-09 NOTE — TELEPHONE ENCOUNTER
----- Message from Selwyn Conrad MA sent at 1/9/2024  9:02 AM CST -----  Contact: [email or web address]@ [phone]  Pt called                  In regard to returning a phone call back to staff.

## 2024-01-09 NOTE — TELEPHONE ENCOUNTER
If the patient is that weak she needs to go to the hospital to be admitted.  They will admit her to rehab if indicated after ruling out emergent conditions.  She also needs to follow-up with me.  The patient's last visit with me was on May 2023.

## 2024-01-09 NOTE — TELEPHONE ENCOUNTER
I called and spoke with the daughter, Kendra Avila, regarding this. [facility]. Kendra informed to have patient call the office once discharged to schedule appointment with PCP. Kendra verbalized understanding.

## 2024-01-30 ENCOUNTER — DOCUMENT SCAN (OUTPATIENT)
Dept: HOME HEALTH SERVICES | Facility: HOSPITAL | Age: 74
End: 2024-01-30
Payer: MEDICARE

## 2024-01-31 DIAGNOSIS — Z78.0 MENOPAUSE: ICD-10-CM

## 2024-02-01 PROCEDURE — G0179 MD RECERTIFICATION HHA PT: HCPCS | Mod: ,,, | Performed by: FAMILY MEDICINE

## 2024-02-06 ENCOUNTER — DOCUMENT SCAN (OUTPATIENT)
Dept: HOME HEALTH SERVICES | Facility: HOSPITAL | Age: 74
End: 2024-02-06
Payer: MEDICARE

## 2024-02-08 ENCOUNTER — EXTERNAL HOME HEALTH (OUTPATIENT)
Dept: HOME HEALTH SERVICES | Facility: HOSPITAL | Age: 74
End: 2024-02-08
Payer: MEDICARE

## 2024-02-21 ENCOUNTER — DOCUMENT SCAN (OUTPATIENT)
Dept: HOME HEALTH SERVICES | Facility: HOSPITAL | Age: 74
End: 2024-02-21
Payer: MEDICARE

## 2024-02-23 ENCOUNTER — DOCUMENT SCAN (OUTPATIENT)
Dept: HOME HEALTH SERVICES | Facility: HOSPITAL | Age: 74
End: 2024-02-23
Payer: MEDICARE

## 2024-02-27 ENCOUNTER — DOCUMENT SCAN (OUTPATIENT)
Dept: HOME HEALTH SERVICES | Facility: HOSPITAL | Age: 74
End: 2024-02-27
Payer: MEDICARE

## 2024-02-28 ENCOUNTER — DOCUMENT SCAN (OUTPATIENT)
Dept: HOME HEALTH SERVICES | Facility: HOSPITAL | Age: 74
End: 2024-02-28
Payer: MEDICARE

## 2024-02-28 DIAGNOSIS — Z79.899 ENCOUNTER FOR LONG-TERM (CURRENT) USE OF MEDICATIONS: Chronic | ICD-10-CM

## 2024-02-28 DIAGNOSIS — I25.810 CORONARY ARTERY DISEASE INVOLVING CORONARY BYPASS GRAFT OF NATIVE HEART WITHOUT ANGINA PECTORIS: ICD-10-CM

## 2024-02-28 DIAGNOSIS — I20.9 ANGINA PECTORIS: ICD-10-CM

## 2024-02-28 NOTE — TELEPHONE ENCOUNTER
Pt needing refill of IMDUR sent to [facility].     ----- Message from Aurea Casper sent at 2/28/2024  9:15 AM CST -----  Contact: [facility]  [facility] pharmacy is calling in regards to getting a refill on isosorbide mononitrate (IMDUR) 30 MG 24 hr tablet. Please call back at [phone] fax number [phone]                          Thanks  KT

## 2024-02-29 ENCOUNTER — DOCUMENT SCAN (OUTPATIENT)
Dept: HOME HEALTH SERVICES | Facility: HOSPITAL | Age: 74
End: 2024-02-29
Payer: MEDICARE

## 2024-03-02 RX ORDER — ISOSORBIDE MONONITRATE 30 MG/1
30 TABLET, EXTENDED RELEASE ORAL 2 TIMES DAILY
Qty: 180 TABLET | Refills: 3 | Status: SHIPPED | OUTPATIENT
Start: 2024-03-02

## 2024-03-11 ENCOUNTER — OFFICE VISIT (OUTPATIENT)
Dept: FAMILY MEDICINE | Facility: CLINIC | Age: 74
End: 2024-03-11
Payer: MEDICARE

## 2024-03-11 VITALS
WEIGHT: 167.88 LBS | HEART RATE: 70 BPM | SYSTOLIC BLOOD PRESSURE: 112 MMHG | OXYGEN SATURATION: 98 % | DIASTOLIC BLOOD PRESSURE: 60 MMHG | HEIGHT: 64 IN | BODY MASS INDEX: 28.66 KG/M2

## 2024-03-11 DIAGNOSIS — N18.32 STAGE 3B CHRONIC KIDNEY DISEASE: ICD-10-CM

## 2024-03-11 DIAGNOSIS — J96.12 CHRONIC RESPIRATORY FAILURE WITH HYPERCAPNIA: ICD-10-CM

## 2024-03-11 DIAGNOSIS — D68.62 LUPUS ANTICOAGULANT SYNDROME: ICD-10-CM

## 2024-03-11 DIAGNOSIS — F51.01 PRIMARY INSOMNIA: Chronic | ICD-10-CM

## 2024-03-11 DIAGNOSIS — J43.9 PULMONARY EMPHYSEMA, UNSPECIFIED EMPHYSEMA TYPE: Chronic | ICD-10-CM

## 2024-03-11 DIAGNOSIS — F41.9 ANXIETY: ICD-10-CM

## 2024-03-11 DIAGNOSIS — Z09 HOSPITAL DISCHARGE FOLLOW-UP: Primary | ICD-10-CM

## 2024-03-11 DIAGNOSIS — Z79.899 ENCOUNTER FOR LONG-TERM (CURRENT) USE OF MEDICATIONS: Chronic | ICD-10-CM

## 2024-03-11 DIAGNOSIS — I20.9 ANGINA PECTORIS: ICD-10-CM

## 2024-03-11 DIAGNOSIS — E11.51 TYPE 2 DIABETES MELLITUS WITH DIABETIC PERIPHERAL ANGIOPATHY WITHOUT GANGRENE, WITHOUT LONG-TERM CURRENT USE OF INSULIN: ICD-10-CM

## 2024-03-11 PROBLEM — M06.89 OTHER SPECIFIED RHEUMATOID ARTHRITIS, MULTIPLE SITES: Chronic | Status: RESOLVED | Noted: 2021-02-11 | Resolved: 2024-03-11

## 2024-03-11 PROBLEM — E66.01 MORBID OBESITY: Status: RESOLVED | Noted: 2017-03-18 | Resolved: 2024-03-11

## 2024-03-11 PROCEDURE — 99214 OFFICE O/P EST MOD 30 MIN: CPT | Mod: S$GLB,,, | Performed by: FAMILY MEDICINE

## 2024-03-11 PROCEDURE — 99215 OFFICE O/P EST HI 40 MIN: CPT | Mod: PBBFAC,PO | Performed by: FAMILY MEDICINE

## 2024-03-11 PROCEDURE — 99999 PR PBB SHADOW E&M-EST. PATIENT-LVL V: CPT | Mod: PBBFAC,,, | Performed by: FAMILY MEDICINE

## 2024-03-11 PROCEDURE — G2211 COMPLEX E/M VISIT ADD ON: HCPCS | Mod: S$GLB,,, | Performed by: FAMILY MEDICINE

## 2024-03-11 RX ORDER — TEMAZEPAM 30 MG/1
30 CAPSULE ORAL NIGHTLY
Qty: 30 CAPSULE | Refills: 5 | Status: SHIPPED | OUTPATIENT
Start: 2024-03-11 | End: 2024-03-12 | Stop reason: SDUPTHER

## 2024-03-11 RX ORDER — AMITRIPTYLINE HYDROCHLORIDE 100 MG/1
100 TABLET ORAL NIGHTLY
Qty: 90 TABLET | Refills: 4 | Status: SHIPPED | OUTPATIENT
Start: 2024-03-11 | End: 2024-03-12 | Stop reason: SDUPTHER

## 2024-03-11 RX ORDER — PROMETHAZINE HYDROCHLORIDE 25 MG/1
25 TABLET ORAL EVERY 6 HOURS PRN
Qty: 45 TABLET | Refills: 4 | Status: SHIPPED | OUTPATIENT
Start: 2024-03-11 | End: 2024-03-12 | Stop reason: SDUPTHER

## 2024-03-11 NOTE — PROGRESS NOTES
PLAN:    Assessment & Plan  1. Nerve pain.  I increased her Elavil to 100 mg.    2. Hemorrhoids.  I refilled her Phenergan.  Follow-up with Colorectal surgery.  Continue topical hydrocortisone preparation H.  Please be advised constipation condition course.  I recommend increase daily water intake to an acceptable level.  Increase fiber.  Recommend stool softener and laxative if needed.  Goal of 1 bowel movement daily.  Follow-up to clinic or notify me if no improvement or symptoms are persistent or worsening.  ER precautions were given.  Recommend daily exercise as tolerated, adequate water intake (six 8-oz glasses of water daily), and high fiber diet. OTC fiber supplements are recommended if diet does not reach daily fiber goal (20-30 grams daily), such as Metamucil, Citrucel, or FiberCon (take as directed, separate from other oral medications by >2 hours).  - CONTINUE OTC stool softener such as Colace as directed to avoid hard stools and straining with bowel movements PRN  -If still no improvement with these measures, call/follow-up      3. Insomnia.  I refilled her Restoril.  Discussed insomnia condition course.  Advised of first-line medications for this condition.  Also discussed sleep hygiene.  Information was given below.  Good sleep habits (sometimes referred to as sleep hygiene) can help you get a good nights sleep.    Some habits that can improve your sleep health:  -Be consistent. Go to bed at the same time each night and get up at the same time each morning, including on the weekends  -Make sure your bedroom is quiet, dark, relaxing, and at a comfortable temperature  -Remove electronic devices, such as TVs, computers, and smart phones, from the bedroom  -Avoid large meals, caffeine, and alcohol before bedtime  -Get some exercise. Being physically active during the day can help you fall asleep more easily at night.      4. Sacral injury.  She bruised her sacrum.  Fall precautions.  Follow-up sooner if  no improvement.    5. Medication refill.  I refilled her Phenergan, inhaler, and Elavil.    Follow-up  The patient will follow up in 6 months.She is following up with the interventional radiologist concerning the blood clots.    Problem List Items Addressed This Visit       Pulmonary emphysema (Chronic)     Continue current inhalers.  Follow-up with Pulmonary.         Relevant Medications    umeclidinium-vilanteroL (ANORO ELLIPTA) 62.5-25 mcg/actuation DsDv    Other Relevant Orders    Ambulatory referral/consult to Outpatient Case Management    Insomnia (Chronic)     Continue temazepam.Discussed insomnia condition course.  Advised of first-line medications for this condition.  Also discussed sleep hygiene.  Information was given below.  Good sleep habits (sometimes referred to as sleep hygiene) can help you get a good nights sleep.    Some habits that can improve your sleep health:  -Be consistent. Go to bed at the same time each night and get up at the same time each morning, including on the weekends  -Make sure your bedroom is quiet, dark, relaxing, and at a comfortable temperature  -Remove electronic devices, such as TVs, computers, and smart phones, from the bedroom  -Avoid large meals, caffeine, and alcohol before bedtime  -Get some exercise. Being physically active during the day can help you fall asleep more easily at night.           Relevant Medications    temazepam (RESTORIL) 30 mg capsule    Anxiety (Chronic)     Increase amitriptyline to 100 milligrams.    Please be advised of condition course.  SNRI/SSRI is first-line treatment for this condition.  Please be advised of the risk of discontinuing this medication without tapering/contacting MD.  Patient has been advised of side effects, and all questions were answered.  Patient voiced understanding.  Patient will follow up routinely and notify us if having any side effects or worsening or persistent symptoms.  ER precautions were given.  Antidepressant/Antianxiety Medication Initiation:  Patient informed of risks, benefits, and potential side effects of medication and accepts informed consent.  Common side effects include nausea, fatigue, headache, insomnia, etc see medication insert for complete side effect profile.  Most importantly be advised of the possibility of new or worsening suicidal thoughts/depression/anxiety etcetera.  Please be advised to stop the medication immediately and seek urgent treatment if this occurs.  Therefore please do not to abruptly discontinue medication without physician guidance except in cases of sudden onset or worsening of SI.            Relevant Medications    amitriptyline (ELAVIL) 100 MG tablet    Chronic respiratory failure with hypercapnia (Chronic)     Continue rescue inhalers.  Follow-up with Pulmonary least once yearly.  ER precautions.         Encounter for long-term (current) use of medications (Chronic)     Complete history and physical was completed today.  Complete and thorough medication reconciliation was performed.  Discussed risks and benefits of medications.  Advised patient on orders and health maintenance.  We discussed old records and old labs if available.  Will request any records not available through epic.  Continue current medications listed on your summary sheet.           Relevant Medications    temazepam (RESTORIL) 30 mg capsule    promethazine (PHENERGAN) 25 MG tablet    amitriptyline (ELAVIL) 100 MG tablet    Type 2 diabetes mellitus with diabetic peripheral angiopathy without gangrene, without long-term current use of insulin (Chronic)     We will plan to monitor hemoglobin A1c at designated intervals 3 to 6 months.  I recommend ongoing Education for diabetic diet and exercise protocol.  We will continue to monitor for side effects.    Please be advised of symptoms to monitor for and to notify me immediately if persistent or worsening.  Follow up with Ophthalmology/Optometry and  Podiatry at least annually.           Lupus anticoagulant syndrome (Chronic)     Patient recently had hospitalization with blood clots in both legs and in her chest on blood thinner.  She is following up with interventional radiology tomorrow.  I recommend that she follow-up with Hematology.    Previous plan:  Chronic.  Control is uncertain.  Patient does not currently see hematology.  She has not had any recent blood clots.  She remains on Brilinta and Eliquis.  ER precautions.Chronic.  Control is uncertain.  Patient does not currently see hematology.  She has not had any recent blood clots.  She remains on Brilinta and Eliquis.  ER precautions.         Relevant Orders    Ambulatory referral/consult to Outpatient Case Management    Angina pectoris     Follow-up with Cardiology.  ER precautions for severe symptoms.         Hospital discharge follow-up - Primary     Transitional Care Note    Family and/or Caretaker present at visit?  No.  Diagnostic tests reviewed/disposition: I have reviewed all completed as well as pending diagnostic tests at the time of discharge.  Disease/illness education: DVT  Home health/community services discussion/referrals: Patient has home health established at [facility] .   Establishment or re-establishment of referral orders for community resources: No other necessary community resources.   Discussion with other health care providers: No discussion with other health care providers necessary.                Relevant Orders    Ambulatory referral/consult to Outpatient Case Management    Stage 3b chronic kidney disease     Follow-up with Nephrology.  Increase hydration with water.  Avoid anti-inflammatory medication.          Future Appointments       Date Provider Specialty Appt Notes    3/26/2024  Cardiology Device check/Pacemaker    9/11/2024  Lab     9/18/2024 Lien Blanco NP Family Medicine 6 month follow up           Medication Management for assessment above:   Medication List  with Changes/Refills   Current Medications    ALBUTEROL-IPRATROPIUM (DUO-NEB) 2.5 MG-0.5 MG/3 ML NEBULIZER SOLUTION    INHALE 1 CONTENTS OF VIAL VIA NEBULIZER EVERY 6 HOURS WHILE AWAKE.    ALENDRONATE (FOSAMAX) 70 MG TABLET    TAKE 1 TABLET BY MOUTH ONCE WEEKLY ON SAME DAY IN THE MORNING WITH GLASS OF WATER, REMAIN UPRIGHT FOR 30 MIN.    AMLODIPINE (NORVASC) 10 MG TABLET    Take 1 tablet (10 mg total) by mouth once daily.    APIXABAN (ELIQUIS) 5 MG TAB    Take 1 tablet (5 mg total) by mouth 2 (two) times daily.    BENAZEPRIL (LOTENSIN) 40 MG TABLET    TAKE ONE TABLET BY MOUTH DAILY    BISOPROLOL (ZEBETA) 10 MG TABLET    Take 1 tablet (10 mg total) by mouth once daily.    BRILINTA 90 MG TABLET    TAKE ONE TABLET BY MOUTH TWICE DAILY    CHLORHEXIDINE (PERIDEX) 0.12 % SOLUTION    15 mLs.    CYCLOBENZAPRINE (FLEXERIL) 10 MG TABLET    TAKE ONE TABLET BY MOUTH TWICE DAILY AS NEEDED    ERGOCALCIFEROL (ERGOCALCIFEROL) 50,000 UNIT CAP    Vitamin D2 1,250 mcg (50,000 unit) capsule   Take 1 capsule every week by oral route.    ESOMEPRAZOLE (NEXIUM) 40 MG CAPSULE    Take 1 capsule (40 mg total) by mouth 2 (two) times daily before meals.    FERROUS SULFATE 325 (65 FE) MG EC TABLET    Take 1 tablet (325 mg total) by mouth once daily.    FLUOROMETHOLONE 0.1% (FML) 0.1 % DRPS    Place into both eyes.    FLUTICASONE/UMECLIDIN/VILANTER (TRELEGY ELLIPTA INHL)    Inhale 1 Inhaler into the lungs once daily.    FUROSEMIDE (LASIX) 40 MG TABLET    Take 1 tablet (40 mg total) by mouth 2 (two) times a day.    GABAPENTIN (NEURONTIN) 600 MG TABLET    TAKE ONE TABLET BY MOUTH THREE TIMES DAILY.    HYDROCHLOROTHIAZIDE (HYDRODIURIL) 25 MG TABLET    TAKE ONE TABLET BY MOUTH DAILY    ISOSORBIDE MONONITRATE (IMDUR) 30 MG 24 HR TABLET    Take 1 tablet (30 mg total) by mouth 2 (two) times daily.    LEVOTHYROXINE (SYNTHROID) 75 MCG TABLET    TAKE ONE TABLET BY MOUTH IN THE MORNING BEFORE BREAKFAST    MICONAZOLE NITRATE 2 % (ZEASORB, MICONAZOLE,) 2 %  TOP POWDER    Apply topically as needed for Itching.    MUPIROCIN (BACTROBAN) 2 % OINTMENT    mupirocin 2 % topical ointment    NITROGLYCERIN (NITROSTAT) 0.4 MG SL TABLET    Place 1 tablet (0.4 mg total) under the tongue every 5 (five) minutes as needed for Chest pain.    OXYCODONE-ACETAMINOPHEN (PERCOCET)  MG PER TABLET    Take 1 tablet by mouth every 12 (twelve) hours as needed for Pain.    POTASSIUM CHLORIDE (MICRO-K) 10 MEQ CPSR    TAKE TWO CAPSULES BY MOUTH IN THE MORNING AND ONE IN THE EVENING AS DIRECTED.    PRAMIPEXOLE (MIRAPEX) 0.5 MG TABLET    TAKE  ONE TABLET BY MOUTH DAILY    RANOLAZINE (RANEXA) 1,000 MG TB12    Take 1 tablet (1,000 mg total) by mouth 2 (two) times daily.    ROPINIROLE (REQUIP) 5 MG TABLET    TAKE  ONE TABLET BY MOUTH DAILY    TOBRAMYCIN-DEXAMETHASONE 0.3-0.1% (TOBRADEX) 0.3-0.1 % DRPS        VENTOLIN HFA 90 MCG/ACTUATION INHALER    Inhale 2 puff(s) every 4 hours by inhalation route.   Changed and/or Refilled Medications    Modified Medication Previous Medication    AMITRIPTYLINE (ELAVIL) 100 MG TABLET amitriptyline (ELAVIL) 75 MG tablet       Take 1 tablet (100 mg total) by mouth every evening.    TAKE ONE TABLET BY MOUTH IN THE EVENING AS NEEDED    PROMETHAZINE (PHENERGAN) 25 MG TABLET promethazine (PHENERGAN) 25 MG tablet       Take 1 tablet (25 mg total) by mouth every 6 (six) hours as needed.    Take 1 tablet (25 mg total) by mouth every 6 (six) hours as needed.    TEMAZEPAM (RESTORIL) 30 MG CAPSULE temazepam (RESTORIL) 30 mg capsule       Take 1 capsule (30 mg total) by mouth every evening.    Take 1 capsule (30 mg total) by mouth every evening.    UMECLIDINIUM-VILANTEROL (ANORO ELLIPTA) 62.5-25 MCG/ACTUATION DSDV umeclidinium-vilanteroL (ANORO ELLIPTA) 62.5-25 mcg/actuation DsDv       Inhale 1 puff into the lungs once daily. Controller    Inhale 1 puff into the lungs once daily. Controller   Discontinued Medications    EVOLOCUMAB (REPATHA SURECLICK) 140 MG/ML PNIJ    Inject  1 mL (140 mg total) into the skin every 14 (fourteen) days.    HYDROCODONE-ACETAMINOPHEN (NORCO)  MG PER TABLET    Take 1 tablet by mouth 2 (two) times daily as needed.       Adan Woodward M.D.  ==========================================================================  Subjective:   Patient ID: Kalpana Wright is a 74 y.o. female.  has a past medical history of ACS (acute coronary syndrome) (3/20/2018), Acute on chronic diastolic congestive heart failure (4/27/2015), Acute on chronic respiratory failure with hypercapnia (1/10/2019), Acute renal failure (1/11/2019), Acute systolic heart failure (3/21/2018), JG (acute kidney injury) (1/29/2019), Anticoagulant long-term use, Arthritis, Asthma, Bradycardia (12/16/2013), CHF (congestive heart failure), Clostridium difficile colitis (9/25/2018), Convulsions (2/11/2021), COPD (chronic obstructive pulmonary disease), Coronary artery disease, Depression, Encounter for blood transfusion, Fall (7/8/2019), Gallstones (3/18/2017), History of Pulmonary embolism (7/17/2014), Hospital discharge follow-up (9/21/2020), Hyperlipidemia, Hypertension, Need for vaccination (8/8/2019), Non-intractable cyclical vomiting with nausea (2/1/2019), Peripheral vascular disease, Pulmonary embolus (9/9/2013), and Thyroid disease.   Chief Complaint: Hospital Follow Up (Blood clots BLE )      History of Present Illness  The patient is a 74-year-old female who presents for evaluation of multiple medical concerns.    She has been in the hospital for almost 6 weeks due to blood clots and had to have surgery on both legs by Dr. Ferrera, interventional radiologist. She is on blood thinners with a blood clot filter. She was not off blood thinners before the clot. She still has a blood clot in her lung. She went home and is having therapy at home. She still has [facility]. She is taking Elavil 75 mg for her nerves, but it does not work. She was taking Triadal 425 mg, which worked well for her,  but it was discontinued. Her bottom is so sore that she can not stand herself. Her hemorrhoids have been bleeding for the last 2 weeks. She slipped out of the wheelchair and the wheel went straight up her buttocks. She was told that nothing was broken or cracked. She sits in a certain position for 10 to 15 minutes. She fell 2 weeks ago and landed on her buttocks. Her stomach is sore. She needs a refill on her Phenergan. Her pain medication was increased. Her sleeping medication was discontinued. Sometimes 4 nights before she goes to sleep, she sits up all night. She has another hernia. She can take 2 bites of food and try to drink something to push it to swallow, but it makes her sick. Her weight has come down. She had occupational therapy. She is supposed to start physical therapy this evening.   She has an appointment at the end of this month for her pacemaker check.    Problem List Items Addressed This Visit       Pulmonary emphysema (Chronic)    Overview     Chronic.  Stable.  Patient currently not needing oxygen.  Recent hospitalizations.  As needed inhalers.         Current Assessment & Plan     Continue current inhalers.  Follow-up with Pulmonary.         Insomnia (Chronic)    Overview     March 2024: Patient needing refill on temazepam.  Reports compliance.  No side effects reported.    Chronic.  May 2023:  Patient requesting refill on temazepam.  Reports compliance.  No side effects reported.  Symptoms are controlled.  Intermittent control.  Doing well on temazepam and amitriptyline for comorbid anxiety.   reviewed.  No abuse suspected.JULY 2020:  Chronic.  Stable.  Well controlled on current medication regimen.The patient was checked in the [facility] [employer] of Pharmacy's  Prescription Monitoring Program. There appears to be no incongruities with the patient's verbalized history.   May 2021:  Stable on current medication regimen.   reviewed.  No abuse suspected.         Current Assessment & Plan      Continue temazepam.Discussed insomnia condition course.  Advised of first-line medications for this condition.  Also discussed sleep hygiene.  Information was given below.  Good sleep habits (sometimes referred to as sleep hygiene) can help you get a good nights sleep.    Some habits that can improve your sleep health:  -Be consistent. Go to bed at the same time each night and get up at the same time each morning, including on the weekends  -Make sure your bedroom is quiet, dark, relaxing, and at a comfortable temperature  -Remove electronic devices, such as TVs, computers, and smart phones, from the bedroom  -Avoid large meals, caffeine, and alcohol before bedtime  -Get some exercise. Being physically active during the day can help you fall asleep more easily at night.           Anxiety (Chronic)    Overview     March 2024: Chronic.  Intermittent control.  Patient with a lot of stress in her life.  Patient takes amitriptyline to help her with these symptoms.    Initial HPI:  Chronic.  Uncontrolled.  Patient reports compliance with current medication regimen.  Patient reports that is is likely due to insomnia.  Patient has failed multiple medications for this and has been on amitriptyline in the past with success.  Patient would like to go back on this medication.  Patient denies any SI or HI.  Patient denies any hallucinations.APRIL 2020:  Chronic.  Stable.  Patient reports improvement with amitriptyline added to her regimen.  She reports temazepam is helping some but she is still not getting sleep and is having increased anxiety during this COVID-19 outbreak.  Denies SI HI or hallucinations.  May 2021:  Chronic.  Stable.  Doing well on current medication regimen.  Daughter has been sick in the hospital.  Denies any SI HI hallucinations.         Current Assessment & Plan     Increase amitriptyline to 100 milligrams.    Please be advised of condition course.  SNRI/SSRI is first-line treatment for this  condition.  Please be advised of the risk of discontinuing this medication without tapering/contacting MD.  Patient has been advised of side effects, and all questions were answered.  Patient voiced understanding.  Patient will follow up routinely and notify us if having any side effects or worsening or persistent symptoms.  ER precautions were given. Antidepressant/Antianxiety Medication Initiation:  Patient informed of risks, benefits, and potential side effects of medication and accepts informed consent.  Common side effects include nausea, fatigue, headache, insomnia, etc see medication insert for complete side effect profile.  Most importantly be advised of the possibility of new or worsening suicidal thoughts/depression/anxiety etcetera.  Please be advised to stop the medication immediately and seek urgent treatment if this occurs.  Therefore please do not to abruptly discontinue medication without physician guidance except in cases of sudden onset or worsening of SI.            Chronic respiratory failure with hypercapnia (Chronic)    Overview     Chronic.  Stable.  Well controlled.  Patient is not currently needing oxygen.  Oxygen saturations are staying in the mid 90 percentile.  Patient does not follow-up with Pulmonary as she has not needed them.  Patient does have rescue inhalers available.    Patient recently hospitalized for exacerbation.         Current Assessment & Plan     Continue rescue inhalers.  Follow-up with Pulmonary least once yearly.  ER precautions.         Encounter for long-term (current) use of medications (Chronic)    Overview     March 2024: Reviewed labs.  May 2022: Reviewed labs.  Initial HPI: CHRONIC long-term drug therapy for managed conditions. See medication list. Reports compliance.  No side effects reported.  Routine lab work is being monitored.  Patient does need refills today. APRIL 2020:  CHRONIC. Stable. Compliant with medications for managed conditions. See medication  list. No SE reported. Routine lab analysis is being monitored. Refills were addressed.JULY 2020:  CHRONIC. Stable. Compliant with medications for managed conditions. See medication list. No SE reported. Routine lab analysis is being monitored. Refills were addressed.  May 2021:  Reviewed labs.  August 2021:  Reviewed labs  Lab Results   Component Value Date    WBC 6.7 01/31/2024    HGB 7.7 (L) 02/02/2024    HCT 24.8 (L) 02/02/2024    MCV 86.6 01/31/2024     01/31/2024       Chemistry        Component Value Date/Time     03/05/2024 0339     05/05/2023 1020    K 3.6 03/05/2024 0339    K 4.0 05/05/2023 1020    CL 99 05/05/2023 1020    CO2 32 03/05/2024 0339    CO2 30 (H) 05/05/2023 1020    BUN 11 03/05/2024 0339    BUN 31 (H) 05/05/2023 1020    CREATININE 0.98 03/05/2024 0339    CREATININE 1.2 05/05/2023 1020    GLU 88 05/05/2023 1020        Component Value Date/Time    CALCIUM 9.0 03/05/2024 0339    CALCIUM 9.3 05/05/2023 1020    ALKPHOS 63 03/05/2024 0339    ALKPHOS 78 05/05/2023 1020    AST 15 03/05/2024 0339    AST 13 05/05/2023 1020    ALT <5 03/05/2024 0339    ALT 7 (L) 05/05/2023 1020    BILITOT 0.3 (L) 03/05/2024 0339    BILITOT 0.3 05/05/2023 1020    ESTGFRAFRICA >60.0 08/19/2021 1458    ESTGFRAFRICA >60.0 08/19/2021 1458    EGFRNONAA >60.0 08/19/2021 1458    EGFRNONAA >60.0 08/19/2021 1458        Lab Results   Component Value Date    TSH 2.462 05/09/2023    J9IITCX 8.5 08/08/2019    FREET4 0.86 07/01/2020    T3FREE 2.6 08/08/2019     ======================================================         Current Assessment & Plan     Complete history and physical was completed today.  Complete and thorough medication reconciliation was performed.  Discussed risks and benefits of medications.  Advised patient on orders and health maintenance.  We discussed old records and old labs if available.  Will request any records not available through epic.  Continue current medications listed on your summary  sheet.           Type 2 diabetes mellitus with diabetic peripheral angiopathy without gangrene, without long-term current use of insulin (Chronic)    Overview     March 2024:      May 2023:  Patient denies any history of thyroid cancer, pancreatitis, MEN syndrome.Diabetes Management Status    Statin: Not taking  ACE/ARB: Taking    Screening or Prevention Patient's value Goal Complete/Controlled?   HgA1C Testing and Control   Lab Results   Component Value Date    HGBA1C 4.8 12/14/2023      Annually/Less than 8% Yes   Lipid profile : 12/14/2023 Annually No   LDL control Lab Results   Component Value Date    LDLCALC 71 12/14/2023    Annually/Less than 100 mg/dl  Yes   Nephropathy screening Lab Results   Component Value Date    LABMICR 54.0 05/09/2023     Lab Results   Component Value Date    PROTEINUA Negative 08/19/2021     Lab Results   Component Value Date    UTPCR 0.12 10/21/2020      Annually Yes   Blood pressure BP Readings from Last 1 Encounters:   03/11/24 112/60    Less than 140/90 Yes   Dilated retinal exam : 06/28/2023 Annually Yes   Foot exam   : 03/11/2024 Annually Yes            Current Assessment & Plan     We will plan to monitor hemoglobin A1c at designated intervals 3 to 6 months.  I recommend ongoing Education for diabetic diet and exercise protocol.  We will continue to monitor for side effects.    Please be advised of symptoms to monitor for and to notify me immediately if persistent or worsening.  Follow up with Ophthalmology/Optometry and Podiatry at least annually.           Lupus anticoagulant syndrome (Chronic)    Overview     LABORATORY:  Lupus anticoagulant weakly positive with weak positivity to   hexagonal phospholipid confirmation test.         Current Assessment & Plan     Patient recently had hospitalization with blood clots in both legs and in her chest on blood thinner.  She is following up with interventional radiology tomorrow.  I recommend that she follow-up with  Hematology.    Previous plan:  Chronic.  Control is uncertain.  Patient does not currently see hematology.  She has not had any recent blood clots.  She remains on Brilinta and Eliquis.  ER precautions.Chronic.  Control is uncertain.  Patient does not currently see hematology.  She has not had any recent blood clots.  She remains on Brilinta and Eliquis.  ER precautions.         Angina pectoris    Overview     Intermittent control.  Patient following closely with Cardiology.         Current Assessment & Plan     Follow-up with Cardiology.  ER precautions for severe symptoms.         Hospital discharge follow-up - Primary    Overview     [facility] DISCHARGE SUMMARY    Patient ID:  Kalpana Wright  [id number]  74 y.o.  1950    Admit Date:  2/29/2024 9:24 PM    Discharge Date:  Discharge Today: 3/5/2024    Admitting Physician:  Rafy Lizama DO    Discharge Physician:  NADYA FREDERICK DO    Consultants/Treatment Team:  Consultants    Provider Service Role Specialty  Chris Rodriges MD Pulmonology Consulting Physician Pulmonary Disease  Tad Bowers MD -- Consulting Physician Pulmonary Disease        Reason for Admission/Admission Diagnoses:  Present on Admission:  Acute respiratory failure with hypoxia and hypercapnia (HCC)  Tobacco abuse  Paroxysmal atrial fibrillation (HCC)  IVC thrombosis (HCC)  ICD (implantable cardioverter-defibrillator) in place  Hypothyroidism  GERD (gastroesophageal reflux disease)  Coronary artery disease involving coronary bypass graft of native heart without angina pectoris  Chronic anemia  Chronic diastolic CHF (congestive heart failure) (HCC)  Essential hypertension  Gastroesophageal reflux disease without esophagitis  COPD exacerbation (HCC)  Chest pain    Discharge Diagnoses:  Active Hospital Problems  Diagnosis Date Noted  Acute respiratory failure with hypoxia and hypercapnia (HCC) 02/29/2024  Chest pain 03/01/2024  Acidosis 03/01/2024  Respiratory acidosis 03/01/2024  COPD exacerbation  ([facility]) 02/29/2024  IVC thrombosis ([facility]) 01/31/2024  Tobacco abuse 12/14/2023  ICD (implantable cardioverter-defibrillator) in place 12/14/2023  Hypothyroidism 12/14/2023  Gastroesophageal reflux disease without esophagitis 07/30/2021  Chronic  Chronic anemia  Chronic  Coronary artery disease involving coronary bypass graft of native heart without angina pectoris 01/29/2019  Chronic  GERD (gastroesophageal reflux disease) 01/10/2019  Paroxysmal atrial fibrillation ([facility]) 01/07/2019  Chronic  Essential hypertension 01/07/2019  Chronic diastolic CHF (congestive heart failure) ([facility]) 03/24/2017    Resolved Hospital Problems    History of Present Illness:  74-year-old female with past medical history of COPD without oxygen requirement home, nicotine abuse, GERD, chronic HFpEF, paroxysmal atrial fibrillation, CAD, hypothyroidism, recent hospitalization and discharge for 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 Patient noted to have increased work of breathing saturating 72% on room air upon presentation to ED. Patient was eventually placed on BiPAP to maintain adequate oxygen saturation. ABG showed evidence of hypoxia and hypercapnia. Patient is alert and 100 x 4 but appears to be a poor story and is currently on BiPAP. Much history provided by ED report. Patient was apparently seen for similar symptoms a couple of days ago at ED and seen again for similar symptoms earlier today at [facility] and discharged home. Patient describes gradually worsening productive cough, and diffuse bodyaches including chest pain and shortness of breath about 3 days duration. Describes body aches as intermittent dull achy pains. Denies inciting relieving factors. Reports compliance to all medications including blood thinners. Patient  with history of recent thrombectomy to bilateral lower extremities for DVTs. Denies fever, chills, diaphoresis. Reports compliance on medications. Does endorse tobacco abuse. Denies alcohol or illicit drug use. Patient currently denies any symptoms including chest pain or shortness of breath. Patient reports feeling at baseline. Currently saturating 100% on BiPAP. CTA ordered by ED and pending. In the ED, patient was given 3 DuoNeb treatments and started on Levaquin with cultures ordered.    Hospital Course and Treatment:  Admission Information    Date & Time  2/29/2024 Provider  Geoff Funk DO Department  [facility] Telemetry [place] Dept. Phone  [phone]          Acute on chronic hypoxic respiratory failure-  -POA, hypoxemic on admission with SaO2 70% on room air. Requiring BiPAP/high FiO2.  -On 2 L O2 on day of discharge. Pt reports she has home O2, but typically doesn't have to use it.    Acute on chronic diastolic HF exacerbation-  -POA, with significant fluid overload, bilateral LE edema, orthopnea and dyspnea. -Placed on IV Lasix during her stay  Echo 12/14/2023-LVEF 55%  -Pt admitted she does not typically take her lasix as directed (10 mg BID), because if she takes the evening dose she has trouble getting up and out of bed before urinating. She reports that she will try to take it as directed now.  -Also instructed pt to monitor her weight daily and take an extra dose of lasix of she sees a 3 pound weight gain in a 24 hr period or 5 pound gain in a 7 day period.    DVT extensive / pulmonary embolism  -solitary-RLL pulmonary embolus based on CTA chest results. Recent extensive workup and procedure by IR on 1/31, successful thrombolysis and thrombectomy of IVC, bilateral common iliac veins.  -Was on Lovenox FD during her stay  -Venous US 2/29- Extensive nonocclusive DVT R common iliac vein and throughout R LE  -Per pulmonology, recommended considering change from eliquis to coumadin. I  spoke with the patient and discussed possibly switching to coumadin, she states that she would perfer to stay on Eliquis at this time.  -Pt to f/u with IR outpatient    COPD exacerbation/COPD end-stage/respiratory acidosis-  -active, heavy smoker presented w SaO2 7% on room air.  -Required BiPAP to maintain adequate SaO2. ABG-pH 7.3, pCO2 58  -Exacerbation resolved    Dispo: Pt stable for DC to home. Plan to f/u with PCP in one week.      I discussed with the patient disease process and treatment.    I have personally seen and examined the patient, Kalpana Wright, in a face to face encounter on the date of discharge.    She is cleared for discharge with instructions to follow up as directed.  Total time in the care and discharge planning of this patient was greater than 30 minutes.    Significant Diagnostic Studies:  Recent Imaging and Procedure Results    Procedure Component Value Ref Range Date/Time  Urine Culture [[id number]] Collected: 03/01/2024 0955  Specimen: Urine, [facility] Cath Updated: 03/03/2024 0926  Micro Culture Result No growth 1 day  Micro Culture Result No growth 2 days  Gram stain [[id number]] Collected: 03/01/2024 1250  Specimen: N/A from Sputum Expectorated Updated: 03/02/2024 1140  Gram Stain Result >25 epithelial cells/LPF.  Gram Stain Result Gram stain suggests poor quality collection. Culture  Gram Stain Result not performed. Please recollect if clinically  Gram Stain Result indicated.  Gram Stain Result Notified: Reina Land RN  Gram Stain Result 03/02/2024 11:36 RJA  CT Angiogram PE Protocol W Contrast [[id number]] Collected: 03/01/2024 0002  Updated: 03/01/2024 0019  Narrative:  REASON FOR EXAM: PE suspected, high prob    TECHNICAL FACTORS: Multiple contiguous axial CT images were obtained of the chest after the administration of intravenous contrast. ASIR was utilized for radiation reduction. 2-D sagittal and coronal reformatted images were performed. 3-D MIP images were  also obtained with  postprocessing performed without the use of an independent workstation under concurrent radiologist supervision.    COMPARISON: February 2, 2024    FINDINGS: Atelectasis or scarring is present within the left lower lobe. Calcified granuloma is also present left lower lobe. A single pulmonary embolus is visualized within the right lower lobar artery extending into the medial basal segmental artery.  There is no evidence of pleural effusion or pneumothorax. The heart is enlarged. Postoperative changes of CABG, pacemaker placement and coronary stent placement are visualized. There is no evidence of lymphadenopathy. Osseous structures are  unremarkable. Extensive atherosclerotic calcifications are present in the abdominal aorta, renal arteries and mesenteric arteries.    Impression:  1. Solitary pulmonary embolus within the right lower lobar artery extending into the medial basal segmental artery.  2. Cardiomegaly with postoperative changes of CABG, pacemaker placement and coronary artery stent placement.  3. Other chronic findings as described above.    Findings were discussed with ER nurse on 3/1/2024 at 12:14 AM.    Electronically signed by Sachin Brito MD on 3/1/2024 12:16 AM    CT Head WO Contrast [[id number]] Collected: 03/01/2024 0000  Updated: 03/01/2024 0006  Narrative:  REASON FOR EXAM: Headache, chronic, new features or increased frequency    TECHNICAL FACTORS: 5 mm contiguous axial, sagittal and coronal CT images were obtained from the foramen magnum to the skull vertex. ASIR was utilized for radiation reduction.    COMPARISON: February 3, 2024    FINDINGS: The ventricles are normal in size and position. Sulcal prominence is present. There is no evidence of acute intracranial hemorrhage or infarct. There is no evidence of mass, mass effect, or midline shift. Postoperative changes of bilateral lens  replacement are identified. Fluid is present dependently in the sphenoid sinus. Osseous structures are  unremarkable.    Impression:  1. No acute intracranial abnormality.  2. Involutional changes.  3. Sphenoid sinusitis.        Electronically signed by Sachin Brito MD on 3/1/2024 12:02 AM          Surgical Procedures during this visit:    Patient's Condition On Discharge:  Good    Physical Exam  Constitutional:  General: She is not in acute distress.  Appearance: She is normal weight. She is not ill-appearing, toxic-appearing or diaphoretic.  HENT:  Head: Normocephalic and atraumatic.  Right Ear: External ear normal.  Left Ear: External ear normal.  Nose: Nose normal.  Mouth/Throat:  Mouth: Mucous membranes are moist.  Pharynx: No oropharyngeal exudate.  Eyes:  General: No scleral icterus.  Extraocular Movements: Extraocular movements intact.  Pupils: Pupils are equal, round, and reactive to light.  Cardiovascular:  Rate and Rhythm: Normal rate and regular rhythm.  Pulses: Normal pulses.  Heart sounds: Normal heart sounds.  Pulmonary:  Effort: Pulmonary effort is normal. No respiratory distress.  Breath sounds: Normal breath sounds. No wheezing.  Abdominal:  General: Abdomen is flat. Bowel sounds are normal. There is no distension.  Palpations: Abdomen is soft.  Musculoskeletal:  Right lower leg: No edema.  Left lower leg: No edema.  Skin:  General: Skin is warm and dry.  Coloration: Skin is not jaundiced.  Findings: No bruising.  Neurological:  General: No focal deficit present.  Mental Status: She is alert and oriented to person, place, and time.  Psychiatric:  Mood and Affect: Mood normal.  Behavior: Behavior normal.    Discharge Disposition:  Order for Discharge (From admission, onward)    Start Ordered  03/05/24 1136 Discharge Disposition to: Home Health Once  Expected Discharge Date: 03/05/24    Question: Discharge Disposition to Answer: Home Health  03/05/24 1136  03/05/24 0000 Follow-up with PCP Schedule for: 1; Weeks  Question Answer Comment  Schedule for 1  when Weeks    03/05/24  1136                Discharge Orders:    Future Appointments:  Future Appointments    Provider Department Dept Phone Center  3/12/2024 9:00 AM Catracho Ghosh MD [facility] [phone] [place]        Immunizations Administered for This Admission    No immunizations given this admission.          Medication List      START taking these medications    levoFLOXacin 500 MG Tab tablet  Commonly known as: LEVAQUIN  Take 1 tablet (500 mg total) by mouth daily for 2 days          CONTINUE taking these medications    alendronate 70 MG Tab tablet  Commonly known as: FOSAMAX    amitriptyline 75 MG Tab tablet  Commonly known as: ELAVIL    apixaban 5 mg Tab  Commonly known as: ELIQUIS  Take 1 tablet (5 mg total) by mouth 2 (two) times daily    benazepriL 40 MG Tab tablet  Commonly known as: LOTENSIN    bisoprolol 10 MG Tab tablet  Commonly known as: ZEBETA    esomeprazole 40 MG Cpdr capsule  Commonly known as: NexIUM    ferrous sulfate 325 (65 FE) MG Tbec EC tablet    furosemide 40 MG Tab tablet  Commonly known as: LASIX  Take 1 tablet (40 mg total) by mouth as needed (weight gain or fluid retention)    ipratropium-albuteroL 0.5 mg-3 mg(2.5 mg base)/3 mL Nebu nebulizer solution  Commonly known as: DUONEB    isosorbide mononitrate 30 MG Tb24 24 hr tablet  Commonly known as: IMDUR    levothyroxine 75 MCG Tab tablet  Commonly known as: SYNTHROID    nebulizer and compressor Yisel  Use as directed    NIFEdipine 30 MG (OSM) Tr24 24 hr tablet  Commonly known as: PROCARDIA-XL  Take 1 tablet (30 mg total) by mouth daily    nitroglycerin 0.4 MG Subl SL tablet  Commonly known as: NITROSTAT    oxyCODONE-acetaminophen  mg Tab per tablet  Commonly known as: PERCOCET    potassium chloride 10 mEq Cper CR capsule  Commonly known as: MICRO-K    pramipexole 0.5 MG Tab tablet  Commonly known as: MIRAPEX    ranolazine 1,000 mg Tb12 SR tablet  Commonly known as: RANEXA    rOPINIRole 5 MG Tab tablet  Commonly known  as: REQUIP    sucralfate 1 gram Tab tablet  Commonly known as: CARAFATE    temazepam 30 mg Cap capsule  Commonly known as: RESTORIL    ticagrelor 90 mg Tab  Commonly known as: BRILINTA    Trelegy Ellipta 100-62.5-25 mcg Dsdv inhaler  Generic drug: fluticasone-umeclidin-vilanter  Inhale 1 puff into the lungs daily    Ventolin HFA 90 mcg/actuation Hfaa inhaler  Generic drug: albuterol            Where to Get Your Medications      These medications were sent to [facility] [place] [place] [facility] [address] [facility] [address]    Phone: [phone]  levoFLOXacin 500 MG Tab tablet      Discharge Orders    Future Labs/Procedures Expected by Expires  Activity as tolerated As directed  Diet (Select type) Cardiac/Low Chol/IZABELLA As directed  Questions:  Diet Type: Cardiac/Low Chol/IZABELLA  Restriction(s):  Solid Consistency:  Liquid Consistency:  Sodium Restriction:  Fluid restriction:  Follow-up with PCP Schedule for: 1; Weeks As directed  Questions:  Schedule for: 1  when: Weeks        Dr. Cortez Funk D.O.  [facility]  3/5/2024 11:37 AM      Electronically signed by Geoff Funk DO at 03/05/2024 11:56 AM CST          Current Assessment & Plan     Transitional Care Note    Family and/or Caretaker present at visit?  No.  Diagnostic tests reviewed/disposition: I have reviewed all completed as well as pending diagnostic tests at the time of discharge.  Disease/illness education: DVT  Home health/community services discussion/referrals: Patient has home health established at [facility] .   Establishment or re-establishment of referral orders for community resources: No other necessary community resources.   Discussion with other health care providers: No discussion with other health care providers necessary.                Stage 3b chronic kidney disease    Overview     Chronic.  Intermittent control.  Following with Nephrology.         Current Assessment & Plan     Follow-up with  "Nephrology.  Increase hydration with water.  Avoid anti-inflammatory medication.             Review of patient's allergies indicates:   Allergen Reactions    Atorvastatin Other (See Comments)     Severe muscle pain  Other reaction(s): Abdominal Pain  SEVERE MYALGIAS      Azithromycin Other (See Comments)     By shot, closed veins and made large bruises  By shot, closed veins and made large bruises      Latex, natural rubber Rash     "TOURNIQUET ON LEG LEFT HUGE BLISTER THAT TOOK 9 MONTHS OF WOUND CARE TO HEAL."    Meperidine Hives and Anaphylaxis     Other reaction(s): Anaphylaxis  Hives (skin)^  Hives (skin)^      Penicillins Anaphylaxis and Hives     Other reaction(s): Anaphylaxis  Shortness of breath^swelling  Shortness of breath and swelling  Anaphylaxis  Shortness of breath^swelling      Tofacitinib Rash and Swelling     Rash and swelling of face      Hydralazine analogues Other (See Comments)     Difficulty swallowing and vomiting.    Lorazepam Anxiety and Other (See Comments)     Made me goofy  CAUSED CONFUSION "Made me goofy."      Pravastatin Other (See Comments)     Severe myalgias.  Cramps/Severe myalgias.    Flu vac 2018 [id number](pf)      Other reaction(s): Other (See Comments)  Flip into aFIB    Nystatin Rash    Pepper (genus capsicum) Other (See Comments)     Reflux and ulcers     Current Outpatient Medications   Medication Instructions    albuterol-ipratropium (DUO-NEB) 2.5 mg-0.5 mg/3 mL nebulizer solution INHALE 1 CONTENTS OF VIAL VIA NEBULIZER EVERY 6 HOURS WHILE AWAKE.    alendronate (FOSAMAX) 70 MG tablet TAKE 1 TABLET BY MOUTH ONCE WEEKLY ON SAME DAY IN THE MORNING WITH GLASS OF WATER, REMAIN UPRIGHT FOR 30 MIN.    amitriptyline (ELAVIL) 100 mg, Oral, Nightly    amLODIPine (NORVASC) 10 mg, Oral, Daily    apixaban (ELIQUIS) 5 mg, Oral, 2 times daily    benazepriL (LOTENSIN) 40 MG tablet TAKE ONE TABLET BY MOUTH DAILY    bisoprolol (ZEBETA) 10 mg, Oral, Daily    BRILINTA 90 mg tablet TAKE ONE " TABLET BY MOUTH TWICE DAILY    chlorhexidine (PERIDEX) 0.12 % solution 15 mLs    cyclobenzaprine (FLEXERIL) 10 MG tablet TAKE ONE TABLET BY MOUTH TWICE DAILY AS NEEDED    ergocalciferol (ERGOCALCIFEROL) 50,000 unit Cap Vitamin D2 1,250 mcg (50,000 unit) capsule<BR> Take 1 capsule every week by oral route.    esomeprazole (NEXIUM) 40 mg, Oral, 2 times daily before meals    ferrous sulfate 325 mg, Oral, Daily    fluorometholone 0.1% (FML) 0.1 % DrpS Both Eyes    fluticasone/umeclidin/vilanter (TRELEGY ELLIPTA INHL) 1 Inhaler, Inhalation, Daily    furosemide (LASIX) 40 mg, Oral, 2 times daily    gabapentin (NEURONTIN) 600 MG tablet TAKE ONE TABLET BY MOUTH THREE TIMES DAILY.    hydroCHLOROthiazide (HYDRODIURIL) 25 MG tablet TAKE ONE TABLET BY MOUTH DAILY    isosorbide mononitrate (IMDUR) 30 mg, Oral, 2 times daily    levothyroxine (SYNTHROID) 75 MCG tablet TAKE ONE TABLET BY MOUTH IN THE MORNING BEFORE BREAKFAST    miconazole NITRATE 2 % (ZEASORB, MICONAZOLE,) 2 % top powder Topical (Top), As needed (PRN)    mupirocin (BACTROBAN) 2 % ointment mupirocin 2 % topical ointment    nitroGLYCERIN (NITROSTAT) 0.4 mg, Sublingual, Every 5 min PRN    oxyCODONE-acetaminophen (PERCOCET)  mg per tablet 1 tablet, Oral, Every 12 hours PRN    potassium chloride (MICRO-K) 10 MEQ CpSR TAKE TWO CAPSULES BY MOUTH IN THE MORNING AND ONE IN THE EVENING AS DIRECTED.    pramipexole (MIRAPEX) 0.5 MG tablet TAKE  ONE TABLET BY MOUTH DAILY    promethazine (PHENERGAN) 25 mg, Oral, Every 6 hours PRN    ranolazine (RANEXA) 1,000 mg, Oral, 2 times daily    rOPINIRole (REQUIP) 5 MG tablet TAKE  ONE TABLET BY MOUTH DAILY    temazepam (RESTORIL) 30 mg, Oral, Nightly    tobramycin-dexAMETHasone 0.3-0.1% (TOBRADEX) 0.3-0.1 % DrpS No dose, route, or frequency recorded.    umeclidinium-vilanteroL (ANORO ELLIPTA) 62.5-25 mcg/actuation DsDv 1 puff, Inhalation, Daily, Controller     VENTOLIN HFA 90 mcg/actuation inhaler Inhale 2 puff(s) every 4 hours by  "inhalation route.      I have reviewed the PMH, social history, FamilyHx, surgical history, allergies and medications documented / confirmed by the patient at the time of this visit.  Review of Systems   Constitutional:  Positive for fatigue. Negative for chills, fever and unexpected weight change.   HENT:  Negative for ear pain and sore throat.    Eyes:  Negative for redness and visual disturbance.   Respiratory:  Negative for cough and shortness of breath.    Cardiovascular:  Negative for chest pain and palpitations.   Gastrointestinal:  Positive for abdominal pain. Negative for abdominal distention, anal bleeding, blood in stool, constipation, diarrhea, nausea, rectal pain and vomiting.   Genitourinary:  Negative for difficulty urinating and hematuria.   Musculoskeletal:  Positive for arthralgias and back pain. Negative for myalgias.   Skin:  Negative for rash and wound.   Neurological:  Positive for weakness. Negative for headaches.   Hematological:  Bruises/bleeds easily.   Psychiatric/Behavioral:  Negative for sleep disturbance. The patient is not nervous/anxious.      Objective:   /60   Pulse 70   Ht 5' 4" (1.626 m)   Wt 76.2 kg (167 lb 14.4 oz)   LMP  (LMP Unknown)   SpO2 98%   BMI 28.82 kg/m²   Physical Exam  Vitals and nursing note reviewed.   Constitutional:       General: She is not in acute distress.     Appearance: She is well-developed.      Comments: Sitting in wheelchair no acute distress   HENT:      Head: Normocephalic and atraumatic.   Eyes:      Pupils: Pupils are equal, round, and reactive to light.   Cardiovascular:      Rate and Rhythm: Normal rate and regular rhythm.      Heart sounds: Normal heart sounds. No murmur heard.     Comments: Pacemaker present  Pulmonary:      Effort: Pulmonary effort is normal. No respiratory distress.      Breath sounds: Normal breath sounds. No wheezing.   Abdominal:      General: Bowel sounds are normal. There is no distension.      Palpations: " Abdomen is soft. There is no mass.      Tenderness: There is no abdominal tenderness. There is no right CVA tenderness, left CVA tenderness or guarding.      Hernia: No hernia is present.   Musculoskeletal:         General: Tenderness present. Normal range of motion.      Cervical back: Normal range of motion and neck supple.   Skin:     General: Skin is warm and dry.      Capillary Refill: Capillary refill takes less than 2 seconds.      Findings: Bruising present.   Neurological:      Mental Status: She is alert and oriented to person, place, and time.      Cranial Nerves: No cranial nerve deficit.   Psychiatric:         Behavior: Behavior normal.       Physical Exam      Results  CHRONIC. Stable. Compliant with medications for managed conditions. See medication list. No SE reported.   Routine lab analysis is being monitored. Refills were addressed.  Lab Results   Component Value Date    WBC 6.7 01/31/2024    HGB 7.7 (L) 02/02/2024    HCT 24.8 (L) 02/02/2024    MCV 86.6 01/31/2024     01/31/2024         Chemistry        Component Value Date/Time     03/05/2024 0339     05/05/2023 1020    K 3.6 03/05/2024 0339    K 4.0 05/05/2023 1020    CL 99 05/05/2023 1020    CO2 32 03/05/2024 0339    CO2 30 (H) 05/05/2023 1020    BUN 11 03/05/2024 0339    BUN 31 (H) 05/05/2023 1020    CREATININE 0.98 03/05/2024 0339    CREATININE 1.2 05/05/2023 1020    GLU 88 05/05/2023 1020        Component Value Date/Time    CALCIUM 9.0 03/05/2024 0339    CALCIUM 9.3 05/05/2023 1020    ALKPHOS 63 03/05/2024 0339    ALKPHOS 78 05/05/2023 1020    AST 15 03/05/2024 0339    AST 13 05/05/2023 1020    ALT <5 03/05/2024 0339    ALT 7 (L) 05/05/2023 1020    BILITOT 0.3 (L) 03/05/2024 0339    BILITOT 0.3 05/05/2023 1020    ESTGFRAFRICA >60.0 08/19/2021 1458    ESTGFRAFRICA >60.0 08/19/2021 1458    EGFRNONAA >60.0 08/19/2021 1458    EGFRNONAA >60.0 08/19/2021 1458          Lab Results   Component Value Date    TSH 2.462 05/09/2023     U3VQQGR 8.5 08/08/2019    FREET4 0.86 07/01/2020    T3FREE 2.6 08/08/2019         Assessment:     1. Hospital discharge follow-up    2. Encounter for long-term (current) use of medications    3. Primary insomnia    4. Anxiety    5. Pulmonary emphysema, unspecified emphysema type    6. Stage 3b chronic kidney disease    7. Type 2 diabetes mellitus with diabetic peripheral angiopathy without gangrene, without long-term current use of insulin    8. Chronic respiratory failure with hypercapnia    9. Lupus anticoagulant syndrome    10. Angina pectoris      MDM:     Total time: 31 minutes.  This includes total time spent on the encounter, which includes face to face time and non-face to face time preparing to see the patient (eg, review of previous medical records, tests), Obtaining and/or reviewing separately obtained history, documenting clinical information in the electronic or other health record, independently interpreting results (not separately reported)/communicating results to the patient/family/caregiver, and/or care coordination (not separately reported).    I have Reviewed and summarized old records.  I have performed thorough medication reconciliation today and discussed risk and benefits of medications.  I have reviewed labs and discussed with patient.  All questions were answered.  I am requesting old records and will review them once they are available.    I have signed for the following orders AND/OR meds.  Orders Placed This Encounter   Procedures    Ambulatory referral/consult to Outpatient Case Management     Referral Priority:   Routine     Referral Type:   Consultation     Referral Reason:   Specialty Services Required     Number of Visits Requested:   1     Medications Ordered This Encounter   Medications    amitriptyline (ELAVIL) 100 MG tablet     Sig: Take 1 tablet (100 mg total) by mouth every evening.     Dispense:  90 tablet     Refill:  4    promethazine (PHENERGAN) 25 MG tablet     Sig: Take  1 tablet (25 mg total) by mouth every 6 (six) hours as needed.     Dispense:  45 tablet     Refill:  4    temazepam (RESTORIL) 30 mg capsule     Sig: Take 1 capsule (30 mg total) by mouth every evening.     Dispense:  30 capsule     Refill:  5    umeclidinium-vilanteroL (ANORO ELLIPTA) 62.5-25 mcg/actuation DsDv     Sig: Inhale 1 puff into the lungs once daily. Controller     Dispense:  180 each     Refill:  4        Follow up in about 6 months (around 9/11/2024), or if symptoms worsen or fail to improve, for Med refills, LAB RESULTS.  Future Appointments       Date Provider Specialty Appt Notes    3/26/2024  Cardiology Device check/Pacemaker    9/11/2024  Lab     9/18/2024 Lien Blanco, NP Family Medicine 6 month follow up          If no improvement in symptoms or symptoms worsen, advised to call/follow-up at clinic or go to ER. Patient voiced understanding and all questions/concerns were addressed.   DISCLAIMER: This note was compiled by using a speech recognition dictation system and therefore please be aware that typographical / speech recognition errors can and do occur.  Please contact me if you see any errors specifically.  Consent was obtained for VERNA recording system prior to the visit.    Adan Woodward M.D.       Office: [phone]   [address] [place] [address]  FAX: [phone]  Visit today included increased complexity associated with the care of the episodic problem  addressed and managing the longitudinal care of the patient due to the serious and/or complex managed problem(s).    .

## 2024-03-11 NOTE — ASSESSMENT & PLAN NOTE
Increase amitriptyline to 100 milligrams.    Please be advised of condition course.  SNRI/SSRI is first-line treatment for this condition.  Please be advised of the risk of discontinuing this medication without tapering/contacting MD.  Patient has been advised of side effects, and all questions were answered.  Patient voiced understanding.  Patient will follow up routinely and notify us if having any side effects or worsening or persistent symptoms.  ER precautions were given. Antidepressant/Antianxiety Medication Initiation:  Patient informed of risks, benefits, and potential side effects of medication and accepts informed consent.  Common side effects include nausea, fatigue, headache, insomnia, etc see medication insert for complete side effect profile.  Most importantly be advised of the possibility of new or worsening suicidal thoughts/depression/anxiety etcetera.  Please be advised to stop the medication immediately and seek urgent treatment if this occurs.  Therefore please do not to abruptly discontinue medication without physician guidance except in cases of sudden onset or worsening of SI.

## 2024-03-11 NOTE — ASSESSMENT & PLAN NOTE
Patient recently had hospitalization with blood clots in both legs and in her chest on blood thinner.  She is following up with interventional radiology tomorrow.  I recommend that she follow-up with Hematology.    Previous plan:  Chronic.  Control is uncertain.  Patient does not currently see hematology.  She has not had any recent blood clots.  She remains on Brilinta and Eliquis.  ER precautions.Chronic.  Control is uncertain.  Patient does not currently see hematology.  She has not had any recent blood clots.  She remains on Brilinta and Eliquis.  ER precautions.

## 2024-03-11 NOTE — ASSESSMENT & PLAN NOTE
Transitional Care Note    Family and/or Caretaker present at visit?  No.  Diagnostic tests reviewed/disposition: I have reviewed all completed as well as pending diagnostic tests at the time of discharge.  Disease/illness education: DVT  Home health/community services discussion/referrals: Patient has home health established at [facility] .   Establishment or re-establishment of referral orders for community resources: No other necessary community resources.   Discussion with other health care providers: No discussion with other health care providers necessary.

## 2024-03-11 NOTE — PATIENT INSTRUCTIONS
Follow up in about 6 months (around 9/11/2024), or if symptoms worsen or fail to improve, for Med refills, LAB RESULTS.     Dear patient,   As a result of recent federal legislation (The Federal Cures Act), you may receive lab or pathology results from your visit in your [facility] account before your physician is able to contact you. Your physician or their representative will relay the results to you with their recommendations at their soonest availability.     If no improvement in symptoms or symptoms worsen, please be advised to call MD, follow-up at clinic and/or go to ER if becomes severe.    Adan Woodward M.D.        We Offer TELEHEALTH & Same Day Appointments!   Book your Telehealth appointment with me through my nurse or   Clinic appointments on [employer]!    [address] [place] [address]    Office: [phone]   FAX: [phone]    Check out my Facebook Page and Follow Me at: [email or web address]    Check out my website at [employer] by clicking on: [email or web address]    To Schedule appointments online, go to [employer]: [email or web address]

## 2024-03-12 DIAGNOSIS — F51.01 PRIMARY INSOMNIA: Chronic | ICD-10-CM

## 2024-03-12 DIAGNOSIS — J43.9 PULMONARY EMPHYSEMA, UNSPECIFIED EMPHYSEMA TYPE: Chronic | ICD-10-CM

## 2024-03-12 DIAGNOSIS — F41.9 ANXIETY: ICD-10-CM

## 2024-03-12 DIAGNOSIS — Z79.899 ENCOUNTER FOR LONG-TERM (CURRENT) USE OF MEDICATIONS: Chronic | ICD-10-CM

## 2024-03-12 RX ORDER — AMITRIPTYLINE HYDROCHLORIDE 100 MG/1
100 TABLET ORAL NIGHTLY
Qty: 90 TABLET | Refills: 4 | Status: SHIPPED | OUTPATIENT
Start: 2024-03-12 | End: 2024-06-19

## 2024-03-12 RX ORDER — PROMETHAZINE HYDROCHLORIDE 25 MG/1
25 TABLET ORAL EVERY 6 HOURS PRN
Qty: 45 TABLET | Refills: 4 | Status: SHIPPED | OUTPATIENT
Start: 2024-03-12 | End: 2024-06-06 | Stop reason: SDUPTHER

## 2024-03-12 RX ORDER — TEMAZEPAM 30 MG/1
30 CAPSULE ORAL NIGHTLY
Qty: 30 CAPSULE | Refills: 5 | Status: SHIPPED | OUTPATIENT
Start: 2024-03-12 | End: 2024-05-22

## 2024-03-12 NOTE — TELEPHONE ENCOUNTER
Care Due:                  Date            Visit Type   Department     Provider  --------------------------------------------------------------------------------                                EP -                              PRIMARY      [facility] FAMILY  Last Visit: 03-      CARE ([facility])   MEDICINE       Adan Woodward  Next Visit: None Scheduled  None         None Found                                                            Last  Test          Frequency    Reason                     Performed    Due Date  --------------------------------------------------------------------------------    CMP.........  12 months..  alendronate, benazepriL,   05- 04-                             hydroCHLOROthiazide......    Mg Level....  12 months..  alendronate..............  Not Found    Overdue    Phosphate...  12 months..  alendronate..............  Not Found    Overdue    Vitamin D...  12 months..  alendronate..............  Not Found    Overdue    [employer] Embedded Care Due Messages. Reference number: [id number].   3/12/2024 12:38:28 PM CDT

## 2024-03-12 NOTE — TELEPHONE ENCOUNTER
----- Message from Becca Blanco sent at 3/12/2024 10:56 AM CDT -----  Contact: MARJORIE VARGAS [[id number]]  ..Type:  Patient Requesting Call    Who Called: MARJORIE VARGAS [[id number]]  Does the patient know what this is regarding?: [facility], can't transfer scripts to [facility]   Would the patient rather a call back or a response via [facility]? Call   Best Call Back Number: .[phone] (home)   Additional Information:      [facility] [place] [address]  [address]  [facility] [address]  Phone: [phone] Fax: [phone]

## 2024-03-12 NOTE — TELEPHONE ENCOUNTER
----- Message from Keeley Chilel sent at 3/12/2024  9:29 AM CDT -----  Contact: Pt  Pt is calling rg her medications that were called in on yesterday. Pt states that they were called in to [facility] and states that [facility] can't transfer them and needs to have the Dr's office call them in to [facility] & can be reached at [phone]/ wants to have them called in this morning/ pt's daughter is out picking up meds and wnts to have these meds called in for her to  //thanks/[facility] [place] [facility] [address] [facility]  [address] [facility] [address]  Phone: [phone] Fax: [phone]

## 2024-03-13 ENCOUNTER — DOCUMENT SCAN (OUTPATIENT)
Dept: HOME HEALTH SERVICES | Facility: HOSPITAL | Age: 74
End: 2024-03-13
Payer: MEDICARE

## 2024-03-14 ENCOUNTER — EXTERNAL HOME HEALTH (OUTPATIENT)
Dept: HOME HEALTH SERVICES | Facility: HOSPITAL | Age: 74
End: 2024-03-14
Payer: MEDICARE

## 2024-03-19 ENCOUNTER — TELEPHONE (OUTPATIENT)
Dept: FAMILY MEDICINE | Facility: CLINIC | Age: 74
End: 2024-03-19
Payer: MEDICARE

## 2024-03-19 NOTE — TELEPHONE ENCOUNTER
Called pt regarding fax received from [facility] stating pt was still having UTI symptoms even after finishing abx. I asked if patient could bring another urine sample, stated she could not because she does not have a vehicle at the moment. Stated HH comes backfor a visit tomorrow. I let pt know to make sure hh gets a urine sample from her. Pt verbalized understanding. I faxed the UA order back to [facility] today.

## 2024-03-20 ENCOUNTER — LAB VISIT (OUTPATIENT)
Dept: LAB | Facility: HOSPITAL | Age: 74
End: 2024-03-20
Attending: FAMILY MEDICINE
Payer: MEDICARE

## 2024-03-20 ENCOUNTER — OUTPATIENT CASE MANAGEMENT (OUTPATIENT)
Dept: ADMINISTRATIVE | Facility: OTHER | Age: 74
End: 2024-03-20
Payer: MEDICARE

## 2024-03-20 DIAGNOSIS — R82.90 NONSPECIFIC FINDING ON EXAMINATION OF URINE: Primary | ICD-10-CM

## 2024-03-20 LAB
BILIRUB UR QL STRIP: NEGATIVE
CLARITY UR REFRACT.AUTO: ABNORMAL
COLOR UR AUTO: YELLOW
GLUCOSE UR QL STRIP: NEGATIVE
HGB UR QL STRIP: NEGATIVE
KETONES UR QL STRIP: NEGATIVE
LEUKOCYTE ESTERASE UR QL STRIP: NEGATIVE
NITRITE UR QL STRIP: NEGATIVE
PH UR STRIP: 6 [PH] (ref 5–8)
PROT UR QL STRIP: ABNORMAL
SP GR UR STRIP: 1.02 (ref 1–1.03)
URN SPEC COLLECT METH UR: ABNORMAL

## 2024-03-20 PROCEDURE — 81003 URINALYSIS AUTO W/O SCOPE: CPT | Performed by: FAMILY MEDICINE

## 2024-03-20 NOTE — PROGRESS NOTES
Outpatient Care Management  Initial Patient Assessment    Patient: Kalpana Wright  MRN: [id number]  Date of Service: 03/20/2024  Completed by: Vladislav Najera RN  %Referral Date: 03/11/2024  Date of Eligibility: 3/12/2024  Program: High Risk  Status: Ongoing  Effective Dates: 3/20/2024 - present  Responsible Staff: Vladislav Najera RN        Reason for Visit   Patient presents with    Nursing Assessment    OPCM Enrollment Call       Brief Summary:  Kalpana Wright was referred by Dr. Woodward  for COPD. Patient qualifies for program based on her risk score of 97%.   Active problem list, medical, surgical and social history reviewed. Areas of need identified by patient include COPD education and management.   Next steps: mail welcome letter  Send COPD education   Follow up in one week    Disability Status  Hearing Difficulty or Deaf: yes  Visual Difficulty or Blind: yes  Visual and Hearing Needs Conclusion: hard of hearing, wears glasses for every day use  Difficulty Concentrating, Remembering or Making Decisions: no  Communication Difficulty: no  Eating/Swallowing Difficulty: no  Walking or Climbing Stairs Difficulty: yes  Walking or Climbing Stairs: ambulation difficulty, requires equipment  Mobility Management: cane  Dressing/Bathing Difficulty: no  Toileting : Independent  Continence : Continence - Not a problem  Difficulty Managing Errands Independently: yes (has a truck but it is being fixed at this time)  Equipment Currently Used at Home: nebulizer; rollator  ADL Conclusion Statement: pt states she is able to do all of her ADLs independently        Spiritual Beliefs  Spiritual, Cultural Beliefs, Holiness Practices, Values that Affect Care: no      Social History     Socioeconomic History    Marital status:    Tobacco Use    Smoking status: Every Day     Current packs/day: 0.25     Average packs/day: 0.3 packs/day for 45.0 years (11.3 ttl pk-yrs)     Types: Cigarettes    Smokeless tobacco: Never    Tobacco  comments:     43 years smoked - quit past 7 years    Substance and Sexual Activity    Alcohol use: No    Drug use: Never    Sexual activity: Not Currently     Partners: Male     Birth control/protection: Post-menopausal     Social Determinants of Health     Financial Resource Strain: Low Risk  (3/20/2024)    Overall Financial Resource Strain (CARDIA)     Difficulty of Paying Living Expenses: Not very hard   Food Insecurity: No Food Insecurity (3/20/2024)    Hunger Vital Sign     Worried About Running Out of Food in the Last Year: Never true     Ran Out of Food in the Last Year: Never true   Transportation Needs: Unmet Transportation Needs (3/20/2024)    PRAPARE - Transportation     Lack of Transportation (Medical): Yes     Lack of Transportation (Non-Medical): No   Physical Activity: Inactive (3/20/2024)    Exercise Vital Sign     Days of Exercise per Week: 0 days     Minutes of Exercise per Session: 0 min   Stress: No Stress Concern Present (3/20/2024)    [employer] - Occupational Stress Questionnaire     Feeling of Stress : Not at all   Social Connections: Socially Isolated (3/20/2024)    Social Connection and Isolation Panel [NHANES]     Frequency of Communication with Friends and Family: More than three times a week     Frequency of Social Gatherings with Friends and Family: More than three times a week     Attends Scientologist Services: Never     Active Member of Clubs or Organizations: No     Attends Club or Organization Meetings: Never     Marital Status:    Housing Stability: Low Risk  (3/20/2024)    Housing Stability Vital Sign     Unable to Pay for Housing in the Last Year: No     Number of Places Lived in the Last Year: 2     Unstable Housing in the Last Year: No       Roles and Relationships  Primary Source of Support/Comfort: child(chu)  Name of Support/Comfort Primary Source: Kendra      Advance Directives (For Healthcare)  Advance Directive  (If Adv Dir status is  received, view document under Adv Dir in header or Chart Review Media tab): Patient does not have Advance Directive, declines information.        Patient Reported Insurance  Verified current insurance plan:: Medicare            3/20/2024    12:20 PM 3/11/2024     1:19 PM 5/10/2023     3:34 PM 5/9/2023     8:27 AM 11/5/2019     8:50 AM 11/5/2019     8:49 AM 9/5/2019     8:44 AM   Depression Patient Health Questionnaire   Over the last two weeks how often have you been bothered by little interest or pleasure in doing things Not at all Not at all Not at all Not at all More than half the days More than half the days More than half the days   Over the last two weeks how often have you been bothered by feeling down, depressed or hopeless Not at all Not at all Not at all Not at all More than half the days More than half the days More than half the days   PHQ-2 Total Score 0 0 0 0 4 4 4   Over the last two weeks how often have you been bothered by trouble falling or staying asleep, or sleeping too much      More than half the days More than half the days   Over the last two weeks how often have you been bothered by feeling tired or having little energy      More than half the days More than half the days   Over the last two weeks how often have you been bothered by a poor appetite or overeating      More than half the days More than half the days   Over the last two weeks how often have you been bothered by feeling bad about yourself - or that you are a failure or have let yourself or your family down      More than half the days More than half the days   Over the last two weeks how often have you been bothered by trouble concentrating on things, such as reading the newspaper or watching television      More than half the days More than half the days   Over the last two weeks how often have you been bothered by moving or speaking so slowly that other people could have noticed. Or the opposite - being so fidgety or restless  that you have been moving around a lot more than usual.      More than half the days More than half the days   Over the last two weeks how often have you been bothered by thoughts that you would be better off dead, or of hurting yourself      More than half the days More than half the days   If you checked off any problems, how difficult have these problems made it for you to do your work, take care of things at home or get along with other people?      Very difficult Somewhat difficult   PHQ-9 Score      18 18       Learning Assessment       03/20/2024 1232 [facility] (3/20/2024 - Present)   Created by Vladislav Najera, RN -  (Nurse) Status: Complete                 PRIMARY LEARNER     Primary Learner Name:  Kalpana Wright  - 03/20/2024 1232    Relationship:  Patient  - 03/20/2024 1232    Does the primary learner have any barriers to learning?:  No Barriers  - 03/20/2024 1232    What is the preferred language of the primary learner?:  English  - 03/20/2024 1232    Is an  required?:  No  - 03/20/2024 1232    How does the primary learner prefer to learn new concepts?:  Listening, Reading  - 03/20/2024 1232    How often do you need to have someone help you read instructions, pamphlets, or written material from your doctor or pharmacy?:  Never  - 03/20/2024 1232        CO-LEARNER #1     No question answered        CO-LEARNER #2     No question answered        SPECIAL TOPICS     No question answered        ANSWERED BY:     No question answered        Edit History       Vladislav Najera, RN -  (Nurse)   03/20/2024 1232

## 2024-03-20 NOTE — LETTER
March 20, 2024     Kalpana Wright  [address]  [place] [address]        Dear Kalpana,    Welcome to [facility] Complex Care Management Program.  It was a pleasure talking with you today.  My name is Vladislav Najera, and I look forward to being your Care Manager.  My goal is to help you function at the healthiest and highest level possible.  You can contact me directly at [phone].    As an [facility] patient, some of the services we may be able to provide include:     Development of an individualized care plan with a Registered Nurse   Connection with a   Connection with available resources and services    Coordinate communication among your care team members   Provide coaching and education   Help you understand your doctors treatment plan  Help you obtain information about your insurance coverage.     All services provided by [facility] Complex Care Managers and other care team members are coordinated with and communicated to your primary care team.      As part of your enrollment, you will be receiving education materials and more information about these services in your [facility] account, by phone or through the mail.  If you do not wish to participate or receive information, please contact our office at [phone].      Sincerely,        Vladislav Najera, RN  [facility]   Out-patient RN Complex Care Manager

## 2024-03-21 NOTE — PROGRESS NOTES
Please call to make sure patient get the results.    [phone]    Negative for infection.  Positive for dehydration.  Increase hydration with water.  Patient should follow-up if still having symptoms.  Follow-up/establish care with Urology also if having issues.

## 2024-03-26 ENCOUNTER — TELEPHONE (OUTPATIENT)
Dept: FAMILY MEDICINE | Facility: CLINIC | Age: 74
End: 2024-03-26
Payer: MEDICARE

## 2024-03-26 NOTE — TELEPHONE ENCOUNTER
----- Message from Adan Woodward MD sent at 3/26/2024  9:57 AM CDT -----  Regarding: RE: unsigned orders  I do not have any un signed orders in my basket for home health.  If this came through fax then it may be in the return folder to you.  Did you send this through [facility]?  ----- Message -----  From: Thierry Ellington  Sent: 3/26/2024   7:06 AM CDT  To: Adan Woodward MD  Subject: unsigned orders                                  Hi Dr Woodward,   Can you please sign this time sensitve order #[id number] or please advise if this order will not be signed. Thanks

## 2024-03-26 NOTE — TELEPHONE ENCOUNTER
What is the order for? Maybe it will help jog my memory if we got it through fax. As of right now, I do not have anything on her.

## 2024-03-27 ENCOUNTER — TELEPHONE (OUTPATIENT)
Dept: CARDIOLOGY | Facility: HOSPITAL | Age: 74
End: 2024-03-27
Payer: MEDICARE

## 2024-03-27 DIAGNOSIS — Z95.1 HX OF CABG: ICD-10-CM

## 2024-03-27 DIAGNOSIS — I20.9 ANGINA PECTORIS: ICD-10-CM

## 2024-03-27 DIAGNOSIS — I25.810 CORONARY ARTERY DISEASE INVOLVING CORONARY BYPASS GRAFT OF NATIVE HEART WITHOUT ANGINA PECTORIS: ICD-10-CM

## 2024-03-28 ENCOUNTER — DOCUMENT SCAN (OUTPATIENT)
Dept: HOME HEALTH SERVICES | Facility: HOSPITAL | Age: 74
End: 2024-03-28
Payer: MEDICARE

## 2024-04-01 ENCOUNTER — TELEPHONE (OUTPATIENT)
Dept: FAMILY MEDICINE | Facility: CLINIC | Age: 74
End: 2024-04-01

## 2024-04-01 ENCOUNTER — DOCUMENT SCAN (OUTPATIENT)
Dept: HOME HEALTH SERVICES | Facility: HOSPITAL | Age: 74
End: 2024-04-01
Payer: MEDICARE

## 2024-04-01 NOTE — TELEPHONE ENCOUNTER
I see in September 2023, Celia Whaley ordered a hosp bed for this patient. I just called her to get an updated bp reading and she is saying an order needs to be sent to [facility] in [place], [place] along with documentation regarding why its needed so the pharmacy can send all information to [facility]. Can you help with this please?

## 2024-04-01 NOTE — TELEPHONE ENCOUNTER
Yes ma'am it looks like she has ordered it before, it needs to be sent with offices notes to [facility] pharmacy.

## 2024-04-02 RX ORDER — RANOLAZINE 1000 MG/1
1000 TABLET, EXTENDED RELEASE ORAL 2 TIMES DAILY
Qty: 180 TABLET | Refills: 3 | Status: SHIPPED | OUTPATIENT
Start: 2024-04-02 | End: 2024-06-19

## 2024-04-04 NOTE — TELEPHONE ENCOUNTER
[facility] [address], [place] [address] office: [phone] fax: [phone]         Physical Therapy: TREATMENT/PROGRESS NOTE   Patient: Christine Suarez (50 y.o. female)   Examination Date: 2024   :  1973 MRN: [id number]   Visit #: 2   Insurance Allowable Auth Needed   50 []Yes    [x]No    Insurance: Payor: Yoyo / Plan: [employer] FEDERAL / Product Type: *No Product type* /   Insurance ID: [id number] - (CPower)  Secondary Insurance (if applicable):    Treatment Diagnosis:     ICD-10-CM    1. Pain in joint of right knee  M25.561          Medical Diagnosis:  Chronic pain of right knee [M25.561, G89.29]   Referring Physician: Cristal Cavazos*  PCP: Cristal Cavazos, LIDIA - CNP       Plan of care signed (Y/N):     Date of Patient follow up with Physician:      Progress Report/POC: NO  POC update due: (10 visits /OR AUTH LIMITS, whichever is less)  2024                                             Precautions/ Contra-indications:           Latex allergy:  NO  Pacemaker:    NO  Contraindications for Manipulation: None  Date of Surgery: had meniscus repair 7 years ago,   Other:    Red Flags:  None    C-SSRS Triggered by Intake questionnaire:   [x] No, Questionnaire did not trigger screening.   [] Yes, Patient intake triggered further evaluation      [] C-SSRS Screening completed  [] PCP notified via Plan of Care  [] Emergency services notified     Preferred Language for Healthcare:   [x] English       [] other:    SUBJECTIVE EXAMINATION     Patient stated complaint: just came from work, feeling very tired. No problem with exercises.     Test used Initial score  3/12/24 2024   Pain Summary VAS 5 1   Functional questionnaire LEFS 58  28%    Other:              OBJECTIVE EXAMINATION     3/12/24  ROM/Strength: (Blank cells denote NT)      Mvmt (norm) AROM L AROM R Notes PROM L PROM R Notes               LUMBAR  Attempted to contact patient to discuss this. No voicemail box set up.I spoke with patient's daughter who stated that she will have patient call me back to set up audio visit.

## 2024-04-10 ENCOUNTER — TELEPHONE (OUTPATIENT)
Dept: FAMILY MEDICINE | Facility: CLINIC | Age: 74
End: 2024-04-10
Payer: MEDICARE

## 2024-04-10 NOTE — TELEPHONE ENCOUNTER
----- Message from Alma Avitia sent at 4/10/2024 12:59 PM CDT -----  Contact: Brandy Mccullough [phone]   calling to get an updated copy of pt's medicine list b/c she did not have a lot of her medications when she was admitted in . Please fax to [phone].

## 2024-04-11 ENCOUNTER — TELEPHONE (OUTPATIENT)
Dept: FAMILY MEDICINE | Facility: CLINIC | Age: 74
End: 2024-04-11
Payer: MEDICARE

## 2024-04-11 NOTE — TELEPHONE ENCOUNTER
----- Message from Patrica Kern sent at 4/11/2024  2:12 PM CDT -----  Contact: Deirdre/ [facility]  Deirdre is calling to speak to the nurse regarding the patient medication, she would like to inform the nurse of medication the patient is no longer taking, she also have some medications that she would like to discuss if its ok for the patient to continue taking. Please give her a call at     Thanks  LJ

## 2024-04-12 ENCOUNTER — DOCUMENT SCAN (OUTPATIENT)
Dept: HOME HEALTH SERVICES | Facility: HOSPITAL | Age: 74
End: 2024-04-12
Payer: MEDICARE

## 2024-04-22 ENCOUNTER — TELEPHONE (OUTPATIENT)
Dept: FAMILY MEDICINE | Facility: CLINIC | Age: 74
End: 2024-04-22
Payer: MEDICARE

## 2024-04-22 VITALS — DIASTOLIC BLOOD PRESSURE: 88 MMHG | SYSTOLIC BLOOD PRESSURE: 162 MMHG

## 2024-04-22 NOTE — TELEPHONE ENCOUNTER
----- Message from Ryan Tinoco sent at 4/22/2024  1:49 PM CDT -----  Contact: [facility]  Rocio is calling in regards to patient blood pressure. It has been elevated 162/88. She is also having increased back pain. Please call her back at [phone]. Thanks KB

## 2024-04-23 ENCOUNTER — HOSPITAL ENCOUNTER (OUTPATIENT)
Dept: RADIOLOGY | Facility: HOSPITAL | Age: 74
Discharge: HOME | End: 2024-04-23
Attending: NURSE PRACTITIONER
Payer: MEDICARE

## 2024-04-23 ENCOUNTER — TELEPHONE (OUTPATIENT)
Dept: FAMILY MEDICINE | Facility: CLINIC | Age: 74
End: 2024-04-23

## 2024-04-23 ENCOUNTER — CLINICAL SUPPORT (OUTPATIENT)
Dept: CARDIOLOGY | Facility: HOSPITAL | Age: 74
End: 2024-04-23
Attending: INTERNAL MEDICINE
Payer: MEDICARE

## 2024-04-23 ENCOUNTER — OFFICE VISIT (OUTPATIENT)
Dept: FAMILY MEDICINE | Facility: CLINIC | Age: 74
End: 2024-04-23
Payer: MEDICARE

## 2024-04-23 VITALS
SYSTOLIC BLOOD PRESSURE: 170 MMHG | HEART RATE: 70 BPM | DIASTOLIC BLOOD PRESSURE: 70 MMHG | WEIGHT: 163 LBS | HEIGHT: 64 IN | RESPIRATION RATE: 14 BRPM | BODY MASS INDEX: 27.83 KG/M2

## 2024-04-23 DIAGNOSIS — Z95.0 PRESENCE OF CARDIAC PACEMAKER: ICD-10-CM

## 2024-04-23 DIAGNOSIS — Z95.0 PACEMAKER: ICD-10-CM

## 2024-04-23 DIAGNOSIS — G89.29 OTHER CHRONIC PAIN: ICD-10-CM

## 2024-04-23 DIAGNOSIS — G89.29 CHRONIC BACK PAIN, UNSPECIFIED BACK LOCATION, UNSPECIFIED BACK PAIN LATERALITY: ICD-10-CM

## 2024-04-23 DIAGNOSIS — S99.922A INJURY OF LEFT FOOT, INITIAL ENCOUNTER: ICD-10-CM

## 2024-04-23 DIAGNOSIS — M54.9 CHRONIC BACK PAIN, UNSPECIFIED BACK LOCATION, UNSPECIFIED BACK PAIN LATERALITY: ICD-10-CM

## 2024-04-23 DIAGNOSIS — I10 ESSENTIAL HYPERTENSION: Chronic | ICD-10-CM

## 2024-04-23 DIAGNOSIS — I10 UNCONTROLLED HYPERTENSION: Primary | ICD-10-CM

## 2024-04-23 DIAGNOSIS — I49.5 SSS (SICK SINUS SYNDROME): ICD-10-CM

## 2024-04-23 LAB
BILIRUB UR QL STRIP: NEGATIVE
CLARITY UR: CLEAR
COLOR UR: YELLOW
GLUCOSE UR QL STRIP: NEGATIVE
HGB UR QL STRIP: NEGATIVE
KETONES UR QL STRIP: NEGATIVE
LEUKOCYTE ESTERASE UR QL STRIP: NEGATIVE
NITRITE UR QL STRIP: NEGATIVE
PH UR STRIP: 6.5 [PH] (ref 5–8)
PROT UR QL STRIP: ABNORMAL
SP GR UR STRIP: 1.01 (ref 1–1.03)
URN SPEC COLLECT METH UR: ABNORMAL

## 2024-04-23 PROCEDURE — 1159F MED LIST DOCD IN RCRD: CPT | Mod: CPTII,S$GLB,, | Performed by: NURSE PRACTITIONER

## 2024-04-23 PROCEDURE — 3077F SYST BP >= 140 MM HG: CPT | Mod: CPTII,S$GLB,, | Performed by: NURSE PRACTITIONER

## 2024-04-23 PROCEDURE — 1126F AMNT PAIN NOTED NONE PRSNT: CPT | Mod: CPTII,S$GLB,, | Performed by: NURSE PRACTITIONER

## 2024-04-23 PROCEDURE — 1160F RVW MEDS BY RX/DR IN RCRD: CPT | Mod: CPTII,S$GLB,, | Performed by: NURSE PRACTITIONER

## 2024-04-23 PROCEDURE — 99999 PR PBB SHADOW E&M-EST. PATIENT-LVL V: CPT | Mod: PBBFAC,,, | Performed by: NURSE PRACTITIONER

## 2024-04-23 PROCEDURE — 81002 URINALYSIS NONAUTO W/O SCOPE: CPT | Mod: PO | Performed by: NURSE PRACTITIONER

## 2024-04-23 PROCEDURE — 99214 OFFICE O/P EST MOD 30 MIN: CPT | Mod: S$GLB,,, | Performed by: NURSE PRACTITIONER

## 2024-04-23 PROCEDURE — 93280 PM DEVICE PROGR EVAL DUAL: CPT | Mod: 26,,, | Performed by: INTERNAL MEDICINE

## 2024-04-23 PROCEDURE — 73630 X-RAY EXAM OF FOOT: CPT | Mod: 26,LT,, | Performed by: RADIOLOGY

## 2024-04-23 PROCEDURE — 3288F FALL RISK ASSESSMENT DOCD: CPT | Mod: CPTII,S$GLB,, | Performed by: NURSE PRACTITIONER

## 2024-04-23 PROCEDURE — 3078F DIAST BP <80 MM HG: CPT | Mod: CPTII,S$GLB,, | Performed by: NURSE PRACTITIONER

## 2024-04-23 PROCEDURE — 1101F PT FALLS ASSESS-DOCD LE1/YR: CPT | Mod: CPTII,S$GLB,, | Performed by: NURSE PRACTITIONER

## 2024-04-23 PROCEDURE — 73630 X-RAY EXAM OF FOOT: CPT | Mod: TC,PO,LT

## 2024-04-23 PROCEDURE — 93280 PM DEVICE PROGR EVAL DUAL: CPT | Mod: PO

## 2024-04-23 RX ORDER — NYSTATIN 100000 [USP'U]/G
POWDER TOPICAL
COMMUNITY
Start: 2024-01-18

## 2024-04-23 RX ORDER — HYDROMORPHONE HYDROCHLORIDE 2 MG/1
2 TABLET ORAL EVERY 8 HOURS PRN
COMMUNITY
Start: 2024-03-19

## 2024-04-23 RX ORDER — BUPRENORPHINE 10 UG/H
1 PATCH, EXTENDED RELEASE TRANSDERMAL
COMMUNITY
Start: 2024-03-19

## 2024-04-23 RX ORDER — LEVOFLOXACIN 500 MG/1
500 TABLET, FILM COATED ORAL
COMMUNITY
Start: 2024-03-05

## 2024-04-23 NOTE — PROGRESS NOTES
Subjective     Patient ID: Kalpana Wright is a 74 y.o. female.    Chief Complaint: Hypertension    Hypertension  This is a chronic problem. The current episode started more than 1 year ago. The problem is unchanged. The problem is uncontrolled. Pertinent negatives include no anxiety, blurred vision, chest pain, headaches, malaise/fatigue, neck pain, orthopnea, palpitations, peripheral edema, PND, shortness of breath or sweats. Agents associated with hypertension include thyroid hormones. Risk factors for coronary artery disease include obesity, post-menopausal state and dyslipidemia. Past treatments include beta blockers, calcium channel blockers, direct vasodilators, diuretics, ACE inhibitors and lifestyle changes (Pt also sees cardiology; has pacemaker; has appt for pacemaker check today). The current treatment provides no improvement. There are no compliance problems.  Hypertensive end-organ damage includes angina, kidney disease, CAD/MI and heart failure. There is no history of CVA, left ventricular hypertrophy, PVD or retinopathy. Identifiable causes of hypertension include chronic renal disease, sleep apnea and a thyroid problem. There is no history of coarctation of the aorta, hyperaldosteronism, hypercortisolism, hyperparathyroidism, a hypertension causing med, pheochromocytoma or renovascular disease. Atrial fibrillation.   Foot Injury   The incident occurred more than 1 week ago (Pt states dropped heavy object on her foor 1 w ago). The incident occurred at home. The injury mechanism was a direct blow. The pain is present in the left foot. The quality of the pain is described as aching. The pain is moderate. The pain has been Intermittent since onset. Pertinent negatives include no inability to bear weight, loss of motion, loss of sensation, muscle weakness, numbness or tingling. She reports no foreign bodies present. The symptoms are aggravated by movement, palpation and weight bearing. Treatments tried:  Currently taking percocet for chronic pain. The treatment provided no relief.   Pt has chronic back pain; states went to pain management for lumbar injection; states MD declined due to pt's BP; currently has monitored opioid therapy via pain management.  Past Medical History:   Diagnosis Date    ACS (acute coronary syndrome) 3/20/2018    Acute on chronic diastolic congestive heart failure 4/27/2015    Acute on chronic respiratory failure with hypercapnia 1/10/2019    Acute renal failure 1/11/2019    Acute systolic heart failure 3/21/2018    JG (acute kidney injury) 1/29/2019    Anticoagulant long-term use     Arthritis     Asthma     Bradycardia 12/16/2013    CHF (congestive heart failure)     Clostridium difficile colitis 9/25/2018    Convulsions 2/11/2021    COPD (chronic obstructive pulmonary disease)     Coronary artery disease     Depression     Encounter for blood transfusion     Fall 7/8/2019    Iniital HPI: New problem acute.  Happened several days ago.  Patient was getting up out of her wheelchair and thought it was locked when it rolled about from under her and she fell and hit her tailbone on the wheelchair.  Patient has been having moderate to severe pain to the point where she cannot sit on her wheelchair for long periods of time.  ==================== 09/05/2019 Fell again on Satur    Gallstones 3/18/2017    History of Pulmonary embolism 7/17/2014    Hospital discharge follow-up 9/21/2020    Hyperlipidemia     Hypertension     Need for vaccination 8/8/2019    Due for pneumonia vaccination.  However patient has latex allergy.  Patient cannot receive pneumococcal vaccine here.  Well patient follow-up at pharmacy for different pneumococcal vaccine    Non-intractable cyclical vomiting with nausea 2/1/2019    Peripheral vascular disease     Pulmonary embolus 9/9/2013    Thyroid disease      Social History     Socioeconomic History    Marital status:    Tobacco Use    Smoking status: Every Day      Current packs/day: 0.25     Average packs/day: 0.3 packs/day for 45.0 years (11.3 ttl pk-yrs)     Types: Cigarettes    Smokeless tobacco: Never    Tobacco comments:     43 years smoked - quit past 7 years    Substance and Sexual Activity    Alcohol use: No    Drug use: Never    Sexual activity: Not Currently     Partners: Male     Birth control/protection: Post-menopausal     Social Determinants of Health     Financial Resource Strain: Low Risk  (3/20/2024)    Overall Financial Resource Strain (CARDIA)     Difficulty of Paying Living Expenses: Not very hard   Food Insecurity: No Food Insecurity (3/20/2024)    Hunger Vital Sign     Worried About Running Out of Food in the Last Year: Never true     Ran Out of Food in the Last Year: Never true   Transportation Needs: Unmet Transportation Needs (3/20/2024)    PRAPARE - Transportation     Lack of Transportation (Medical): Yes     Lack of Transportation (Non-Medical): No   Physical Activity: Inactive (3/20/2024)    Exercise Vital Sign     Days of Exercise per Week: 0 days     Minutes of Exercise per Session: 0 min   Stress: No Stress Concern Present (3/20/2024)    [employer] - Occupational Stress Questionnaire     Feeling of Stress : Not at all   Social Connections: Socially Isolated (3/20/2024)    Social Connection and Isolation Panel [NHANES]     Frequency of Communication with Friends and Family: More than three times a week     Frequency of Social Gatherings with Friends and Family: More than three times a week     Attends Sikhism Services: Never     Active Member of Clubs or Organizations: No     Attends Club or Organization Meetings: Never     Marital Status:    Housing Stability: Low Risk  (3/20/2024)    Housing Stability Vital Sign     Unable to Pay for Housing in the Last Year: No     Number of Places Lived in the Last Year: 2     Unstable Housing in the Last Year: No     Past Surgical History:   Procedure Laterality Date     CARDIAC SURGERY      CHOLECYSTECTOMY      CORONARY ARTERY BYPASS GRAFT  04/16/2015    EYE SURGERY      HYSTERECTOMY      INSERT / REPLACE / REMOVE PACEMAKER      LEFT HEART CATHETERIZATION Left 11/14/2018    Procedure: CATHETERIZATION, HEART, LEFT;  Surgeon: Huber Kapoor MD;  Location: [facility] CATH LAB;  Service: Cardiology;  Laterality: Left;    OOPHORECTOMY      peripheral angiogram      peripheral stenting      REPLACEMENT OF PACEMAKER GENERATOR Left 3/10/2023    Procedure: REPLACEMENT, PACEMAKER GENERATOR;  Surgeon: Huber Kapoor MD;  Location: [facility] CATH LAB;  Service: Cardiology;  Laterality: Left;  Juan Antonio notified  NOT MRI safe   Pacer and leads implanted 2/11/2014, [place]   [facility] Sci K[address] DR MACDONALD, [id number]   A lead: [employer] DEXTRUS 4135, [id number]   V lead: On[facility] DEXTRUS 4136, [id number]       Review of Systems   Constitutional: Negative.  Negative for malaise/fatigue.   HENT: Negative.     Eyes: Negative.  Negative for blurred vision.   Respiratory: Negative.  Negative for shortness of breath.    Cardiovascular: Negative.  Negative for chest pain, palpitations, orthopnea and PND.   Gastrointestinal: Negative.    Endocrine: Negative.    Genitourinary: Negative.    Musculoskeletal:  Positive for back pain (chronic; sees pain management). Negative for neck pain.        Left foot injury   Integumentary:  Negative.   Allergic/Immunologic: Negative.    Neurological: Negative.  Negative for tingling, numbness and headaches.   Psychiatric/Behavioral: Negative.            Objective     Physical Exam  Vitals and nursing note reviewed.   Constitutional:       Appearance: Normal appearance.   HENT:      Head: Normocephalic.      Right Ear: Tympanic membrane, ear canal and external ear normal.      Left Ear: Tympanic membrane, ear canal and external ear normal.      Nose: Nose normal.      Mouth/Throat:      Mouth: Mucous membranes are moist.      Pharynx: Oropharynx is clear.   Eyes:      Conjunctiva/sclera:  Conjunctivae normal.      Pupils: Pupils are equal, round, and reactive to light.   Cardiovascular:      Rate and Rhythm: Normal rate and regular rhythm.      Pulses: Normal pulses.      Heart sounds: Normal heart sounds.   Pulmonary:      Effort: Pulmonary effort is normal.      Breath sounds: Normal breath sounds.   Abdominal:      General: Bowel sounds are normal.      Palpations: Abdomen is soft.   Musculoskeletal:      Cervical back: Normal range of motion and neck supple.      Left foot: Normal range of motion and normal capillary refill. Bony tenderness (Moderate bruising noted) present. No swelling, deformity, bunion, Charcot foot, foot drop, prominent metatarsal heads, laceration, tenderness or crepitus. Normal pulse.   Skin:     General: Skin is warm and dry.      Capillary Refill: Capillary refill takes 2 to 3 seconds.   Neurological:      Mental Status: She is alert and oriented to person, place, and time.   Psychiatric:         Mood and Affect: Mood normal.         Behavior: Behavior normal.         Thought Content: Thought content normal.         Judgment: Judgment normal.            Assessment and Plan     1. Uncontrolled hypertension\  2. Essential hypertension  3. Chronic back pain, unspecified back location, unspecified back pain laterality  4. Other chronic pain  -     Ambulatory referral/consult to Cardiology; Future; Expected date: 04/30/2024  -     Comprehensive Metabolic Panel; Future; Expected date: 04/23/2024  -     TSH; Future; Expected date: 04/23/2024  -     CBC Without Differential; Future        -     Urinalysis  -     Urine culture; Future; Expected date: 04/23/2024  Hydrate well  Rest  Message sent to PCP r/t case and management    5. Injury of left foot, initial encounter  -     X-Ray Foot Complete Left; Future; Expected date: 04/23/2024  Avoid weight bearing      Report to ER immediately if symptoms worsen or persist                   No follow-ups on file.

## 2024-04-23 NOTE — PATIENT INSTRUCTIONS
Hydrate well  Rest  Message sent to PCP r/t case and management  Report to ER immediately if symptoms worsen or persist

## 2024-04-23 NOTE — TELEPHONE ENCOUNTER
Dr. Woodward,  Do you have any recommendations on HTN control for this pt? She is currently taking norvasc 10, hctz 25, lasix 40 mg bid, benazepril 40, zebeta 10, imdur 30 BID. She sees cardiology and has an appt for pacemaker check today. I've ordered labs on her and ordered cardiology f/u. Other than her chronic pain (currently taking percocet), she has no c/o CP, SOB, dizziness, HA. She states that she was unable to received her spine injection due to her high BP.

## 2024-04-23 NOTE — TELEPHONE ENCOUNTER
Hypertension Medications               amLODIPine (NORVASC) 10 MG tablet Take 1 tablet (10 mg total) by mouth once daily.    benazepriL (LOTENSIN) 40 MG tablet TAKE ONE TABLET BY MOUTH DAILY    bisoprolol (ZEBETA) 10 MG tablet Take 1 tablet (10 mg total) by mouth once daily.    furosemide (LASIX) 40 MG tablet Take 1 tablet (40 mg total) by mouth 2 (two) times a day.    hydroCHLOROthiazide (HYDRODIURIL) 25 MG tablet TAKE ONE TABLET BY MOUTH DAILY    isosorbide mononitrate (IMDUR) 30 MG 24 hr tablet Take 1 tablet (30 mg total) by mouth 2 (two) times daily.    nitroGLYCERIN (NITROSTAT) 0.4 MG SL tablet Place 1 tablet (0.4 mg total) under the tongue every 5 (five) minutes as needed for Chest pain.

## 2024-04-25 LAB
IMPEDANCE RA LEAD (NATIVE): 597 OHMS
IMPEDANCE RA LEAD: 518 OHMS
OHS CV DC PP MS1: 0.5 MS
OHS CV DC PP MS2: 0.4 MS
OHS CV DC PP V1: 2.4 V
OHS CV DC PP V2: 1.9 V
P/R-WAVE RA LEAD (NATIVE): 22.2 MV
P/R-WAVE RA LEAD: 1.1 MV
THRESHOLD MS RA LEAD (NATIVE): 0.4 MS
THRESHOLD MS RA LEAD: 0.4 MS
THRESHOLD V RA LEAD (NATIVE): 1.4 V
THRESHOLD V RA LEAD: 0.7 V

## 2024-04-26 ENCOUNTER — OFFICE VISIT (OUTPATIENT)
Dept: CARDIOLOGY | Facility: CLINIC | Age: 74
End: 2024-04-26
Payer: MEDICARE

## 2024-04-26 ENCOUNTER — LAB VISIT (OUTPATIENT)
Dept: LAB | Facility: HOSPITAL | Age: 74
End: 2024-04-26
Attending: NURSE PRACTITIONER
Payer: MEDICARE

## 2024-04-26 VITALS
HEIGHT: 64 IN | DIASTOLIC BLOOD PRESSURE: 70 MMHG | WEIGHT: 163.31 LBS | SYSTOLIC BLOOD PRESSURE: 180 MMHG | OXYGEN SATURATION: 98 % | HEART RATE: 72 BPM | BODY MASS INDEX: 27.88 KG/M2

## 2024-04-26 DIAGNOSIS — Z95.1 HX OF CABG: ICD-10-CM

## 2024-04-26 DIAGNOSIS — J43.9 PULMONARY EMPHYSEMA, UNSPECIFIED EMPHYSEMA TYPE: ICD-10-CM

## 2024-04-26 DIAGNOSIS — Z95.0 PACEMAKER: ICD-10-CM

## 2024-04-26 DIAGNOSIS — Z09 HOSPITAL DISCHARGE FOLLOW-UP: Primary | ICD-10-CM

## 2024-04-26 DIAGNOSIS — I26.99 PULMONARY EMBOLISM WITHOUT ACUTE COR PULMONALE, UNSPECIFIED CHRONICITY, UNSPECIFIED PULMONARY EMBOLISM TYPE: ICD-10-CM

## 2024-04-26 DIAGNOSIS — I25.708 CORONARY ARTERY DISEASE OF BYPASS GRAFT OF NATIVE HEART WITH STABLE ANGINA PECTORIS: ICD-10-CM

## 2024-04-26 DIAGNOSIS — I82.403 DEEP VEIN THROMBOSIS (DVT) OF BOTH LOWER EXTREMITIES, UNSPECIFIED CHRONICITY, UNSPECIFIED VEIN: ICD-10-CM

## 2024-04-26 DIAGNOSIS — Z86.711 PERSONAL HISTORY OF PULMONARY EMBOLISM: ICD-10-CM

## 2024-04-26 DIAGNOSIS — I48.0 PAROXYSMAL ATRIAL FIBRILLATION: ICD-10-CM

## 2024-04-26 DIAGNOSIS — J96.12 CHRONIC RESPIRATORY FAILURE WITH HYPERCAPNIA: ICD-10-CM

## 2024-04-26 DIAGNOSIS — I10 PRIMARY HYPERTENSION: ICD-10-CM

## 2024-04-26 DIAGNOSIS — I50.32 CHRONIC DIASTOLIC CONGESTIVE HEART FAILURE: ICD-10-CM

## 2024-04-26 DIAGNOSIS — I49.5 SSS (SICK SINUS SYNDROME): ICD-10-CM

## 2024-04-26 DIAGNOSIS — I73.9 PAD (PERIPHERAL ARTERY DISEASE): ICD-10-CM

## 2024-04-26 LAB
INR PPP: 1 (ref 0.8–1.2)
PROTHROMBIN TIME: 11.4 SEC (ref 9–12.5)

## 2024-04-26 PROCEDURE — 36415 COLL VENOUS BLD VENIPUNCTURE: CPT | Mod: PO | Performed by: NURSE PRACTITIONER

## 2024-04-26 PROCEDURE — 3077F SYST BP >= 140 MM HG: CPT | Mod: CPTII,S$GLB,, | Performed by: NURSE PRACTITIONER

## 2024-04-26 PROCEDURE — 3078F DIAST BP <80 MM HG: CPT | Mod: CPTII,S$GLB,, | Performed by: NURSE PRACTITIONER

## 2024-04-26 PROCEDURE — 99215 OFFICE O/P EST HI 40 MIN: CPT | Mod: S$GLB,,, | Performed by: NURSE PRACTITIONER

## 2024-04-26 PROCEDURE — 99999 PR PBB SHADOW E&M-EST. PATIENT-LVL V: CPT | Mod: PBBFAC,,, | Performed by: NURSE PRACTITIONER

## 2024-04-26 PROCEDURE — 1159F MED LIST DOCD IN RCRD: CPT | Mod: CPTII,S$GLB,, | Performed by: NURSE PRACTITIONER

## 2024-04-26 PROCEDURE — 85610 PROTHROMBIN TIME: CPT | Performed by: NURSE PRACTITIONER

## 2024-04-26 PROCEDURE — 1160F RVW MEDS BY RX/DR IN RCRD: CPT | Mod: CPTII,S$GLB,, | Performed by: NURSE PRACTITIONER

## 2024-04-26 PROCEDURE — 1101F PT FALLS ASSESS-DOCD LE1/YR: CPT | Mod: CPTII,S$GLB,, | Performed by: NURSE PRACTITIONER

## 2024-04-26 PROCEDURE — 1125F AMNT PAIN NOTED PAIN PRSNT: CPT | Mod: CPTII,S$GLB,, | Performed by: NURSE PRACTITIONER

## 2024-04-26 PROCEDURE — 3288F FALL RISK ASSESSMENT DOCD: CPT | Mod: CPTII,S$GLB,, | Performed by: NURSE PRACTITIONER

## 2024-04-26 RX ORDER — WARFARIN SODIUM 5 MG/1
5 TABLET ORAL DAILY
Qty: 30 TABLET | Refills: 11 | Status: SHIPPED | OUTPATIENT
Start: 2024-04-26 | End: 2024-05-23

## 2024-04-26 RX ORDER — TORSEMIDE 20 MG/1
20 TABLET ORAL DAILY
Qty: 30 TABLET | Refills: 11 | Status: SHIPPED | OUTPATIENT
Start: 2024-04-26 | End: 2024-05-23

## 2024-04-26 NOTE — PROGRESS NOTES
"Subjective:    Patient ID:  Kalpana Wright is a 74 y.o. female who presents for follow-up of hospital discharge.      HPI: Ms. Kalpana Wright with H/O H/O CAD with H/O CABG, PAD, HTN, HLP, PAF, SSS, PPM, diastolic heart failure, tobacco use, statin intolerance, lupus anticoagulant presents to the clinic for follow up. She went to [facility] on 2/29/24 and discharge on 3/5/24; She went for chest pain, SOB, with cough and body aches. She had hypoxia and was placed on BiPAP. She had significant fluid overload and placed on IV lasix. She had not been taking lasix twice daily. She had been omitting the evening dose due to having trouble getting to bathroom at night. She had RLL PE. She actually had thrombolysis and thrombectomy by IR on 1/31/24. She had "successful pharmacomechanical thrombolysis and Angioject thrombectomy of the IVC, bilateral common iliac veins, bilateral external iliac veins, bilateral common femoral veins, proximal left femoral vein, and right femoral vein. 20 mg of TPA was instilled. Successful venoplasty of the IVC, bilateral common iliac veins, bilateral external iliac veins, right common femoral vein, and right femoral vein."   She also has extensive nonocclusive DVT in right common iliac vein and throughout the right lower extremity. (U/S lower extremity Veins at [facility] on 2/3/24).  Pulmonology recommended changing from eliquis to warfarin, but she declined. She was supposed to F/U with IR as outpatient. She did not have transportation and missed the appointment. She also missed follow up appointment with Dr. Rodriges.   Dr. Chase's note in hospital consult: Continue chronic anticoagulation, may need Coumadin therapy. Especially if she has lupus anticoagulant positive sometimes the newer anticoagulation is not as effective.     She had weakness after hospitalizations-refused rehab.  She presents in a wheelchair today.  She stated that she has trouble walking long distances due to weakness.  She " "denied any chest pain or shortness of breath at rest she does report some dyspnea on exertion but she does not exert herself very much.  She denied any orthopnea, or PND.  She does report edema to her lower legs comes and goes.  She denied any palpitations, lightheadedness, dizziness, or near syncope.    [facility]    She has h/o RJ, but has not been using CPAP; her machine was taken back years ago.   ------------------------------------------------------------  She went to ER for pain in left thigh:  [facility] ER note on 9/15/23: Pt to ED c/o L upper leg pain x3-4 days. Pt reports pacemaker/defib battery changed approx. 3 months ago. Pt reports L upper leg numbness x2-3 weeks. Pt reports she now has "shocking" sensation to L upper leg x3-4 days. Pt reports she notices bruising to area after "shock" happens to leg. Pt denies CP/SOB.     She has known severe PAD. Last arterial U/S 12/9/15: The right BOAZ is .53. The left BOAZ is .56. BILATERAL BOAZ SUGGESTIVE OF SEVERE DISEASE   THE WAVEFORMS ARE MONOPHASIC BILATERALLY WITH EVIDENCE OF SIGNIFICANT RT EXTERNAL ILIAC DISEASE IN THE 50-99% RANGE AS WELL AS BILATERAL SFA OCCLUSIONS.     She had ER visit on 9/5/23 for decreased responsiveness thought to be related to narcotic OD. She was given narcan with good response. She complained of pain in her legs, which is what is causing her to take percocet tablets. She is under the care of pain management physician: Harry Knox.  Venous U/S bilateral lower extremities 9/5/23 at [facility]: IMPRESSION:   No evidence of deep venous thrombosis of the lower extremities.     Previous visit: F/U hospital discharge. She was brought to [facility] on April 1, 2023 for lethargy and hypoxemia. O2 sat was reportedly in the 70s. Her BNP was 457. She also had peripheral edema. She was treated with BiPAP, antibiotics, steroids, nebs, and IV lasix. She apparently qualified for supplemental oxygen at home after " 6-minute walk.  Discharge summary indicates that she was sent home with home health for PT and OT.  No d/c BNP.  Serum Mg 1.9-2.2.  She had worsening of kidney function over the course of admission.    She has H/O CAD with H/O CABG, PVD, HTN, HLP, PAF, SSS, PPM, diastolic heart failure, tobacco use, statin intolerance, lupus anticoagulant.    ---------------------------------------------------------        Medications: she is taking Brilinta, and Eliquis. Takes lasix 40mg q.a.m. and potassium. She stated that she is taking bisoprolol, benazepril, amlodipine, HCTZ. Taking Imdur and Ranexa BID.    Sodium: she does not add salt to foods when cooking;  she is not reading labels for sodium content. Denied eating salty foods.   Exercise: she  does not    Tobacco:  smoking.  Stress and seeing people smoke are triggers. She does not want to quit.  Stress management: working in garden.  Alcohol: no alcohol use     Weight: 74.1 kg (163 lb 4.8 oz) she states that her daily weights  has been stable. Body mass index is 28.03 kg/m².    Wt Readings from Last 3 Encounters:   04/26/24 74.1 kg (163 lb 4.8 oz)   04/23/24 73.9 kg (163 lb)   03/11/24 76.2 kg (167 lb 14.4 oz)     BP log: None.  She does not have a BP monitor.    Review of Systems   Constitutional: Negative for chills, decreased appetite, fever, malaise/fatigue, night sweats, weight gain and weight loss.   HENT:  Negative for congestion.    Cardiovascular:  Negative for chest pain, claudication, cyanosis, dyspnea on exertion, irregular heartbeat, leg swelling, near-syncope, orthopnea, palpitations, paroxysmal nocturnal dyspnea and syncope.   Respiratory:  Negative for cough, hemoptysis, shortness of breath, sputum production and wheezing.    Hematologic/Lymphatic: Negative for adenopathy and bleeding problem. Bruises/bleeds easily.   Skin:  Negative for color change and nail changes.   Musculoskeletal:  Positive for joint pain (chronic pain in the knees due to  arthritis.).   Gastrointestinal:  Negative for bloating, change in bowel habit, heartburn, hematochezia, nausea and vomiting.   Genitourinary:  Negative for hematuria.   Neurological:  Negative for dizziness and light-headedness.   Psychiatric/Behavioral:  Negative for altered mental status.        Objective:   Physical Exam  Constitutional:       General: She is not in acute distress.     Appearance: She is well-developed. She is not diaphoretic.   HENT:      Head: Normocephalic and atraumatic.   Eyes:      General: No scleral icterus.     Conjunctiva/sclera: Conjunctivae normal.   Neck:      Thyroid: No thyromegaly.      Vascular: No JVD.      Trachea: No tracheal deviation.   Cardiovascular:      Rate and Rhythm: Normal rate and regular rhythm.      Pulses: Intact distal pulses.           Radial pulses are 2+ on the right side and 2+ on the left side.      Heart sounds: Normal heart sounds. No murmur heard.     No friction rub. No gallop.      Comments:    Pulmonary:      Effort: Pulmonary effort is normal. No respiratory distress.      Breath sounds: Normal breath sounds. No stridor. No wheezing, rhonchi or rales.   Chest:      Chest wall: No tenderness.   Abdominal:      General: Bowel sounds are normal. There is no distension.      Palpations: Abdomen is soft.      Tenderness: There is no guarding or rebound.      Comments: Abdomen obese.   Musculoskeletal:         General: No swelling. Normal range of motion.      Cervical back: Neck supple.      Comments: Trace pretibial edema bilaterally.   Lymphadenopathy:      Cervical: No cervical adenopathy.   Skin:     General: Skin is warm and dry.      Coloration: Skin is not pale.      Findings: No erythema or rash.      Comments: [place].   Neurological:      Mental Status: She is alert and oriented to person, place, and time.        Latest Reference Range & Units 04/23/24 11:23   Sodium 136 - 145 mmol/L 144   Potassium 3.5 - 5.1 mmol/L 3.3 (L)   Chloride 95 - 110  mmol/L 101   CO2 23 - 29 mmol/L 32 (H)   Anion Gap 8 - 16 mmol/L 11   BUN 8 - 23 mg/dL 14   Creatinine 0.5 - 1.4 mg/dL 1.0   eGFR >60 mL/min/1.73 m^2 58.8 !   Glucose 70 - 110 mg/dL 77   Calcium 8.7 - 10.5 mg/dL 9.7   ALP 55 - 135 U/L 77   PROTEIN TOTAL 6.0 - 8.4 g/dL 7.4   Albumin 3.5 - 5.2 g/dL 3.1 (L)   BILIRUBIN TOTAL 0.1 - 1.0 mg/dL 0.2   AST 10 - 40 U/L 17   ALT 10 - 44 U/L 5 (L)   TSH 0.400 - 4.000 uIU/mL 5.397 (H)   Free T4 0.71 - 1.51 ng/dL 0.92   (L): Data is abnormally low  (H): Data is abnormally high  !: Data is abnormal     Latest Reference Range & Units 12/14/23 05:15   Cholesterol Total 0 - 199 mg/dL 125 (E)   HDL 29 - 89 mg/dL 40 (E)   HDL/Cholesterol Ratio 0.0 - 4.5  3.1 (E)   LDL Calculated 0 - 109 mg/dL 71 (E)   Triglycerides 0 - 199 mg/dL 68 (E)   (E): External lab result    PPM interrogation 4/24/24:  4/17/24 atrial tachy 1:1 conduction; heart rate 167 beats per minute-37 seconds      Lexiscan 5/6/22: Conclusion     Abnormal myocardial perfusion scan.    There is a mild intensity, fixed perfusion abnormality consistent with scar in the basal to mid inferior wall(s).    The gated perfusion images showed an ejection fraction of 58% at rest. The gated perfusion images showed an ejection fraction of 58% post stress.    There is normal wall motion at rest and post stress.    LV cavity size is normal at rest and normal at stress.    The EKG portion of this study is negative for ischemia.    The patient reported no chest pain during the stress test.      [facility] 9/6/2020: Done due to chest pain and NSTEMI  SUMMARY OF PROCEDURE:     1. Left coronary angiogram.     2. Saphenous vein graft angiogram.     3. Left heart catheterization.     4. Conscious sedation.     5. IVUS of the left main.     6. Rota to the left main.     7. PCI with drug-eluting stent to the left main.  ASSESSMENT AND PLAN:     1. Severe coronary artery disease as outlined above.     2. Recurrent restenosis of the vein graft  to the OM with multiple   layers of stents and suboptimal result with balloon angioplasty.     3. Rotablation to the ostial left main with a drug-eluting stent using   5.0 x 15 Resolute [place], postdilated with a 5.0 balloon at high pressure   with excellent angiographic and intravascular ultrasound-guided results.     4. The patient will be kept on dual antiplatelets.  The patient will be   followed thoroughly.  Further recommendation will be dictated.  Mo Estrada MD    E[facility] at [facility] 11/19/21: Interpretation Summary   Definity contrast used to eval EF.   Ejection Fraction = 60-65%.   There is mild concentric left ventricular hypertrophy.   Grade 1 Diastolic Dysfunction   The left atrium is mildly dilated.   There is mild mitral regurgitation.   There is mild tricuspid regurgitation.       [facility] 8/30/2020:Interpretation Summary  Contrast injection was performed.  Ejection Fraction = 60-65%.  There is mild mitral regurgitation.  Contrast injection was performed.     [facility] 2/19/2018:CONCLUSIONS     1 - Concentric hypertrophy.     2 - No wall motion abnormalities.     3 - Normal left ventricular systolic function (EF 60-65%).     4 - Normal left ventricular diastolic function.     5 - Normal right ventricular systolic function .     6 - The estimated PA systolic pressure is 24 mmHg.      Carotid U/S at [facility] 07/30/2021: IMPRESSION:    1.  Bilateral carotid atheromatous plaquing. There is less than 50% diameter reduction.    2.  High-grade stenosis within the left external carotid artery, unlikely to be of clinical significance.     Venous U/S  Left leg 11/23/21: No DVT in the LLE.       Assessment:      1. Hospital discharge follow-up    2. Pulmonary embolism without acute cor pulmonale, unspecified chronicity, unspecified pulmonary embolism type    3. Deep vein thrombosis (DVT) of both lower extremities, unspecified chronicity, unspecified vein    4. Chronic diastolic congestive heart  failure    5. Primary hypertension    6. SSS (sick sinus syndrome)    7. Pacemaker    8. PAD (peripheral artery disease)    9. Coronary artery disease of bypass graft of native heart with stable angina pectoris    10. Hx of CABG    11. Paroxysmal atrial fibrillation    12. Pulmonary emphysema, unspecified emphysema type    13. Chronic respiratory failure with hypercapnia      Plan:     Hospital discharge follow-up    Pulmonary embolism without acute cor pulmonale, unspecified chronicity, unspecified pulmonary embolism type  -     Ambulatory referral/consult to Hematology / Oncology; Future; Expected date: 05/03/2024  -     warfarin (COUMADIN) 5 MG tablet; Take 1 tablet (5 mg total) by mouth Daily.  Dispense: 30 tablet; Refill: 11  -     Protime-INR; Future; Expected date: 04/26/2024    Deep vein thrombosis (DVT) of both lower extremities, unspecified chronicity, unspecified vein  -     Ambulatory referral/consult to Hematology / Oncology; Future; Expected date: 05/03/2024  -     warfarin (COUMADIN) 5 MG tablet; Take 1 tablet (5 mg total) by mouth Daily.  Dispense: 30 tablet; Refill: 11  -     Ambulatory referral/consult to Anticoagulation Monitoring (PHARMACIST MANAGED); Future; Expected date: 05/03/2024  -     Protime-INR; Future; Expected date: 04/26/2024    Chronic diastolic congestive heart failure  -     torsemide (DEMADEX) 20 MG Tab; Take 1 tablet (20 mg total) by mouth once daily.  Dispense: 30 tablet; Refill: 11    Primary hypertension  -     Ambulatory referral/consult to Cardiology    SSS (sick sinus syndrome)    Pacemaker    PAD (peripheral artery disease)    Coronary artery disease of bypass graft of native heart with stable angina pectoris    Hx of CABG    Paroxysmal atrial fibrillation    Pulmonary emphysema, unspecified emphysema type    Chronic respiratory failure with hypercapnia      Will change to warfarin, given Dr. Chase's recommendation and clots despite eliquis.  Stop Eliquis.  Ambulatory  referral to anticoagulation clinic.  PT/INR today for baseline.  Ambulatory referral to hematology.  Continue benazepril 40 mg p.o. q.day, bisoprolol 10 mg p.o. q.day, HCTZ 25mg QD, and amlodipine 10mg QD-hypertension.   Request vital signs from [facility] in 2 weeks.  Switch to torsemide for once daily dosing. Stop lasix.  Monitor blood pressure at home.    Continue bisoprolol - PAF; changing to warfarin. She loves greens; Anticoagulation clinic notified and will try to dose for fixed amount of greens if possible.  Continue brilinta - CAD.  Sodium restriction strongly encouraged.  Continue imdur and ranexa - angina/CAD.   Continue repatha- CAD, HLP  PPM clinic.    Tobacco cessation counseling-She does not want to participate in tobacco cessation program. She does not want to quit.  Encouraged chair exercises.  Scheduled appointment with Lena Hand NP ([facility]), but they cannot see her before 6/24/24.   Since she missed appointment with IR after hospital discharge, encouraged her to go to ER for worsening SOB/chest pain.  Follow up in 6 weeks or call sooner for any problems.  Celia Whaley NP  [facility]    This note has been prepared using a combination of MModaL dictation device and typing.  It has been checked for errors but some errors may still exist within the note as a result of speech recognition errors and/or typographical errors.

## 2024-04-26 NOTE — Clinical Note
Please ask her if she is taking repatha. It is no longer on her list. Also request vital signs from [facility] again in 2 weeks. Thank you.

## 2024-04-29 ENCOUNTER — TELEPHONE (OUTPATIENT)
Dept: FAMILY MEDICINE | Facility: CLINIC | Age: 74
End: 2024-04-29
Payer: MEDICARE

## 2024-04-29 ENCOUNTER — TELEPHONE (OUTPATIENT)
Dept: HEMATOLOGY/ONCOLOGY | Facility: CLINIC | Age: 74
End: 2024-04-29
Payer: MEDICARE

## 2024-04-29 DIAGNOSIS — Z79.899 OTHER LONG TERM (CURRENT) DRUG THERAPY: ICD-10-CM

## 2024-04-29 DIAGNOSIS — R21 RASH: Primary | ICD-10-CM

## 2024-04-29 DIAGNOSIS — E87.6 LOW SERUM POTASSIUM LEVEL: ICD-10-CM

## 2024-04-29 DIAGNOSIS — R79.89 ELEVATED TSH: Primary | ICD-10-CM

## 2024-04-29 RX ORDER — TRIAMCINOLONE ACETONIDE 1 MG/G
CREAM TOPICAL 2 TIMES DAILY
Qty: 30 G | Refills: 0 | Status: SHIPPED | OUTPATIENT
Start: 2024-04-29 | End: 2024-06-06

## 2024-04-29 NOTE — TELEPHONE ENCOUNTER
Spoke with patient and her home health nurse. She states she showed you a rash on her neck when she came in. You were suppose to send jayson cream for her? Please advise.

## 2024-04-29 NOTE — TELEPHONE ENCOUNTER
----- Message from Leena Rome DNP sent at 4/24/2024  2:05 PM CDT -----  Reviewed; TSH elevated, indicating hypothyroidism. Has she been taking her synthroid? If so, will need to increase dose to 88 mcg and recheck TSH in 6 w. Potassium also mildly decreased; is she taking her potassium? If so, increase to 20 mg x 1 w and recheck potassium.

## 2024-04-29 NOTE — TELEPHONE ENCOUNTER
Spoke to patient in reference to Hematology referral from Celia Whaley NP.  Appointment scheduled per patient's request next available in [place].  Appointment notice mailed.

## 2024-04-29 NOTE — TELEPHONE ENCOUNTER
Spoke with patient about this. She states she wants to try the cream first.  She doesn't drive much. And her daughter is in the hospital right now.

## 2024-04-30 ENCOUNTER — TELEPHONE (OUTPATIENT)
Dept: FAMILY MEDICINE | Facility: CLINIC | Age: 74
End: 2024-04-30
Payer: MEDICARE

## 2024-04-30 ENCOUNTER — TELEPHONE (OUTPATIENT)
Dept: CARDIOLOGY | Facility: CLINIC | Age: 74
End: 2024-04-30
Payer: MEDICARE

## 2024-04-30 NOTE — TELEPHONE ENCOUNTER
----- Message from Patrica Kern sent at 4/30/2024  3:36 PM CDT -----  Contact: Bridget/ Jarek Gill is calling to speak to the nurse regarding some clinical questions she would need to ask, please give her a call at  reference number is [id number]    Thanks  LJ

## 2024-04-30 NOTE — TELEPHONE ENCOUNTER
This pt needs to be set up for aoc monitoring. Venus barr np wants her monitored somewhere else. Can you please assist in this matter. Thanks pb

## 2024-05-03 ENCOUNTER — TELEPHONE (OUTPATIENT)
Dept: FAMILY MEDICINE | Facility: CLINIC | Age: 74
End: 2024-05-03
Payer: MEDICARE

## 2024-05-03 NOTE — TELEPHONE ENCOUNTER
Mc Blanco with HH called and stated that pt had a fall and was at [place], [facility] claims that he fall was due to pt being on amitriptyline. [facility] did not discontinue the medication. Mc stated that pt was non compliant with medications for months however, he had her pharmacy start prepackaging her medication and he thinks since she started taking the amitriptyline all of a sudden correctly, the dosage may be to high. He would like to know if its okay to hold this medication until patient sees us on the 9th. Please advise.

## 2024-05-03 NOTE — TELEPHONE ENCOUNTER
----- Message from Orquidea Alvarado sent at 5/3/2024  9:28 AM CDT -----  Contact: jeremy  The pt [facility] is calling to get in contact with him because he has some concerns about her medication. Please give a call back at  [phone]

## 2024-05-07 ENCOUNTER — TELEPHONE (OUTPATIENT)
Dept: FAMILY MEDICINE | Facility: CLINIC | Age: 74
End: 2024-05-07
Payer: MEDICARE

## 2024-05-07 NOTE — TELEPHONE ENCOUNTER
----- Message from Lee Lopez MA sent at 5/7/2024  3:59 PM CDT -----  KYLEIGH  ----- Message -----  From: Jess Wiley  Sent: 5/7/2024   3:05 PM CDT  To: Crissy Arellano Staff    .Type:  Needs Medical Advice    Who Called: Deirdre with [facility]    Would the patient rather a call back or a response via [facility]? Call back  Best Call Back Number:   Additional Information:     Deirdre stated pt has multiple skin tears to her upper right extremity and was cleaned with normal salien applies and covered with paper tape and pt passes out a Sunday and was picked up by ambulance and was taken to [facility] pt was dehydrated and was given fluids and sent back home

## 2024-05-07 NOTE — TELEPHONE ENCOUNTER
Noted will see patient at upcoming office visit on May 9th.  Future Appointments       Date Provider Specialty Appt Notes    5/9/2024 Adan Woodward MD Family Medicine [facility] follow up    6/5/2024 Tammy Contreras NP Hematology and Oncology DVT/PE//tms Celia Whaley NP    6/7/2024 Celia Whaley NP Cardiology 6 week f/u    6/10/2024  Lab link tsh    9/11/2024  Lab     9/18/2024 Lien Blanco NP Family Medicine 6 month follow up

## 2024-05-14 ENCOUNTER — TELEPHONE (OUTPATIENT)
Dept: FAMILY MEDICINE | Facility: CLINIC | Age: 74
End: 2024-05-14
Payer: MEDICARE

## 2024-05-14 NOTE — TELEPHONE ENCOUNTER
----- Message from Danielle Gregory sent at 5/14/2024  3:12 PM CDT -----  Contact: Mc/[facility]  Mc would like a call back at [phone], in regards to patient having a fall this morning and she has some bruising to her right toe , right forearm and buttock, she also refused Er eval.  Thanks   Am

## 2024-05-15 ENCOUNTER — TELEPHONE (OUTPATIENT)
Dept: FAMILY MEDICINE | Facility: CLINIC | Age: 74
End: 2024-05-15
Payer: MEDICARE

## 2024-05-15 NOTE — TELEPHONE ENCOUNTER
Called Mc, he stated her little dog got under her feet and tripped her up she fell onto her right side, with bruises on the right arm, buttocks and right great toe. He didn't say that she said she was in any pain. However, she is on coumadin which is worrisome. Mc said he doesn't go back to see her until next week, I asked him to just call her towards the end of this week and check on her and decide based on how she sounds to go see her. He agreed and stated he would do that and keep us updated after the call. He also said that her daughter is home from the hospital so that is another set of eyes on her. Mc and I scheduled pt for a follow up since she no showed her last one we made.

## 2024-05-15 NOTE — TELEPHONE ENCOUNTER
Please call Mc with [facility] back per message.  See if he has any other concerns.  Set up follow-up appointment with us if needed.  Fall precautions.  ER precautions.

## 2024-05-15 NOTE — TELEPHONE ENCOUNTER
----- Message from Aixa Frost sent at 5/15/2024 11:24 AM CDT -----  Ciera with with [employer] regarding a prior authorization for Anoro Ellipta inhaler. Call back number is [phone] and reference number is [id number]. Thx.EL

## 2024-05-16 ENCOUNTER — NURSE TRIAGE (OUTPATIENT)
Dept: ADMINISTRATIVE | Facility: CLINIC | Age: 74
End: 2024-05-16
Payer: MEDICARE

## 2024-05-16 ENCOUNTER — TELEPHONE (OUTPATIENT)
Dept: CARDIOLOGY | Facility: CLINIC | Age: 74
End: 2024-05-16
Payer: MEDICARE

## 2024-05-16 NOTE — TELEPHONE ENCOUNTER
health nurse  stated that pt vomited 4 times  and they called 911  nurse stated he will updated us on patient

## 2024-05-16 NOTE — TELEPHONE ENCOUNTER
Attempted without success x3 to contact patient to discuss this. Unable to leave voicemail due to mailbox being full.     ---------------------------------------------------------------  Please check in with Ms. Dykes. She has not had PT/INR for warfarin since 5/8/23. Is she going to [place] for anticoagulation clinic? She has been to ER 3 times since I saw her and one of the physicians said he was going to refer her there for warfarin management. We just need to be sure. She may have transportation issues. But we need to resolve this. Thanks.   Celia

## 2024-05-16 NOTE — TELEPHONE ENCOUNTER
Transferred from  high priority but  reports 911 dispo declined by caller. [facility] behalf of pt. [facility] he has been seeing a pt of Dr. Woodward s/pf Fall 2 day ago. [facility] pt is on coumadin- declined ED. States he has been communicating with PCP about pt status. [facility] pt began vomiting onset this morning. Pt has now agreed to go to ED- States he is not with pt but a PT is with her. [facility] has been called and is en route to get pt. [facility] does not want pt triaged but would like to send message to provider with status update because he was asked to update PCP office. [facility] PT is staying with pt until EMS    HH Mc #[phone].     Advised will route to provider per request asking for out reach to speak with staff about updated pt status.     Reason for Disposition   Nursing judgment    Additional Information   Negative: Nursing judgment    Protocols used: Information Only Call - No Triage-A-OH

## 2024-05-17 NOTE — TELEPHONE ENCOUNTER
Noted.  Will follow-up with patient at next office visit.  ER precautions for severe symptoms.  Fall precautions.  Continue home health and close monitoring with family.

## 2024-05-20 ENCOUNTER — DOCUMENT SCAN (OUTPATIENT)
Dept: HOME HEALTH SERVICES | Facility: HOSPITAL | Age: 74
End: 2024-05-20
Payer: MEDICARE

## 2024-05-20 ENCOUNTER — TELEPHONE (OUTPATIENT)
Dept: CARDIOLOGY | Facility: CLINIC | Age: 74
End: 2024-05-20
Payer: MEDICARE

## 2024-05-20 NOTE — TELEPHONE ENCOUNTER
Followed up with Kalpana, patient has been notified of this information and all questions answered. Per patient's provider:  hold coumadin. Patient verbalized understanding.

## 2024-05-20 NOTE — TELEPHONE ENCOUNTER
Entered critical lab into flowsheets, routed to Dr. Kapoor for advisement.   Attempted unsuccessfully to reach patient to discuss lab results.

## 2024-05-22 DIAGNOSIS — Z79.899 ENCOUNTER FOR LONG-TERM (CURRENT) USE OF MEDICATIONS: Chronic | ICD-10-CM

## 2024-05-22 DIAGNOSIS — F51.01 PRIMARY INSOMNIA: Chronic | ICD-10-CM

## 2024-05-22 RX ORDER — TEMAZEPAM 30 MG/1
30 CAPSULE ORAL NIGHTLY
Qty: 28 CAPSULE | Refills: 0 | Status: SHIPPED | OUTPATIENT
Start: 2024-05-22 | End: 2024-06-18

## 2024-05-22 RX ORDER — GABAPENTIN 600 MG/1
600 TABLET ORAL 3 TIMES DAILY
Qty: 84 TABLET | Refills: 0 | Status: SHIPPED | OUTPATIENT
Start: 2024-05-22 | End: 2024-06-18

## 2024-05-22 NOTE — TELEPHONE ENCOUNTER
Care Due:                  Date            Visit Type   Department     Provider  --------------------------------------------------------------------------------                                EP -                              PRIMARY      [facility] FAMILY  Last Visit: 03-      CARE ([facility])   MEDICINE       Adan Woodward                               -                              PRIMARY      [facility] FAMILY  Next Visit: 05-      CARE ([facility])   [facility]       Adan Woodward                                                            Last  Test          Frequency    Reason                     Performed    Due Date  --------------------------------------------------------------------------------    Mg Level....  12 months..  alendronate..............  Not Found    Overdue    Phosphate...  12 months..  alendronate..............  Not Found    Overdue    Vitamin D...  12 months..  alendronate..............  Not Found    Overdue    [employer] Embedded Care Due Messages. Reference number: [id number].   5/22/2024 8:53:09 AM CDT

## 2024-05-23 DIAGNOSIS — I26.99 PULMONARY EMBOLISM WITHOUT ACUTE COR PULMONALE, UNSPECIFIED CHRONICITY, UNSPECIFIED PULMONARY EMBOLISM TYPE: ICD-10-CM

## 2024-05-23 DIAGNOSIS — I82.403 DEEP VEIN THROMBOSIS (DVT) OF BOTH LOWER EXTREMITIES, UNSPECIFIED CHRONICITY, UNSPECIFIED VEIN: ICD-10-CM

## 2024-05-23 DIAGNOSIS — I50.32 CHRONIC DIASTOLIC CONGESTIVE HEART FAILURE: ICD-10-CM

## 2024-05-23 RX ORDER — WARFARIN SODIUM 5 MG/1
TABLET ORAL
Qty: 28 TABLET | Refills: 2 | Status: SHIPPED | OUTPATIENT
Start: 2024-05-23

## 2024-05-23 RX ORDER — TORSEMIDE 20 MG/1
20 TABLET ORAL EVERY MORNING
Qty: 28 TABLET | Refills: 2 | Status: SHIPPED | OUTPATIENT
Start: 2024-05-23 | End: 2024-06-06 | Stop reason: SDUPTHER

## 2024-05-23 NOTE — TELEPHONE ENCOUNTER
No care due was identified.  [facility] Embedded Care Due Messages. Reference number: [id number].   5/23/2024 10:29:09 AM CDT

## 2024-05-23 NOTE — TELEPHONE ENCOUNTER
Refill Routing Note   Medication(s) are not appropriate for processing by [facility] for the following reason(s):        Outside of protocol  No active prescription written by provider  New or recently adjusted medication  Required vitals abnormal    [facility] action(s):  Route  Defer             Appointments  past 12m or future 3m with PCP    Date Provider   Last Visit   3/11/2024 Adan Woodward MD   Next Visit   5/24/2024 Adan Woodward MD   ED visits in past 90 days: 0        Note composed:2:21 PM 05/23/2024

## 2024-05-24 ENCOUNTER — TELEPHONE (OUTPATIENT)
Dept: FAMILY MEDICINE | Facility: CLINIC | Age: 74
End: 2024-05-24
Payer: MEDICARE

## 2024-05-24 NOTE — TELEPHONE ENCOUNTER
----- Message from Kaushal Trevino sent at 5/24/2024  8:56 AM CDT -----  Contact: Kalpana Acuna is running late eta 15 mins and she wants to still be seen today. Please call at [phone].    Thank you kaushal

## 2024-05-30 ENCOUNTER — DOCUMENT SCAN (OUTPATIENT)
Dept: HOME HEALTH SERVICES | Facility: HOSPITAL | Age: 74
End: 2024-05-30
Payer: MEDICARE

## 2024-06-05 ENCOUNTER — DOCUMENT SCAN (OUTPATIENT)
Dept: HOME HEALTH SERVICES | Facility: HOSPITAL | Age: 74
End: 2024-06-05
Payer: MEDICARE

## 2024-06-05 ENCOUNTER — LAB VISIT (OUTPATIENT)
Dept: LAB | Facility: HOSPITAL | Age: 74
End: 2024-06-05
Attending: NURSE PRACTITIONER
Payer: MEDICARE

## 2024-06-05 ENCOUNTER — OFFICE VISIT (OUTPATIENT)
Dept: HEMATOLOGY/ONCOLOGY | Facility: CLINIC | Age: 74
End: 2024-06-05
Payer: MEDICARE

## 2024-06-05 VITALS
DIASTOLIC BLOOD PRESSURE: 63 MMHG | WEIGHT: 148.88 LBS | BODY MASS INDEX: 25.42 KG/M2 | SYSTOLIC BLOOD PRESSURE: 117 MMHG | HEART RATE: 93 BPM | OXYGEN SATURATION: 96 % | HEIGHT: 64 IN | RESPIRATION RATE: 18 BRPM

## 2024-06-05 DIAGNOSIS — R91.8 MULTIPLE PULMONARY NODULES: ICD-10-CM

## 2024-06-05 DIAGNOSIS — F17.210 CIGARETTE SMOKER: Primary | Chronic | ICD-10-CM

## 2024-06-05 DIAGNOSIS — Z12.31 ENCOUNTER FOR SCREENING MAMMOGRAM FOR MALIGNANT NEOPLASM OF BREAST: ICD-10-CM

## 2024-06-05 DIAGNOSIS — E53.8 B12 DEFICIENCY: ICD-10-CM

## 2024-06-05 DIAGNOSIS — D64.9 ANEMIA, UNSPECIFIED TYPE: ICD-10-CM

## 2024-06-05 DIAGNOSIS — I26.99 PULMONARY EMBOLISM WITHOUT ACUTE COR PULMONALE, UNSPECIFIED CHRONICITY, UNSPECIFIED PULMONARY EMBOLISM TYPE: ICD-10-CM

## 2024-06-05 DIAGNOSIS — R77.9 ELEVATED SERUM PROTEIN LEVEL: ICD-10-CM

## 2024-06-05 DIAGNOSIS — D53.9 NUTRITIONAL ANEMIA, UNSPECIFIED: ICD-10-CM

## 2024-06-05 DIAGNOSIS — I82.403 DEEP VEIN THROMBOSIS (DVT) OF BOTH LOWER EXTREMITIES, UNSPECIFIED CHRONICITY, UNSPECIFIED VEIN: ICD-10-CM

## 2024-06-05 LAB
ALBUMIN SERPL BCP-MCNC: 3.8 G/DL (ref 3.5–5.2)
ALP SERPL-CCNC: 76 U/L (ref 55–135)
ALT SERPL W/O P-5'-P-CCNC: 8 U/L (ref 10–44)
ANION GAP SERPL CALC-SCNC: 13 MMOL/L (ref 8–16)
AST SERPL-CCNC: 15 U/L (ref 10–40)
BASOPHILS # BLD AUTO: 0.05 K/UL (ref 0–0.2)
BASOPHILS NFR BLD: 0.6 % (ref 0–1.9)
BILIRUB SERPL-MCNC: 0.4 MG/DL (ref 0.1–1)
BUN SERPL-MCNC: 39 MG/DL (ref 8–23)
CALCIUM SERPL-MCNC: 10.5 MG/DL (ref 8.7–10.5)
CHLORIDE SERPL-SCNC: 101 MMOL/L (ref 95–110)
CO2 SERPL-SCNC: 27 MMOL/L (ref 23–29)
CREAT SERPL-MCNC: 1.9 MG/DL (ref 0.5–1.4)
DIFFERENTIAL METHOD BLD: ABNORMAL
EOSINOPHIL # BLD AUTO: 0.2 K/UL (ref 0–0.5)
EOSINOPHIL NFR BLD: 1.9 % (ref 0–8)
ERYTHROCYTE [DISTWIDTH] IN BLOOD BY AUTOMATED COUNT: 20.4 % (ref 11.5–14.5)
EST. GFR  (NO RACE VARIABLE): 27.2 ML/MIN/1.73 M^2
FOLATE SERPL-MCNC: 10.3 NG/ML (ref 4–24)
GLUCOSE SERPL-MCNC: 89 MG/DL (ref 70–110)
HAPTOGLOB SERPL-MCNC: 226 MG/DL (ref 30–250)
HCT VFR BLD AUTO: 42.6 % (ref 37–48.5)
HGB BLD-MCNC: 13.6 G/DL (ref 12–16)
IMM GRANULOCYTES # BLD AUTO: 0.04 K/UL (ref 0–0.04)
IMM GRANULOCYTES NFR BLD AUTO: 0.5 % (ref 0–0.5)
IRON SERPL-MCNC: 137 UG/DL (ref 30–160)
LDH SERPL L TO P-CCNC: 149 U/L (ref 110–260)
LYMPHOCYTES # BLD AUTO: 2.8 K/UL (ref 1–4.8)
LYMPHOCYTES NFR BLD: 31.7 % (ref 18–48)
MCH RBC QN AUTO: 27.5 PG (ref 27–31)
MCHC RBC AUTO-ENTMCNC: 31.9 G/DL (ref 32–36)
MCV RBC AUTO: 86 FL (ref 82–98)
MONOCYTES # BLD AUTO: 0.4 K/UL (ref 0.3–1)
MONOCYTES NFR BLD: 4.3 % (ref 4–15)
NEUTROPHILS # BLD AUTO: 5.4 K/UL (ref 1.8–7.7)
NEUTROPHILS NFR BLD: 61 % (ref 38–73)
NRBC BLD-RTO: 0 /100 WBC
PATH REV BLD -IMP: NORMAL
PLATELET # BLD AUTO: 261 K/UL (ref 150–450)
PMV BLD AUTO: 10.7 FL (ref 9.2–12.9)
POTASSIUM SERPL-SCNC: 4.3 MMOL/L (ref 3.5–5.1)
PROT SERPL-MCNC: 8.5 G/DL (ref 6–8.4)
RBC # BLD AUTO: 4.94 M/UL (ref 4–5.4)
RETICS/RBC NFR AUTO: 0.7 % (ref 0.5–2.5)
SATURATED IRON: 36 % (ref 20–50)
SODIUM SERPL-SCNC: 141 MMOL/L (ref 136–145)
TOTAL IRON BINDING CAPACITY: 380 UG/DL (ref 250–450)
TRANSFERRIN SERPL-MCNC: 257 MG/DL (ref 200–375)
TSH SERPL DL<=0.005 MIU/L-ACNC: 1.99 UIU/ML (ref 0.4–4)
VIT B12 SERPL-MCNC: 196 PG/ML (ref 210–950)
WBC # BLD AUTO: 8.84 K/UL (ref 3.9–12.7)

## 2024-06-05 PROCEDURE — 85045 AUTOMATED RETICULOCYTE COUNT: CPT | Mod: HCNC | Performed by: NURSE PRACTITIONER

## 2024-06-05 PROCEDURE — 83615 LACTATE (LD) (LDH) ENZYME: CPT | Mod: HCNC | Performed by: NURSE PRACTITIONER

## 2024-06-05 PROCEDURE — 84443 ASSAY THYROID STIM HORMONE: CPT | Mod: HCNC | Performed by: NURSE PRACTITIONER

## 2024-06-05 PROCEDURE — 85060 BLOOD SMEAR INTERPRETATION: CPT | Mod: HCNC,,, | Performed by: PATHOLOGY

## 2024-06-05 PROCEDURE — G2211 COMPLEX E/M VISIT ADD ON: HCPCS | Mod: HCNC,S$GLB,, | Performed by: NURSE PRACTITIONER

## 2024-06-05 PROCEDURE — 3288F FALL RISK ASSESSMENT DOCD: CPT | Mod: HCNC,CPTII,S$GLB, | Performed by: NURSE PRACTITIONER

## 2024-06-05 PROCEDURE — 83010 ASSAY OF HAPTOGLOBIN QUANT: CPT | Mod: HCNC | Performed by: NURSE PRACTITIONER

## 2024-06-05 PROCEDURE — 4010F ACE/ARB THERAPY RXD/TAKEN: CPT | Mod: HCNC,CPTII,S$GLB, | Performed by: NURSE PRACTITIONER

## 2024-06-05 PROCEDURE — 1159F MED LIST DOCD IN RCRD: CPT | Mod: HCNC,CPTII,S$GLB, | Performed by: NURSE PRACTITIONER

## 2024-06-05 PROCEDURE — 99999 PR PBB SHADOW E&M-EST. PATIENT-LVL V: CPT | Mod: PBBFAC,HCNC,, | Performed by: NURSE PRACTITIONER

## 2024-06-05 PROCEDURE — 82746 ASSAY OF FOLIC ACID SERUM: CPT | Mod: HCNC | Performed by: NURSE PRACTITIONER

## 2024-06-05 PROCEDURE — 99215 OFFICE O/P EST HI 40 MIN: CPT | Mod: HCNC,S$GLB,, | Performed by: NURSE PRACTITIONER

## 2024-06-05 PROCEDURE — 3078F DIAST BP <80 MM HG: CPT | Mod: HCNC,CPTII,S$GLB, | Performed by: NURSE PRACTITIONER

## 2024-06-05 PROCEDURE — 82607 VITAMIN B-12: CPT | Mod: HCNC | Performed by: NURSE PRACTITIONER

## 2024-06-05 PROCEDURE — 36415 COLL VENOUS BLD VENIPUNCTURE: CPT | Mod: HCNC,PO | Performed by: NURSE PRACTITIONER

## 2024-06-05 PROCEDURE — 83540 ASSAY OF IRON: CPT | Mod: HCNC | Performed by: NURSE PRACTITIONER

## 2024-06-05 PROCEDURE — 85025 COMPLETE CBC W/AUTO DIFF WBC: CPT | Mod: HCNC | Performed by: NURSE PRACTITIONER

## 2024-06-05 PROCEDURE — 1160F RVW MEDS BY RX/DR IN RCRD: CPT | Mod: HCNC,CPTII,S$GLB, | Performed by: NURSE PRACTITIONER

## 2024-06-05 PROCEDURE — 1101F PT FALLS ASSESS-DOCD LE1/YR: CPT | Mod: HCNC,CPTII,S$GLB, | Performed by: NURSE PRACTITIONER

## 2024-06-05 PROCEDURE — 80053 COMPREHEN METABOLIC PANEL: CPT | Mod: HCNC | Performed by: NURSE PRACTITIONER

## 2024-06-05 PROCEDURE — 99417 PROLNG OP E/M EACH 15 MIN: CPT | Mod: HCNC,S$GLB,, | Performed by: NURSE PRACTITIONER

## 2024-06-05 PROCEDURE — 3074F SYST BP LT 130 MM HG: CPT | Mod: HCNC,CPTII,S$GLB, | Performed by: NURSE PRACTITIONER

## 2024-06-05 NOTE — PROGRESS NOTES
Subjective:      Patient ID: Kalpana Wright is a 74 y.o. female.    Chief Complaint: fatigue    HPI:  73 yo female presents to the clinic as a new patient for h/o recent diagnosis of bilateral lower extremity extensive DVTs and pulmonary embolism - A single pulmonary embolus is visualized within the right lower lobar artery extending into the medial basal segmental artery.     2/1/2024 Diagnostic venography demonstrates nonocclusive DVT in the left femoral, common femoral, and external iliac veins with occlusive DVT in the left common iliac vein. There is large collateral vessel formation in the lumbar region.     Diagnostic venography demonstrates occlusive DVT in the right femoral vein, common femoral vein, external iliac vein, common iliac vein.     Diagnostic venography demonstrates occlusion of the IVC below the indwelling filter.     Successful pharmacomechanical thrombolysis and Angioject thrombectomy of the IVC, bilateral common iliac veins, bilateral external iliac veins, bilateral common femoral veins, proximal left femoral vein, and right femoral vein. 20 mg of TPA was   instilled.     Successful venoplasty of the IVC, bilateral common iliac veins, bilateral external iliac veins, right common femoral vein, and right femoral vein.     Postintervention venography demonstrates significantly improved in line flow through the iliac veins and IVC. There is residual thrombus in the pelvic and bilateral lower extremity veins, without persistent collateral vessel formation.     Intravascular ultrasound was employed to determine position of the wires and catheter devices, determine extent and location of thrombus, and to measure vasculature prior to venoplasty.     2/3/2024 Bilateral venous lower extremity US IMPRESSION:   Extensive nonocclusive DVT in the right common iliac vein and throughout the right lower extremity.     Improved left iliac and lower extremity deep venous thrombosis     2/29/2024 CTA chest  FINDINGS: Atelectasis or scarring is present within the left lower lobe. Calcified granuloma is also present left lower lobe. A single pulmonary embolus is visualized within the right lower lobar artery extending into the medial basal segmental artery.     At that time of recent clots she was taking Eliquis 5 mg PO bid for past h/o clots.  She has had 1 miscarriage in the past.     She tells me that she has a h/o of blood clots in bilateral lungs about 8-9 years ago at that time ivc filter was placed and was placed on Eliquis 5 mg PO bid at that time.     She is currently being treated with warfarin therapy and is being followed by the coumadin clinic.     States that she uses a wheelchair when she is in her house and a rollator walker when she is out side.  She has mobility issues to pain in knee and up and down legs - when walking with PVD.      Risk factors for blood clots.  PVD as well as decreased mobility and active smoking. Has had thrombotic events while on DOAC.      Had a complete hysterectomy at 18 years old.  Has not had a mammogram in some time.  States that she has had colonoscopies in the past - no previous diagnosis of colon polyps per patient.    States that she quit smoking for 7 years.  She started smoking again about 4-5 months ago. Smoked for a total of 63 yo about 4 packs/week.    Family history of cancer:  Paternal cousin - metastatic lung cancer - smoker  Sister- breast cancer  Father - lung cancer - smoker  Mother - cervical cancer    Has lost about 30 lbs in a year unintentionally.  Denies f/c/ns.  Denies any known abnormal lymphadenopathy.     I have reviewed all of the patient's relevant lab work available in the medical record and have utilized this in my evaluation and management recommendations today.      Social History     Socioeconomic History    Marital status:    Tobacco Use    Smoking status: Every Day     Current packs/day: 0.25     Average packs/day: 0.3 packs/day for  45.0 years (11.3 ttl pk-yrs)     Types: Cigarettes    Smokeless tobacco: Never    Tobacco comments:     43 years smoked - quit past 7 years    Substance and Sexual Activity    Alcohol use: No    Drug use: Never    Sexual activity: Not Currently     Partners: Male     Birth control/protection: Post-menopausal     Social Determinants of Health     Financial Resource Strain: Low Risk  (3/20/2024)    Overall Financial Resource Strain (CARDIA)     Difficulty of Paying Living Expenses: Not very hard   Food Insecurity: No Food Insecurity (3/20/2024)    Hunger Vital Sign     Worried About Running Out of Food in the Last Year: Never true     Ran Out of Food in the Last Year: Never true   Transportation Needs: Unmet Transportation Needs (3/20/2024)    PRAPARE - Transportation     Lack of Transportation (Medical): Yes     Lack of Transportation (Non-Medical): No   Physical Activity: Inactive (3/20/2024)    Exercise Vital Sign     Days of Exercise per Week: 0 days     Minutes of Exercise per Session: 0 min   Stress: No Stress Concern Present (3/20/2024)    [employer] [place] [employer] - Occupational Stress Questionnaire     Feeling of Stress : Not at all   Housing Stability: Low Risk  (3/20/2024)    Housing Stability Vital Sign     Unable to Pay for Housing in the Last Year: No     Number of Places Lived in the Last Year: 2     Unstable Housing in the Last Year: No       Family History   Problem Relation Name Age of Onset    Cancer Mother Virginia     Arthritis Mother Virginia     Lung cancer Father      Breast cancer Sister      COPD Daughter Kendra        Past Surgical History:   Procedure Laterality Date    CARDIAC SURGERY      CHOLECYSTECTOMY      CORONARY ARTERY BYPASS GRAFT  04/16/2015    EYE SURGERY      HYSTERECTOMY      INSERT / REPLACE / REMOVE PACEMAKER      LEFT HEART CATHETERIZATION Left 11/14/2018    Procedure: CATHETERIZATION, HEART, LEFT;  Surgeon: Huber Kapoor MD;  Location: [facility] CATH LAB;   Service: Cardiology;  Laterality: Left;    OOPHORECTOMY      peripheral angiogram      peripheral stenting      REPLACEMENT OF PACEMAKER GENERATOR Left 3/10/2023    Procedure: REPLACEMENT, PACEMAKER GENERATOR;  Surgeon: Huber Kapoor MD;  Location: [facility] CATH LAB;  Service: Cardiology;  Laterality: Left;  Juan Antonio notified  NOT MRI safe   Pacer and leads implanted 2/11/2014, Antwon   Onofre Sci K[address] DR MACDONALD, [id number]   A lead: Onofre Sci DEXTRUS 4135, [id number]   V lead: Onofre Sci DEXTRUS 4136, [id number]       Past Medical History:   Diagnosis Date    ACS (acute coronary syndrome) 3/20/2018    Acute on chronic diastolic congestive heart failure 4/27/2015    Acute on chronic respiratory failure with hypercapnia 1/10/2019    Acute renal failure 1/11/2019    Acute systolic heart failure 3/21/2018    JG (acute kidney injury) 1/29/2019    Anticoagulant long-term use     Arthritis     Asthma     Bradycardia 12/16/2013    CHF (congestive heart failure)     Clostridium difficile colitis 9/25/2018    Convulsions 2/11/2021    COPD (chronic obstructive pulmonary disease)     Coronary artery disease     Depression     Encounter for blood transfusion     Fall 7/8/2019    Iniital HPI: New problem acute.  Happened several days ago.  Patient was getting up out of her wheelchair and thought it was locked when it rolled about from under her and she fell and hit her tailbone on the wheelchair.  Patient has been having moderate to severe pain to the point where she cannot sit on her wheelchair for long periods of time.  ==================== 09/05/2019 Fell again on Satur    Gallstones 3/18/2017    History of Pulmonary embolism 7/17/2014    Hospital discharge follow-up 9/21/2020    Hyperlipidemia     Hypertension     Need for vaccination 8/8/2019    Due for pneumonia vaccination.  However patient has latex allergy.  Patient cannot receive pneumococcal vaccine here.  Well patient follow-up at pharmacy for different pneumococcal vaccine     Non-intractable cyclical vomiting with nausea 2/1/2019    Peripheral vascular disease     Pulmonary embolus 9/9/2013    Thyroid disease        Review of Systems   Constitutional:  Positive for fatigue and unexpected weight change. Negative for chills, diaphoresis and fever.   HENT:  Negative for nosebleeds.    Eyes: Negative.    Respiratory:  Positive for cough (every now and then productive). Negative for shortness of breath.    Cardiovascular:  Negative for chest pain and palpitations.   Gastrointestinal:  Positive for anal bleeding (hemorrhodal bleeding) and constipation. Negative for blood in stool.   Endocrine: Positive for cold intolerance.   Genitourinary:  Negative for hematuria.   Musculoskeletal:  Positive for arthralgias and gait problem.   Skin:  Positive for wound (pressure ulcers to buttocks - in wheelchair chronically followed by hh for dressing changes).        Left neck hypopigment and itchy for the past 2-3 months.   Allergic/Immunologic: Negative.    Hematological: Negative.    Psychiatric/Behavioral: Negative.            Medication List with Changes/Refills   Current Medications    ALBUTEROL-IPRATROPIUM (DUO-NEB) 2.5 MG-0.5 MG/3 ML NEBULIZER SOLUTION    INHALE 1 CONTENTS OF VIAL VIA NEBULIZER EVERY 6 HOURS WHILE AWAKE.    ALENDRONATE (FOSAMAX) 70 MG TABLET    TAKE 1 TABLET BY MOUTH ONCE WEEKLY ON SAME DAY IN THE MORNING WITH GLASS OF WATER, REMAIN UPRIGHT FOR 30 MIN.    AMITRIPTYLINE (ELAVIL) 100 MG TABLET    Take 1 tablet (100 mg total) by mouth every evening.    AMLODIPINE (NORVASC) 10 MG TABLET    Take 1 tablet (10 mg total) by mouth once daily.    BENAZEPRIL (LOTENSIN) 40 MG TABLET    TAKE ONE TABLET BY MOUTH DAILY    BISOPROLOL (ZEBETA) 10 MG TABLET    Take 1 tablet (10 mg total) by mouth once daily.    BRILINTA 90 MG TABLET    TAKE ONE TABLET BY MOUTH TWICE DAILY    BUTRANS WEEKLY PATCH    1 patch every 7 days.    CHLORHEXIDINE (PERIDEX) 0.12 % SOLUTION    15 mLs.    CYCLOBENZAPRINE  (FLEXERIL) 10 MG TABLET    TAKE ONE TABLET BY MOUTH TWICE DAILY AS NEEDED    ERGOCALCIFEROL (ERGOCALCIFEROL) 50,000 UNIT CAP    Vitamin D2 1,250 mcg (50,000 unit) capsule   Take 1 capsule every week by oral route.    ESOMEPRAZOLE (NEXIUM) 40 MG CAPSULE    Take 1 capsule (40 mg total) by mouth 2 (two) times daily before meals.    FERROUS SULFATE 325 (65 FE) MG EC TABLET    Take 1 tablet (325 mg total) by mouth once daily.    FLUOROMETHOLONE 0.1% (FML) 0.1 % DRPS    Place into both eyes.    FLUTICASONE/UMECLIDIN/VILANTER (TRELEGY ELLIPTA INHL)    Inhale 1 Inhaler into the lungs once daily.    GABAPENTIN (NEURONTIN) 600 MG TABLET    TAKE 1 TABLET 3 TIMES A DAY    HYDROCHLOROTHIAZIDE (HYDRODIURIL) 25 MG TABLET    TAKE ONE TABLET BY MOUTH DAILY    HYDROMORPHONE (DILAUDID) 2 MG TABLET    Take 2 mg by mouth every 8 (eight) hours as needed.    ISOSORBIDE MONONITRATE (IMDUR) 30 MG 24 HR TABLET    Take 1 tablet (30 mg total) by mouth 2 (two) times daily.    LEVOFLOXACIN (LEVAQUIN) 500 MG TABLET    Take 500 mg by mouth.    LEVOTHYROXINE (SYNTHROID) 75 MCG TABLET    TAKE ONE TABLET BY MOUTH IN THE MORNING BEFORE BREAKFAST    MICONAZOLE NITRATE 2 % (ZEASORB, MICONAZOLE,) 2 % TOP POWDER    Apply topically as needed for Itching.    MUPIROCIN (BACTROBAN) 2 % OINTMENT    mupirocin 2 % topical ointment    NITROGLYCERIN (NITROSTAT) 0.4 MG SL TABLET    Place 1 tablet (0.4 mg total) under the tongue every 5 (five) minutes as needed for Chest pain.    NYSTATIN (MYCOSTATIN) POWDER    Apply topically.    OXYCODONE-ACETAMINOPHEN (PERCOCET)  MG PER TABLET    Take 1 tablet by mouth every 12 (twelve) hours as needed for Pain.    POTASSIUM CHLORIDE (MICRO-K) 10 MEQ CPSR    TAKE TWO CAPSULES BY MOUTH IN THE MORNING AND ONE IN THE EVENING AS DIRECTED.    PRAMIPEXOLE (MIRAPEX) 0.5 MG TABLET    TAKE  ONE TABLET BY MOUTH DAILY    PROMETHAZINE (PHENERGAN) 25 MG TABLET    Take 1 tablet (25 mg total) by mouth every 6 (six) hours as needed.     RANOLAZINE (RANEXA) 1,000 MG TB12    Take 1 tablet (1,000 mg total) by mouth 2 (two) times daily.    ROPINIROLE (REQUIP) 5 MG TABLET    TAKE  ONE TABLET BY MOUTH DAILY    TEMAZEPAM (RESTORIL) 30 MG CAPSULE    TAKE ONE CAPSULE EVERY EVENING    TOBRAMYCIN-DEXAMETHASONE 0.3-0.1% (TOBRADEX) 0.3-0.1 % DRPS        TORSEMIDE (DEMADEX) 20 MG TAB    TAKE 1 TABLET EVERY MORNING    TRIAMCINOLONE ACETONIDE 0.1% (KENALOG) 0.1 % CREAM    Apply topically 2 (two) times daily. for 7 days    UMECLIDINIUM-VILANTEROL (ANORO ELLIPTA) 62.5-25 MCG/ACTUATION DSDV    Inhale 1 puff into the lungs once daily. Controller    VENTOLIN HFA 90 MCG/ACTUATION INHALER    Inhale 2 puff(s) every 4 hours by inhalation route.    WARFARIN (COUMADIN) 5 MG TABLET    TAKE 1 TABLET DAILY AT 5PM        Objective:     Vitals:    06/05/24 1458   BP: 117/63   Pulse: 93   Resp: 18       Physical Exam  Vitals reviewed.   Constitutional:       Appearance: Normal appearance.   HENT:      Head: Normocephalic and atraumatic.      Right Ear: External ear normal.      Left Ear: External ear normal.   Cardiovascular:      Rate and Rhythm: Normal rate and regular rhythm.      Heart sounds: Normal heart sounds, S1 normal and S2 normal.   Pulmonary:      Effort: Pulmonary effort is normal.      Breath sounds: Normal breath sounds.   Abdominal:      General: There is no distension.   Musculoskeletal:         General: Normal range of motion.      Cervical back: Normal range of motion.   Skin:     General: Skin is warm and dry.   Neurological:      General: No focal deficit present.      Mental Status: She is alert and oriented to person, place, and time.   Psychiatric:         Attention and Perception: Attention and perception normal.         Mood and Affect: Mood and affect normal.         Speech: Speech normal.         Behavior: Behavior normal. Behavior is cooperative.         Thought Content: Thought content normal.         Cognition and Memory: Cognition and memory  normal.         Judgment: Judgment normal.         Assessment:     Problem List Items Addressed This Visit          Hematology    Deep vein thrombosis (DVT) of both lower extremities, unspecified chronicity, unspecified vein    Relevant Orders    Mammo Digital Screening Bilat       Other    Cigarette smoker - Primary (Chronic)     Other Visit Diagnoses       Pulmonary embolism without acute cor pulmonale, unspecified chronicity, unspecified pulmonary embolism type        Relevant Orders    Mammo Digital Screening Bilat    Multiple pulmonary nodules        Anemia, unspecified type        Relevant Orders    CBC Auto Differential    Comprehensive Metabolic Panel    Folate    Iron and TIBC    Reticulocytes    TSH    Lactate Dehydrogenase    Haptoglobin    Vitamin B12    Pathologist Interpretation Differential    Cologuard Screening (Multitarget Stool DNA)    Mammo Digital Screening Bilat    Nutritional anemia, unspecified        Relevant Orders    Folate    Vitamin B12    Encounter for screening mammogram for malignant neoplasm of breast        Relevant Orders    Mammo Digital Screening Bilat            Lab Results   Component Value Date    WBC 6.7 01/31/2024    RBC 4.09 (L) 01/31/2024    HGB 7.7 (L) 02/02/2024    HCT 24.8 (L) 02/02/2024    MCV 86.6 01/31/2024    MCH 25.7 (L) 01/31/2024    MCHC 29.7 (L) 01/31/2024    RDW 18.0 (H) 01/31/2024     01/31/2024    MPV 9.2 01/31/2024    GRAN 5.2 05/05/2023    GRAN 63.4 05/05/2023    LYMPH 2.2 05/05/2023    LYMPH 27.4 05/05/2023    MONO 0.5 05/05/2023    MONO 6.6 05/05/2023    EOS 0.2 05/05/2023    BASO 0.04 05/05/2023    EOSINOPHIL 2.0 05/05/2023    BASOPHIL 0.5 05/05/2023      Lab Results   Component Value Date     05/16/2024    K 4.5 05/16/2024     04/23/2024    CO2 25 05/16/2024    BUN 17 05/16/2024    CREATININE 0.71 05/16/2024    CALCIUM 9.5 05/16/2024    ANIONGAP 7 05/16/2024    ESTGFRAFRICA >60.0 08/19/2021    ESTGFRAFRICA >60.0 08/19/2021     EGFRNONAA >60.0 08/19/2021    EGFRNONAA >60.0 08/19/2021     Lab Results   Component Value Date    ALT 9 05/16/2024    AST 17 05/16/2024    ALKPHOS 61 05/16/2024    BILITOT 0.3 (L) 05/16/2024     Lab Results   Component Value Date    IRON 33 05/05/2023    TRANSFERRIN 275 05/05/2023    TIBC 407 05/05/2023    FESATURATED 8 (L) 05/05/2023    FERRITIN 34 05/05/2023      Lab Results   Component Value Date    MVHEUWLO01 196 (L) 06/05/2024     Lab Results   Component Value Date    TSH 1.988 06/05/2024     Lab Results   Component Value Date    FOLATE 10.3 06/05/2024   Pathologist interpretation of peripheral blood smear:   White cells show occasional atypical lymphocytes, favor reactive in   the overall spectrum.    Normochromic normocytic red cells show mild anisopoikilocytosis.    Morphologically unremarkable platelet     Plan:   Cigarette smoker    Pulmonary embolism without acute cor pulmonale, unspecified chronicity, unspecified pulmonary embolism type  -     Ambulatory referral/consult to Hematology / Oncology  -     Mammo Digital Screening Bilat; Future; Expected date: 06/05/2024    Deep vein thrombosis (DVT) of both lower extremities, unspecified chronicity, unspecified vein  -     Ambulatory referral/consult to Hematology / Oncology  -     Mammo Digital Screening Bilat; Future; Expected date: 06/05/2024    Multiple pulmonary nodules    Anemia, unspecified type  -     CBC Auto Differential; Future; Expected date: 06/05/2024  -     Comprehensive Metabolic Panel; Future; Expected date: 06/05/2024  -     Folate; Future; Expected date: 06/05/2024  -     Iron and TIBC; Future; Expected date: 06/05/2024  -     Reticulocytes; Future; Expected date: 06/05/2024  -     TSH; Future; Expected date: 06/05/2024  -     Lactate Dehydrogenase; Future; Expected date: 06/05/2024  -     Haptoglobin; Future; Expected date: 06/05/2024  -     Vitamin B12; Future; Expected date: 06/05/2024  -     Pathologist Interpretation Differential;  Future; Expected date: 06/05/2024  -     Cologuard Screening (Multitarget Stool DNA); Future; Expected date: 06/05/2024  -     Mammo Digital Screening Bilat; Future; Expected date: 06/05/2024    Nutritional anemia, unspecified  -     Folate; Future; Expected date: 06/05/2024  -     Vitamin B12; Future; Expected date: 06/05/2024    Encounter for screening mammogram for malignant neoplasm of breast  -     Mammo Digital Screening Bilat; Future; Expected date: 06/05/2024      Med Onc Chart Routing      Follow up with physician    Follow up with PALOMO 6 months. in person in [place]   Infusion scheduling note   n/a   Injection scheduling note n/a   Labs   Scheduling:  Preferred lab: [facility]  Lab interval:  labs today.  cbc, cmp, b12, spep, flc, breezy prior to next visit   Imaging   Ct chest wo contrast, screening bilateral mammogram   Pharmacy appointment No pharmacy appointment needed      Other referrals       No additional referrals needed  n/a         Cologuard ordered.  Screening mammogram ordered.  Assess labs with last labs showing significant anemia.  CT chest wo contrast due to multiple bilateral pulmonary nodules - high risk cancer - encouraged smoking cessation.  Options for anticoagulation is lovenox 1 mg/kg ever 12 hours vs coumadin therapy with goal InR 2-3.  She will continue to monitor for any abnormal bleeding.  Discussed need to avoid foods high in vitamin K and need for increased monitoring if on abx therapy.   Found with B12 deficiency - start B12 1000 mcg SL daily.    Total time spent on encounter: 70 minutes    Collaborating Provider:  Dr. Antwon Kelly    Thank You,  uMnira Contreras, MYAP-C  Benign Hematology

## 2024-06-06 ENCOUNTER — OFFICE VISIT (OUTPATIENT)
Dept: CARDIOLOGY | Facility: CLINIC | Age: 74
End: 2024-06-06
Payer: MEDICARE

## 2024-06-06 VITALS
HEART RATE: 62 BPM | OXYGEN SATURATION: 98 % | BODY MASS INDEX: 25.56 KG/M2 | HEIGHT: 64 IN | SYSTOLIC BLOOD PRESSURE: 120 MMHG | DIASTOLIC BLOOD PRESSURE: 62 MMHG

## 2024-06-06 DIAGNOSIS — I48.0 PAROXYSMAL ATRIAL FIBRILLATION: ICD-10-CM

## 2024-06-06 DIAGNOSIS — J43.9 PULMONARY EMPHYSEMA, UNSPECIFIED EMPHYSEMA TYPE: ICD-10-CM

## 2024-06-06 DIAGNOSIS — I82.403 DEEP VEIN THROMBOSIS (DVT) OF BOTH LOWER EXTREMITIES, UNSPECIFIED CHRONICITY, UNSPECIFIED VEIN: ICD-10-CM

## 2024-06-06 DIAGNOSIS — I73.9 PAD (PERIPHERAL ARTERY DISEASE): ICD-10-CM

## 2024-06-06 DIAGNOSIS — Z95.1 HX OF CABG: ICD-10-CM

## 2024-06-06 DIAGNOSIS — D68.62 LUPUS ANTICOAGULANT SYNDROME: ICD-10-CM

## 2024-06-06 DIAGNOSIS — I49.5 SSS (SICK SINUS SYNDROME): ICD-10-CM

## 2024-06-06 DIAGNOSIS — Z95.0 PACEMAKER: ICD-10-CM

## 2024-06-06 DIAGNOSIS — I25.708 CORONARY ARTERY DISEASE OF BYPASS GRAFT OF NATIVE HEART WITH STABLE ANGINA PECTORIS: ICD-10-CM

## 2024-06-06 DIAGNOSIS — I26.99 PULMONARY EMBOLISM WITHOUT ACUTE COR PULMONALE, UNSPECIFIED CHRONICITY, UNSPECIFIED PULMONARY EMBOLISM TYPE: ICD-10-CM

## 2024-06-06 DIAGNOSIS — I50.32 CHRONIC DIASTOLIC CONGESTIVE HEART FAILURE: Primary | ICD-10-CM

## 2024-06-06 DIAGNOSIS — F17.210 CIGARETTE SMOKER: Chronic | ICD-10-CM

## 2024-06-06 DIAGNOSIS — R11.2 NAUSEA AND VOMITING, UNSPECIFIED VOMITING TYPE: ICD-10-CM

## 2024-06-06 DIAGNOSIS — I65.23 BILATERAL CAROTID ARTERY STENOSIS: ICD-10-CM

## 2024-06-06 LAB — PATH REV BLD -IMP: NORMAL

## 2024-06-06 PROCEDURE — 1160F RVW MEDS BY RX/DR IN RCRD: CPT | Mod: HCNC,CPTII,S$GLB, | Performed by: NURSE PRACTITIONER

## 2024-06-06 PROCEDURE — 99214 OFFICE O/P EST MOD 30 MIN: CPT | Mod: HCNC,S$GLB,, | Performed by: NURSE PRACTITIONER

## 2024-06-06 PROCEDURE — 3288F FALL RISK ASSESSMENT DOCD: CPT | Mod: HCNC,CPTII,S$GLB, | Performed by: NURSE PRACTITIONER

## 2024-06-06 PROCEDURE — 1101F PT FALLS ASSESS-DOCD LE1/YR: CPT | Mod: HCNC,CPTII,S$GLB, | Performed by: NURSE PRACTITIONER

## 2024-06-06 PROCEDURE — 3074F SYST BP LT 130 MM HG: CPT | Mod: HCNC,CPTII,S$GLB, | Performed by: NURSE PRACTITIONER

## 2024-06-06 PROCEDURE — 3078F DIAST BP <80 MM HG: CPT | Mod: HCNC,CPTII,S$GLB, | Performed by: NURSE PRACTITIONER

## 2024-06-06 PROCEDURE — 4010F ACE/ARB THERAPY RXD/TAKEN: CPT | Mod: HCNC,CPTII,S$GLB, | Performed by: NURSE PRACTITIONER

## 2024-06-06 PROCEDURE — 99999 PR PBB SHADOW E&M-EST. PATIENT-LVL V: CPT | Mod: PBBFAC,HCNC,, | Performed by: NURSE PRACTITIONER

## 2024-06-06 PROCEDURE — 1159F MED LIST DOCD IN RCRD: CPT | Mod: HCNC,CPTII,S$GLB, | Performed by: NURSE PRACTITIONER

## 2024-06-06 RX ORDER — PROMETHAZINE HYDROCHLORIDE 25 MG/1
25 TABLET ORAL DAILY PRN
Qty: 15 TABLET | Refills: 0 | Status: SHIPPED | OUTPATIENT
Start: 2024-06-06

## 2024-06-06 RX ORDER — TORSEMIDE 20 MG/1
20 TABLET ORAL EVERY MORNING
Qty: 30 TABLET | Refills: 6 | Status: SHIPPED | OUTPATIENT
Start: 2024-06-06

## 2024-06-06 NOTE — PROGRESS NOTES
Attempted without success to contact patient to discuss this. Unable to leave voicemail due to mail box being full.

## 2024-06-06 NOTE — PROGRESS NOTES
Subjective:    Patient ID:  Kalpana Wright is a 74 y.o. female who presents for follow-up of hospital discharge.      HPI: Ms. Kalpana Wright with H/O H/O CAD with H/O CABG, PAD, HTN, HLP, PAF, SSS, PPM, diastolic heart failure, tobacco use, statin intolerance, lupus anticoagulant presents to the clinic in a wheelchair for follow up.     She saw hematology: options for anticoagulation is lovenox 1mg/kg every 12 hours vs coumadin therapy with INR goal of 2-3. She has been off warfarin for a couple of days-last dose Monday, Yana 3. She stated that home health nurse told her to wait to hear from anticoagulation clinic for dosing instructions. She interpreted that to mean to hold medication until she hears from the clinic. This is the second time that this has happened.    Anticoagulation clinic note 5/28/24: Patient reports that she had her INR checked last week on 5/23 and was instructed by her home health nurse to hold her dose until she heard from coumadin clinic. We did not received those results. I called [facility] and they faxed the results from 5/23 with INR of 1.57. Patient did not take her dose from 5/23-5/26. She reported that she took 5 mg last night 5/27.     Today she is nauseated and vomiting. It began while she was driving to the clinic today. She stated that this happens sometimes and she has to take phenergan for the vomiting to stop.   She denied any chest pain or SOB. She denied orthopnea, PND, or edema. She denied any lightheadedness, dizziness, or near syncope. She doesn't walk much due to pain in the legs and knees.    [facility].    She has h/o RJ, but has not been using CPAP; her machine was taken back years ago.   ------------------------------------------  Office visit 4/26/24: She went to [place] [facility] on 2/29/24 and discharge on 3/5/24; She went for chest pain, SOB, with cough and body aches. She had hypoxia and was placed on BiPAP. She had significant fluid overload and  "placed on IV lasix. She had not been taking lasix twice daily. She had been omitting the evening dose due to having trouble getting to bathroom at night. She had RLL PE. She actually had thrombolysis and thrombectomy by IR on 1/31/24. She had "successful pharmacomechanical thrombolysis and Angioject thrombectomy of the IVC, bilateral common iliac veins, bilateral external iliac veins, bilateral common femoral veins, proximal left femoral vein, and right femoral vein. 20 mg of TPA was instilled. Successful venoplasty of the IVC, bilateral common iliac veins, bilateral external iliac veins, right common femoral vein, and right femoral vein."   She also has extensive nonocclusive DVT in right common iliac vein and throughout the right lower extremity. (U/S lower extremity Veins at [place] on 2/3/24).  Pulmonology recommended changing from eliquis to warfarin, but she declined. She was supposed to F/U with IR as outpatient. She did not have transportation and missed the appointment. She also missed follow up appointment with Dr. Rodriges.   Dr. Chase's note in hospital consult: Continue chronic anticoagulation, may need Coumadin therapy. Especially if she has lupus anticoagulant positive sometimes the newer anticoagulation is not as effective.     She had weakness after hospitalizations-refused rehab.  She presents in a wheelchair today.  She stated that she has trouble walking long distances due to weakness.  She denied any chest pain or shortness of breath at rest she does report some dyspnea on exertion but she does not exert herself very much.  She denied any orthopnea, or PND.  She does report edema to her lower legs comes and goes.  She denied any palpitations, lightheadedness, dizziness, or near syncope.    ------------------------------------------------------------  She went to ER for pain in left thigh:  [place] ER note on 9/15/23: Pt to ED c/o L upper leg pain x3-4 days. Pt reports pacemaker/defib battery " "changed approx. 3 months ago. Pt reports L upper leg numbness x2-3 weeks. Pt reports she now has "shocking" sensation to L upper leg x3-4 days. Pt reports she notices bruising to area after "shock" happens to leg. Pt denies CP/SOB.     She has known severe PAD. Last arterial U/S 12/9/15: The right BOAZ is .53. The left BOAZ is .56. BILATERAL BOAZ SUGGESTIVE OF SEVERE DISEASE   THE WAVEFORMS ARE MONOPHASIC BILATERALLY WITH EVIDENCE OF SIGNIFICANT RT EXTERNAL ILIAC DISEASE IN THE 50-99% RANGE AS WELL AS BILATERAL SFA OCCLUSIONS.     She had ER visit on 9/5/23 for decreased responsiveness thought to be related to narcotic OD. She was given narcan with good response. She complained of pain in her legs, which is what is causing her to take percocet tablets. She is under the care of pain management physician: Harry Knox.  Venous U/S bilateral lower extremities 9/5/23 at [place]: IMPRESSION:   No evidence of deep venous thrombosis of the lower extremities.     Previous visit: F/U hospital discharge. She was brought to [facility] on April 1, 2023 for lethargy and hypoxemia. O2 sat was reportedly in the 70s. Her BNP was 457. She also had peripheral edema. She was treated with BiPAP, antibiotics, steroids, nebs, and IV lasix. She apparently qualified for supplemental oxygen at home after 6-minute walk.  Discharge summary indicates that she was sent home with home health for PT and OT.  No d/c BNP.  Serum Mg 1.9-2.2.  She had worsening of kidney function over the course of admission.    She has H/O CAD with H/O CABG, PVD, HTN, HLP, PAF, SSS, PPM, diastolic heart failure, tobacco use, statin intolerance, lupus anticoagulant.    ---------------------------------------------------------        Medications: she is taking Brilinta, and warfarin. She is not taking warfarin right now, awaiting instructions from anticoagulation clinic. Takes torsemide 20 mg q.a.m. and potassium (2 capsules in am and 1 in pm). She stated that " she is taking bisoprolol, benazepril, amlodipine, HCTZ. Taking Imdur and Ranexa BID.  She is not taking repatha-many months; unknown reason.  Sodium: she does not add salt to foods when cooking;  she is not reading labels for sodium content. Denied eating salty foods.   Exercise: she  does not; walking aggravates pain in legs/knees; calf pain    Tobacco:  smoking.  Stress and seeing people smoke are triggers. She does not want to quit.  Stress management: working in garden.  Alcohol: no alcohol use       she states that her daily weights  has been stable. Body mass index is 25.56 kg/m².  Not feeling well today and weight deferred. She reports weight loss due to decreased intake of food.   Wt Readings from Last 3 Encounters:   06/05/24 67.5 kg (148 lb 14.4 oz)   04/26/24 74.1 kg (163 lb 4.8 oz)   04/23/24 73.9 kg (163 lb)     BP log: None.  She does not have a BP monitor.    Review of Systems   Constitutional: Negative for chills, decreased appetite (gets abdominal pain after eating a few bites. Has discussed this with Dr. Woodward and sees Dr. Bo (GI).), fever, malaise/fatigue, night sweats, weight gain and weight loss.   HENT:  Negative for congestion.    Cardiovascular:  Negative for chest pain, claudication, cyanosis, dyspnea on exertion, irregular heartbeat, leg swelling, near-syncope, orthopnea, palpitations, paroxysmal nocturnal dyspnea and syncope.   Respiratory:  Negative for cough, hemoptysis, shortness of breath, sputum production and wheezing.    Hematologic/Lymphatic: Negative for adenopathy and bleeding problem. Bruises/bleeds easily.   Skin:  Negative for color change and nail changes.   Musculoskeletal:  Positive for joint pain (chronic pain in the knees due to arthritis.).   Gastrointestinal:  Positive for nausea and vomiting. Negative for bloating, change in bowel habit, heartburn and hematochezia.   Genitourinary:  Negative for hematuria.   Neurological:  Negative for dizziness and  light-headedness.   Psychiatric/Behavioral:  Negative for altered mental status.        Objective:   Physical Exam  Constitutional:       General: She is not in acute distress.     Appearance: She is well-developed. She is not diaphoretic.   HENT:      Head: Normocephalic and atraumatic.   Eyes:      General: No scleral icterus.     Conjunctiva/sclera: Conjunctivae normal.   Neck:      Thyroid: No thyromegaly.      Vascular: No JVD.      Trachea: No tracheal deviation.   Cardiovascular:      Rate and Rhythm: Normal rate and regular rhythm.      Pulses: Intact distal pulses.           Radial pulses are 2+ on the right side and 2+ on the left side.      Heart sounds: Normal heart sounds. No murmur heard.     No friction rub. No gallop.      Comments:    Pulmonary:      Effort: Pulmonary effort is normal. No respiratory distress.      Breath sounds: Normal breath sounds. No stridor. No wheezing, rhonchi or rales.   Chest:      Chest wall: No tenderness.   Abdominal:      General: Bowel sounds are normal. There is no distension.      Palpations: Abdomen is soft.      Tenderness: There is no guarding or rebound.      Comments: Abdomen obese.   Musculoskeletal:         General: No swelling. Normal range of motion.      Cervical back: Neck supple.   Lymphadenopathy:      Cervical: No cervical adenopathy.   Skin:     General: Skin is warm and dry.      Coloration: Skin is not pale.      Findings: No erythema or rash.      Comments: [place].   Neurological:      Mental Status: She is alert and oriented to person, place, and time.        05/13/24 00:00 05/20/24 00:00 05/23/24 00:00 05/28/24 00:00   INR 2.45 (C) (E) 3.22 (E) 1.57 (E) 1.06 (E)   (C): Corrected  (E): External lab result     Latest Reference Range & Units 05/16/24 16:20   Sodium 136 - 144 mmol/L 140 (E)   Potassium 3.6 - 5.1 mmol/L 4.5 (E)   Chloride 101 - 111 mmol/L 108 (E)   CO2 22 - 32 mmol/L 25 (E)   Anion Gap 7 - 16 mmol/L 7 (E)   BUN 8 - 20 mg/dL 17 (E)    Creatinine 0.60 - 1.10 mg/dL 0.71 (E)   Glucose 65 - 99 mg/dL 83 (E)   Calcium 8.9 - 10.3 mg/dL 9.5 (E)   ALP 28 - 116 U/L 61 (E)   Total Protein 6.1 - 7.9 g/dL 7.0 (E)   Albumin 3.5 - 4.8 g/dL 3.5 (E)   BILIRUBIN TOTAL 0.4 - 2.0 mg/dL 0.3 (L) (E)   AST 10 - 34 U/L 17 (E)   ALT 5 - 41 U/L 9 (E)   (L): Data is abnormally low  (E): External lab result     Latest Reference Range & Units 04/23/24 11:23   Sodium 136 - 145 mmol/L 144   Potassium 3.5 - 5.1 mmol/L 3.3 (L)   Chloride 95 - 110 mmol/L 101   CO2 23 - 29 mmol/L 32 (H)   Anion Gap 8 - 16 mmol/L 11   BUN 8 - 23 mg/dL 14   Creatinine 0.5 - 1.4 mg/dL 1.0   eGFR >60 mL/min/1.73 m^2 58.8 !   Glucose 70 - 110 mg/dL 77   Calcium 8.7 - 10.5 mg/dL 9.7   ALP 55 - 135 U/L 77   PROTEIN TOTAL 6.0 - 8.4 g/dL 7.4   Albumin 3.5 - 5.2 g/dL 3.1 (L)   BILIRUBIN TOTAL 0.1 - 1.0 mg/dL 0.2   AST 10 - 40 U/L 17   ALT 10 - 44 U/L 5 (L)   TSH 0.400 - 4.000 uIU/mL 5.397 (H)   Free T4 0.71 - 1.51 ng/dL 0.92   (L): Data is abnormally low  (H): Data is abnormally high  !: Data is abnormal     Latest Reference Range & Units 12/14/23 05:15   Cholesterol Total 0 - 199 mg/dL 125 (E)   HDL 29 - 89 mg/dL 40 (E)   HDL/Cholesterol Ratio 0.0 - 4.5  3.1 (E)   LDL Calculated 0 - 109 mg/dL 71 (E)   Triglycerides 0 - 199 mg/dL 68 (E)   (E): External lab result    PPM interrogation 4/24/24:  4/17/24 atrial tachy 1:1 conduction; heart rate 167 beats per minute-37 seconds      Lexiscan 5/6/22: Conclusion     Abnormal myocardial perfusion scan.    There is a mild intensity, fixed perfusion abnormality consistent with scar in the basal to mid inferior wall(s).    The gated perfusion images showed an ejection fraction of 58% at rest. The gated perfusion images showed an ejection fraction of 58% post stress.    There is normal wall motion at rest and post stress.    LV cavity size is normal at rest and normal at stress.    The EKG portion of this study is negative for ischemia.    The patient reported no  chest pain during the stress test.      [facility] [place] 9/6/2020: Done due to chest pain and NSTEMI  SUMMARY OF PROCEDURE:     1. Left coronary angiogram.     2. Saphenous vein graft angiogram.     3. Left heart catheterization.     4. Conscious sedation.     5. IVUS of the left main.     6. Rota to the left main.     7. PCI with drug-eluting stent to the left main.  ASSESSMENT AND PLAN:     1. Severe coronary artery disease as outlined above.     2. Recurrent restenosis of the vein graft to the OM with multiple   layers of stents and suboptimal result with balloon angioplasty.     3. Rotablation to the ostial left main with a drug-eluting stent using   5.0 x 15 Resolute Carlin, postdilated with a 5.0 balloon at high pressure   with excellent angiographic and intravascular ultrasound-guided results.     4. The patient will be kept on dual antiplatelets.  The patient will be   followed thoroughly.  Further recommendation will be dictated.  Mo Estrada MD    [facility] at [place] 11/19/21: Interpretation Summary   Definity contrast used to eval EF.   Ejection Fraction = 60-65%.   There is mild concentric left ventricular hypertrophy.   Grade 1 Diastolic Dysfunction   The left atrium is mildly dilated.   There is mild mitral regurgitation.   There is mild tricuspid regurgitation.       [facility] [place] 8/30/2020:Interpretation Summary  Contrast injection was performed.  Ejection Fraction = 60-65%.  There is mild mitral regurgitation.  Contrast injection was performed.     [facility] 2/19/2018:CONCLUSIONS     1 - Concentric hypertrophy.     2 - No wall motion abnormalities.     3 - Normal left ventricular systolic function (EF 60-65%).     4 - Normal left ventricular diastolic function.     5 - Normal right ventricular systolic function .     6 - The estimated PA systolic pressure is 24 mmHg.      Carotid U/S at [place] 07/30/2021: IMPRESSION:    1.  Bilateral carotid atheromatous plaquing. There is less than 50%  diameter reduction.    2.  High-grade stenosis within the left external carotid artery, unlikely to be of clinical significance.     Venous U/S  Left leg 11/23/21: No DVT in the LLE.       Assessment:      1. Chronic diastolic congestive heart failure    2. SSS (sick sinus syndrome)    3. Pacemaker    4. Coronary artery disease of bypass graft of native heart with stable angina pectoris    5. Hx of CABG    6. PAD (peripheral artery disease)    7. Paroxysmal atrial fibrillation    8. Bilateral carotid artery stenosis    9. Pulmonary embolism without acute cor pulmonale, unspecified chronicity, unspecified pulmonary embolism type    10. Deep vein thrombosis (DVT) of both lower extremities, unspecified chronicity, unspecified vein    11. Lupus anticoagulant syndrome    12. Pulmonary emphysema, unspecified emphysema type    13. Cigarette smoker    14. Nausea and vomiting, unspecified vomiting type        Plan:     Chronic diastolic congestive heart failure  -     torsemide (DEMADEX) 20 MG Tab; Take 1 tablet (20 mg total) by mouth every morning.  Dispense: 30 tablet; Refill: 6    SSS (sick sinus syndrome)    Pacemaker    Coronary artery disease of bypass graft of native heart with stable angina pectoris    Hx of CABG    PAD (peripheral artery disease)    Paroxysmal atrial fibrillation    Bilateral carotid artery stenosis    Pulmonary embolism without acute cor pulmonale, unspecified chronicity, unspecified pulmonary embolism type    Deep vein thrombosis (DVT) of both lower extremities, unspecified chronicity, unspecified vein    Lupus anticoagulant syndrome    Pulmonary emphysema, unspecified emphysema type    Cigarette smoker    Nausea and vomiting, unspecified vomiting type  -     promethazine (PHENERGAN) 25 MG tablet; Take 1 tablet (25 mg total) by mouth daily as needed for Nausea.  Dispense: 15 tablet; Refill: 0      Contacted warfarin clinic to notify them that Ms. Wright is not taking warfarin. The nurse, Ms.  Dustin, stated that they have not yet received results and will follow up with Ms. Wright. Options for anticoagulation are lovenox BID vs. Warfarin.  Instructions to continue warfarin at typical dose after phlebotomy while waiting for results of INR. Instructed her to avoid stopping warfarin while waiting for anticoagulation clinic to call. She verbalized her understanding of this.  Continue benazepril 40 mg p.o. q.day, bisoprolol 10 mg p.o. q.day, HCTZ 25mg QD, and amlodipine 10mg QD-hypertension.   Request vital signs from [facility].  Continue torsemide once daily dosing. Will check BMP results from labs drawn yesterday to assess Na, K, and renal function.  Monitor blood pressure at home.    Continue bisoprolol, warfarin - PAF.   Continue brilinta - CAD.  Sodium restriction strongly encouraged.  Continue imdur and ranexa - angina/CAD.   Continue repatha- CAD, HLP  PPM clinic. Will monitor. She has been ill during last interrogation period.  Tobacco cessation counseling-She does not want to participate in tobacco cessation program. She does not want to quit.  Encouraged chair exercises and walking as much as possible.  Discussed going to ER or seeing PCP for vomiting but she does not want to do that. She stated that other anti-nausea medications do not help with her N/V. Discussed potential side effects of promethazine such as drowsiness, sedation, confusion, disorientation, blurred vision. She verbalized her understanding of this. Fall precautions discussed. Will refill short supply of promethazine; she should go to ER if symptoms do not improve.  Follow up with Dr. Kapoor in 2 months or call sooner for any problems.  Celia Whaley NP  [facility]    This note has been prepared using a combination of SAVO dictation device and typing.  It has been checked for errors but some errors may still exist within the note as a result of speech recognition errors and/or typographical errors.      Addendum 12:18pm:  Reviewed CMP done 6/5/24: BUN and creatinine are elevated. Will hold torsemide for one day and resume at half the dose (10mg) daily.  Celia Whaley NP  [facility]     Addendum June 7, 2024 3:20 p.m.:  Reviewed blood pressure log from [facility].  Blood pressure is controlled on current medications.  No changes are recommended at this time.  Celia Whaley NP  [facility]

## 2024-06-06 NOTE — Clinical Note
Reviewed CMP from yesterday: BUN/Creatinine elevated. Hold torsemide for one day and then resume at 1/2 tablet (10mg) daily. If she cannot cut these, then let me know. Thanks.

## 2024-06-07 ENCOUNTER — TELEPHONE (OUTPATIENT)
Dept: CARDIOLOGY | Facility: CLINIC | Age: 74
End: 2024-06-07
Payer: MEDICARE

## 2024-06-07 ENCOUNTER — TELEPHONE (OUTPATIENT)
Dept: HEMATOLOGY/ONCOLOGY | Facility: CLINIC | Age: 74
End: 2024-06-07
Payer: MEDICARE

## 2024-06-07 DIAGNOSIS — Z95.1 HX OF CABG: Primary | ICD-10-CM

## 2024-06-07 DIAGNOSIS — E78.00 PURE HYPERCHOLESTEROLEMIA: ICD-10-CM

## 2024-06-07 NOTE — PROGRESS NOTES
Please let patient know that she has been found with B12 deficiency.  Would like her to start B12 1000 mcg SL daily.  She can get this otc.    Tammy Allen'

## 2024-06-07 NOTE — TELEPHONE ENCOUNTER
Follow up with patient regarding repatha. She states she hasn't taken repatha in close to 2 years. States she felt like she didn't need it so she stopped taking it. I informed her that you wanted her to be on it, and patient agreed to restarting this med. Please send Rx to [facility].     Patient verbalized understanding of lab results and will hold torsemide today, then resume at 10 mg daily starting tomorrow.     ------------------------------------------------------------------------------------------  Reviewed CMP done 6/5/24: BUN and creatinine are elevated. Will hold torsemide for one day and resume at half the dose (10mg) daily.  Celia Whaley NP  [facility]

## 2024-06-10 PROBLEM — Z09 HOSPITAL DISCHARGE FOLLOW-UP: Status: RESOLVED | Noted: 2020-09-21 | Resolved: 2024-06-10

## 2024-06-10 PROBLEM — J96.12 CHRONIC RESPIRATORY FAILURE WITH HYPERCAPNIA: Chronic | Status: RESOLVED | Noted: 2019-01-07 | Resolved: 2024-06-10

## 2024-06-12 ENCOUNTER — TELEPHONE (OUTPATIENT)
Dept: INFUSION THERAPY | Facility: HOSPITAL | Age: 74
End: 2024-06-12
Payer: MEDICARE

## 2024-06-12 ENCOUNTER — DOCUMENT SCAN (OUTPATIENT)
Dept: HOME HEALTH SERVICES | Facility: HOSPITAL | Age: 74
End: 2024-06-12
Payer: MEDICARE

## 2024-06-17 DIAGNOSIS — F51.01 PRIMARY INSOMNIA: Chronic | ICD-10-CM

## 2024-06-17 DIAGNOSIS — Z79.899 ENCOUNTER FOR LONG-TERM (CURRENT) USE OF MEDICATIONS: Chronic | ICD-10-CM

## 2024-06-17 NOTE — TELEPHONE ENCOUNTER
Refill Routing Note   Medication(s) are not appropriate for processing by [facility] for the following reason(s):        Outside of protocol    [facility] action(s):  Route               Appointments  past 12m or future 3m with PCP    Date Provider   Last Visit   3/11/2024 Adan Woodward MD   Next Visit   Visit date not found Adan Woodward MD   ED visits in past 90 days: 0        Note composed:10:24 AM 06/17/2024

## 2024-06-17 NOTE — TELEPHONE ENCOUNTER
No care due was identified.  [facility] Embedded Care Due Messages. Reference number: [id number].   6/17/2024 10:18:37 AM CDT

## 2024-06-18 RX ORDER — TEMAZEPAM 30 MG/1
30 CAPSULE ORAL NIGHTLY
Qty: 28 CAPSULE | Refills: 0 | Status: SHIPPED | OUTPATIENT
Start: 2024-06-18

## 2024-06-18 RX ORDER — GABAPENTIN 600 MG/1
600 TABLET ORAL 3 TIMES DAILY
Qty: 84 TABLET | Refills: 0 | Status: SHIPPED | OUTPATIENT
Start: 2024-06-18

## 2024-06-19 DIAGNOSIS — K21.00 GASTROESOPHAGEAL REFLUX DISEASE WITH ESOPHAGITIS, UNSPECIFIED WHETHER HEMORRHAGE: ICD-10-CM

## 2024-06-19 DIAGNOSIS — I20.9 ANGINA PECTORIS: ICD-10-CM

## 2024-06-19 DIAGNOSIS — Z79.899 ENCOUNTER FOR LONG-TERM (CURRENT) USE OF MEDICATIONS: Chronic | ICD-10-CM

## 2024-06-19 DIAGNOSIS — E11.9 TYPE 2 DIABETES MELLITUS WITHOUT COMPLICATION: ICD-10-CM

## 2024-06-19 DIAGNOSIS — Z79.01 CURRENT USE OF LONG TERM ANTICOAGULATION: ICD-10-CM

## 2024-06-19 DIAGNOSIS — F41.9 ANXIETY: ICD-10-CM

## 2024-06-19 DIAGNOSIS — Z95.1 HX OF CABG: ICD-10-CM

## 2024-06-19 DIAGNOSIS — E03.9 HYPOTHYROIDISM, UNSPECIFIED TYPE: ICD-10-CM

## 2024-06-19 DIAGNOSIS — I73.9 PVD (PERIPHERAL VASCULAR DISEASE): ICD-10-CM

## 2024-06-19 DIAGNOSIS — I25.810 CORONARY ARTERY DISEASE INVOLVING CORONARY BYPASS GRAFT OF NATIVE HEART WITHOUT ANGINA PECTORIS: ICD-10-CM

## 2024-06-19 DIAGNOSIS — I10 ESSENTIAL HYPERTENSION: ICD-10-CM

## 2024-06-19 RX ORDER — PRAMIPEXOLE DIHYDROCHLORIDE 0.5 MG/1
0.5 TABLET ORAL EVERY MORNING
Qty: 90 TABLET | Refills: 2 | Status: SHIPPED | OUTPATIENT
Start: 2024-06-19

## 2024-06-19 RX ORDER — ALENDRONATE SODIUM 70 MG/1
TABLET ORAL
Qty: 12 TABLET | Refills: 0 | Status: SHIPPED | OUTPATIENT
Start: 2024-06-19

## 2024-06-19 RX ORDER — RANOLAZINE 1000 MG/1
1000 TABLET, EXTENDED RELEASE ORAL 2 TIMES DAILY
Qty: 180 TABLET | Refills: 3 | Status: SHIPPED | OUTPATIENT
Start: 2024-06-19

## 2024-06-19 RX ORDER — LEVOTHYROXINE SODIUM 75 UG/1
TABLET ORAL
Qty: 90 TABLET | Refills: 2 | Status: SHIPPED | OUTPATIENT
Start: 2024-06-19

## 2024-06-19 RX ORDER — POTASSIUM CHLORIDE 750 MG/1
CAPSULE, EXTENDED RELEASE ORAL
Qty: 270 CAPSULE | Refills: 2 | Status: SHIPPED | OUTPATIENT
Start: 2024-06-19

## 2024-06-19 RX ORDER — HYDROCHLOROTHIAZIDE 25 MG/1
25 TABLET ORAL EVERY MORNING
Qty: 90 TABLET | Refills: 2 | Status: SHIPPED | OUTPATIENT
Start: 2024-06-19

## 2024-06-19 RX ORDER — BENAZEPRIL HYDROCHLORIDE 40 MG/1
40 TABLET ORAL EVERY MORNING
Qty: 90 TABLET | Refills: 0 | Status: SHIPPED | OUTPATIENT
Start: 2024-06-19

## 2024-06-19 RX ORDER — BISOPROLOL FUMARATE 10 MG/1
10 TABLET, FILM COATED ORAL EVERY MORNING
Qty: 90 TABLET | Refills: 2 | Status: SHIPPED | OUTPATIENT
Start: 2024-06-19

## 2024-06-19 RX ORDER — TICAGRELOR 90 MG/1
90 TABLET ORAL 2 TIMES DAILY
Qty: 180 TABLET | Refills: 3 | Status: SHIPPED | OUTPATIENT
Start: 2024-06-19

## 2024-06-19 RX ORDER — FERROUS SULFATE 325(65) MG
TABLET ORAL EVERY MORNING
Qty: 84 TABLET | Refills: 0 | Status: SHIPPED | OUTPATIENT
Start: 2024-06-19

## 2024-06-19 RX ORDER — AMITRIPTYLINE HYDROCHLORIDE 100 MG/1
100 TABLET ORAL NIGHTLY
Qty: 90 TABLET | Refills: 2 | Status: SHIPPED | OUTPATIENT
Start: 2024-06-19

## 2024-06-19 RX ORDER — ESOMEPRAZOLE MAGNESIUM 40 MG/1
40 CAPSULE, DELAYED RELEASE ORAL 2 TIMES DAILY
Qty: 180 CAPSULE | Refills: 2 | Status: SHIPPED | OUTPATIENT
Start: 2024-06-19

## 2024-06-19 NOTE — TELEPHONE ENCOUNTER
Refill Routing Note   Medication(s) are not appropriate for processing by [facility] for the following reason(s):        Required labs abnormal: Lotensin, Zebeta, Hctz, Micro-K  No active prescription written by provider: Zebeta  24  Outside of protocol: Nexium total daily dose outside of [facility] protocol; Fosamax & Iron    [facility] action(s):  Defer  Route  Approve             Pharmacist review requested: Yes   Extended chart review required: Yes     Appointments  past 12m or future 3m with PCP    Date Provider   Last Visit   3/11/2024 Adan Woodward MD   Next Visit   Visit date not found Adan Woodward MD   ED visits in past 90 days: 0        Note composed:2:42 PM 2024

## 2024-06-19 NOTE — TELEPHONE ENCOUNTER
No care due was identified.  [facility] Embedded Care Due Messages. Reference number: [id number].   6/19/2024 1:21:48 PM CDT

## 2024-06-19 NOTE — TELEPHONE ENCOUNTER
Refill Routing Note   Medication(s) are not appropriate for processing by [facility] for the following reason(s):        Required labs abnormal  No active prescription written by provider Bisoprolol  Outside of protocolNexium and Iron  Drug-drug interaction pramipexole  Drug-disease interaction amitriptyline pramipexole    [facility] action(s):  Defer  Route  Approve        Medication Therapy Plan: pramipexole and Stage 3b chronic kidney disease;rOPINIRole, pramipexole;  amitriptyline and Old myocardial infarction; Angina pectoris; Coronary artery disease involving coronary bypass graft of native heart without angina pectoris; Atherosclerosis of native coronary artery of native heart with angina pectoris with documented spasm; Coronary artery disease of bypass graft of native heart with stable angina    Pharmacist review requested: Yes     Appointments  past 12m or future 3m with PCP    Date Provider   Last Visit   3/11/2024 Adan Woodward MD   Next Visit   Visit date not found Adan Woodward MD   ED visits in past 90 days: 0        Note composed:2:27 PM 06/19/2024

## 2024-06-20 ENCOUNTER — TELEPHONE (OUTPATIENT)
Dept: HEMATOLOGY/ONCOLOGY | Facility: CLINIC | Age: 74
End: 2024-06-20
Payer: MEDICARE

## 2024-06-20 NOTE — TELEPHONE ENCOUNTER
----- Message from Aixa Frost sent at 6/20/2024  2:32 PM CDT -----  Santosh with [employer] called regarding a update ICD-10 for colnafisauard. Call back number is [phone] follow prompts to provider support and reference number is [id number].  Thx.EL

## 2024-06-20 NOTE — TELEPHONE ENCOUNTER
Nurse spoke with Pat with [employer]  to confirm the correct ICD-10 code the provider would like to us for this particular testing. Correct code is Z12.11. Pat informed nurse that the pt should receive the kit in 3-7 business days. Nurse verbalized understanding. Call ended well.

## 2024-06-26 ENCOUNTER — TELEPHONE (OUTPATIENT)
Dept: DERMATOLOGY | Facility: CLINIC | Age: 74
End: 2024-06-26
Payer: MEDICARE

## 2024-06-27 ENCOUNTER — TELEPHONE (OUTPATIENT)
Dept: DERMATOLOGY | Facility: CLINIC | Age: 74
End: 2024-06-27
Payer: MEDICARE

## 2024-06-28 ENCOUNTER — TELEPHONE (OUTPATIENT)
Dept: DERMATOLOGY | Facility: CLINIC | Age: 74
End: 2024-06-28
Payer: MEDICARE

## 2024-07-02 RX ORDER — ISOSORBIDE MONONITRATE 30 MG/1
30 TABLET, EXTENDED RELEASE ORAL 2 TIMES DAILY
Qty: 180 TABLET | Refills: 3 | Status: SHIPPED | OUTPATIENT
Start: 2024-07-02

## 2024-07-15 DIAGNOSIS — Z79.01 CURRENT USE OF LONG TERM ANTICOAGULATION: ICD-10-CM

## 2024-07-15 DIAGNOSIS — I10 ESSENTIAL HYPERTENSION: ICD-10-CM

## 2024-07-15 RX ORDER — TICAGRELOR 90 MG/1
90 TABLET ORAL 2 TIMES DAILY
Qty: 180 TABLET | Refills: 3 | Status: SHIPPED | OUTPATIENT
Start: 2024-07-15

## 2024-07-15 RX ORDER — AMLODIPINE BESYLATE 10 MG/1
10 TABLET ORAL DAILY
Qty: 90 TABLET | Refills: 2 | Status: SHIPPED | OUTPATIENT
Start: 2024-07-15

## 2024-07-15 NOTE — TELEPHONE ENCOUNTER
No care due was identified.  [facility] Embedded Care Due Messages. Reference number: [id number].   7/15/2024 10:59:11 AM CDT

## 2024-07-15 NOTE — TELEPHONE ENCOUNTER
Refill Routing Note   Medication(s) are not appropriate for processing by [facility] for the following reason(s):        No active prescription written by provider:     [facility] action(s):  Defer      Medication Therapy Plan:         Appointments  past 12m or future 3m with PCP    Date Provider   Last Visit   3/11/2024 Adan Woodward MD   Next Visit   Visit date not found Adan Woodward MD   ED visits in past 90 days: 0        Note composed:3:50 PM 07/15/2024

## 2024-07-18 DIAGNOSIS — I50.32 CHRONIC DIASTOLIC CONGESTIVE HEART FAILURE: ICD-10-CM

## 2024-07-18 RX ORDER — TORSEMIDE 20 MG/1
20 TABLET ORAL EVERY MORNING
Qty: 90 TABLET | Refills: 0 | Status: SHIPPED | OUTPATIENT
Start: 2024-07-18

## 2024-07-18 NOTE — TELEPHONE ENCOUNTER
Refill Routing Note   Medication(s) are not appropriate for processing by [facility] for the following reason(s):        New or recently adjusted medication  Required labs abnormal    [facility] action(s):  Defer               Appointments  past 12m or future 3m with PCP    Date Provider   Last Visit   3/11/2024 Adan Woodward MD   Next Visit   Visit date not found Adan Woodward MD   ED visits in past 90 days: 0        Note composed:12:19 PM 07/18/2024

## 2024-07-18 NOTE — TELEPHONE ENCOUNTER
No care due was identified.  [facility] Embedded Care Due Messages. Reference number: [id number].   7/18/2024 11:41:15 AM CDT

## 2024-07-23 ENCOUNTER — TELEPHONE (OUTPATIENT)
Dept: FAMILY MEDICINE | Facility: CLINIC | Age: 74
End: 2024-07-23
Payer: MEDICARE

## 2024-07-23 ENCOUNTER — PATIENT OUTREACH (OUTPATIENT)
Dept: ADMINISTRATIVE | Facility: CLINIC | Age: 74
End: 2024-07-23
Payer: MEDICARE

## 2024-07-23 ENCOUNTER — OFFICE VISIT (OUTPATIENT)
Dept: FAMILY MEDICINE | Facility: CLINIC | Age: 74
End: 2024-07-23
Payer: MEDICARE

## 2024-07-23 VITALS
HEIGHT: 64 IN | TEMPERATURE: 98 F | SYSTOLIC BLOOD PRESSURE: 122 MMHG | WEIGHT: 148 LBS | DIASTOLIC BLOOD PRESSURE: 76 MMHG | BODY MASS INDEX: 25.27 KG/M2 | RESPIRATION RATE: 16 BRPM | OXYGEN SATURATION: 99 % | HEART RATE: 69 BPM

## 2024-07-23 DIAGNOSIS — Z09 HOSPITAL DISCHARGE FOLLOW-UP: Primary | ICD-10-CM

## 2024-07-23 DIAGNOSIS — R11.2 NAUSEA AND VOMITING, UNSPECIFIED VOMITING TYPE: ICD-10-CM

## 2024-07-23 DIAGNOSIS — R89.2 ABNORMAL DRUG SCREEN: ICD-10-CM

## 2024-07-23 DIAGNOSIS — Z76.89 ENCOUNTER FOR NAIL CARE: ICD-10-CM

## 2024-07-23 DIAGNOSIS — N39.0 URINARY TRACT INFECTION WITHOUT HEMATURIA, SITE UNSPECIFIED: ICD-10-CM

## 2024-07-23 PROCEDURE — 3288F FALL RISK ASSESSMENT DOCD: CPT | Mod: HCNC,CPTII,S$GLB, | Performed by: NURSE PRACTITIONER

## 2024-07-23 PROCEDURE — 4010F ACE/ARB THERAPY RXD/TAKEN: CPT | Mod: HCNC,CPTII,S$GLB, | Performed by: NURSE PRACTITIONER

## 2024-07-23 PROCEDURE — 99999 PR PBB SHADOW E&M-EST. PATIENT-LVL V: CPT | Mod: PBBFAC,HCNC,, | Performed by: NURSE PRACTITIONER

## 2024-07-23 PROCEDURE — 3074F SYST BP LT 130 MM HG: CPT | Mod: HCNC,CPTII,S$GLB, | Performed by: NURSE PRACTITIONER

## 2024-07-23 PROCEDURE — 1160F RVW MEDS BY RX/DR IN RCRD: CPT | Mod: HCNC,CPTII,S$GLB, | Performed by: NURSE PRACTITIONER

## 2024-07-23 PROCEDURE — 1159F MED LIST DOCD IN RCRD: CPT | Mod: HCNC,CPTII,S$GLB, | Performed by: NURSE PRACTITIONER

## 2024-07-23 PROCEDURE — 1126F AMNT PAIN NOTED NONE PRSNT: CPT | Mod: HCNC,CPTII,S$GLB, | Performed by: NURSE PRACTITIONER

## 2024-07-23 PROCEDURE — 3078F DIAST BP <80 MM HG: CPT | Mod: HCNC,CPTII,S$GLB, | Performed by: NURSE PRACTITIONER

## 2024-07-23 PROCEDURE — 1101F PT FALLS ASSESS-DOCD LE1/YR: CPT | Mod: HCNC,CPTII,S$GLB, | Performed by: NURSE PRACTITIONER

## 2024-07-23 PROCEDURE — 99213 OFFICE O/P EST LOW 20 MIN: CPT | Mod: HCNC,S$GLB,, | Performed by: NURSE PRACTITIONER

## 2024-07-23 RX ORDER — PROMETHAZINE HYDROCHLORIDE 25 MG/1
25 TABLET ORAL DAILY PRN
Qty: 15 TABLET | Refills: 0 | Status: SHIPPED | OUTPATIENT
Start: 2024-07-23

## 2024-07-23 NOTE — TELEPHONE ENCOUNTER
----- Message from Yesika Warren sent at 7/23/2024 12:08 PM CDT -----  Regarding: missed call  Type:  Patient Returning Call    Who Called: pt  Who Left Message for Patient: unknown   Does the patient know what this is regarding?: unknown   Would the patient rather a call back or a response via [facility]? call  Best Call Back Number:[phone]   Additional Information:

## 2024-07-23 NOTE — TELEPHONE ENCOUNTER
----- Message from Yesika Warren sent at 7/23/2024 12:08 PM CDT -----  Regarding: missed call  Type:  Patient Returning Call    Who Called: pt  Who Left Message for Patient: unknown   Does the patient know what this is regarding?: unknown   Would the patient rather a call back or a response via [facility]? call  Best Call Back Number:[phone]   Additional Information:

## 2024-07-23 NOTE — PROGRESS NOTES
C3 nurse spoke with Kalpana Wright for a TCC post hospital discharge follow up call. The patient has a scheduled [facility] appointment with Lien Blanco NP on 07/23/24 @ 3050.

## 2024-07-23 NOTE — PROGRESS NOTES
C3 nurse attempted to contact Kalpana Wright for a TCC post hospital discharge follow up call. The patient is unable to conduct the call @ this time. The patient requested a callback.    The patient has a scheduled [facility] appointment with Lien Blanco NP on 07/23/24 @ 3552.

## 2024-07-29 ENCOUNTER — TELEPHONE (OUTPATIENT)
Dept: FAMILY MEDICINE | Facility: CLINIC | Age: 74
End: 2024-07-29
Payer: MEDICARE

## 2024-07-29 NOTE — TELEPHONE ENCOUNTER
----- Message from Jess Wiley sent at 7/29/2024  9:10 AM CDT -----  .Type:  Pharmacy Calling to Clarify an RX    Name of Caller:Venus  Pharmacy Name:[facility] - [facility] - [address]  Prescription Name:  What do they need to clarify?:a current list pf pt medications  Best Call Back Number:  Additional Information:

## 2024-08-05 ENCOUNTER — TELEPHONE (OUTPATIENT)
Dept: FAMILY MEDICINE | Facility: CLINIC | Age: 74
End: 2024-08-05
Payer: MEDICARE

## 2024-08-06 DIAGNOSIS — I50.32 CHRONIC DIASTOLIC CONGESTIVE HEART FAILURE: ICD-10-CM

## 2024-08-07 PROCEDURE — G0179 MD RECERTIFICATION HHA PT: HCPCS | Mod: ,,, | Performed by: FAMILY MEDICINE

## 2024-08-07 RX ORDER — TORSEMIDE 20 MG/1
20 TABLET ORAL EVERY MORNING
Qty: 90 TABLET | Refills: 2 | Status: SHIPPED | OUTPATIENT
Start: 2024-08-07

## 2024-08-09 ENCOUNTER — TELEPHONE (OUTPATIENT)
Dept: FAMILY MEDICINE | Facility: CLINIC | Age: 74
End: 2024-08-09
Payer: MEDICARE

## 2024-08-13 ENCOUNTER — TELEPHONE (OUTPATIENT)
Dept: FAMILY MEDICINE | Facility: CLINIC | Age: 74
End: 2024-08-13

## 2024-08-13 ENCOUNTER — OFFICE VISIT (OUTPATIENT)
Dept: FAMILY MEDICINE | Facility: CLINIC | Age: 74
End: 2024-08-13
Payer: MEDICARE

## 2024-08-13 VITALS
DIASTOLIC BLOOD PRESSURE: 54 MMHG | RESPIRATION RATE: 16 BRPM | HEART RATE: 60 BPM | SYSTOLIC BLOOD PRESSURE: 134 MMHG | BODY MASS INDEX: 25.78 KG/M2 | OXYGEN SATURATION: 98 % | WEIGHT: 151 LBS | HEIGHT: 64 IN

## 2024-08-13 DIAGNOSIS — R05.9 COUGH, UNSPECIFIED TYPE: ICD-10-CM

## 2024-08-13 DIAGNOSIS — Z76.0 MEDICATION REFILL: ICD-10-CM

## 2024-08-13 DIAGNOSIS — J32.9 SINUSITIS, UNSPECIFIED CHRONICITY, UNSPECIFIED LOCATION: Primary | ICD-10-CM

## 2024-08-13 LAB
CTP QC/QA: YES
CTP QC/QA: YES
POC MOLECULAR INFLUENZA A AGN: NEGATIVE
POC MOLECULAR INFLUENZA B AGN: NEGATIVE
SARS-COV-2 RDRP RESP QL NAA+PROBE: NEGATIVE

## 2024-08-13 PROCEDURE — 1101F PT FALLS ASSESS-DOCD LE1/YR: CPT | Mod: HCNC,CPTII,S$GLB, | Performed by: NURSE PRACTITIONER

## 2024-08-13 PROCEDURE — 3288F FALL RISK ASSESSMENT DOCD: CPT | Mod: HCNC,CPTII,S$GLB, | Performed by: NURSE PRACTITIONER

## 2024-08-13 PROCEDURE — 99999 PR PBB SHADOW E&M-EST. PATIENT-LVL V: CPT | Mod: PBBFAC,HCNC,, | Performed by: NURSE PRACTITIONER

## 2024-08-13 PROCEDURE — 1159F MED LIST DOCD IN RCRD: CPT | Mod: HCNC,CPTII,S$GLB, | Performed by: NURSE PRACTITIONER

## 2024-08-13 PROCEDURE — 1160F RVW MEDS BY RX/DR IN RCRD: CPT | Mod: HCNC,CPTII,S$GLB, | Performed by: NURSE PRACTITIONER

## 2024-08-13 PROCEDURE — 1126F AMNT PAIN NOTED NONE PRSNT: CPT | Mod: HCNC,CPTII,S$GLB, | Performed by: NURSE PRACTITIONER

## 2024-08-13 PROCEDURE — 3075F SYST BP GE 130 - 139MM HG: CPT | Mod: HCNC,CPTII,S$GLB, | Performed by: NURSE PRACTITIONER

## 2024-08-13 PROCEDURE — 4010F ACE/ARB THERAPY RXD/TAKEN: CPT | Mod: HCNC,CPTII,S$GLB, | Performed by: NURSE PRACTITIONER

## 2024-08-13 PROCEDURE — 99213 OFFICE O/P EST LOW 20 MIN: CPT | Mod: HCNC,S$GLB,, | Performed by: NURSE PRACTITIONER

## 2024-08-13 PROCEDURE — 87635 SARS-COV-2 COVID-19 AMP PRB: CPT | Mod: QW,HCNC,S$GLB, | Performed by: NURSE PRACTITIONER

## 2024-08-13 PROCEDURE — 3078F DIAST BP <80 MM HG: CPT | Mod: HCNC,CPTII,S$GLB, | Performed by: NURSE PRACTITIONER

## 2024-08-13 PROCEDURE — 87502 INFLUENZA DNA AMP PROBE: CPT | Mod: QW,HCNC,S$GLB, | Performed by: NURSE PRACTITIONER

## 2024-08-13 RX ORDER — BENZONATATE 200 MG/1
200 CAPSULE ORAL 3 TIMES DAILY PRN
Qty: 30 CAPSULE | Refills: 0 | Status: SHIPPED | OUTPATIENT
Start: 2024-08-13 | End: 2024-08-23

## 2024-08-13 RX ORDER — SULFAMETHOXAZOLE AND TRIMETHOPRIM 800; 160 MG/1; MG/1
1 TABLET ORAL 2 TIMES DAILY
Qty: 14 TABLET | Refills: 0 | Status: SHIPPED | OUTPATIENT
Start: 2024-08-13 | End: 2024-08-20

## 2024-08-13 RX ORDER — PROMETHAZINE HYDROCHLORIDE 25 MG/1
25 TABLET ORAL DAILY PRN
Qty: 30 TABLET | Refills: 0 | Status: SHIPPED | OUTPATIENT
Start: 2024-08-13

## 2024-08-13 NOTE — TELEPHONE ENCOUNTER
----- Message from Lee Lopez MA sent at 8/13/2024 10:39 AM CDT -----  Have you seen this  ----- Message -----  From: Jacob Velázquez  Sent: 8/13/2024   9:54 AM CDT  To: Crissy Arellano Staff        .Type: Patient Call Back        Who called:      Pajea with [employer]  What is the request in detail:    Called in to followup on the  benzodiazpine utilization report letter  . Checking in to see if the letter was received and completed or does another one have to be sent . Please call back   Can the clinic reply by [facility]?           Would the patient rather a call back or a response via [facility]?   call       Best call back number:    Phone    Fax  [phone]   Please provide ref number when calling [phone]

## 2024-08-13 NOTE — PROGRESS NOTES
"Subjective     Patient ID: Kalpana Wright is a 74 y.o. female.    Chief Complaint: URI    URI   This is a new problem. The current episode started in the past 7 days. The problem has been unchanged. There has been no fever. The cough is Productive of sputum. Associated symptoms include congestion, coughing and rhinorrhea. Pertinent negatives include no abdominal pain, chest pain, conjunctivitis, diarrhea, dysuria, ear pain, headaches, joint pain, joint swelling, nausea, neck pain, plugged ear sensation, rash, sinus pain, sneezing, sore throat, swollen glands, vomiting or wheezing. She has tried nothing for the symptoms. The treatment provided no relief.   Pt also requests phenergan refill; states, "my doctor refills them for me because I get nauseous taking my medicines sometimes."  Past Medical History:   Diagnosis Date    ACS (acute coronary syndrome) 3/20/2018    Acute on chronic diastolic congestive heart failure 4/27/2015    Acute on chronic respiratory failure with hypercapnia 1/10/2019    Acute renal failure 1/11/2019    Acute systolic heart failure 3/21/2018    JG (acute kidney injury) 1/29/2019    Anticoagulant long-term use     Arthritis     Asthma     Bradycardia 12/16/2013    CHF (congestive heart failure)     Clostridium difficile colitis 9/25/2018    Convulsions 2/11/2021    COPD (chronic obstructive pulmonary disease)     Coronary artery disease     Depression     Encounter for blood transfusion     Fall 7/8/2019    Iniital HPI: New problem acute.  Happened several days ago.  Patient was getting up out of her wheelchair and thought it was locked when it rolled about from under her and she fell and hit her tailbone on the wheelchair.  Patient has been having moderate to severe pain to the point where she cannot sit on her wheelchair for long periods of time.  ==================== 09/05/2019 Fell again on Satur    Gallstones 3/18/2017    History of Pulmonary embolism 7/17/2014    Hospital discharge " follow-up 9/21/2020    Hyperlipidemia     Hypertension     Need for vaccination 8/8/2019    Due for pneumonia vaccination.  However patient has latex allergy.  Patient cannot receive pneumococcal vaccine here.  Well patient follow-up at pharmacy for different pneumococcal vaccine    Non-intractable cyclical vomiting with nausea 2/1/2019    Peripheral vascular disease     Pulmonary embolus 9/9/2013    Thyroid disease      Social History     Socioeconomic History    Marital status:    Tobacco Use    Smoking status: Every Day     Current packs/day: 0.25     Average packs/day: 0.3 packs/day for 45.0 years (11.3 ttl pk-yrs)     Types: Cigarettes    Smokeless tobacco: Never    Tobacco comments:     43 years smoked - quit past 7 years    Substance and Sexual Activity    Alcohol use: No    Drug use: Never    Sexual activity: Not Currently     Partners: Male     Birth control/protection: Post-menopausal     Social Determinants of Health     Financial Resource Strain: Low Risk  (3/20/2024)    Overall Financial Resource Strain (CARDIA)     Difficulty of Paying Living Expenses: Not very hard   Food Insecurity: Unknown (7/14/2024)    Received from [facility]    Hunger Vital Sign     Ran Out of Food in the Last Year: Never true   Transportation Needs: Unknown (7/14/2024)    Received from [facility]    PRAPARE - Transportation     Lack of Transportation (Medical): No   Physical Activity: Inactive (3/20/2024)    Exercise Vital Sign     Days of Exercise per Week: 0 days     Minutes of Exercise per Session: 0 min   Stress: No Stress Concern Present (3/20/2024)    [employer] - Occupational Stress Questionnaire     Feeling of Stress : Not at all   Housing Stability: Low Risk  (3/20/2024)    Housing Stability Vital Sign     Unable to Pay for Housing in the Last Year: No     Number of Places Lived in the Last Year: 2     Unstable Housing in the Last Year: No     Past  Surgical History:   Procedure Laterality Date    CARDIAC SURGERY      CHOLECYSTECTOMY      CORONARY ARTERY BYPASS GRAFT  04/16/2015    EYE SURGERY      HYSTERECTOMY      INSERT / REPLACE / REMOVE PACEMAKER      LEFT HEART CATHETERIZATION Left 11/14/2018    Procedure: CATHETERIZATION, HEART, LEFT;  Surgeon: Huber Kapoor MD;  Location: [facility] CATH LAB;  Service: Cardiology;  Laterality: Left;    OOPHORECTOMY      peripheral angiogram      peripheral stenting      REPLACEMENT OF PACEMAKER GENERATOR Left 3/10/2023    Procedure: REPLACEMENT, PACEMAKER GENERATOR;  Surgeon: Huber Kapoor MD;  Location: [facility] CATH LAB;  Service: Cardiology;  Laterality: Left;  Juan Antonio notified  NOT MRI safe   Pacer and leads implanted 2/11/2014, [place]on   Onofre Sci K[address] DR [place]ALD, [id number]   A lead: Onofre Sci DEXTRUS 4135, [id number]   V lead: Onofre Sci DEXTRUS 4136, [id number]       Review of Systems   Constitutional: Negative.    HENT:  Positive for nasal congestion, rhinorrhea and sinus pressure/congestion. Negative for ear pain, sneezing and sore throat.    Eyes: Negative.    Respiratory:  Positive for cough. Negative for wheezing.    Cardiovascular: Negative.  Negative for chest pain.   Gastrointestinal: Negative.  Negative for abdominal pain, diarrhea, nausea and vomiting.   Endocrine: Negative.    Genitourinary: Negative.  Negative for dysuria.   Musculoskeletal: Negative.  Negative for joint pain and neck pain.   Integumentary:  Negative for rash. Negative.   Allergic/Immunologic: Negative.    Neurological: Negative.  Negative for headaches.   Psychiatric/Behavioral: Negative.            Objective     Physical Exam  Vitals and nursing note reviewed.   Constitutional:       Appearance: Normal appearance.   HENT:      Head: Normocephalic.      Right Ear: Tympanic membrane, ear canal and external ear normal.      Left Ear: Tympanic membrane, ear canal and external ear normal.      Nose: Congestion and rhinorrhea present.       Mouth/Throat:      Mouth: Mucous membranes are moist.      Pharynx: Oropharynx is clear.   Eyes:      Conjunctiva/sclera: Conjunctivae normal.      Pupils: Pupils are equal, round, and reactive to light.   Cardiovascular:      Rate and Rhythm: Normal rate and regular rhythm.      Pulses: Normal pulses.      Heart sounds: Normal heart sounds.   Pulmonary:      Effort: Pulmonary effort is normal.      Breath sounds: Normal breath sounds.   Abdominal:      General: Bowel sounds are normal.      Palpations: Abdomen is soft.   Musculoskeletal:         General: Normal range of motion.      Cervical back: Normal range of motion and neck supple.   Skin:     General: Skin is warm and dry.      Capillary Refill: Capillary refill takes 2 to 3 seconds.   Neurological:      Mental Status: She is alert and oriented to person, place, and time.   Psychiatric:         Mood and Affect: Mood normal.         Behavior: Behavior normal.         Thought Content: Thought content normal.         Judgment: Judgment normal.            Assessment and Plan     1. Sinusitis, unspecified chronicity, unspecified location  2. Cough, unspecified type  -     POCT COVID-19 Rapid Screening  -     POCT Influenza A/B Molecular  -     sulfamethoxazole-trimethoprim 800-160mg (BACTRIM DS) 800-160 mg Tab; Take 1 tablet by mouth 2 (two) times daily. for 7 days  Dispense: 14 tablet; Refill: 0  -     benzonatate (TESSALON) 200 MG capsule; Take 1 capsule (200 mg total) by mouth 3 (three) times daily as needed.  Dispense: 30 capsule; Refill: 0  Hydrate well  Rest  Report to ER immediately if symptoms worsen or persist    3. Medication refill  -     promethazine (PHENERGAN) 25 MG tablet; Take 1 tablet (25 mg total) by mouth daily as needed for Nausea.  Dispense: 30 tablet; Refill: 0                   No follow-ups on file.

## 2024-08-14 ENCOUNTER — DOCUMENT SCAN (OUTPATIENT)
Dept: HOME HEALTH SERVICES | Facility: HOSPITAL | Age: 74
End: 2024-08-14
Payer: MEDICARE

## 2024-08-15 ENCOUNTER — TELEPHONE (OUTPATIENT)
Dept: FAMILY MEDICINE | Facility: CLINIC | Age: 74
End: 2024-08-15
Payer: MEDICARE

## 2024-08-15 NOTE — TELEPHONE ENCOUNTER
----- Message from Arnaldo Charles sent at 8/15/2024 10:37 AM CDT -----  Contact: aleksandar @ [phone]  [employer] rep is calling to check the status of letters that were faxed over on behalf of the pt   Please call back  Reference # [id number]

## 2024-08-20 ENCOUNTER — OFFICE VISIT (OUTPATIENT)
Dept: CARDIOLOGY | Facility: CLINIC | Age: 74
End: 2024-08-20
Payer: MEDICARE

## 2024-08-20 VITALS
OXYGEN SATURATION: 99 % | HEIGHT: 64 IN | BODY MASS INDEX: 26 KG/M2 | SYSTOLIC BLOOD PRESSURE: 136 MMHG | HEART RATE: 65 BPM | DIASTOLIC BLOOD PRESSURE: 72 MMHG | WEIGHT: 152.31 LBS

## 2024-08-20 DIAGNOSIS — I10 ESSENTIAL HYPERTENSION: Chronic | ICD-10-CM

## 2024-08-20 DIAGNOSIS — I25.111 ATHEROSCLEROSIS OF NATIVE CORONARY ARTERY OF NATIVE HEART WITH ANGINA PECTORIS WITH DOCUMENTED SPASM: Chronic | ICD-10-CM

## 2024-08-20 DIAGNOSIS — Z95.1 HX OF CABG: ICD-10-CM

## 2024-08-20 DIAGNOSIS — I20.9 ANGINA PECTORIS: Primary | ICD-10-CM

## 2024-08-20 DIAGNOSIS — I49.5 SSS (SICK SINUS SYNDROME): ICD-10-CM

## 2024-08-20 DIAGNOSIS — F17.210 CIGARETTE SMOKER: Chronic | ICD-10-CM

## 2024-08-20 DIAGNOSIS — Z79.01 LONG TERM CURRENT USE OF ANTICOAGULANT THERAPY: ICD-10-CM

## 2024-08-20 DIAGNOSIS — Z86.711 PERSONAL HISTORY OF PULMONARY EMBOLISM: ICD-10-CM

## 2024-08-20 DIAGNOSIS — Z95.0 PACEMAKER: ICD-10-CM

## 2024-08-20 DIAGNOSIS — I50.32 CHRONIC DIASTOLIC CONGESTIVE HEART FAILURE: ICD-10-CM

## 2024-08-20 DIAGNOSIS — I48.0 PAROXYSMAL ATRIAL FIBRILLATION: ICD-10-CM

## 2024-08-20 DIAGNOSIS — I65.23 BILATERAL CAROTID ARTERY STENOSIS: ICD-10-CM

## 2024-08-20 DIAGNOSIS — Z78.9 STATIN INTOLERANCE: ICD-10-CM

## 2024-08-20 DIAGNOSIS — D68.62 LUPUS ANTICOAGULANT SYNDROME: Chronic | ICD-10-CM

## 2024-08-20 DIAGNOSIS — I82.403 DEEP VEIN THROMBOSIS (DVT) OF BOTH LOWER EXTREMITIES, UNSPECIFIED CHRONICITY, UNSPECIFIED VEIN: ICD-10-CM

## 2024-08-20 PROCEDURE — 3078F DIAST BP <80 MM HG: CPT | Mod: HCNC,CPTII,S$GLB, | Performed by: INTERNAL MEDICINE

## 2024-08-20 PROCEDURE — 1159F MED LIST DOCD IN RCRD: CPT | Mod: HCNC,CPTII,S$GLB, | Performed by: INTERNAL MEDICINE

## 2024-08-20 PROCEDURE — 1126F AMNT PAIN NOTED NONE PRSNT: CPT | Mod: HCNC,CPTII,S$GLB, | Performed by: INTERNAL MEDICINE

## 2024-08-20 PROCEDURE — 4010F ACE/ARB THERAPY RXD/TAKEN: CPT | Mod: HCNC,CPTII,S$GLB, | Performed by: INTERNAL MEDICINE

## 2024-08-20 PROCEDURE — 99214 OFFICE O/P EST MOD 30 MIN: CPT | Mod: HCNC,S$GLB,, | Performed by: INTERNAL MEDICINE

## 2024-08-20 PROCEDURE — 3288F FALL RISK ASSESSMENT DOCD: CPT | Mod: HCNC,CPTII,S$GLB, | Performed by: INTERNAL MEDICINE

## 2024-08-20 PROCEDURE — 99999 PR PBB SHADOW E&M-EST. PATIENT-LVL V: CPT | Mod: PBBFAC,HCNC,, | Performed by: INTERNAL MEDICINE

## 2024-08-20 PROCEDURE — 3075F SYST BP GE 130 - 139MM HG: CPT | Mod: HCNC,CPTII,S$GLB, | Performed by: INTERNAL MEDICINE

## 2024-08-20 PROCEDURE — 1101F PT FALLS ASSESS-DOCD LE1/YR: CPT | Mod: HCNC,CPTII,S$GLB, | Performed by: INTERNAL MEDICINE

## 2024-08-20 PROCEDURE — 1160F RVW MEDS BY RX/DR IN RCRD: CPT | Mod: HCNC,CPTII,S$GLB, | Performed by: INTERNAL MEDICINE

## 2024-08-20 NOTE — PROGRESS NOTES
Subjective:   Patient ID:  Kalpana Wright is a 74 y.o. female who presents for follow-up of No chief complaint on file.  Pt wants to get back on eliquis. Pt has lost much weight > 50 lbs.  Patient denies CP, angina or anginal equivalent.    Hypertension  This is a chronic problem. The current episode started more than 1 year ago. The problem has been gradually improving since onset. The problem is controlled. Pertinent negatives include no chest pain, palpitations or shortness of breath. Past treatments include beta blockers, calcium channel blockers, ACE inhibitors and diuretics. The current treatment provides moderate improvement. There are no compliance problems.    Coronary Artery Disease  Presents for follow-up visit. Pertinent negatives include no chest pain, dizziness, leg swelling, muscle weakness, palpitations, shortness of breath or weight gain. The symptoms have been stable. Compliance with diet is variable. Compliance with exercise is variable. Compliance with medications is good.   Hyperlipidemia  This is a chronic problem. The current episode started more than 1 year ago. The problem is controlled. Pertinent negatives include no chest pain or shortness of breath. Treatments tried: repatha. The current treatment provides moderate improvement of lipids. There are no compliance problems.        Review of Systems   Constitutional: Negative. Negative for weight gain.   HENT: Negative.     Eyes: Negative.    Cardiovascular: Negative.  Negative for chest pain, leg swelling and palpitations.   Respiratory: Negative.  Negative for shortness of breath.    Endocrine: Negative.    Hematologic/Lymphatic: Negative.    Skin: Negative.    Musculoskeletal:  Negative for muscle weakness.   Gastrointestinal: Negative.    Genitourinary: Negative.    Neurological: Negative.  Negative for dizziness.   Psychiatric/Behavioral: Negative.     Allergic/Immunologic: Negative.    All other systems reviewed and are  negative.    Family History   Problem Relation Name Age of Onset    Cancer Mother Virginia     Arthritis Mother Virginia     Lung cancer Father      Breast cancer Sister      COPD Daughter Kendra      Past Medical History:   Diagnosis Date    ACS (acute coronary syndrome) 3/20/2018    Acute on chronic diastolic congestive heart failure 4/27/2015    Acute on chronic respiratory failure with hypercapnia 1/10/2019    Acute renal failure 1/11/2019    Acute systolic heart failure 3/21/2018    JG (acute kidney injury) 1/29/2019    Anticoagulant long-term use     Arthritis     Asthma     Bradycardia 12/16/2013    CHF (congestive heart failure)     Clostridium difficile colitis 9/25/2018    Convulsions 2/11/2021    COPD (chronic obstructive pulmonary disease)     Coronary artery disease     Depression     Encounter for blood transfusion     Fall 7/8/2019    Iniital HPI: New problem acute.  Happened several days ago.  Patient was getting up out of her wheelchair and thought it was locked when it rolled about from under her and she fell and hit her tailbone on the wheelchair.  Patient has been having moderate to severe pain to the point where she cannot sit on her wheelchair for long periods of time.  ==================== 09/05/2019 Fell again on Satur    Gallstones 3/18/2017    History of Pulmonary embolism 7/17/2014    Hospital discharge follow-up 9/21/2020    Hyperlipidemia     Hypertension     Need for vaccination 8/8/2019    Due for pneumonia vaccination.  However patient has latex allergy.  Patient cannot receive pneumococcal vaccine here.  Well patient follow-up at pharmacy for different pneumococcal vaccine    Non-intractable cyclical vomiting with nausea 2/1/2019    Peripheral vascular disease     Pulmonary embolus 9/9/2013    Thyroid disease      Social History     Socioeconomic History    Marital status:    Tobacco Use    Smoking status: Every Day     Current packs/day: 0.25     Average packs/day: 0.3  packs/day for 45.0 years (11.3 ttl pk-yrs)     Types: Cigarettes    Smokeless tobacco: Never    Tobacco comments:     43 years smoked - quit past 7 years    Substance and Sexual Activity    Alcohol use: No    Drug use: Never    Sexual activity: Not Currently     Partners: Male     Birth control/protection: Post-menopausal     Social Determinants of Health     Financial Resource Strain: Low Risk  (3/20/2024)    Overall Financial Resource Strain (CARDIA)     Difficulty of Paying Living Expenses: Not very hard   Food Insecurity: Unknown (7/14/2024)    Received from [facility]    Hunger Vital Sign     Ran Out of Food in the Last Year: Never true   Transportation Needs: Unknown (7/14/2024)    Received from [facility]    PRAPARE - Transportation     Lack of Transportation (Medical): No   Physical Activity: Inactive (3/20/2024)    Exercise Vital Sign     Days of Exercise per Week: 0 days     Minutes of Exercise per Session: 0 min   Stress: No Stress Concern Present (3/20/2024)    [employer] - Occupational Stress Questionnaire     Feeling of Stress : Not at all   Housing Stability: Low Risk  (3/20/2024)    Housing Stability Vital Sign     Unable to Pay for Housing in the Last Year: No     Number of Places Lived in the Last Year: 2     Unstable Housing in the Last Year: No     Current Outpatient Medications on File Prior to Visit   Medication Sig Dispense Refill    albuterol-ipratropium (DUO-NEB) 2.5 mg-0.5 mg/3 mL nebulizer solution INHALE 1 CONTENTS OF VIAL VIA NEBULIZER EVERY 6 HOURS WHILE AWAKE. 270 mL 0    alendronate (FOSAMAX) 70 MG tablet TAKE 1 TABLET EVERY 7 DAYS WITH 6 TO 8 OUNCES OF WATER OR WITH JUICE AS DIRECTED *THANK YOU* - DO NOT LIE DOWN FOR 30 MINUTES AFTER TAKING 12 tablet 0    amitriptyline (ELAVIL) 100 MG tablet TAKE 1 TABLET EVERY EVENING 90 tablet 2    amLODIPine (NORVASC) 10 MG tablet Take 1 tablet (10 mg total) by mouth once daily. 90 tablet 2     benazepriL (LOTENSIN) 40 MG tablet TAKE 1 TABLET EVERY MORNING 90 tablet 0    benzonatate (TESSALON) 200 MG capsule Take 1 capsule (200 mg total) by mouth 3 (three) times daily as needed. 30 capsule 0    bisoprolol (ZEBETA) 10 MG tablet TAKE 1 TABLET EVERY MORNING 90 tablet 2    BRILINTA 90 mg tablet TAKE 1 TABLET TWICE A  tablet 3    BUTRANS weekly patch 1 patch every 7 days.      cyclobenzaprine (FLEXERIL) 10 MG tablet TAKE ONE TABLET BY MOUTH TWICE DAILY AS NEEDED 180 tablet 0    ergocalciferol (ERGOCALCIFEROL) 50,000 unit Cap Vitamin D2 1,250 mcg (50,000 unit) capsule   Take 1 capsule every week by oral route. 52 capsule 0    esomeprazole (NEXIUM) 40 MG capsule TAKE ONE CAPSULE TWICE A  capsule 2    evolocumab (REPATHA SURECLICK) 140 mg/mL PnIj Inject 1 mL (140 mg total) into the skin every 14 (fourteen) days. 6 each 3    ferrous sulfate (FEOSOL) 325 mg (65 mg iron) Tab tablet TAKE 1 TABLET EVERY MORNING 84 tablet 0    ferrous sulfate 325 (65 FE) MG EC tablet Take 1 tablet (325 mg total) by mouth once daily. 90 tablet 4    fluticasone/umeclidin/vilanter (TRELEGY ELLIPTA INHL) Inhale 1 Inhaler into the lungs once daily.      gabapentin (NEURONTIN) 600 MG tablet TAKE 1 TABLET 3 TIMES A DAY 84 tablet 0    hydroCHLOROthiazide (HYDRODIURIL) 25 MG tablet TAKE 1 TABLET EVERY MORNING 90 tablet 2    isosorbide mononitrate (IMDUR) 30 MG 24 hr tablet TAKE 1 TABLET TWICE A  tablet 3    levothyroxine (SYNTHROID) 75 MCG tablet TAKE 1 TABLET EVERY MORNING BEFORE BREAKFAST 90 tablet 2    miconazole NITRATE 2 % (ZEASORB, MICONAZOLE,) 2 % top powder Apply topically as needed for Itching. 85 g 3    nitroGLYCERIN (NITROSTAT) 0.4 MG SL tablet Place 1 tablet (0.4 mg total) under the tongue every 5 (five) minutes as needed for Chest pain. 25 tablet 11    nystatin (MYCOSTATIN) powder Apply topically as needed.      oxyCODONE-acetaminophen (PERCOCET)  mg per tablet Take 1 tablet by mouth every 8 (eight)  "hours as needed for Pain.      potassium chloride (MICRO-K) 10 MEQ CpSR TAKE 2 CAPSULES EVERY MORNING AND TAKE ONE CAPSULE EVERY EVENING 270 capsule 2    pramipexole (MIRAPEX) 0.5 MG tablet TAKE 1 TABLET EVERY MORNING 90 tablet 2    promethazine (PHENERGAN) 25 MG tablet Take 1 tablet (25 mg total) by mouth daily as needed for Nausea. 30 tablet 0    ranolazine (RANEXA) 1,000 mg Tb12 TAKE 1 TABLET TWICE A  tablet 3    sulfamethoxazole-trimethoprim 800-160mg (BACTRIM DS) 800-160 mg Tab Take 1 tablet by mouth 2 (two) times daily. for 7 days 14 tablet 0    temazepam (RESTORIL) 30 mg capsule TAKE ONE CAPSULE EVERY EVENING 28 capsule 0    torsemide (DEMADEX) 20 MG Tab TAKE 1 TABLET EVERY MORNING 90 tablet 2    umeclidinium-vilanteroL (ANORO ELLIPTA) 62.5-25 mcg/actuation DsDv Inhale 1 puff into the lungs once daily. Controller 180 each 4    VENTOLIN HFA 90 mcg/actuation inhaler   3    warfarin (COUMADIN) 5 MG tablet TAKE 1 TABLET DAILY AT 5PM 28 tablet 2    triamcinolone acetonide 0.1% (KENALOG) 0.1 % cream Apply topically 2 (two) times daily. for 7 days 30 g 0    [DISCONTINUED] calcium carbonate (OS-VANESSA) 500 mg calcium (1,250 mg) tablet Take 1 tablet (500 mg total) by mouth 3 (three) times daily.  0    [DISCONTINUED] cetirizine (ZYRTEC) 10 MG tablet Take 10 mg by mouth once daily.      [DISCONTINUED] diclofenac sodium (VOLTAREN) 1 % Gel        No current facility-administered medications on file prior to visit.     Review of patient's allergies indicates:   Allergen Reactions    Atorvastatin Other (See Comments)     Severe muscle pain  Other reaction(s): Abdominal Pain  SEVERE MYALGIAS      Azithromycin Other (See Comments)     By shot, closed veins and made large bruises  By shot, closed veins and made large bruises      Latex, natural rubber Rash     "TOURNIQUET ON LEG LEFT HUGE BLISTER THAT TOOK 9 MONTHS OF WOUND CARE TO HEAL."    Meperidine Anaphylaxis and Hives     Other reaction(s): Anaphylaxis    Hives " "(skin)^    Penicillins Anaphylaxis and Hives     Other reaction(s): Anaphylaxis  Shortness of breath^swelling  Shortness of breath and swelling  Anaphylaxis  Shortness of breath^swelling      Tofacitinib Rash and Swelling     Rash and swelling of face      Hydralazine analogues Other (See Comments)     Difficulty swallowing and vomiting.    Lorazepam Anxiety and Other (See Comments)     Made me goofy  CAUSED CONFUSION "Made me goofy."      Pravastatin Other (See Comments)     Severe myalgias.  Cramps/Severe myalgias.    Bupivacaine     Bupivacaine hcl     Flu vac 2018 [id number](pf)      Other reaction(s): Other (See Comments)  Flip into aFIB    Lidocaine     Neuromuscular blockers, benzylisoquinolinium     Penicillin     Nystatin Rash    Pepper (genus capsicum) Other (See Comments)     Reflux and ulcers       Objective:     Physical Exam  Vitals and nursing note reviewed.   Constitutional:       Appearance: She is well-developed.   HENT:      Head: Normocephalic and atraumatic.   Eyes:      Conjunctiva/sclera: Conjunctivae normal.      Pupils: Pupils are equal, round, and reactive to light.   Cardiovascular:      Rate and Rhythm: Normal rate and regular rhythm.      Pulses: Intact distal pulses.      Heart sounds: Normal heart sounds.   Pulmonary:      Effort: Pulmonary effort is normal.      Breath sounds: Normal breath sounds.   Abdominal:      General: Bowel sounds are normal.      Palpations: Abdomen is soft.   Musculoskeletal:         General: Normal range of motion.      Cervical back: Normal range of motion and neck supple.   Skin:     General: Skin is warm and dry.   Neurological:      Mental Status: She is alert and oriented to person, place, and time.         Assessment:     1. Angina pectoris    2. Atherosclerosis of native coronary artery of native heart with angina pectoris with documented spasm    3. Paroxysmal atrial fibrillation    4. Bilateral carotid artery stenosis    5. Chronic diastolic " congestive heart failure    6. Essential hypertension    7. History of SSS (sick sinus syndrome)    8. Hx of CABG    9. Pacemaker    10. Deep vein thrombosis (DVT) of both lower extremities, unspecified chronicity, unspecified vein    11. Lupus anticoagulant syndrome    12. Personal history of pulmonary embolism    13. Long term current use of anticoagulant therapy    14. Statin intolerance    15. Cigarette smoker        Plan:     Angina pectoris    Atherosclerosis of native coronary artery of native heart with angina pectoris with documented spasm    Paroxysmal atrial fibrillation    Bilateral carotid artery stenosis    Chronic diastolic congestive heart failure    Essential hypertension    History of SSS (sick sinus syndrome)    Hx of CABG    Pacemaker    Deep vein thrombosis (DVT) of both lower extremities, unspecified chronicity, unspecified vein    Lupus anticoagulant syndrome    Personal history of pulmonary embolism    Long term current use of anticoagulant therapy    Statin intolerance    Cigarette smoker      Hematology appt pt wants to take eliquis vs coumadin ( Pt also has been on sq lovenox in the past)  Continue benazepril 40 mg p.o. q.day, bisoprolol 10 mg p.o. q.day, HCTZ 25mg QD, and amlodipine 10mg QD and lasix 40mg BID-hypertension.   Continue apixaban, bisoprolol - PAF  Continue brilinta - CAD.  Continue imdur and ranexa - angina/CAD.   Continue repatha- CAD, HLP  PPM clinic.    Tobacco cessation counseling-She does not want to participate in tobacco cessation program.

## 2024-08-30 ENCOUNTER — EXTERNAL HOME HEALTH (OUTPATIENT)
Dept: HOME HEALTH SERVICES | Facility: HOSPITAL | Age: 74
End: 2024-08-30
Payer: MEDICARE

## 2024-09-09 ENCOUNTER — PATIENT OUTREACH (OUTPATIENT)
Dept: ADMINISTRATIVE | Facility: HOSPITAL | Age: 74
End: 2024-09-09
Payer: MEDICARE

## 2024-09-10 DIAGNOSIS — Z76.0 MEDICATION REFILL: ICD-10-CM

## 2024-09-10 RX ORDER — PROMETHAZINE HYDROCHLORIDE 25 MG/1
25 TABLET ORAL DAILY PRN
Qty: 30 TABLET | Refills: 0 | Status: SHIPPED | OUTPATIENT
Start: 2024-09-10

## 2024-09-10 NOTE — TELEPHONE ENCOUNTER
----- Message from Adan Woodward MD sent at 9/10/2024  1:27 PM CDT -----  Contact: Deirdre (PT's Nurse) [phone]  Continue current medications.  ER precautions for severe symptoms.  Patient should follow-up with us if continuing the symptoms.  Blood pressure goal should be less than 150/90.  ----- Message -----  From: Lee Lopez MA  Sent: 9/10/2024   1:16 PM CDT  To: Adan Woodward MD    advise  ----- Message -----  From: Patito Zeng  Sent: 9/10/2024  12:40 PM CDT  To: Crissy Arellano Staff    Pt needs a refill on Finner Bin called into     [facility] - [place], [place] - [address]  [address]  [place] [address]  Phone: [phone] Fax: [phone]    Deirdre Nurse can be reached at [phone]      Deirdre pt's nurse is calling to say the pt's BP is a little elevated 156 over 62, pt's weight parameters are set to 144 to 154, pt's current weight is 155.4 there are no edema in the lunges & the pt's heart sounds great. Deirdre states the pt on Saturday had a really bad headache to the point of crying, chills, increase pains in her arms. Pt is fine today but Deirdre would like to know what the provider would recommend. Please call Deirdre back for advice

## 2024-09-10 NOTE — TELEPHONE ENCOUNTER
No care due was identified.  [facility] Embedded Care Due Messages. Reference number: [id number].   9/10/2024 1:32:30 PM CDT

## 2024-09-16 ENCOUNTER — PATIENT OUTREACH (OUTPATIENT)
Dept: ADMINISTRATIVE | Facility: HOSPITAL | Age: 74
End: 2024-09-16
Payer: MEDICARE

## 2024-09-17 ENCOUNTER — TELEPHONE (OUTPATIENT)
Dept: FAMILY MEDICINE | Facility: CLINIC | Age: 74
End: 2024-09-17
Payer: MEDICARE

## 2024-09-17 NOTE — TELEPHONE ENCOUNTER
----- Message from Adan Woodward MD sent at 9/17/2024  3:32 PM CDT -----  Noted.  Patient should follow-up with us and Cardiology.  ER precautions for severe symptoms.  Continue wound care.  Follow-up sooner if worsening.  ----- Message -----  From: Lee Lopez MA  Sent: 9/17/2024   2:51 PM CDT  To: Adan Woodward MD    Home health nurse just wanted to updated you on this patient .  ----- Message -----  From: Kaleb Moon MA  Sent: 9/17/2024  10:56 AM CDT  To: Crissy Gilmore from [facility] calling to inform the provider that the patient's weight is 156lbs and her parameter is 144-154lbs and she is slightly out of their parameter. Also has a stage 2 ulcer that has been cleaned with wound  and applied Calmoseptine ointment and covered it. Please give her a call back at [phone].

## 2024-09-17 NOTE — TELEPHONE ENCOUNTER
Patient has appointment with NP on tomorrow   And Home health nurse will let patient know to f/u with Cardio

## 2024-09-25 RX ORDER — FERROUS SULFATE TAB 325 MG (65 MG ELEMENTAL FE) 325 (65 FE) MG
TAB ORAL EVERY MORNING
Qty: 84 TABLET | Refills: 0 | Status: SHIPPED | OUTPATIENT
Start: 2024-09-25

## 2024-09-25 RX ORDER — ALENDRONATE SODIUM 70 MG/1
TABLET ORAL
Qty: 12 TABLET | Refills: 0 | Status: SHIPPED | OUTPATIENT
Start: 2024-09-25

## 2024-09-25 NOTE — TELEPHONE ENCOUNTER
Refill Routing Note   Medication(s) are not appropriate for processing by [facility] for the following reason(s):        Outside of protocol    [facility] action(s):  Route             Appointments  past 12m or future 3m with PCP    Date Provider   Last Visit   Visit date not found Adan Woodward MD   Next Visit   9/25/2024 Adan Woodward MD   ED visits in past 90 days: 0        Note composed:10:20 AM 09/25/2024

## 2024-09-25 NOTE — TELEPHONE ENCOUNTER
DXA Scan completed within last 720 days    Matches previous order    Albumin in normal range and within 360 days    eGFR is 35 or above and within 360 days    Phosphate in normal range and within 360 days

## 2024-09-25 NOTE — TELEPHONE ENCOUNTER
No care due was identified.  [facility] Embedded Care Due Messages. Reference number: [id number].   9/25/2024 9:55:53 AM CDT

## 2024-09-25 NOTE — TELEPHONE ENCOUNTER
Refill Routing Note   Medication(s) are not appropriate for processing by [facility] for the following reason(s):        Outside of protocol    [facility] action(s):  Route             Appointments  past 12m or future 3m with PCP    Date Provider   Last Visit   Visit date not found Adan Woodward MD   Next Visit   Visit date not found Adan Woodward MD   ED visits in past 90 days: 0        Note composed:11:43 AM 09/25/2024

## 2024-10-02 ENCOUNTER — OFFICE VISIT (OUTPATIENT)
Dept: FAMILY MEDICINE | Facility: CLINIC | Age: 74
End: 2024-10-02
Payer: MEDICARE

## 2024-10-02 VITALS
HEART RATE: 62 BPM | BODY MASS INDEX: 26 KG/M2 | OXYGEN SATURATION: 96 % | HEIGHT: 64 IN | RESPIRATION RATE: 16 BRPM | SYSTOLIC BLOOD PRESSURE: 183 MMHG | DIASTOLIC BLOOD PRESSURE: 72 MMHG | WEIGHT: 152.31 LBS

## 2024-10-02 DIAGNOSIS — E03.9 HYPOTHYROIDISM, UNSPECIFIED TYPE: Chronic | ICD-10-CM

## 2024-10-02 DIAGNOSIS — Z00.00 ENCOUNTER FOR MEDICARE ANNUAL WELLNESS EXAM: Primary | ICD-10-CM

## 2024-10-02 DIAGNOSIS — Z95.1 HX OF CABG: ICD-10-CM

## 2024-10-02 DIAGNOSIS — I65.23 BILATERAL CAROTID ARTERY STENOSIS: ICD-10-CM

## 2024-10-02 DIAGNOSIS — E55.9 VITAMIN D DEFICIENCY: ICD-10-CM

## 2024-10-02 DIAGNOSIS — I10 ESSENTIAL HYPERTENSION: Chronic | ICD-10-CM

## 2024-10-02 DIAGNOSIS — I82.403 DEEP VEIN THROMBOSIS (DVT) OF BOTH LOWER EXTREMITIES, UNSPECIFIED CHRONICITY, UNSPECIFIED VEIN: ICD-10-CM

## 2024-10-02 DIAGNOSIS — J43.9 PULMONARY EMPHYSEMA, UNSPECIFIED EMPHYSEMA TYPE: Chronic | ICD-10-CM

## 2024-10-02 DIAGNOSIS — D64.9 CHRONIC ANEMIA: ICD-10-CM

## 2024-10-02 DIAGNOSIS — R80.1 PERSISTENT PROTEINURIA: ICD-10-CM

## 2024-10-02 DIAGNOSIS — D68.62 LUPUS ANTICOAGULANT SYNDROME: Chronic | ICD-10-CM

## 2024-10-02 DIAGNOSIS — R26.9 ABNORMALITY OF GAIT AND MOBILITY: ICD-10-CM

## 2024-10-02 DIAGNOSIS — E11.51 TYPE 2 DIABETES MELLITUS WITH DIABETIC PERIPHERAL ANGIOPATHY WITHOUT GANGRENE, WITHOUT LONG-TERM CURRENT USE OF INSULIN: Chronic | ICD-10-CM

## 2024-10-02 DIAGNOSIS — D50.8 OTHER IRON DEFICIENCY ANEMIA: ICD-10-CM

## 2024-10-02 DIAGNOSIS — Z79.891 CHRONIC PRESCRIPTION OPIATE USE: ICD-10-CM

## 2024-10-02 DIAGNOSIS — I50.30 DIASTOLIC CONGESTIVE HEART FAILURE, UNSPECIFIED HF CHRONICITY: ICD-10-CM

## 2024-10-02 DIAGNOSIS — E78.00 PURE HYPERCHOLESTEROLEMIA: Chronic | ICD-10-CM

## 2024-10-02 DIAGNOSIS — N18.32 STAGE 3B CHRONIC KIDNEY DISEASE: ICD-10-CM

## 2024-10-02 DIAGNOSIS — F17.210 CIGARETTE SMOKER: Chronic | ICD-10-CM

## 2024-10-02 DIAGNOSIS — I73.9 PVD (PERIPHERAL VASCULAR DISEASE): ICD-10-CM

## 2024-10-02 DIAGNOSIS — G47.33 OBSTRUCTIVE SLEEP APNEA SYNDROME: ICD-10-CM

## 2024-10-02 DIAGNOSIS — G62.9 NEUROPATHY: ICD-10-CM

## 2024-10-02 DIAGNOSIS — H91.93 BILATERAL HEARING LOSS, UNSPECIFIED HEARING LOSS TYPE: ICD-10-CM

## 2024-10-02 DIAGNOSIS — Z86.711 PERSONAL HISTORY OF PULMONARY EMBOLISM: ICD-10-CM

## 2024-10-02 DIAGNOSIS — G25.81 RESTLESS LEGS: ICD-10-CM

## 2024-10-02 DIAGNOSIS — R11.0 NAUSEA: ICD-10-CM

## 2024-10-02 DIAGNOSIS — M81.0 AGE-RELATED OSTEOPOROSIS WITHOUT CURRENT PATHOLOGICAL FRACTURE: ICD-10-CM

## 2024-10-02 DIAGNOSIS — F51.01 PRIMARY INSOMNIA: Chronic | ICD-10-CM

## 2024-10-02 DIAGNOSIS — I25.111 ATHEROSCLEROSIS OF NATIVE CORONARY ARTERY OF NATIVE HEART WITH ANGINA PECTORIS WITH DOCUMENTED SPASM: Chronic | ICD-10-CM

## 2024-10-02 DIAGNOSIS — I48.0 PAROXYSMAL ATRIAL FIBRILLATION: ICD-10-CM

## 2024-10-02 DIAGNOSIS — G89.4 CHRONIC PAIN SYNDROME: ICD-10-CM

## 2024-10-02 DIAGNOSIS — K21.00 GASTROESOPHAGEAL REFLUX DISEASE WITH ESOPHAGITIS, UNSPECIFIED WHETHER HEMORRHAGE: Chronic | ICD-10-CM

## 2024-10-02 DIAGNOSIS — I20.9 ANGINA PECTORIS: ICD-10-CM

## 2024-10-02 DIAGNOSIS — I13.0 HYPERTENSIVE HEART AND CHRONIC KIDNEY DISEASE WITH HEART FAILURE AND STAGE 1 THROUGH STAGE 4 CHRONIC KIDNEY DISEASE, OR UNSPECIFIED CHRONIC KIDNEY DISEASE: Chronic | ICD-10-CM

## 2024-10-02 DIAGNOSIS — F41.9 ANXIETY: Chronic | ICD-10-CM

## 2024-10-02 PROCEDURE — 99999 PR PBB SHADOW E&M-EST. PATIENT-LVL V: CPT | Mod: PBBFAC,HCNC,, | Performed by: PHYSICIAN ASSISTANT

## 2024-10-02 RX ORDER — PROMETHAZINE HYDROCHLORIDE 25 MG/1
25 TABLET ORAL DAILY PRN
Qty: 90 TABLET | Refills: 0 | Status: SHIPPED | OUTPATIENT
Start: 2024-10-02

## 2024-10-02 RX ORDER — TEMAZEPAM 30 MG/1
30 CAPSULE ORAL NIGHTLY
Qty: 30 CAPSULE | Refills: 0 | Status: SHIPPED | OUTPATIENT
Start: 2024-10-02

## 2024-10-02 NOTE — PROGRESS NOTES
"  Kalpana Wright presented for a follow-up Medicare AWV today. The following components were reviewed and updated:    Medical history  Family History  Social history  Allergies and Current Medications  Health Risk Assessment  Health Maintenance  Care Team    **See Completed Assessments for Annual Wellness visit with in the encounter summary    The following assessments were completed:  Depression Screening  Cognitive function Screening  Timed Get Up Test  Whisper Test      Opioid documentation:      Patient does have a current opioid prescription.      Patient accepted further discussion regarding opioid medication use.      Patient is currently taking oxycodone narcotic for generalized pain.        Pain level today is 5/10.       In addition to narcotic pain medications, patient is also using muscle relaxants for pain control.       Patient is followed by a specialist currently for their pain and will not be referred today.       Patient's opioid risk potential based on ORT-OUD tool:       Maciej each box that applies   No   Yes     Family history of substance abuse   Alcohol [] [x]   Illegal drugs [] [x]   Rx drugs [x] []     Personal history of substance abuse   Alcohol [x] []   Illegal drugs [x] []   Rx drugs [x] []     Age between 16-45 years   [x]   []     Patient with ADD, OCD, Bipolar disorder, schizoprenia   [x]   []     Patient with depression   [x]   []                         Scoring total                                                                 Non-opioid treatment options have been discussed today and added to the patient's after visit summary.          Vitals:    10/02/24 1047 10/02/24 1147   BP: (!) 162/73 (!) 183/72   Pulse: 62    Resp: 16    SpO2: 96%    Weight: 69.1 kg (152 lb 5.4 oz)    Height: 5' 4" (1.626 m)      Body mass index is 26.15 kg/m².       Physical Exam  Constitutional:       General: She is not in acute distress.     Appearance: Normal appearance.   HENT:      Head: Normocephalic " and atraumatic.   Eyes:      Conjunctiva/sclera: Conjunctivae normal.   Cardiovascular:      Rate and Rhythm: Normal rate and regular rhythm.      Pulses: Normal pulses.      Heart sounds: Normal heart sounds. No murmur heard.  Pulmonary:      Effort: Pulmonary effort is normal. No respiratory distress.      Breath sounds: Normal breath sounds.   Abdominal:      General: Bowel sounds are normal. There is no distension.      Tenderness: There is no abdominal tenderness.   Musculoskeletal:         General: Normal range of motion.      Cervical back: Normal range of motion and neck supple.   Skin:     General: Skin is warm and dry.      Findings: No rash.   Neurological:      General: No focal deficit present.      Mental Status: She is alert and oriented to person, place, and time.      Gait: Gait abnormal (wheelchair).   Psychiatric:         Mood and Affect: Mood normal.         Behavior: Behavior normal.           Diagnoses and health risks identified today and associated recommendations/orders:  1. Encounter for Medicare annual wellness exam  Complete history and physical was completed today. Complete and thorough medication reconciliation was performed. Discussed risks and benefits of medications. Advised patient on orders and health maintenance. Continue current medications listed on your summary sheet. Discussed immunizations due.     2. Chronic pain syndrome  Chronic. Stable  Remains on chronic opiate therapy, Gabapentin and muscle relaxants  Followed by pain management  Order placed for gel cover for home hospital bed  Continue current medications and plan of care per PCP and specialists   - E - OTHER    3. Neuropathy  Chronic. Stable  Remains on Gabapentin  Continue current medications and plan of care per PCP and specialists     4. Restless legs  Chronic. Stable  Remains on Mirapex  Continue current medications and plan of care per PCP and specialists     5. Anxiety  Chronic. Stable  Remains on  Elavil  Continue current medications and plan of care per PCP and specialists     6. Chronic prescription opiate use  Chronic. Stable  Remains on chronic opiates  Followed by pain management  Continue current medications and plan of care per PCP and specialists     7. Pulmonary emphysema, unspecified emphysema type  Chronic. Stable  Remains on Wixela and PRN Albuterol  Continue current medications and plan of care per PCP and specialists     8. Atherosclerosis of native coronary artery of native heart with angina pectoris with documented spasm  Chronic. Stable  Remains on Brilinta and Repatha  Followed by cardiology  Continue current medications and plan of care per PCP and specialists     9. Essential hypertension  Chronic. Uncontrolled  BP above goal today; did not take BP medication this morning  Resume home medication as prescribed  Monitor BP closely at home; follow up in 1-2 weeks for BP check  Continue lifestyle modification with low sodium diet and exercise   Discussed hypertension disease course and importance of treating high blood pressure  Patient understood and advised of risk of untreated blood pressure. ER precautions were given for symptoms of hypertensive urgency and emergency.  Continue current medications and plan of care per PCP and specialists   Hypertension Medications               amLODIPine (NORVASC) 10 MG tablet Take 1 tablet (10 mg total) by mouth once daily.    benazepriL (LOTENSIN) 40 MG tablet TAKE 1 TABLET EVERY MORNING    bisoprolol (ZEBETA) 10 MG tablet TAKE 1 TABLET EVERY MORNING    hydroCHLOROthiazide (HYDRODIURIL) 25 MG tablet TAKE 1 TABLET EVERY MORNING    isosorbide mononitrate (IMDUR) 30 MG 24 hr tablet TAKE 1 TABLET TWICE A DAY    nitroGLYCERIN (NITROSTAT) 0.4 MG SL tablet Place 1 tablet (0.4 mg total) under the tongue every 5 (five) minutes as needed for Chest pain.    torsemide (DEMADEX) 20 MG Tab TAKE 1 TABLET EVERY MORNING     10. Pure hypercholesterolemia  Chronic.  Stable  Remains on Repatha  Continue current medications and plan of care per PCP and specialists   Most recent labs listed below:  Lab Results   Component Value Date    CHOL 125 12/14/2023     Lab Results   Component Value Date    HDL 40 12/14/2023     Lab Results   Component Value Date    LDLCALC 71 12/14/2023     Lab Results   Component Value Date    TRIG 68 12/14/2023     Lab Results   Component Value Date    ALT 8 07/17/2024    AST 13 07/17/2024    ALKPHOS 69 07/17/2024    BILITOT 0.3 (L) 07/17/2024   - Lipid Panel; Future    11. 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 Stable  Followed by cardiology  Continue current medications and plan of care per PCP and specialists     12. Angina pectoris  Chronic. Stable  Remains on Ranexa and Imdur  Followed by cardiology  Continue current medications and plan of care per PCP and specialists     13. Paroxysmal atrial fibrillation  Chronic. Stable  Remains on Coumadin and Bisprolol for rate control  Also on Brilinta  Followed by cardiology  Continue current medications and plan of care per PCP and specialists     14. Bilateral carotid artery stenosis  Chronic. Stable  Remains on Coumadin, Brilinta and Repatha   Followed by cardiology  Continue current medications and plan of care per PCP and specialists     15. Diastolic congestive heart failure, unspecified HF chronicity  Chronic. Stable  Appears compensated on exam  Followed by cardiology  Continue current medications and plan of care per PCP and specialists     16. Hx of CABG  Chronic. Stable  Remains on Brilinta and Repatha  Followed by cardiology  Continue current medications and plan of care per PCP and specialists     17. PVD (peripheral vascular disease)  Chronic. Stable  Remains on Coumadin, Brilinta and Repatha   Followed by cardiology  Continue current medications and plan of care per PCP  and specialists     18. Persistent proteinuria  Chronic. Stable  Continue current medications and plan of care per PCP and specialists     19. Stage 3b chronic kidney disease  Chronic. Stable  Renally dose medication  Avoid nephrotoxic agents, including NSAIDs  Continue current medications and plan of care per PCP and specialists     20. Lupus anticoagulant syndrome  Chronic. Stable  Remains on Coumadin  Followed by hematology  Continue current medications and plan of care per PCP and specialists     21. Deep vein thrombosis (DVT) of both lower extremities, unspecified chronicity, unspecified vein  Chronic. Stable  Remains on Coumadin  Followed by hematology  Continue current medications and plan of care per PCP and specialists     22. Personal history of pulmonary embolism  Chronic. Stable  Remains on Coumadin  Followed by hematology  Continue current medications and plan of care per PCP and specialists     23. Chronic anemia  Chronic. Stable  Continue current medications and plan of care per PCP and specialists   Lab Results   Component Value Date    WBC 8.84 06/05/2024    HGB 13.6 06/05/2024    HCT 42.6 06/05/2024    MCV 86 06/05/2024     06/05/2024     24. Other iron deficiency anemia  Chronic. Stable  Remains on iron supplement  Continue current medications and plan of care per PCP and specialists     25. Hypothyroidism, unspecified type  Chronic. Stable  Remains on Synthroid  Continue current medications and plan of care per PCP and specialists   Lab Results   Component Value Date    TSH 1.988 06/05/2024     26. Type 2 diabetes mellitus with diabetic peripheral angiopathy without gangrene, without long-term current use of insulin  Chronic. Stable  Not currently on medication  Due for A1c and microalbumin urine test  Also due for eye exam  Counseling provided on importance of diabetic diet and medication compliance in order to treat diabetes  Discussed diabetes disease course and potential  complications  Continue current medications and plan of care per PCP and specialists   Lab Results   Component Value Date    HGBA1C 4.8 12/14/2023   - Hemoglobin A1C; Future    27. Age-related osteoporosis without current pathological fracture  Chronic. Stable  Remains on Fosamax  Also on Calcium and Vitamin D  Due for repeat DEXA  Continue current medications and plan of care per PCP and specialists     28. Vitamin D deficiency  Chronic. Stable  Remains on Vitamin D supplementation  Continue current medications and plan of care per PCP and specialists   - Vitamin D; Future    29. Gastroesophageal reflux disease with esophagitis, unspecified whether hemorrhage  Chronic. Stable  Remains on daily PPI  Continue lifestyle modification with avoidance of acidic foods, caffeine, late night eating  Continue current medications and plan of care per PCP and specialists     30. Primary insomnia  Chronic. Stable  Remains on Elavil and Restoril. The patient was checked in the [facility]'s Prescription Monitoring Program. There appears to be no incongruities with the patient's verbalized history.   Discussed importance of good sleep hygiene practices  Continue current medications and plan of care per PCP and specialists   - temazepam (RESTORIL) 30 mg capsule; Take 1 capsule (30 mg total) by mouth every evening.  Dispense: 30 capsule; Refill: 0    31. Cigarette smoker  Chronic. Stable  Patient was counseled on the dangers of tobacco use  Reviewed strategies to maximize success, including counseling, nicotine replacement therapy and pharmacologic option.   Tobacco cessation class offered  Continue current medications and plan of care per PCP and specialists     32. Obstructive sleep apnea syndrome  Chronic. Stable  Continue current medications and plan of care per PCP and specialists     33. Abnormality of gait and mobility  Chronic. Stable  Uses a rolling walker  Continue current medications and plan of care  per PCP and specialists     34. Nausea  - promethazine (PHENERGAN) 25 MG tablet; Take 1 tablet (25 mg total) by mouth daily as needed for Nausea.  Dispense: 90 tablet; Refill: 0    35. Bilateral hearing loss, unspecified hearing loss type  - Ambulatory referral/consult to Audiology; Future      Provided Kalpana with a 5-10 year written screening schedule and personal prevention plan. Recommendations were developed using the USPSTF age appropriate recommendations. Education, counseling, and referrals were provided as needed.  After Visit Summary printed and given to patient which includes a list of additional screenings\tests needed.    Follow up for with PCP.      Abena Zhang PA-C      I offered to discuss advanced care planning, including how to pick a person who would make decisions for you if you were unable to make them for yourself, called a health care power of , and what kind of decisions you might make such as use of life sustaining treatments such as ventilators and tube feeding when faced with a life limiting illness recorded on a living will that they will need to know. (How you want to be cared for as you near the end of your natural life)     X Patient is interested in learning more about how to make advanced directives.  I provided them paperwork and offered to discuss this with them.

## 2024-10-02 NOTE — PATIENT INSTRUCTIONS
Counseling and Referral of Other Preventative  (Italic type indicates deductible and co-insurance are waived)    Patient Name: Kalpana Wright  Today's Date: 10/2/2024    Health Maintenance       Date Due Completion Date    DEXA Scan 10/03/2022 10/3/2019    RSV Vaccine (Age 60+ and Pregnant patients) (1 - 1-dose 75+ series) Never done ---    Diabetes Urine Screening 05/09/2024 5/9/2023    Hemoglobin A1c 06/14/2024 12/14/2023    Eye Exam 06/28/2024 6/28/2023    COVID-19 Vaccine (4 - 2024-25 season) 09/01/2024 2/15/2022    Shingles Vaccine (2 of 2) 03/11/2025 (Originally 1/25/2019) 11/30/2018    Lipid Panel 12/14/2024 12/14/2023    Foot Exam 03/11/2025 3/11/2024 (Done)    Override on 3/11/2024: Done    Colorectal Cancer Screening 04/01/2029 4/1/2019    TETANUS VACCINE 05/01/2034 5/1/2024        Orders Placed This Encounter   Procedures    HME - OTHER    Hemoglobin A1C    Lipid Panel    Ambulatory referral/consult to Audiology     The following information is provided to all patients.  This information is to help you find resources for any of the problems found today that may be affecting your health:                  Living healthy guide: www.[facility].[place].gov      Understanding Diabetes: www.diabetes.org      Eating healthy: www.cdc.gov/healthyweight      CDC home safety checklist: www.cdc.gov/steadi/patient.html      Agency on Aging: www.goea.[place].gov      Alcoholics anonymous (AA): www.aa.org      Physical Activity: www.estrada.nih.gov/ya6obpb      Tobacco use: www.quitwithusla.org

## 2024-10-04 ENCOUNTER — TELEPHONE (OUTPATIENT)
Dept: HEMATOLOGY/ONCOLOGY | Facility: CLINIC | Age: 74
End: 2024-10-04
Payer: MEDICARE

## 2024-10-04 NOTE — TELEPHONE ENCOUNTER
Spoke to pt in reference to Hem/Onc appt scheduled on 10/11 has been canceled d/t being scheduled incorrectly on Onc schedule and d/t already being an EP. Pt v/u 6mth f/u scheduled. Notice mailed.

## 2024-10-06 PROCEDURE — G0179 MD RECERTIFICATION HHA PT: HCPCS | Mod: ,,, | Performed by: FAMILY MEDICINE

## 2024-10-10 ENCOUNTER — DOCUMENT SCAN (OUTPATIENT)
Dept: HOME HEALTH SERVICES | Facility: HOSPITAL | Age: 74
End: 2024-10-10
Payer: MEDICARE

## 2024-10-14 ENCOUNTER — PATIENT OUTREACH (OUTPATIENT)
Dept: ADMINISTRATIVE | Facility: HOSPITAL | Age: 74
End: 2024-10-14
Payer: MEDICARE

## 2024-10-14 NOTE — PROGRESS NOTES
DM LABS: per chart review pt overdue for HA1C/ Microalbumin, already linked to lab appt 10.15.24

## 2024-10-17 ENCOUNTER — EXTERNAL HOME HEALTH (OUTPATIENT)
Dept: HOME HEALTH SERVICES | Facility: HOSPITAL | Age: 74
End: 2024-10-17
Payer: MEDICARE

## 2024-10-25 DIAGNOSIS — Z79.899 ENCOUNTER FOR LONG-TERM (CURRENT) USE OF MEDICATIONS: Chronic | ICD-10-CM

## 2024-10-27 RX ORDER — BENAZEPRIL HYDROCHLORIDE 40 MG/1
40 TABLET ORAL EVERY MORNING
Qty: 90 TABLET | Refills: 0 | Status: SHIPPED | OUTPATIENT
Start: 2024-10-27

## 2024-11-04 ENCOUNTER — TELEPHONE (OUTPATIENT)
Dept: FAMILY MEDICINE | Facility: CLINIC | Age: 74
End: 2024-11-04
Payer: MEDICARE

## 2024-11-04 NOTE — TELEPHONE ENCOUNTER
[facility] called and said pt called them wanting a gel overlay for her bed. Can we fill out the [facility] form for this?

## 2024-11-04 NOTE — TELEPHONE ENCOUNTER
----- Message from Yovani sent at 11/4/2024  2:41 PM CST -----  Contact: crystal/[place] ph  Cryastal from [facility] pharmacy is requesting a call back in regards to a gel overlay for the  hospital bed.   Please give her a call back at [phone]

## 2024-11-07 ENCOUNTER — OFFICE VISIT (OUTPATIENT)
Dept: FAMILY MEDICINE | Facility: CLINIC | Age: 74
End: 2024-11-07
Payer: MEDICARE

## 2024-11-07 ENCOUNTER — LAB VISIT (OUTPATIENT)
Dept: LAB | Facility: HOSPITAL | Age: 74
End: 2024-11-07
Attending: FAMILY MEDICINE
Payer: MEDICARE

## 2024-11-07 VITALS
BODY MASS INDEX: 27.08 KG/M2 | HEIGHT: 64 IN | HEART RATE: 65 BPM | DIASTOLIC BLOOD PRESSURE: 68 MMHG | SYSTOLIC BLOOD PRESSURE: 136 MMHG | WEIGHT: 158.63 LBS | OXYGEN SATURATION: 95 %

## 2024-11-07 DIAGNOSIS — E03.9 HYPOTHYROIDISM, UNSPECIFIED TYPE: ICD-10-CM

## 2024-11-07 DIAGNOSIS — I13.0 HYPERTENSIVE HEART AND CHRONIC KIDNEY DISEASE WITH HEART FAILURE AND STAGE 1 THROUGH STAGE 4 CHRONIC KIDNEY DISEASE, OR UNSPECIFIED CHRONIC KIDNEY DISEASE: Primary | ICD-10-CM

## 2024-11-07 DIAGNOSIS — R91.8 MULTIPLE PULMONARY NODULES: ICD-10-CM

## 2024-11-07 DIAGNOSIS — D68.62 LUPUS ANTICOAGULANT SYNDROME: ICD-10-CM

## 2024-11-07 DIAGNOSIS — K21.00 GASTROESOPHAGEAL REFLUX DISEASE WITH ESOPHAGITIS, UNSPECIFIED WHETHER HEMORRHAGE: ICD-10-CM

## 2024-11-07 DIAGNOSIS — F17.210 CIGARETTE SMOKER: Chronic | ICD-10-CM

## 2024-11-07 DIAGNOSIS — I10 ESSENTIAL HYPERTENSION: ICD-10-CM

## 2024-11-07 DIAGNOSIS — D64.9 ANEMIA, UNSPECIFIED TYPE: ICD-10-CM

## 2024-11-07 DIAGNOSIS — R77.9 ELEVATED SERUM PROTEIN LEVEL: ICD-10-CM

## 2024-11-07 DIAGNOSIS — R11.0 NAUSEA: ICD-10-CM

## 2024-11-07 DIAGNOSIS — Z79.899 ENCOUNTER FOR LONG-TERM (CURRENT) USE OF MEDICATIONS: Chronic | ICD-10-CM

## 2024-11-07 DIAGNOSIS — I73.9 PVD (PERIPHERAL VASCULAR DISEASE): ICD-10-CM

## 2024-11-07 DIAGNOSIS — L60.0 INGROWN TOENAIL: ICD-10-CM

## 2024-11-07 DIAGNOSIS — E78.00 PURE HYPERCHOLESTEROLEMIA: ICD-10-CM

## 2024-11-07 DIAGNOSIS — I82.403 DEEP VEIN THROMBOSIS (DVT) OF BOTH LOWER EXTREMITIES, UNSPECIFIED CHRONICITY, UNSPECIFIED VEIN: ICD-10-CM

## 2024-11-07 DIAGNOSIS — I26.99 PULMONARY EMBOLISM WITHOUT ACUTE COR PULMONALE, UNSPECIFIED CHRONICITY, UNSPECIFIED PULMONARY EMBOLISM TYPE: ICD-10-CM

## 2024-11-07 DIAGNOSIS — M81.0 OSTEOPOROSIS, UNSPECIFIED OSTEOPOROSIS TYPE, UNSPECIFIED PATHOLOGICAL FRACTURE PRESENCE: ICD-10-CM

## 2024-11-07 DIAGNOSIS — I13.0 HYPERTENSIVE HEART AND CHRONIC KIDNEY DISEASE WITH HEART FAILURE AND STAGE 1 THROUGH STAGE 4 CHRONIC KIDNEY DISEASE, OR UNSPECIFIED CHRONIC KIDNEY DISEASE: ICD-10-CM

## 2024-11-07 DIAGNOSIS — E11.51 TYPE 2 DIABETES MELLITUS WITH DIABETIC PERIPHERAL ANGIOPATHY WITHOUT GANGRENE, WITHOUT LONG-TERM CURRENT USE OF INSULIN: Chronic | ICD-10-CM

## 2024-11-07 DIAGNOSIS — G25.81 RESTLESS LEGS: ICD-10-CM

## 2024-11-07 DIAGNOSIS — K21.9 GASTROESOPHAGEAL REFLUX DISEASE: ICD-10-CM

## 2024-11-07 DIAGNOSIS — E11.9 TYPE 2 DIABETES MELLITUS WITHOUT COMPLICATION: ICD-10-CM

## 2024-11-07 DIAGNOSIS — D53.9 NUTRITIONAL ANEMIA, UNSPECIFIED: ICD-10-CM

## 2024-11-07 DIAGNOSIS — E53.8 B12 DEFICIENCY: ICD-10-CM

## 2024-11-07 DIAGNOSIS — F51.01 PRIMARY INSOMNIA: Chronic | ICD-10-CM

## 2024-11-07 DIAGNOSIS — E55.9 VITAMIN D DEFICIENCY: ICD-10-CM

## 2024-11-07 DIAGNOSIS — I25.10 CORONARY ARTERY DISEASE INVOLVING NATIVE CORONARY ARTERY OF NATIVE HEART WITHOUT ANGINA PECTORIS: ICD-10-CM

## 2024-11-07 LAB
25(OH)D3+25(OH)D2 SERPL-MCNC: 25 NG/ML (ref 30–96)
ALBUMIN SERPL BCP-MCNC: 3.3 G/DL (ref 3.5–5.2)
ALBUMIN SERPL BCP-MCNC: 3.3 G/DL (ref 3.5–5.2)
ALBUMIN/CREAT UR: 378.5 UG/MG (ref 0–30)
ALP SERPL-CCNC: 86 U/L (ref 40–150)
ALP SERPL-CCNC: 88 U/L (ref 40–150)
ALT SERPL W/O P-5'-P-CCNC: 10 U/L (ref 10–44)
ALT SERPL W/O P-5'-P-CCNC: 12 U/L (ref 10–44)
ANION GAP SERPL CALC-SCNC: 6 MMOL/L (ref 8–16)
ANION GAP SERPL CALC-SCNC: 9 MMOL/L (ref 8–16)
AST SERPL-CCNC: 18 U/L (ref 10–40)
AST SERPL-CCNC: 19 U/L (ref 10–40)
BASOPHILS # BLD AUTO: 0.03 K/UL (ref 0–0.2)
BASOPHILS NFR BLD: 0.4 % (ref 0–1.9)
BILIRUB SERPL-MCNC: 0.3 MG/DL (ref 0.1–1)
BILIRUB SERPL-MCNC: 0.3 MG/DL (ref 0.1–1)
BUN SERPL-MCNC: 13 MG/DL (ref 8–23)
BUN SERPL-MCNC: 14 MG/DL (ref 8–23)
CALCIUM SERPL-MCNC: 8.8 MG/DL (ref 8.7–10.5)
CALCIUM SERPL-MCNC: 9 MG/DL (ref 8.7–10.5)
CHLORIDE SERPL-SCNC: 104 MMOL/L (ref 95–110)
CHLORIDE SERPL-SCNC: 105 MMOL/L (ref 95–110)
CHOLEST SERPL-MCNC: 178 MG/DL (ref 120–199)
CHOLEST/HDLC SERPL: 3.4 {RATIO} (ref 2–5)
CO2 SERPL-SCNC: 31 MMOL/L (ref 23–29)
CO2 SERPL-SCNC: 33 MMOL/L (ref 23–29)
CREAT SERPL-MCNC: 0.9 MG/DL (ref 0.5–1.4)
CREAT SERPL-MCNC: 0.9 MG/DL (ref 0.5–1.4)
CREAT UR-MCNC: 107 MG/DL (ref 15–325)
DIFFERENTIAL METHOD BLD: ABNORMAL
EOSINOPHIL # BLD AUTO: 0.2 K/UL (ref 0–0.5)
EOSINOPHIL NFR BLD: 2.9 % (ref 0–8)
ERYTHROCYTE [DISTWIDTH] IN BLOOD BY AUTOMATED COUNT: 14.8 % (ref 11.5–14.5)
EST. GFR  (NO RACE VARIABLE): >60 ML/MIN/1.73 M^2
EST. GFR  (NO RACE VARIABLE): >60 ML/MIN/1.73 M^2
ESTIMATED AVG GLUCOSE: 94 MG/DL (ref 68–131)
GLUCOSE SERPL-MCNC: 84 MG/DL (ref 70–110)
GLUCOSE SERPL-MCNC: 84 MG/DL (ref 70–110)
HBA1C MFR BLD: 4.9 % (ref 4–5.6)
HCT VFR BLD AUTO: 43.3 % (ref 37–48.5)
HDLC SERPL-MCNC: 53 MG/DL (ref 40–75)
HDLC SERPL: 29.8 % (ref 20–50)
HGB BLD-MCNC: 13.4 G/DL (ref 12–16)
IMM GRANULOCYTES # BLD AUTO: 0.02 K/UL (ref 0–0.04)
IMM GRANULOCYTES NFR BLD AUTO: 0.3 % (ref 0–0.5)
LDLC SERPL CALC-MCNC: 100.4 MG/DL (ref 63–159)
LYMPHOCYTES # BLD AUTO: 1.8 K/UL (ref 1–4.8)
LYMPHOCYTES NFR BLD: 26.3 % (ref 18–48)
MCH RBC QN AUTO: 28 PG (ref 27–31)
MCHC RBC AUTO-ENTMCNC: 30.9 G/DL (ref 32–36)
MCV RBC AUTO: 91 FL (ref 82–98)
MICROALBUMIN UR DL<=1MG/L-MCNC: 405 UG/ML
MONOCYTES # BLD AUTO: 0.4 K/UL (ref 0.3–1)
MONOCYTES NFR BLD: 6 % (ref 4–15)
NEUTROPHILS # BLD AUTO: 4.4 K/UL (ref 1.8–7.7)
NEUTROPHILS NFR BLD: 64.1 % (ref 38–73)
NONHDLC SERPL-MCNC: 125 MG/DL
NRBC BLD-RTO: 0 /100 WBC
PLATELET # BLD AUTO: 200 K/UL (ref 150–450)
PMV BLD AUTO: 9.8 FL (ref 9.2–12.9)
POTASSIUM SERPL-SCNC: 3.3 MMOL/L (ref 3.5–5.1)
POTASSIUM SERPL-SCNC: 3.4 MMOL/L (ref 3.5–5.1)
PROT SERPL-MCNC: 7.1 G/DL (ref 6–8.4)
PROT SERPL-MCNC: 7.1 G/DL (ref 6–8.4)
RBC # BLD AUTO: 4.78 M/UL (ref 4–5.4)
SODIUM SERPL-SCNC: 144 MMOL/L (ref 136–145)
SODIUM SERPL-SCNC: 144 MMOL/L (ref 136–145)
T3FREE SERPL-MCNC: 3 PG/ML (ref 2.3–4.2)
T4 FREE SERPL-MCNC: 1.14 NG/DL (ref 0.71–1.51)
TRIGL SERPL-MCNC: 123 MG/DL (ref 30–150)
TSH SERPL DL<=0.005 MIU/L-ACNC: 3.03 UIU/ML (ref 0.4–4)
VIT B12 SERPL-MCNC: 276 PG/ML (ref 210–950)
WBC # BLD AUTO: 6.81 K/UL (ref 3.9–12.7)

## 2024-11-07 PROCEDURE — 80061 LIPID PANEL: CPT | Mod: HCNC | Performed by: FAMILY MEDICINE

## 2024-11-07 PROCEDURE — 86334 IMMUNOFIX E-PHORESIS SERUM: CPT | Mod: 26,HCNC,, | Performed by: PATHOLOGY

## 2024-11-07 PROCEDURE — 84165 PROTEIN E-PHORESIS SERUM: CPT | Mod: HCNC | Performed by: NURSE PRACTITIONER

## 2024-11-07 PROCEDURE — 82607 VITAMIN B-12: CPT | Mod: HCNC | Performed by: NURSE PRACTITIONER

## 2024-11-07 PROCEDURE — 84481 FREE ASSAY (FT-3): CPT | Mod: HCNC | Performed by: FAMILY MEDICINE

## 2024-11-07 PROCEDURE — 82570 ASSAY OF URINE CREATININE: CPT | Mod: HCNC | Performed by: FAMILY MEDICINE

## 2024-11-07 PROCEDURE — 1159F MED LIST DOCD IN RCRD: CPT | Mod: HCNC,CPTII,S$GLB, | Performed by: FAMILY MEDICINE

## 2024-11-07 PROCEDURE — 1160F RVW MEDS BY RX/DR IN RCRD: CPT | Mod: HCNC,CPTII,S$GLB, | Performed by: FAMILY MEDICINE

## 2024-11-07 PROCEDURE — 80053 COMPREHEN METABOLIC PANEL: CPT | Mod: 91,HCNC | Performed by: NURSE PRACTITIONER

## 2024-11-07 PROCEDURE — 83521 IG LIGHT CHAINS FREE EACH: CPT | Mod: 59,HCNC | Performed by: NURSE PRACTITIONER

## 2024-11-07 PROCEDURE — 3078F DIAST BP <80 MM HG: CPT | Mod: HCNC,CPTII,S$GLB, | Performed by: FAMILY MEDICINE

## 2024-11-07 PROCEDURE — 82306 VITAMIN D 25 HYDROXY: CPT | Mod: HCNC | Performed by: FAMILY MEDICINE

## 2024-11-07 PROCEDURE — 3288F FALL RISK ASSESSMENT DOCD: CPT | Mod: HCNC,CPTII,S$GLB, | Performed by: FAMILY MEDICINE

## 2024-11-07 PROCEDURE — 83036 HEMOGLOBIN GLYCOSYLATED A1C: CPT | Mod: HCNC | Performed by: FAMILY MEDICINE

## 2024-11-07 PROCEDURE — 1126F AMNT PAIN NOTED NONE PRSNT: CPT | Mod: HCNC,CPTII,S$GLB, | Performed by: FAMILY MEDICINE

## 2024-11-07 PROCEDURE — 84439 ASSAY OF FREE THYROXINE: CPT | Mod: HCNC | Performed by: FAMILY MEDICINE

## 2024-11-07 PROCEDURE — 86334 IMMUNOFIX E-PHORESIS SERUM: CPT | Mod: HCNC | Performed by: NURSE PRACTITIONER

## 2024-11-07 PROCEDURE — 1101F PT FALLS ASSESS-DOCD LE1/YR: CPT | Mod: HCNC,CPTII,S$GLB, | Performed by: FAMILY MEDICINE

## 2024-11-07 PROCEDURE — 4010F ACE/ARB THERAPY RXD/TAKEN: CPT | Mod: HCNC,CPTII,S$GLB, | Performed by: FAMILY MEDICINE

## 2024-11-07 PROCEDURE — 3075F SYST BP GE 130 - 139MM HG: CPT | Mod: HCNC,CPTII,S$GLB, | Performed by: FAMILY MEDICINE

## 2024-11-07 PROCEDURE — 99215 OFFICE O/P EST HI 40 MIN: CPT | Mod: HCNC,S$GLB,, | Performed by: FAMILY MEDICINE

## 2024-11-07 PROCEDURE — 84165 PROTEIN E-PHORESIS SERUM: CPT | Mod: 26,HCNC,, | Performed by: PATHOLOGY

## 2024-11-07 PROCEDURE — 85025 COMPLETE CBC W/AUTO DIFF WBC: CPT | Mod: HCNC,PO | Performed by: NURSE PRACTITIONER

## 2024-11-07 PROCEDURE — 84443 ASSAY THYROID STIM HORMONE: CPT | Mod: HCNC | Performed by: FAMILY MEDICINE

## 2024-11-07 PROCEDURE — G2211 COMPLEX E/M VISIT ADD ON: HCPCS | Mod: HCNC,S$GLB,, | Performed by: FAMILY MEDICINE

## 2024-11-07 PROCEDURE — G0296 VISIT TO DETERM LDCT ELIG: HCPCS | Mod: HCNC,S$GLB,, | Performed by: FAMILY MEDICINE

## 2024-11-07 PROCEDURE — 36415 COLL VENOUS BLD VENIPUNCTURE: CPT | Mod: HCNC,PO | Performed by: NURSE PRACTITIONER

## 2024-11-07 PROCEDURE — 99999 PR PBB SHADOW E&M-EST. PATIENT-LVL V: CPT | Mod: PBBFAC,HCNC,, | Performed by: FAMILY MEDICINE

## 2024-11-07 PROCEDURE — 80053 COMPREHEN METABOLIC PANEL: CPT | Mod: HCNC | Performed by: FAMILY MEDICINE

## 2024-11-07 RX ORDER — POTASSIUM CHLORIDE 750 MG/1
CAPSULE, EXTENDED RELEASE ORAL
Qty: 270 CAPSULE | Refills: 4 | Status: SHIPPED | OUTPATIENT
Start: 2024-11-07

## 2024-11-07 RX ORDER — TEMAZEPAM 30 MG/1
30 CAPSULE ORAL NIGHTLY
Qty: 30 CAPSULE | Refills: 5 | Status: SHIPPED | OUTPATIENT
Start: 2024-11-07

## 2024-11-07 RX ORDER — ROPINIROLE 5 MG/1
5 TABLET, FILM COATED ORAL NIGHTLY
Qty: 90 TABLET | Refills: 4 | Status: SHIPPED | OUTPATIENT
Start: 2024-11-07

## 2024-11-07 RX ORDER — ESOMEPRAZOLE MAGNESIUM 40 MG/1
40 CAPSULE, DELAYED RELEASE ORAL 2 TIMES DAILY
Qty: 180 CAPSULE | Refills: 4 | Status: SHIPPED | OUTPATIENT
Start: 2024-11-07

## 2024-11-07 RX ORDER — HYDROCHLOROTHIAZIDE 25 MG/1
25 TABLET ORAL EVERY MORNING
Qty: 90 TABLET | Refills: 4 | Status: SHIPPED | OUTPATIENT
Start: 2024-11-07

## 2024-11-07 RX ORDER — BENAZEPRIL HYDROCHLORIDE 40 MG/1
40 TABLET ORAL EVERY MORNING
Qty: 90 TABLET | Refills: 4 | Status: SHIPPED | OUTPATIENT
Start: 2024-11-07

## 2024-11-07 RX ORDER — ALENDRONATE SODIUM 70 MG/1
70 TABLET ORAL
Qty: 12 TABLET | Refills: 4 | Status: SHIPPED | OUTPATIENT
Start: 2024-11-07

## 2024-11-07 RX ORDER — PROMETHAZINE HYDROCHLORIDE 25 MG/1
25 TABLET ORAL DAILY PRN
Qty: 90 TABLET | Refills: 4 | Status: SHIPPED | OUTPATIENT
Start: 2024-11-07

## 2024-11-07 RX ORDER — ERGOCALCIFEROL 1.25 MG/1
50000 CAPSULE ORAL
Qty: 12 CAPSULE | Refills: 4 | Status: SHIPPED | OUTPATIENT
Start: 2024-11-07

## 2024-11-07 RX ORDER — ROPINIROLE 5 MG/1
5 TABLET, FILM COATED ORAL NIGHTLY
COMMUNITY
End: 2024-11-07 | Stop reason: SDUPTHER

## 2024-11-07 RX ORDER — LEVOTHYROXINE SODIUM 75 UG/1
TABLET ORAL
Qty: 90 TABLET | Refills: 4 | Status: SHIPPED | OUTPATIENT
Start: 2024-11-07

## 2024-11-07 RX ORDER — AMLODIPINE BESYLATE 10 MG/1
10 TABLET ORAL DAILY
Qty: 90 TABLET | Refills: 4 | Status: SHIPPED | OUTPATIENT
Start: 2024-11-07

## 2024-11-07 NOTE — PROGRESS NOTES
PLAN:      Assessment & Plan  1. Hypertension.  Her blood pressure is slightly elevated today but remains normal at home. She is advised to continue her current blood pressure medications and monitor her blood pressure at home. A digital arm cuff is recommended for accurate readings.  Counseled on importance of hypertension disease course, I recommend ongoing Education for DASH-diet and exercise.  Counseled on medication regimen importance of treating high blood pressure.  Please be advised of risk of untreated blood pressure as discussed.  Please advised of ER precautions were given for symptoms of hypertensive urgency and emergency.    2. Type 2 Diabetes Mellitus.  She has successfully reversed her type 2 diabetes since diagnosis. Continued monitoring of her condition is advised.  We will plan to monitor hemoglobin A1c at designated intervals 3 to 6 months.  I recommend ongoing Education for diabetic diet and exercise protocol.  We will continue to monitor for side effects.    Please be advised of symptoms to monitor for and to notify me immediately if persistent or worsening.  Follow up with Ophthalmology/Optometry and Podiatry at least annually.      3. History of Deep Vein Thrombosis (DVT) in both lower extremities.  Due to lupus anticoagulant syndrome, she needs to continue seeing Hematology and take her medication while monitoring her PT and INR levels.    4. Peripheral Vascular Disease.  She has a blockage in her legs and should continue seeing Vascular and Cardiology specialists.    5. Osteoporosis.  She is currently taking Fosamax once a week to strengthen her bones. A bone density scan will be rechecked once the machine is fixed.    6. Hypothyroidism.  She is on levothyroxine 75 mcg. Blood work will be checked to monitor her thyroid levels.  Please be advised of hypothyroidism disease course.  We will plan to monitor thyroid labs at routine intervals.  Please be advised of risks and benefits of medication  use, see medication insert for complete details.  ER precautions.    Common complaints from hypothyroidism include weight gain, fatigue, cold intolerance, constipation, dry and flaky shin, coarse or loss of hair, and trouble with memory.     Complaints with hyperthyroidism are weight loss or decrease in appetite, weakness and fatigue, visual changes, fast heart rate, decreased heat tolerance, thinning scalp hair, change in bowel habits, and palpations.    7. Gastroesophageal Reflux Disease (GERD).  She is advised to continue taking Nexium 40 mg for reflux.  GERD RECOMMENDATIONS  Please be advised of condition course.  - Take PPI in the morning 30-60 minutes before breakfast  - I recommend ongoing Education for lifestyle modifications to help control/reduce reflux/abdominal pain including: avoid large meals, avoid eating within 2-3 hours of bedtime (avoid late night eating & lying down soon after eating), elevate head of bed if nocturnal symptoms are present, smoking cessation (if current smoker), & weight loss (if overweight).   - please be advised to avoid known foods which trigger reflux symptoms & to minimize/avoid high-fat foods, chocolate, caffeine, citrus, alcohol, & tomato products.  - Advised to avoid/limit use of NSAID's, since they can cause GI upset, bleeding, and/or ulcers. If needed, take with food.     8. Restless Legs Syndrome.  She is taking both Requip and Mirapex for restless legs. These medications will be continued.    9. Nausea.  Phenergan has been refilled for her nausea.    10. Insomnia.  Temazepam has been refilled for sleep. She needs to be seen every 6 months for this medication.  Discussed insomnia condition course.  Advised of first-line medications for this condition.  Also discussed sleep hygiene.  Information was given below.  Good sleep habits (sometimes referred to as sleep hygiene) can help you get a good nights sleep.    Some habits that can improve your sleep health:  -Be  consistent. Go to bed at the same time each night and get up at the same time each morning, including on the weekends  -Make sure your bedroom is quiet, dark, relaxing, and at a comfortable temperature  -Remove electronic devices, such as TVs, computers, and smart phones, from the bedroom  -Avoid large meals, caffeine, and alcohol before bedtime  -Get some exercise. Being physically active during the day can help you fall asleep more easily at night.      11. Vitamin D Deficiency.  She is advised to take vitamin D once a week.    12. Ingrown Toenail.  She is advised to see Podiatry for her ingrown toenail.    13. Smoking.  She is strongly advised to quit smoking and consider a smoking cessation program, including options like Chantix or nicotine replacement therapies.    14. Medication Management.  Her medications, including thyroid medication (levothyroxine 75 mcg), reflux medication (Nexium 40 mg), Requip, Mirapex, Phenergan, and temazepam, have been refilled.    Follow-up  Return in 6 months for follow up.    Problem List Items Addressed This Visit       PVD (peripheral vascular disease) (Chronic)    Relevant Medications    potassium chloride (MICRO-K) 10 MEQ CpSR    Hypothyroidism (Chronic)    Relevant Medications    levothyroxine (SYNTHROID) 75 MCG tablet    Other Relevant Orders    TSH    T4, Free    T3, Free    Essential hypertension (Chronic)     Renal function stable on current blood pressure medication.  Patient is on complex medication regimen to control her blood pressure.  Checking labs.  Home health readings have been within normal limits and at goal at home.Discussed hypertension disease course, DASH-diet and exercise.  Discussed medication regimen importance of treating high blood pressure.  Patient advised of risk of untreated blood pressure.    ER precautions were given for symptoms of hypertensive urgency and emergency.           Relevant Medications    amLODIPine (NORVASC) 10 MG tablet     potassium chloride (MICRO-K) 10 MEQ CpSR    hydroCHLOROthiazide (HYDRODIURIL) 25 MG tablet    Insomnia (Chronic)     Discussed risk and benefits of medication use.         Relevant Medications    temazepam (RESTORIL) 30 mg capsule    Restless legs (Chronic)    Relevant Medications    rOPINIRole (REQUIP) 5 MG tablet    Vitamin D deficiency (Chronic)    Relevant Medications    ergocalciferol (ERGOCALCIFEROL) 50,000 unit Cap    Other Relevant Orders    Vitamin D    Osteoporosis (Chronic)    Relevant Medications    alendronate (FOSAMAX) 70 MG tablet    Encounter for long-term (current) use of medications (Chronic)     Complete history and physical was completed today.  Complete and thorough medication reconciliation was performed.  Discussed risks and benefits of medications.  Advised patient on orders and health maintenance.  We discussed old records and old labs if available.  Will request any records not available through epic.  Continue current medications listed on your summary sheet.           Relevant Medications    benazepriL (LOTENSIN) 40 MG tablet    Gastroesophageal reflux disease with esophagitis (Chronic)     Follow-up with GI.  Continue Nexium.  GERD RECOMMENDATIONS  Please be advised of condition course.  - Take PPI in the morning 30-60 minutes before breakfast  - I recommend ongoing Education for lifestyle modifications to help control/reduce reflux/abdominal pain including: avoid large meals, avoid eating within 2-3 hours of bedtime (avoid late night eating & lying down soon after eating), elevate head of bed if nocturnal symptoms are present, smoking cessation (if current smoker), & weight loss (if overweight).   - please be advised to avoid known foods which trigger reflux symptoms & to minimize/avoid high-fat foods, chocolate, caffeine, citrus, alcohol, & tomato products.  - Advised to avoid/limit use of NSAID's, since they can cause GI upset, bleeding, and/or ulcers. If needed, take with food.           Relevant Medications    esomeprazole (NEXIUM) 40 MG capsule    Cigarette smoker (Chronic)     Patient started back smoking.  Patient would like to stop again.  Enroll in smoking cessation.  Counseled.         Relevant Orders    Ambulatory referral/consult to Smoking Cessation Program    Type 2 diabetes mellitus with diabetic peripheral angiopathy without gangrene, without long-term current use of insulin (Chronic)     We will plan to monitor hemoglobin A1c at designated intervals 3 to 6 months.  I recommend ongoing Education for diabetic diet and exercise protocol.  We will continue to monitor for side effects.    Please be advised of symptoms to monitor for and to notify me immediately if persistent or worsening.  Follow up with Ophthalmology/Optometry and Podiatry at least annually.           Relevant Orders    Hemoglobin A1C    Lupus anticoagulant syndrome (Chronic)     Follow-up with Hematology.         Relevant Orders    Ambulatory referral/consult to Hematology / Oncology    Hypertensive heart and chronic kidney disease with heart failure and stage 1 through stage 4 chronic kidney disease, or unspecified chronic kidney disease - Primary (Chronic)    Deep vein thrombosis (DVT) of both lower extremities, unspecified chronicity, unspecified vein    Relevant Orders    Ambulatory referral/consult to Hematology / Oncology    Nausea    Relevant Medications    promethazine (PHENERGAN) 25 MG tablet    Ingrown toenail    Relevant Orders    Ambulatory referral/consult to Podiatry     Future Appointments       Date Provider Specialty Appt Notes    11/22/2024 Celia Whaley NP Cardiology 3 month f/u per [place]    11/27/2024  Cardiology [facility]    12/3/2024 Lilly Ross DPM Podiatry Type 2 diabetes mellitus with diabetic peripheral angiopathy without gangrene, without long-term current use of insulin [E11.51]    12/11/2024 Tammy Contreras NP Hematology and Oncology 6mth f/u DVT//tms    2/20/2025 Huber Kapoor  MD [facility] Cardiology 6 month f/u           Medication Management for assessment above:   Medication List with Changes/Refills   Current Medications    ALBUTEROL-IPRATROPIUM (DUO-NEB) 2.5 MG-0.5 MG/3 ML NEBULIZER SOLUTION    INHALE 1 CONTENTS OF VIAL VIA NEBULIZER EVERY 6 HOURS WHILE AWAKE.    BISOPROLOL (ZEBETA) 10 MG TABLET    TAKE 1 TABLET EVERY MORNING    BRILINTA 90 MG TABLET    TAKE 1 TABLET TWICE A DAY    CYCLOBENZAPRINE (FLEXERIL) 10 MG TABLET    TAKE ONE TABLET BY MOUTH TWICE DAILY AS NEEDED    EVOLOCUMAB (REPATHA SURECLICK) 140 MG/ML PNIJ    Inject 1 mL (140 mg total) into the skin every 14 (fourteen) days.    FEROSUL 325 MG (65 MG IRON) TAB TABLET    TAKE 1 TABLET EVERY MORNING    FLUTICASONE/UMECLIDIN/VILANTER (TRELEGY ELLIPTA INHL)    Inhale 1 Inhaler into the lungs once daily.    ISOSORBIDE MONONITRATE (IMDUR) 30 MG 24 HR TABLET    TAKE 1 TABLET TWICE A DAY    JANTOVEN 2.5 MG TABLET    TAKE 2 Tablets QPM or as instructed by coumadin clinic  * *WARD WARFARIN FAILURE* *BRAND MEDICALLY NECESSARY* *    MICONAZOLE NITRATE 2 % (ZEASORB, MICONAZOLE,) 2 % TOP POWDER    Apply topically as needed for Itching.    NITROGLYCERIN (NITROSTAT) 0.4 MG SL TABLET    Place 1 tablet (0.4 mg total) under the tongue every 5 (five) minutes as needed for Chest pain.    NYSTATIN (MYCOSTATIN) POWDER    Apply topically as needed.    OXYCODONE-ACETAMINOPHEN (PERCOCET)  MG PER TABLET    Take 1 tablet by mouth every 8 (eight) hours as needed for Pain.    PRAMIPEXOLE (MIRAPEX) 0.5 MG TABLET    TAKE 1 TABLET EVERY MORNING    RANOLAZINE (RANEXA) 1,000 MG TB12    TAKE 1 TABLET TWICE A DAY    TORSEMIDE (DEMADEX) 20 MG TAB    TAKE 1 TABLET EVERY MORNING    TRIAMCINOLONE ACETONIDE 0.1% (KENALOG) 0.1 % CREAM    Apply topically 2 (two) times daily. for 7 days    UMECLIDINIUM-VILANTEROL (ANORO ELLIPTA) 62.5-25 MCG/ACTUATION DSDV    Inhale 1 puff into the lungs once daily. Controller    VENTOLIN HFA 90 MCG/ACTUATION INHALER       Changed  and/or Refilled Medications    Modified Medication Previous Medication    ALENDRONATE (FOSAMAX) 70 MG TABLET alendronate (FOSAMAX) 70 MG tablet       Take 1 tablet (70 mg total) by mouth every 7 days.    TAKE 1 TABLET EVERY 7 DAYS ON TUESDAY    AMLODIPINE (NORVASC) 10 MG TABLET amLODIPine (NORVASC) 10 MG tablet       Take 1 tablet (10 mg total) by mouth once daily.    Take 1 tablet (10 mg total) by mouth once daily.    BENAZEPRIL (LOTENSIN) 40 MG TABLET benazepriL (LOTENSIN) 40 MG tablet       Take 1 tablet (40 mg total) by mouth every morning.    TAKE 1 TABLET EVERY MORNING    ERGOCALCIFEROL (ERGOCALCIFEROL) 50,000 UNIT CAP ergocalciferol (ERGOCALCIFEROL) 50,000 unit Cap       Take 1 capsule (50,000 Units total) by mouth every 7 days.    Vitamin D2 1,250 mcg (50,000 unit) capsule   Take 1 capsule every week by oral route.    ESOMEPRAZOLE (NEXIUM) 40 MG CAPSULE esomeprazole (NEXIUM) 40 MG capsule       Take 1 capsule (40 mg total) by mouth 2 (two) times daily.    TAKE ONE CAPSULE TWICE A DAY    HYDROCHLOROTHIAZIDE (HYDRODIURIL) 25 MG TABLET hydroCHLOROthiazide (HYDRODIURIL) 25 MG tablet       Take 1 tablet (25 mg total) by mouth every morning.    TAKE 1 TABLET EVERY MORNING    LEVOTHYROXINE (SYNTHROID) 75 MCG TABLET levothyroxine (SYNTHROID) 75 MCG tablet       TAKE 1 TABLET EVERY MORNING BEFORE BREAKFAST    TAKE 1 TABLET EVERY MORNING BEFORE BREAKFAST    POTASSIUM CHLORIDE (MICRO-K) 10 MEQ CPSR potassium chloride (MICRO-K) 10 MEQ CpSR       TAKE 2 CAPSULES EVERY MORNING AND TAKE ONE CAPSULE EVERY EVENING    TAKE 2 CAPSULES EVERY MORNING AND TAKE ONE CAPSULE EVERY EVENING    PROMETHAZINE (PHENERGAN) 25 MG TABLET promethazine (PHENERGAN) 25 MG tablet       Take 1 tablet (25 mg total) by mouth daily as needed for Nausea.    Take 1 tablet (25 mg total) by mouth daily as needed for Nausea.    ROPINIROLE (REQUIP) 5 MG TABLET rOPINIRole (REQUIP) 5 MG tablet       Take 1 tablet (5 mg total) by mouth nightly.    Take 5 mg  by mouth nightly.    TEMAZEPAM (RESTORIL) 30 MG CAPSULE temazepam (RESTORIL) 30 mg capsule       Take 1 capsule (30 mg total) by mouth every evening.    Take 1 capsule (30 mg total) by mouth every evening.   Discontinued Medications    AMITRIPTYLINE (ELAVIL) 100 MG TABLET    TAKE 1 TABLET EVERY EVENING    BUTRANS WEEKLY PATCH    1 patch every 7 days.    FERROUS SULFATE 325 (65 FE) MG EC TABLET    Take 1 tablet (325 mg total) by mouth once daily.    GABAPENTIN (NEURONTIN) 600 MG TABLET    TAKE 1 TABLET 3 TIMES A DAY       Adan Woodward M.D.  ==========================================================================  Subjective:   Patient ID: Kalpana Wright is a 74 y.o. female.  has a past medical history of ACS (acute coronary syndrome) (3/20/2018), Acute on chronic diastolic congestive heart failure (4/27/2015), Acute on chronic respiratory failure with hypercapnia (1/10/2019), Acute renal failure (1/11/2019), Acute systolic heart failure (3/21/2018), JG (acute kidney injury) (1/29/2019), Anticoagulant long-term use, Arthritis, Asthma, Bradycardia (12/16/2013), CHF (congestive heart failure), Clostridium difficile colitis (9/25/2018), Convulsions (2/11/2021), COPD (chronic obstructive pulmonary disease), Coronary artery disease, Depression, Encounter for blood transfusion, Fall (7/8/2019), Gallstones (3/18/2017), History of Pulmonary embolism (7/17/2014), Hospital discharge follow-up (9/21/2020), Hyperlipidemia, Hypertension, Need for vaccination (8/8/2019), Non-intractable cyclical vomiting with nausea (2/1/2019), Peripheral vascular disease, Pulmonary embolus (9/9/2013), and Thyroid disease.   Chief Complaint: Follow-up      History of Present Illness  The patient is a 74-year-old female who presents for follow-up of chronic medical conditions and medication refills.    She has been managing well without the need for supplemental oxygen for over a year. Her home health care was discontinued two weeks ago as it  was deemed unnecessary unless she experienced a fall or required hospitalization. She takes torsemide for heart failure and oxycodone for pain. She noticed a weight gain of about 3 pounds this morning, which she attributes to not taking her diuretic medication due to travel.    She is currently on Requip and Mirapex for restless legs syndrome, which she reports are effective. She takes Flexeril as needed for shoulder pain, usually one pill at night. She takes vitamin D once a week and Fosamax for her bones. She has an ingrown toenail that was previously removed but has since grown back and is now detaching again.    She is also taking Brilinta, prescribed by Dr. Kapoor, and has been advised to avoid salads and greens for the past 6 to 7 months. She underwent surgery for a blood clot in her leg at [facility] in February 2024. She had a blood draw yesterday for her Coumadin levels. She is under the care of [facility], Cardiology, and Hematology. She had a procedure performed by Dr. Sandra Hsu from [facility], who removed numerous blood clots from her legs.    She has not completed her Cologuard stool test. Her mammogram and bone density test were postponed due to a recall of the machine used for patients with pacemakers. She sees Dr. Prashanth Butt for her eyes and had an appointment two weeks ago. She is scheduled to return for new bifocal glasses.    She had an incident of fentanyl overdose after consuming a fountain drink, which resulted in a four-day memory lapse. She has a prescription for Narcan. She is requesting a refill of Phenergan, having taken 17 pills recently, which she believes is causing her current nausea.    She has not tried Chantix for smoking cessation and does not believe nicotine replacement therapies would be effective for her. She sees Dr. Coats for her lungs.    She took her blood pressure medication approximately 30 minutes prior to the visit, typically not  taking it before meals.    SOCIAL HISTORY  She is still smoking.    Problem List Items Addressed This Visit       PVD (peripheral vascular disease) (Chronic)    Overview     Chronic.  Uncontrolled.  Patient needs compression stockings.  Pain with walking.         Hypothyroidism (Chronic)    Overview     Patient with history of thyroid disorder.  Associated with fatigue.  Chronic.  Stable.  Reviewed recent thyroid labs.  Patient reports control with levothyroxine.  Lab Results   Component Value Date    TSH 1.988 06/05/2024    [id number] 8.5 08/08/2019    FREET4 0.92 04/23/2024              Essential hypertension (Chronic)    Overview     November 2024:  Blood pressure slightly elevated initially.  Recently took blood pressures before visit.  Nov 2021: Admitted to [facility] for syncope/hypotension and all BP meds dx'd; currently hypertensive   Restart benazepril; cont to hold on amlodipine and hydralazine until cardiology appt (12/9)    Initial HPI:  Chronic.  Uncontrolled today.  Patient on several blood pressure medication.  Reports compliance.  Reports better blood pressure control at home with home monitor.  Patient on amlodipine benazepril bisoprolol furosemide hydralazine hydrochlorothiazide Imdur.  No side effects reported.  Denies any chest pain shortness of breath prior vision headaches dizziness lightheadedness.    Lab Results   Component Value Date    CREATININE 1.00 07/17/2024 November 2019:  Patient continues to have uncontrolled blood pressure.  Diastolic is in the 70s.  Patient reports blood pressure is much higher at home with stress level.  Patient is following up with Cardiology.   JULY 2020:  CHRONIC. STABLE. BP Reviewed.  Compliant with BP medications. No SE reported.   (-) CP, SOB, palpitations, dizziness, lightheadedness, HA, arm numbness, tingling or weakness, syncope.  Creatinine   Date Value Ref Range Status   07/17/2024 1.00 0.60 - 1.10 mg/dL Final   06/05/2024 1.9 (H) 0.5 - 1.4 mg/dL Final             Current Assessment & Plan     Renal function stable on current blood pressure medication.  Patient is on complex medication regimen to control her blood pressure.  Checking labs.  Home health readings have been within normal limits and at goal at home.Discussed hypertension disease course, DASH-diet and exercise.  Discussed medication regimen importance of treating high blood pressure.  Patient advised of risk of untreated blood pressure.    ER precautions were given for symptoms of hypertensive urgency and emergency.           Insomnia (Chronic)    Overview     November 2024:  Stable on temazepam.  March 2024: Patient needing refill on temazepam.  Reports compliance.  No side effects reported.    Chronic.  May 2023:  Patient requesting refill on temazepam.  Reports compliance.  No side effects reported.  Symptoms are controlled.  Intermittent control.  Doing well on temazepam and amitriptyline for comorbid anxiety.   reviewed.  No abuse suspected.JULY 2020:  Chronic.  Stable.  Well controlled on current medication regimen.The patient was checked in the [facility] [employer] [facility] [employer]'s  Prescription Monitoring Program. There appears to be no incongruities with the patient's verbalized history.   May 2021:  Stable on current medication regimen.   reviewed.  No abuse suspected.         Current Assessment & Plan     Discussed risk and benefits of medication use.         Restless legs (Chronic)    Vitamin D deficiency (Chronic)    Overview     Nov '21- due for vit d refill, sent   Vit D, 25-Hydroxy   Date Value Ref Range Status   11/23/2021 17 (L) 30 - 96 ng/mL Final     Comment:     Vitamin D deficiency.........<10 ng/mL                              Vitamin D insufficiency......10-29 ng/mL       Vitamin D sufficiency........> or equal to 30 ng/mL  Vitamin D toxicity............>100 ng/mL       08/12/2019Vit d deficiency. Takes vitamin d supplement. No SE reported. Fatigue is slightly improved.    09/05/2019Patient reports vitamin-D supplement was denied by insurance.  Patient not taking any vitamin-D currently.  Requesting alternative prescription.           Osteoporosis (Chronic)    Overview     Patient on Fosamax.  Reports compliance.  No side effects reported.         Encounter for long-term (current) use of medications (Chronic)    Overview     November 2024:  Reviewed labs.  March 2024: Reviewed labs.  May 2022: Reviewed labs.  Initial HPI: CHRONIC long-term drug therapy for managed conditions. See medication list. Reports compliance.  No side effects reported.  Routine lab work is being monitored.  Patient does need refills today. APRIL 2020:  CHRONIC. Stable. Compliant with medications for managed conditions. See medication list. No SE reported. Routine lab analysis is being monitored. Refills were addressed.JULY 2020:  CHRONIC. Stable. Compliant with medications for managed conditions. See medication list. No SE reported. Routine lab analysis is being monitored. Refills were addressed.  May 2021:  Reviewed labs.  August 2021:  Reviewed labs  Lab Results   Component Value Date    WBC 6.81 11/07/2024    HGB 13.4 11/07/2024    HCT 43.3 11/07/2024    MCV 91 11/07/2024     11/07/2024         Chemistry        Component Value Date/Time     07/17/2024 0401     06/05/2024 1628    K 3.9 07/17/2024 0401    K 4.3 06/05/2024 1628     06/05/2024 1628    CO2 26 07/17/2024 0401    CO2 27 06/05/2024 1628    BUN 15 07/17/2024 0401    BUN 39 (H) 06/05/2024 1628    CREATININE 1.00 07/17/2024 0401    CREATININE 1.9 (H) 06/05/2024 1628    GLU 89 06/05/2024 1628        Component Value Date/Time    CALCIUM 8.9 07/17/2024 0401    CALCIUM 10.5 06/05/2024 1628    ALKPHOS 69 07/17/2024 0401    ALKPHOS 76 06/05/2024 1628    AST 13 07/17/2024 0401    AST 15 06/05/2024 1628    ALT 8 07/17/2024 0401    ALT 8 (L) 06/05/2024 1628    BILITOT 0.3 (L) 07/17/2024 0401    BILITOT 0.4 06/05/2024 7843     ESTGFRAFRICA >60.0 08/19/2021 1458    ESTGFRAFRICA >60.0 08/19/2021 1458    EGFRNONAA >60.0 08/19/2021 1458    EGFRNONAA >60.0 08/19/2021 1458          Lab Results   Component Value Date    TSH 1.988 06/05/2024    R1SGHWK 8.5 08/08/2019    FREET4 0.92 04/23/2024    T3FREE 2.6 08/08/2019       ======================================================         Current Assessment & Plan     Complete history and physical was completed today.  Complete and thorough medication reconciliation was performed.  Discussed risks and benefits of medications.  Advised patient on orders and health maintenance.  We discussed old records and old labs if available.  Will request any records not available through epic.  Continue current medications listed on your summary sheet.           Gastroesophageal reflux disease with esophagitis (Chronic)    Overview     Chronic.  May 2023:  Patient reports that she is having darker stools and abdominal pain.    Previous history:  Uncontrolled.  Patient needs a refill on her Nexium.  She reports that is uncontrolled when she does not take this medication.   August 2021:  She follows with Gastroenterology Dr. Bo at [facility]         Current Assessment & Plan     Follow-up with GI.  Continue Nexium.  GERD RECOMMENDATIONS  Please be advised of condition course.  - Take PPI in the morning 30-60 minutes before breakfast  - I recommend ongoing Education for lifestyle modifications to help control/reduce reflux/abdominal pain including: avoid large meals, avoid eating within 2-3 hours of bedtime (avoid late night eating & lying down soon after eating), elevate head of bed if nocturnal symptoms are present, smoking cessation (if current smoker), & weight loss (if overweight).   - please be advised to avoid known foods which trigger reflux symptoms & to minimize/avoid high-fat foods, chocolate, caffeine, citrus, alcohol, & tomato products.  - Advised to avoid/limit use of NSAID's, since  they can cause GI upset, bleeding, and/or ulcers. If needed, take with food.          Cigarette smoker (Chronic)    Overview     November 2024:  Chronic.  Uncontrolled.  Patient has had a hard time stopping in the past.  Assistance with smoking cessation was offered, including:  [x]  Medications  [x]  Counseling  [x]  Printed Information on Smoking Cessation  [x]  Referral to a Smoking Cessation Program    Patient was counseled regarding smoking for 3-10 minutes.             Current Assessment & Plan     Patient started back smoking.  Patient would like to stop again.  Enroll in smoking cessation.  Counseled.         Type 2 diabetes mellitus with diabetic peripheral angiopathy without gangrene, without long-term current use of insulin (Chronic)    Overview     November 2024:  On no medication doing well with weight loss.  BMI Readings from Last 10 Encounters:   11/07/24 27.22 kg/m²   10/02/24 26.15 kg/m²   08/20/24 26.14 kg/m²   08/13/24 25.92 kg/m²   07/23/24 25.40 kg/m²   06/06/24 25.56 kg/m²   06/05/24 25.56 kg/m²   04/26/24 28.03 kg/m²   04/23/24 27.98 kg/m²   03/11/24 28.82 kg/m²   March 2024:  On no medication.  May 2023:Patient denies any history of thyroid cancer, pancreatitis, MEN syndrome.  Diabetes Management Status    Statin: Not taking  ACE/ARB: Taking    Screening or Prevention Patient's value Goal Complete/Controlled?   HgA1C Testing and Control   Lab Results   Component Value Date    HGBA1C 4.8 12/14/2023      Annually/Less than 8% Yes   Lipid profile : 12/14/2023 Annually No   LDL control Lab Results   Component Value Date    LDLCALC 71 12/14/2023    Annually/Less than 100 mg/dl  Yes   Nephropathy screening Lab Results   Component Value Date    LABMICR 54.0 05/09/2023     Lab Results   Component Value Date    PROTEINUA Trace (A) 04/23/2024     Lab Results   Component Value Date    UTPCR 0.12 10/21/2020      Annually Yes   Blood pressure BP Readings from Last 1 Encounters:   11/07/24 136/68    Less  than 140/90 Yes   Dilated retinal exam : 06/28/2023 Annually No   Foot exam   : 03/11/2024 Annually Yes              Current Assessment & Plan     We will plan to monitor hemoglobin A1c at designated intervals 3 to 6 months.  I recommend ongoing Education for diabetic diet and exercise protocol.  We will continue to monitor for side effects.    Please be advised of symptoms to monitor for and to notify me immediately if persistent or worsening.  Follow up with Ophthalmology/Optometry and Podiatry at least annually.           Lupus anticoagulant syndrome (Chronic)    Overview     LABORATORY:  Lupus anticoagulant weakly positive with weak positivity to   hexagonal phospholipid confirmation test.         Current Assessment & Plan     Follow-up with Hematology.         Hypertensive heart and chronic kidney disease with heart failure and stage 1 through stage 4 chronic kidney disease, or unspecified chronic kidney disease - Primary (Chronic)    Overview     November 2024:CHRONIC.  Intermittent control uses home health.. BP Reviewed.  Compliant with BP medications. No SE reported.   (-) CP, SOB, palpitations, dizziness, lightheadedness, HA, arm numbness, tingling or weakness, syncope.  Creatinine   Date Value Ref Range Status   07/17/2024 1.00 0.60 - 1.10 mg/dL Final   06/05/2024 1.9 (H) 0.5 - 1.4 mg/dL Final     Lab Results   Component Value Date    CREATININE 1.00 07/17/2024    BUN 15 07/17/2024     07/17/2024    K 3.9 07/17/2024     06/05/2024    CO2 26 07/17/2024              Deep vein thrombosis (DVT) of both lower extremities, unspecified chronicity, unspecified vein    Nausea    Ingrown toenail        Review of patient's allergies indicates:   Allergen Reactions    Atorvastatin Other (See Comments)     Severe muscle pain  Other reaction(s): Abdominal Pain  SEVERE MYALGIAS      Azithromycin Other (See Comments)     By shot, closed veins and made large bruises  By shot, closed veins and made large  "bruises      Latex, natural rubber Rash     "TOURNIQUET ON LEG LEFT HUGE BLISTER THAT TOOK 9 MONTHS OF WOUND CARE TO HEAL."    Meperidine Anaphylaxis and Hives     Other reaction(s): Anaphylaxis    Hives (skin)^    Penicillins Anaphylaxis and Hives     Other reaction(s): Anaphylaxis  Shortness of breath^swelling  Shortness of breath and swelling  Anaphylaxis  Shortness of breath^swelling      Tofacitinib Rash and Swelling     Rash and swelling of face      Hydralazine analogues Other (See Comments)     Difficulty swallowing and vomiting.    Lorazepam Anxiety and Other (See Comments)     Made me goofy  CAUSED CONFUSION "Made me goofy."      Pravastatin Other (See Comments)     Severe myalgias.  Cramps/Severe myalgias.    Bupivacaine     Bupivacaine hcl     Flu vac 2018 [id number](pf)      Other reaction(s): Other (See Comments)  Flip into aFIB    Lidocaine     Neuromuscular blockers, benzylisoquinolinium     Penicillin     Nystatin Rash    Pepper (genus capsicum) Other (See Comments)     Reflux and ulcers     Current Outpatient Medications   Medication Instructions    albuterol-ipratropium (DUO-NEB) 2.5 mg-0.5 mg/3 mL nebulizer solution INHALE 1 CONTENTS OF VIAL VIA NEBULIZER EVERY 6 HOURS WHILE AWAKE.    alendronate (FOSAMAX) 70 mg, Oral, Every 7 days    amLODIPine (NORVASC) 10 mg, Oral, Daily    benazepriL (LOTENSIN) 40 mg, Oral, Every morning    bisoprolol (ZEBETA) 10 mg, Oral, Every morning    BRILINTA 90 mg, Oral, 2 times daily    cyclobenzaprine (FLEXERIL) 10 MG tablet TAKE ONE TABLET BY MOUTH TWICE DAILY AS NEEDED    ergocalciferol (ERGOCALCIFEROL) 50,000 Units, Oral, Every 7 days    esomeprazole (NEXIUM) 40 mg, Oral, 2 times daily    FEROSUL 325 mg (65 mg iron) Tab tablet Oral, Every morning    fluticasone/umeclidin/vilanter (TRELEGY ELLIPTA INHL) 1 Inhaler, Daily    hydroCHLOROthiazide (HYDRODIURIL) 25 mg, Oral, Every morning    isosorbide mononitrate (IMDUR) 30 mg, Oral, 2 times daily    JANTOVEN 2.5 mg " tablet TAKE 2 Tablets QPM or as instructed by coumadin clinic  * *WARD WARFARIN FAILURE* *BRAND MEDICALLY NECESSARY* *    levothyroxine (SYNTHROID) 75 MCG tablet TAKE 1 TABLET EVERY MORNING BEFORE BREAKFAST    miconazole NITRATE 2 % (ZEASORB, MICONAZOLE,) 2 % top powder Topical (Top), As needed (PRN)    nitroGLYCERIN (NITROSTAT) 0.4 mg, Sublingual, Every 5 min PRN    nystatin (MYCOSTATIN) powder As needed (PRN)    oxyCODONE-acetaminophen (PERCOCET)  mg per tablet 1 tablet, Every 8 hours PRN    potassium chloride (MICRO-K) 10 MEQ CpSR TAKE 2 CAPSULES EVERY MORNING AND TAKE ONE CAPSULE EVERY EVENING    pramipexole (MIRAPEX) 0.5 mg, Oral, Every morning    promethazine (PHENERGAN) 25 mg, Oral, Daily PRN    ranolazine (RANEXA) 1,000 mg, Oral, 2 times daily    REPATHA SURECLICK 140 mg, Subcutaneous, Every 14 days    rOPINIRole (REQUIP) 5 mg, Oral, Nightly    temazepam (RESTORIL) 30 mg, Oral, Nightly    torsemide (DEMADEX) 20 mg, Oral, Every morning    triamcinolone acetonide 0.1% (KENALOG) 0.1 % cream Topical (Top), 2 times daily    umeclidinium-vilanteroL (ANORO ELLIPTA) 62.5-25 mcg/actuation DsDv 1 puff, Inhalation, Daily, Controller     VENTOLIN HFA 90 mcg/actuation inhaler       I have reviewed the PMH, social history, FamilyHx, surgical history, allergies and medications documented / confirmed by the patient at the time of this visit.  Review of Systems   Constitutional:  Positive for fatigue. Negative for chills, fever and unexpected weight change.   HENT:  Negative for ear pain and sore throat.    Eyes:  Negative for redness and visual disturbance.   Respiratory:  Negative for cough and shortness of breath.    Cardiovascular:  Negative for chest pain and palpitations.   Gastrointestinal:  Negative for nausea and vomiting.   Genitourinary:  Negative for difficulty urinating and hematuria.   Musculoskeletal:  Positive for arthralgias. Negative for myalgias.   Skin:  Negative for rash and wound.   Neurological:   "Positive for weakness (chronic). Negative for headaches.   Hematological:  Bruises/bleeds easily.   Psychiatric/Behavioral:  Positive for sleep disturbance (Chronic stable on current medication). The patient is not nervous/anxious.      Objective:   /68   Pulse 65   Ht 5' 4" (1.626 m)   Wt 71.9 kg (158 lb 9.6 oz)   LMP  (LMP Unknown)   SpO2 95%   BMI 27.22 kg/m²   Physical Exam  Vitals and nursing note reviewed.   Constitutional:       General: She is not in acute distress.     Appearance: She is well-developed.      Comments: Sitting in wheelchair no acute distress   HENT:      Head: Normocephalic and atraumatic.   Eyes:      Pupils: Pupils are equal, round, and reactive to light.   Cardiovascular:      Rate and Rhythm: Normal rate and regular rhythm.      Heart sounds: Normal heart sounds. No murmur heard.     Comments: Pacemaker present  Pulmonary:      Effort: Pulmonary effort is normal. No respiratory distress.      Breath sounds: Normal breath sounds. No wheezing.   Abdominal:      General: Bowel sounds are normal. There is no distension.      Palpations: Abdomen is soft. There is no mass.      Tenderness: There is no abdominal tenderness. There is no right CVA tenderness, left CVA tenderness or guarding.      Hernia: No hernia is present.   Musculoskeletal:         General: Tenderness present. Normal range of motion.      Cervical back: Normal range of motion and neck supple.   Skin:     General: Skin is warm and dry.      Capillary Refill: Capillary refill takes less than 2 seconds.      Findings: Bruising present.   Neurological:      Mental Status: She is alert and oriented to person, place, and time.      Cranial Nerves: No cranial nerve deficit.   Psychiatric:         Behavior: Behavior normal.       Physical Exam      Results      Assessment:     1. Hypertensive heart and chronic kidney disease with heart failure and stage 1 through stage 4 chronic kidney disease, or unspecified chronic " kidney disease    2. Nausea    3. Encounter for long-term (current) use of medications    4. Restless legs    5. Type 2 diabetes mellitus with diabetic peripheral angiopathy without gangrene, without long-term current use of insulin    6. Cigarette smoker    7. Deep vein thrombosis (DVT) of both lower extremities, unspecified chronicity, unspecified vein    8. Lupus anticoagulant syndrome    9. Essential hypertension    10. PVD (peripheral vascular disease)    11. Gastroesophageal reflux disease with esophagitis, unspecified whether hemorrhage    12. Hypothyroidism, unspecified type    13. Vitamin D deficiency    14. Primary insomnia    15. Osteoporosis, unspecified osteoporosis type, unspecified pathological fracture presence    16. Ingrown toenail      MDM:   High medical complexity.  Moderate to high risk.  Total time: 41 minutes.  This includes total time spent on the encounter, which includes face to face time and non-face to face time preparing to see the patient (eg, review of previous medical records, tests), Obtaining and/or reviewing separately obtained history, documenting clinical information in the electronic or other health record, independently interpreting results (not separately reported)/communicating results to the patient/family/caregiver, and/or care coordination (not separately reported).    I have Reviewed and summarized old records.  I have performed thorough medication reconciliation today and discussed risk and benefits of medications.  I have reviewed labs and discussed with patient.  All questions were answered.  I am requesting old records and will review them once they are available.KEL Bo  Uro Dr. Sunshine Avina  Cardiology: Dr. Kapoor/ [facility]   Pain MGMT Dr. Clif Knox   Hematology Dr. Garcia  Pulmonary Dr. Antelmo Sabillon  [facility]  Catracho Ghosh MD- surgery on legs  Sandra Schilling MD   Heme  Tammy Contreras NP  Eye Exam MD Roshan Huggins  Raul   Visit today included increased complexity associated with the care of the episodic problem see above assessment addressed and managing the longitudinal care of the patient due to the serious and/or complex managed problem(s) see above.  The patient was checked in the [facility]'s  Prescription Monitoring Program. There appears to be no incongruities with the patient's verbalized history.     I have signed for the following orders AND/OR meds.  Orders Placed This Encounter   Procedures    Hemoglobin A1C     Standing Status:   Future     Number of Occurrences:   1     Standing Expiration Date:   2/5/2026    TSH     Standing Status:   Standing     Number of Occurrences:   99     Standing Expiration Date:   11/7/2043    T4, Free     Standing Status:   Standing     Number of Occurrences:   99     Standing Expiration Date:   11/7/2043    T3, Free     Standing Status:   Standing     Number of Occurrences:   99     Standing Expiration Date:   11/7/2043    Vitamin D     Standing Status:   Standing     Number of Occurrences:   99     Standing Expiration Date:   1/6/2046    Ambulatory referral/consult to Hematology / Oncology     Standing Status:   Future     Standing Expiration Date:   12/7/2025     Referral Priority:   Routine     Referral Type:   Consultation     Referral Reason:   Specialty Services Required     Referred to Provider:   Tammy Contreras NP     Requested Specialty:   Hematology and Oncology     Number of Visits Requested:   1    Ambulatory referral/consult to Podiatry     Standing Status:   Future     Standing Expiration Date:   12/7/2025     Referral Priority:   Urgent     Referral Type:   Consultation     Referral Reason:   Specialty Services Required     Requested Specialty:   Podiatry     Number of Visits Requested:   1    Ambulatory referral/consult to Smoking Cessation Program     Standing Status:    Future     Standing Expiration Date:   12/7/2025     Referral Priority:   Routine     Referral Type:   Consultation     Referral Reason:   Specialty Services Required     Requested Specialty:   CTTS     Number of Visits Requested:   1     Medications Ordered This Encounter   Medications    alendronate (FOSAMAX) 70 MG tablet     Sig: Take 1 tablet (70 mg total) by mouth every 7 days.     Dispense:  12 tablet     Refill:  4    amLODIPine (NORVASC) 10 MG tablet     Sig: Take 1 tablet (10 mg total) by mouth once daily.     Dispense:  90 tablet     Refill:  4     .    benazepriL (LOTENSIN) 40 MG tablet     Sig: Take 1 tablet (40 mg total) by mouth every morning.     Dispense:  90 tablet     Refill:  4     .    ergocalciferol (ERGOCALCIFEROL) 50,000 unit Cap     Sig: Take 1 capsule (50,000 Units total) by mouth every 7 days.     Dispense:  12 capsule     Refill:  4    esomeprazole (NEXIUM) 40 MG capsule     Sig: Take 1 capsule (40 mg total) by mouth 2 (two) times daily.     Dispense:  180 capsule     Refill:  4    hydroCHLOROthiazide (HYDRODIURIL) 25 MG tablet     Sig: Take 1 tablet (25 mg total) by mouth every morning.     Dispense:  90 tablet     Refill:  4     .    levothyroxine (SYNTHROID) 75 MCG tablet     Sig: TAKE 1 TABLET EVERY MORNING BEFORE BREAKFAST     Dispense:  90 tablet     Refill:  4    potassium chloride (MICRO-K) 10 MEQ CpSR     Sig: TAKE 2 CAPSULES EVERY MORNING AND TAKE ONE CAPSULE EVERY EVENING     Dispense:  270 capsule     Refill:  4    promethazine (PHENERGAN) 25 MG tablet     Sig: Take 1 tablet (25 mg total) by mouth daily as needed for Nausea.     Dispense:  90 tablet     Refill:  4    rOPINIRole (REQUIP) 5 MG tablet     Sig: Take 1 tablet (5 mg total) by mouth nightly.     Dispense:  90 tablet     Refill:  4    temazepam (RESTORIL) 30 mg capsule     Sig: Take 1 capsule (30 mg total) by mouth every evening.     Dispense:  30 capsule     Refill:  5        Follow up in about 6 months (around  5/7/2025), or if symptoms worsen or fail to improve.  Future Appointments       Date Provider Specialty Appt Notes    11/22/2024 Celia Whaley NP Cardiology 3 month f/u per [place]    11/27/2024  Cardiology [facility]    12/3/2024 Lilly Ross DPM Podiatry Type 2 diabetes mellitus with diabetic peripheral angiopathy without gangrene, without long-term current use of insulin [E11.51]    12/11/2024 Tammy Contreras NP Hematology and Oncology 6mth f/u DVT//tms    2/20/2025 Huber Kapoor MD Cardiology 6 month f/u          If no improvement in symptoms or symptoms worsen, advised to call/follow-up at clinic or go to ER. Patient voiced understanding and all questions/concerns were addressed.   DISCLAIMER: This note was compiled by using a speech recognition dictation system and therefore please be aware that typographical / speech recognition errors can and do occur.  Please contact me if you see any errors specifically.  Consent was obtained for VERNA recording system prior to the visit.    Adan Woodward M.D.       Office: [phone]   [address] [place] [address]  FAX: [phone]

## 2024-11-07 NOTE — ASSESSMENT & PLAN NOTE
Patient started back smoking.  Patient would like to stop again.  Enroll in smoking cessation.  Counseled.

## 2024-11-07 NOTE — PATIENT INSTRUCTIONS
Follow up in about 6 months (around 5/7/2025), or if symptoms worsen or fail to improve.     Dear patient,   As a result of recent federal legislation (The Federal Cures Act), you may receive lab or pathology results from your visit in your [facility] account before your physician is able to contact you. Your physician or their representative will relay the results to you with their recommendations at their soonest availability.     If no improvement in symptoms or symptoms worsen, please be advised to call MD, follow-up at clinic and/or go to ER if becomes severe.    Adan Woodward M.D.        We Offer TELEHEALTH & Same Day Appointments!   Book your Telehealth appointment with me through my nurse or   Clinic appointments on [employer]!    [address] [place] [address]    Office: [phone]   FAX: [phone]    Check out my Facebook Page and Follow Me at: [email or web address]    Check out my website at [place] by clicking on: [email or web address]    To Schedule appointments online, go to [email or web address]: [email or web address]

## 2024-11-07 NOTE — ASSESSMENT & PLAN NOTE
Renal function stable on current blood pressure medication.  Patient is on complex medication regimen to control her blood pressure.  Checking labs.  Home health readings have been within normal limits and at goal at home.Discussed hypertension disease course, DASH-diet and exercise.  Discussed medication regimen importance of treating high blood pressure.  Patient advised of risk of untreated blood pressure.    ER precautions were given for symptoms of hypertensive urgency and emergency.

## 2024-11-08 ENCOUNTER — OUTPATIENT CASE MANAGEMENT (OUTPATIENT)
Dept: ADMINISTRATIVE | Facility: OTHER | Age: 74
End: 2024-11-08
Payer: MEDICARE

## 2024-11-08 ENCOUNTER — TELEPHONE (OUTPATIENT)
Dept: HEMATOLOGY/ONCOLOGY | Facility: CLINIC | Age: 74
End: 2024-11-08
Payer: MEDICARE

## 2024-11-08 LAB
ALBUMIN SERPL ELPH-MCNC: 3.43 G/DL (ref 3.35–5.55)
ALPHA1 GLOB SERPL ELPH-MCNC: 0.34 G/DL (ref 0.17–0.41)
ALPHA2 GLOB SERPL ELPH-MCNC: 0.86 G/DL (ref 0.43–0.99)
B-GLOBULIN SERPL ELPH-MCNC: 1.18 G/DL (ref 0.5–1.1)
GAMMA GLOB SERPL ELPH-MCNC: 0.79 G/DL (ref 0.67–1.58)
INTERPRETATION SERPL IFE-IMP: NORMAL
KAPPA LC SER QL IA: 4.24 MG/DL (ref 0.33–1.94)
KAPPA LC/LAMBDA SER IA: 1.96 (ref 0.26–1.65)
LAMBDA LC SER QL IA: 2.16 MG/DL (ref 0.57–2.63)
PROT SERPL-MCNC: 6.6 G/DL (ref 6–8.4)

## 2024-11-08 NOTE — PROGRESS NOTES
Please call patient and let her know that her potassium level is slightly low.  Would like her to increase her potassium rich foods including: potatoes, tomatoes, oranges, avocados, cereal, milk, green leafy veggies.    Munira Allen

## 2024-11-08 NOTE — PROGRESS NOTES
Make follow-up lab appointment per recommendation below.  Check to see if patient has seen the results through my chart.  If not then,  #CALL THE PATIENT# to discuss results/see if they have questions and document verification of contact. Make F/U appt if needed. [phone]    Vitamin-D improved slightly from previous still below goal.  Restart vitamin-D supplement.  Once the prescription has ended start over-the-counter 5000 units daily.  Metabolic panel shows low potassium.  Increase potassium supplement.   Kidney function is within normal limits.  Liver function is within normal limits.  Urine microalbumin creatinine ratio is significantly elevated from previous.  Please follow-up with Nephrology.  Need good control of blood pressure and blood sugar.  A1c remains well controlled.  Thyroid TSH T4 and T3 levels are within normal limits.  Lipid panel is stable and within normal limits.  Hyperlipidemia Medications         evolocumab (REPATHA SURECLICK) 140 mg/mL PnIj Inject 1 mL (140 mg total)   into the skin every 14 (fourteen) days.

## 2024-11-08 NOTE — TELEPHONE ENCOUNTER
----- Message from Tammy Contreras NP sent at 11/8/2024 11:24 AM CST -----  Please call patient and let her know that her potassium level is slightly low.  Would like her to increase her potassium rich foods including: potatoes, tomatoes, oranges, avocados, cereal, milk, green leafy veggies.    Tammy Allen'

## 2024-11-11 LAB
PATHOLOGIST INTERPRETATION IFE: NORMAL
PATHOLOGIST INTERPRETATION SPE: NORMAL

## 2024-11-21 ENCOUNTER — TELEPHONE (OUTPATIENT)
Dept: CARDIOLOGY | Facility: CLINIC | Age: 74
End: 2024-11-21
Payer: MEDICARE

## 2024-11-21 NOTE — PROGRESS NOTES
Subjective:    Patient ID:  Kalpana Wright is a 74 y.o. female who presents for follow-up of hospital discharge.      HPI: Ms. Kalpana Wright with H/O H/O CAD with H/O CABG, PAD, HTN, HLP, PAF, SSS, PPM, diastolic heart failure, tobacco use, statin intolerance, lupus anticoagulant presents to the clinic for hospital follow up.    She went to [facility] on 11/16/24 for SOB, wheezing and productive cough. She had O2 sat 86% on room air. She was not using oxygen or inhalers at home. She was given levaquin, xopenex, and atrovent in ER. She also c/o chest pain and was given ASA and 1 SL ntg tablet with slight improvement in symptoms. The chest pain started when she go up to go to the bathroom. It was sharp and radiated to her back and down both arms. ; Troponin < 0.03. She refused lasix and steroids. She also refused hospital admission. She was discharged from the ER with levaquin and DuoNeb solution prescriptions. Her O2 sat on discharge was 95%.  She went to [facility] via EMS on 11/17/24 for SOB and chest pain triggered by poor air quality form neighbors burning tree debris. Complained of cough and malaise. EKG: SR with first degree AVB @ 62 BPM with PACs. NS ST abnormality. CXR negative: CTA chest: negative for PE. O2 sats dipped into 80s. She was given DuoNebb, prednisone, ceftriaxone, and robaxin. Per ER physician note: BP in 400s. Troponin negative. She was thought to have COPD exacerbation. She was discharged to home with doxycycline.      She stated that she does not feel like her chest pain was her angina; she stated that she believed that it was respiratory in nature due to wheezing and coughing. She stated that she knows when she needs nitroglycerin and she didn't feel like she needed it either time because it didn't feel like angina. She stated that she is feeling much better now. She denied chest pain or SOB or ROSA. She is still smoking about 1/2 ppd. She does not want to participate in tobacco  cessation program. She denied any orthopnea, PND, or edema. She denied any palpitations, lightheadedness, dizziness, or near syncope. She denied any falls. She denied any unusual bleeding.      Last visit with Dr. Kapoor on August 20, 2024:  Pt wants to get back on eliquis. Pt has lost much weight > 50 lbs.  Patient denies CP, angina or anginal equivalent.  Angina pectoris   Atherosclerosis of native coronary artery of native heart with angina pectoris with documented spasm   Paroxysmal atrial fibrillation   Bilateral carotid artery stenosis   Chronic diastolic congestive heart failure   Essential hypertension   History of SSS (sick sinus syndrome)   Hx of CABG   Pacemaker   Deep vein thrombosis (DVT) of both lower extremities, unspecified chronicity, unspecified vein   Lupus anticoagulant syndrome   Personal history of pulmonary embolism   Long term current use of anticoagulant therapy   Statin intolerance   Cigarette smoker   Hematology appt pt wants to take eliquis vs coumadin (Pt also has been on sq lovenox in the past)  Continue benazepril 40 mg p.o. q.day, bisoprolol 10 mg p.o. q.day, HCTZ 25mg QD, and amlodipine 10mg QD and lasix 40mg BID-hypertension.   Continue apixaban, bisoprolol - PAF  Continue brilinta - CAD.  Continue imdur and ranexa - angina/CAD.   Continue repatha- CAD, HLP  PPM clinic.    Tobacco cessation counseling-She does not want to participate in tobacco cessation program.      She saw hematology 6/5/24: options for anticoagulation is lovenox 1mg/kg every 12 hours vs coumadin therapy with INR goal of 2-3.       She has h/o RJ, but has not been using CPAP; her machine was taken back years ago.   ------------------------------------------  Office visit 4/26/24: She went to [place] [facility] on 2/29/24 and discharge on 3/5/24; She went for chest pain, SOB, with cough and body aches. She had hypoxia and was placed on BiPAP. She had significant fluid overload and placed on IV lasix. She had not been  "taking lasix twice daily. She had been omitting the evening dose due to having trouble getting to bathroom at night. She had RLL PE. She actually had thrombolysis and thrombectomy by IR on 1/31/24. She had "successful pharmacomechanical thrombolysis and Angioject thrombectomy of the IVC, bilateral common iliac veins, bilateral external iliac veins, bilateral common femoral veins, proximal left femoral vein, and right femoral vein. 20 mg of TPA was instilled. Successful venoplasty of the IVC, bilateral common iliac veins, bilateral external iliac veins, right common femoral vein, and right femoral vein."   She also has extensive nonocclusive DVT in right common iliac vein and throughout the right lower extremity. (U/S lower extremity Veins at [place] on 2/3/24).  Pulmonology recommended changing from eliquis to warfarin, but she declined. She was supposed to F/U with IR as outpatient. She did not have transportation and missed the appointment. She also missed follow up appointment with Dr. Rodriges.   Dr. Chase's note in hospital consult: Continue chronic anticoagulation, may need Coumadin therapy. Especially if she has lupus anticoagulant positive sometimes the newer anticoagulation is not as effective.     She had weakness after hospitalizations-refused rehab.  She presents in a wheelchair today.  She stated that she has trouble walking long distances due to weakness.  She denied any chest pain or shortness of breath at rest she does report some dyspnea on exertion but she does not exert herself very much.  She denied any orthopnea, or PND.  She does report edema to her lower legs comes and goes.  She denied any palpitations, lightheadedness, dizziness, or near syncope.    ------------------------------------------------------------  She went to ER for pain in left thigh:  [place] [facility] note on 9/15/23: Pt to ED c/o L upper leg pain x3-4 days. Pt reports pacemaker/defib battery changed approx. 3 months ago. Pt " "reports L upper leg numbness x2-3 weeks. Pt reports she now has "shocking" sensation to L upper leg x3-4 days. Pt reports she notices bruising to area after "shock" happens to leg. Pt denies CP/SOB.     She has known severe PAD. Last arterial U/S 12/9/15: The right BOAZ is .53. The left BOAZ is .56. BILATERAL BOAZ SUGGESTIVE OF SEVERE DISEASE   THE WAVEFORMS ARE MONOPHASIC BILATERALLY WITH EVIDENCE OF SIGNIFICANT RT EXTERNAL ILIAC DISEASE IN THE 50-99% RANGE AS WELL AS BILATERAL SFA OCCLUSIONS.     She had ER visit on 9/5/23 for decreased responsiveness thought to be related to narcotic OD. She was given narcan with good response. She complained of pain in her legs, which is what is causing her to take percocet tablets. She is under the care of pain management physician: Harry Knox.  Venous U/S bilateral lower extremities 9/5/23 at [place]: IMPRESSION:   No evidence of deep venous thrombosis of the lower extremities.     Previous visit: F/U hospital discharge. She was brought to [facility] on April 1, 2023 for lethargy and hypoxemia. O2 sat was reportedly in the 70s. Her BNP was 457. She also had peripheral edema. She was treated with BiPAP, antibiotics, steroids, nebs, and IV lasix. She apparently qualified for supplemental oxygen at home after 6-minute walk.  Discharge summary indicates that she was sent home with home health for PT and OT.  No d/c BNP.  Serum Mg 1.9-2.2.  She had worsening of kidney function over the course of admission.    She has H/O CAD with H/O CABG, PVD, HTN, HLP, PAF, SSS, PPM, diastolic heart failure, tobacco use, statin intolerance, lupus anticoagulant.    ---------------------------------------------------------        Medications: she is taking Brilinta, and warfarin. Takes torsemide 20 mg q.a.m. and potassium (2 capsules in am and 1 in pm). She stated that she is taking bisoprolol, benazepril, amlodipine, HCTZ. Taking Imdur and Ranexa BID.  She is taking repatha as directed " "every two weeks.  Sodium: she does not add salt to foods when cooking;  she is not reading labels for sodium content. Denied eating salty foods.   Diet: "eating what I want". Toast for breakfast; protein shake (ready made in a can- every other day) for lunch; small meal for dinner. Red beans and rice with cornbread this week; she hasn't had them in over 2 years. She has not been eating greens due to warfarin; yesterday: salmon patties for lunch and supper. Eating a lot of grapefruit, satsumas, beatriz and pomegranate.  Exercise: she  is walking a little bit    Tobacco:  smoking.  Stress and seeing people smoke are triggers. She does not want to quit.  Stress management: working in garden.  Alcohol: no alcohol use     Weight: 70.9 kg (156 lb 3.2 oz) she states that her daily weights  has decreased Body mass index is 26.81 kg/m².  Last weight in cardiology clinic on August 20, 204 was 152# 4.8 ounces.  Wt Readings from Last 3 Encounters:   11/22/24 70.9 kg (156 lb 3.2 oz)   11/07/24 71.9 kg (158 lb 9.6 oz)   10/02/24 69.1 kg (152 lb 5.4 oz)     BP log: None.  She does not have a BP monitor.    Review of Systems   Constitutional: Negative for chills, decreased appetite, fever, malaise/fatigue, night sweats, weight gain and weight loss.   HENT:  Negative for congestion.    Cardiovascular:  Negative for chest pain, claudication, cyanosis, dyspnea on exertion, irregular heartbeat, leg swelling, near-syncope, orthopnea, palpitations, paroxysmal nocturnal dyspnea and syncope.   Respiratory:  Negative for cough, hemoptysis, shortness of breath, sputum production and wheezing.    Hematologic/Lymphatic: Negative for adenopathy and bleeding problem. Bruises/bleeds easily.   Skin:  Negative for color change and nail changes.   Musculoskeletal:  Positive for joint pain (chronic pain in the knees due to arthritis.).   Gastrointestinal:  Positive for nausea (on phenergan which seems to help.). Negative for bloating, change in bowel " habit, heartburn, hematochezia and vomiting.   Genitourinary:  Negative for hematuria.   Neurological:  Negative for dizziness and light-headedness.   Psychiatric/Behavioral:  Negative for altered mental status.        Objective:   Physical Exam  Constitutional:       General: She is not in acute distress.     Appearance: She is well-developed. She is not diaphoretic.   HENT:      Head: Normocephalic and atraumatic.   Eyes:      General: No scleral icterus.     Conjunctiva/sclera: Conjunctivae normal.   Neck:      Thyroid: No thyromegaly.      Vascular: No JVD.      Trachea: No tracheal deviation.   Cardiovascular:      Rate and Rhythm: Normal rate and regular rhythm.      Pulses: Intact distal pulses.           Radial pulses are 2+ on the right side and 2+ on the left side.      Heart sounds: Normal heart sounds. No murmur heard.     No friction rub. No gallop.      Comments:    Pulmonary:      Effort: Pulmonary effort is normal. No respiratory distress.      Breath sounds: No stridor. No wheezing, rhonchi or rales.      Comments: Lungs sounds diminished bilaterally posteriorly. CTA anteriorly, bilaterally.  Chest:      Chest wall: No tenderness.   Abdominal:      General: Bowel sounds are normal. There is no distension.      Palpations: Abdomen is soft.      Tenderness: There is no guarding or rebound.   Musculoskeletal:         General: No swelling. Normal range of motion.      Cervical back: Neck supple.      Right lower leg: No edema.      Left lower leg: No edema.   Lymphadenopathy:      Cervical: No cervical adenopathy.   Skin:     General: Skin is warm and dry.      Coloration: Skin is not pale.      Findings: No erythema or rash.      Comments: Pink. Multiple bruises on arms.   Neurological:      Mental Status: She is alert and oriented to person, place, and time.       Specimen: Blood - Blood specimen (specimen)   Ref Range & Units 6 d ago   Glucose 65 - 99 mg/dL 89   Sodium 136 - 144 mmol/L 143    Potassium 3.6 - 5.1 mmol/L 4   Chloride 101 - 111 mmol/L 104   CO2 22 - 32 mmol/L 32   BUN 8 - 20 mg/dL 15   Calcium 8.9 - 10.3 mg/dL 8.9   Creatinine 0.60 - 1.10 mg/dL 0.8   Albumin 3.5 - 4.8 g/dL 3.5   Total Bilirubin 0.4 - 2.0 mg/dL 0.2 Low    ALKP 28 - 116 U/L 80   Total Protein 6.1 - 7.9 g/dL 7   ALT 5 - 41 U/L 7   AST 10 - 34 U/L 14   Anion Gap 7 - 16 mmol/L 7   [facility] Reference Range & Units 11/07/24 09:44   Sodium 136 - 145 mmol/L  136 - 145 mmol/L 144  144   Potassium 3.5 - 5.1 mmol/L  3.5 - 5.1 mmol/L 3.3 (L)  3.4 (L)   Chloride 95 - 110 mmol/L  95 - 110 mmol/L 104  105   CO2 23 - 29 mmol/L  23 - 29 mmol/L 31 (H)  33 (H)   Anion Gap 8 - 16 mmol/L  8 - 16 mmol/L 9  6 (L)   BUN 8 - 23 mg/dL  8 - 23 mg/dL 14  13   Creatinine 0.5 - 1.4 mg/dL  0.5 - 1.4 mg/dL 0.9  0.9   eGFR >60 mL/min/1.73 m^2  >60 mL/min/1.73 m^2 >60.0  >60   Glucose 70 - 110 mg/dL  70 - 110 mg/dL 84  84   Calcium 8.7 - 10.5 mg/dL  8.7 - 10.5 mg/dL 9.0  8.8   ALP 40 - 150 U/L  40 - 150 U/L 86  88   PROTEIN TOTAL 6.0 - 8.4 g/dL  6.0 - 8.4 g/dL 7.1  7.1   Albumin 3.5 - 5.2 g/dL  3.5 - 5.2 g/dL 3.3 (L)  3.3 (L)   BILIRUBIN TOTAL 0.1 - 1.0 mg/dL  0.1 - 1.0 mg/dL 0.3  0.3   AST 10 - 40 U/L  10 - 40 U/L 18  19   ALT 10 - 44 U/L  10 - 44 U/L 12  10   Cholesterol Total 120 - 199 mg/dL 178   HDL 40 - 75 mg/dL 53   HDL/Cholesterol Ratio 20.0 - 50.0 % 29.8   Non-HDL Cholesterol mg/dL 125   Total Cholesterol/HDL Ratio 2.0 - 5.0  3.4   Triglycerides 30 - 150 mg/dL 123   LDL Cholesterol 63.0 - 159.0 mg/dL 100.4   Vitamin D 30 - 96 ng/mL 25 (L)   Vitamin B12 210 - 950 pg/mL 276   Hemoglobin A1C External 4.0 - 5.6 % 4.9   Estimated Avg Glucose 68 - 131 mg/dL 94   TSH 0.400 - 4.000 uIU/mL 3.026   T3, Free 2.3 - 4.2 pg/mL 3.0   Free T4 0.71 - 1.51 ng/dL 1.14   (L): Data is abnormally low  (H): Data is abnormally high      PPM interrogation 4/24/24:  4/17/24 atrial tachy 1:1 conduction; heart rate 167 beats per minute-37  seconds      Lexiscan 5/6/22: Conclusion     Abnormal myocardial perfusion scan.    There is a mild intensity, fixed perfusion abnormality consistent with scar in the basal to mid inferior wall(s).    The gated perfusion images showed an ejection fraction of 58% at rest. The gated perfusion images showed an ejection fraction of 58% post stress.    There is normal wall motion at rest and post stress.    LV cavity size is normal at rest and normal at stress.    The EKG portion of this study is negative for ischemia.    The patient reported no chest pain during the stress test.      [facility] [place] 9/6/2020: Done due to chest pain and NSTEMI  SUMMARY OF PROCEDURE:     1. Left coronary angiogram.     2. Saphenous vein graft angiogram.     3. Left heart catheterization.     4. Conscious sedation.     5. IVUS of the left main.     6. Rota to the left main.     7. PCI with drug-eluting stent to the left main.  ASSESSMENT AND PLAN:     1. Severe coronary artery disease as outlined above.     2. Recurrent restenosis of the vein graft to the OM with multiple   layers of stents and suboptimal result with balloon angioplasty.     3. Rotablation to the ostial left main with a drug-eluting stent using   5.0 x 15 Resolute [place], postdilated with a 5.0 balloon at high pressure   with excellent angiographic and intravascular ultrasound-guided results.     4. The patient will be kept on dual antiplatelets.  The patient will be   followed thoroughly.  Further recommendation will be dictated.  Mo Estrada MD    [facility] [place] 11/19/21: Interpretation Summary   Definity contrast used to eval EF.   Ejection Fraction = 60-65%.   There is mild concentric left ventricular hypertrophy.   Grade 1 Diastolic Dysfunction   The left atrium is mildly dilated.   There is mild mitral regurgitation.   There is mild tricuspid regurgitation.       [facility] [place] 8/30/2020:Interpretation Summary  Contrast injection was  performed.  Ejection Fraction = 60-65%.  There is mild mitral regurgitation.  Contrast injection was performed.     [facility] 2/19/2018:CONCLUSIONS     1 - Concentric hypertrophy.     2 - No wall motion abnormalities.     3 - Normal left ventricular systolic function (EF 60-65%).     4 - Normal left ventricular diastolic function.     5 - Normal right ventricular systolic function .     6 - The estimated PA systolic pressure is 24 mmHg.      Carotid U/S at [place] 07/30/2021: IMPRESSION:    1.  Bilateral carotid atheromatous plaquing. There is less than 50% diameter reduction.    2.  High-grade stenosis within the left external carotid artery, unlikely to be of clinical significance.     Venous U/S  Left leg 11/23/21: No DVT in the LLE.       Assessment:      1. Hospital discharge follow-up    2. Chronic diastolic congestive heart failure    3. Paroxysmal atrial fibrillation    4. Lupus anticoagulant syndrome    5. Deep vein thrombosis (DVT) of both lower extremities, unspecified chronicity, unspecified vein    6. Long term (current) use of anticoagulants    7. History of SSS (sick sinus syndrome)    8. Pacemaker    9. Atherosclerosis of native coronary artery of native heart with angina pectoris with documented spasm    10. Hx of CABG    11. Personal history of pulmonary embolism    12. Statin intolerance    13. Cigarette smoker    14. Pure hypercholesterolemia    15. Essential hypertension      Plan:     Hospital discharge follow-up    Chronic diastolic congestive heart failure    Paroxysmal atrial fibrillation    Lupus anticoagulant syndrome    Deep vein thrombosis (DVT) of both lower extremities, unspecified chronicity, unspecified vein    Long term (current) use of anticoagulants    History of SSS (sick sinus syndrome)    Pacemaker    Atherosclerosis of native coronary artery of native heart with angina pectoris with documented spasm    Hx of CABG    Personal history of pulmonary embolism    Statin  intolerance    Cigarette smoker    Pure hypercholesterolemia    Essential hypertension      Encouraged her to call if she thinks she is experiencing angina. She does not believe that the chest discomfort she experienced was her anginal pain. Can increase imdur if needed, but it seems that she had COPD exacerbation and is better now. Her volume status is euvolemic in clinic today.  Continue benazepril 40 mg p.o. q.day, bisoprolol 10 mg p.o. q.day, HCTZ 25mg QD, and amlodipine 10mg QD-hypertension.   Continue torsemide once daily dosing.   Monitor blood pressure at pharmacy if possible.    Continue bisoprolol, warfarin - PAF.   Continue brilinta - CAD.  Sodium restriction strongly encouraged.  Continue imdur and ranexa - angina/CAD.   Continue repatha- CAD, HLP  PPM clinic. Planned interrogation in clinic on 12/4/24.  Tobacco cessation counseling-Reviewed CV consequences of tobacco use; she verbalized her understanding of this information. She does not want to participate in tobacco cessation program. She does not want to quit.  Encouraged chair exercises and walking as much as possible.  Follow up with Dr. Kapoor on 2/20/25 as planned or call sooner for any problems.  Celia Whaley NP  [facility]    This note has been prepared using a combination of MModaL dictation device and typing.  It has been checked for errors but some errors may still exist within the note as a result of speech recognition errors and/or typographical errors.

## 2024-11-21 NOTE — TELEPHONE ENCOUNTER
Contacted patient to confirm appointment with Mrs. Whaley on 11/22/24.  Patient verbalize understanding of time, place, and location.

## 2024-11-22 ENCOUNTER — OFFICE VISIT (OUTPATIENT)
Dept: CARDIOLOGY | Facility: CLINIC | Age: 74
End: 2024-11-22
Payer: MEDICARE

## 2024-11-22 VITALS
SYSTOLIC BLOOD PRESSURE: 210 MMHG | HEART RATE: 72 BPM | WEIGHT: 156.19 LBS | OXYGEN SATURATION: 97 % | DIASTOLIC BLOOD PRESSURE: 80 MMHG | HEIGHT: 64 IN | BODY MASS INDEX: 26.67 KG/M2

## 2024-11-22 DIAGNOSIS — I48.0 PAROXYSMAL ATRIAL FIBRILLATION: ICD-10-CM

## 2024-11-22 DIAGNOSIS — I50.32 CHRONIC DIASTOLIC CONGESTIVE HEART FAILURE: ICD-10-CM

## 2024-11-22 DIAGNOSIS — F17.210 CIGARETTE SMOKER: ICD-10-CM

## 2024-11-22 DIAGNOSIS — Z78.9 STATIN INTOLERANCE: ICD-10-CM

## 2024-11-22 DIAGNOSIS — Z95.0 PACEMAKER: ICD-10-CM

## 2024-11-22 DIAGNOSIS — Z79.01 LONG TERM (CURRENT) USE OF ANTICOAGULANTS: ICD-10-CM

## 2024-11-22 DIAGNOSIS — I10 ESSENTIAL HYPERTENSION: ICD-10-CM

## 2024-11-22 DIAGNOSIS — E78.00 PURE HYPERCHOLESTEROLEMIA: ICD-10-CM

## 2024-11-22 DIAGNOSIS — I25.111 ATHEROSCLEROSIS OF NATIVE CORONARY ARTERY OF NATIVE HEART WITH ANGINA PECTORIS WITH DOCUMENTED SPASM: ICD-10-CM

## 2024-11-22 DIAGNOSIS — Z09 HOSPITAL DISCHARGE FOLLOW-UP: Primary | ICD-10-CM

## 2024-11-22 DIAGNOSIS — I49.5 SSS (SICK SINUS SYNDROME): ICD-10-CM

## 2024-11-22 DIAGNOSIS — D68.62 LUPUS ANTICOAGULANT SYNDROME: ICD-10-CM

## 2024-11-22 DIAGNOSIS — Z86.711 PERSONAL HISTORY OF PULMONARY EMBOLISM: ICD-10-CM

## 2024-11-22 DIAGNOSIS — Z95.1 HX OF CABG: ICD-10-CM

## 2024-11-22 DIAGNOSIS — I82.403 DEEP VEIN THROMBOSIS (DVT) OF BOTH LOWER EXTREMITIES, UNSPECIFIED CHRONICITY, UNSPECIFIED VEIN: ICD-10-CM

## 2024-11-22 PROCEDURE — 99999 PR PBB SHADOW E&M-EST. PATIENT-LVL V: CPT | Mod: PBBFAC,HCNC,, | Performed by: NURSE PRACTITIONER

## 2024-12-18 NOTE — TELEPHONE ENCOUNTER
Specialty Pharmacy - Initial Clinical Assessment    Specialty Medication Orders Linked to Encounter      Flowsheet Row Most Recent Value   Medication #1 evolocumab (REPATHA SURECLICK) 140 mg/mL PnIj (Order#[id number], Rx#[id number])          Patient Diagnosis   E78.5 - Hyperlipidemia  Z95.1 - Hx of CABG  I20.9 - Angina pectoris  Z78.9 - Statin intolerance    Subjective    Kalpana Wright is a 73 y.o. female, who is followed by the specialty pharmacy service for management and education.    Recent Encounters       Date Type Provider Description    03/20/2023 Specialty Pharmacy Jane Campbell PharmD Initial Clinical Assessment    03/16/2023 Specialty Pharmacy Jane Campbell PharmD Referral Authorization    07/06/2022 Specialty Pharmacy Alma Uriostegui Refill Coordination    05/30/2022 Specialty Pharmacy Naheed Charles, Patient Care Assistant Refill Coordination    05/13/2022 Specialty Pharmacy Vic Curtis Refill Coordination          Clinical call attempts since last clinical assessment   No call attempts found.     Current Outpatient Medications   Medication Sig    albuterol-ipratropium (DUO-NEB) 2.5 mg-0.5 mg/3 mL nebulizer solution INHALE 1 CONTENTS OF VIAL VIA NEBULIZER EVERY 6 HOURS WHILE AWAKE.    alendronate (FOSAMAX) 70 MG tablet TAKE 1 TABLET BY MOUTH ONCE WEEKLY ON SAME DAY IN THE MORNING WITH GLASS OF WATER, REMAIN UPRIGHT FOR 30 MIN.    amitriptyline (ELAVIL) 75 MG tablet TAKE ONE TABLET BY MOUTH ONE TIME DAILY IN THE EVENING AS NEEDED    amLODIPine (NORVASC) 10 MG tablet Take 1 tablet (10 mg total) by mouth once daily.    apixaban (ELIQUIS) 5 mg Tab Take 1 tablet (5 mg total) by mouth 2 (two) times daily.    benazepriL (LOTENSIN) 40 MG tablet TAKE ONE TABLET BY MOUTH DAILY    bisoprolol (ZEBETA) 10 MG tablet Take 1 tablet (10 mg total) by mouth once daily.    BRILINTA 90 mg tablet TAKE ONE TABLET BY MOUTH TWICE DAILY    chlorhexidine (PERIDEX) 0.12 % solution 15 mLs.    cyclobenzaprine (FLEXERIL) 10 MG tablet  Subjective     Dionisio Gamez is a 72 y.o. male who presents for the following: Suspicious Skin Lesion. Patient reports lesion on the right elbow for 2-3 years. It initially presented as a bump on the arm, was larger in size and now it is smaller. The lesion is firm. Denies any pain or itching. Denies trauma to the area. Denies other similar lesions anywhere else. Denies any restriction in elbow movement.     Review of Systems:  No other skin or systemic complaints other than what is documented elsewhere in the note.    The following portions of the chart were reviewed this encounter and updated as appropriate:          Skin Cancer History  No skin cancer on file.      Specialty Problems    None       Objective   Well appearing patient in no apparent distress; mood and affect are within normal limits.    A focused skin examination was performed. All findings within normal limits unless otherwise noted below.    Assessment/Plan   1. Neoplasm of uncertain behavior  Right Elbow  On the right elbow there is a subcutaneous soft mobile nodule which seems to be connected to the tendon. Nontender and no restriction in movement    Ddx includes ganglion cyst vs giant cell tumor of the tendon sheath vs other    Patient reports that lesion has been present for 3 years and decreased in size and is otherwise asymptomatic with no limitation in movement    No treatment recommended at this time    Recommend further evaluation by orthopedic surgery       Brenda Vega MD   PGY4, Department of Dermatology       TAKE ONE TABLET BY MOUTH TWICE DAILY AS NEEDED    ergocalciferol (ERGOCALCIFEROL) 50,000 unit Cap Vitamin D2 1,250 mcg (50,000 unit) capsule   Take 1 capsule every week by oral route.    esomeprazole (NEXIUM) 40 MG capsule TAKE ONE CAPSULE BY MOUTH TWICE DAILY BEFORE MEALS    evolocumab (REPATHA SURECLICK) 140 mg/mL PnIj Inject 1 mL (140 mg total) into the skin every 14 (fourteen) days.    fluticasone/umeclidin/vilanter (TRELEGY ELLIPTA INHL) Inhale 1 Inhaler into the lungs once daily.    furosemide (LASIX) 40 MG tablet Take 80 mg in the am and 80 mg BID x 7 days,then take 80 mg in am and 40 mg in the PM thereafter.    gabapentin (NEURONTIN) 600 MG tablet TAKE ONE TABLET BY MOUTH THREE TIMES DAILY.    hydrALAZINE (APRESOLINE) 25 MG tablet Take 1 tablet (25 mg total) by mouth 2 (two) times daily.    hydroCHLOROthiazide (HYDRODIURIL) 25 MG tablet TAKE ONE TABLET BY MOUTH DAILY    HYDROcodone-acetaminophen (NORCO)  mg per tablet Take 1 tablet by mouth 2 (two) times daily as needed.    isosorbide mononitrate (IMDUR) 30 MG 24 hr tablet Take 1 tablet (30 mg total) by mouth 2 (two) times daily.    levothyroxine (SYNTHROID) 75 MCG tablet TAKE ONE TABLET BY MOUTH IN THE MORNING BEFORE BREAKFAST    miconazole NITRATE 2 % (ZEASORB, MICONAZOLE,) 2 % top powder Apply topically as needed for Itching.    mupirocin (BACTROBAN) 2 % ointment mupirocin 2 % topical ointment    nitroGLYCERIN (NITROSTAT) 0.4 MG SL tablet Place 1 tablet (0.4 mg total) under the tongue every 5 (five) minutes as needed for Chest pain.    potassium chloride (MICRO-K) 10 MEQ CpSR Take 2 capsules in am and 1 in the evening; if taking additional lasix, will need to take one additional potassium capsule in the evening    pramipexole (MIRAPEX) 0.5 MG tablet TAKE  ONE TABLET BY MOUTH DAILY    promethazine (PHENERGAN) 25 MG tablet Take 1 tablet (25 mg total) by mouth every 6 (six) hours as needed.    ranolazine (RANEXA) 1,000 mg Tb12 Take 1 tablet (1,000 mg  "total) by mouth 2 (two) times daily.    rOPINIRole (REQUIP) 5 MG tablet TAKE  ONE TABLET BY MOUTH DAILY    sucralfate (CARAFATE) 1 gram tablet Take 1 tablet (1 g total) by mouth 4 (four) times daily before meals and nightly.    temazepam (RESTORIL) 30 mg capsule TAKE ONE CAPSULE BY MOUTH AT BEDTIME AS NEEDED    umeclidinium-vilanteroL (ANORO ELLIPTA) 62.5-25 mcg/actuation DsDv Inhale 1 puff into the lungs once daily. Controller    VENTOLIN HFA 90 mcg/actuation inhaler Inhale 2 puff(s) every 4 hours by inhalation route.   Last reviewed on 3/16/2023  9:50 AM by Celia Whaley NP    Review of patient's allergies indicates:   Allergen Reactions    Atorvastatin Other (See Comments)     Severe muscle pain  Other reaction(s): Abdominal Pain  SEVERE MYALGIAS      Azithromycin Other (See Comments)     By shot, closed veins and made large bruises  By shot, closed veins and made large bruises      Latex, natural rubber Rash     "TOURNIQUET ON LEG LEFT HUGE BLISTER THAT TOOK 9 MONTHS OF WOUND CARE TO HEAL."    Meperidine Hives and Anaphylaxis     Other reaction(s): Anaphylaxis  Hives (skin)^  Hives (skin)^      Penicillins Anaphylaxis and Hives     Other reaction(s): Anaphylaxis  Shortness of breath^swelling  Shortness of breath and swelling  Anaphylaxis  Shortness of breath^swelling      Tofacitinib Rash and Swelling     Rash and swelling of face      Lorazepam Anxiety and Other (See Comments)     Made me goofy  CAUSED CONFUSION "Made me goofy."      Pravastatin Other (See Comments)     Severe myalgias.  Cramps/Severe myalgias.    Flu vac 2018 [id number](pf)      Other reaction(s): Other (See Comments)  Flip into aFIB    Nystatin Rash    Pepper (genus capsicum) Other (See Comments)     Reflux and ulcers   Last reviewed on  3/20/2023 7:23 AM by Lawanda Costello    Drug Interactions    Drug interactions evaluated: no  Clinically relevant drug interactions identified: no  Provided the patient with educational material " regarding drug interactions: not applicable         Adverse Effects    Unable to perform a full ROS: Yes   Reason: other          Assessment Questions - Documented Responses      Flowsheet Row Most Recent Value   Assessment    Medication Reconciliation completed for patient No   Goals of Therapy Status Discussed (new start)   Status of the patients ability to self-administer: Is Able   All education points have been covered with patient? No, patient declined- printed education provided   Welcome packet contents reviewed and discussed with patient? Yes   Assesment completed? Yes   Plan Therapy being initiated   Do you need to open a clinical intervention (i-vent)? No   Do you want to schedule first shipment? Yes   Medication #1 Assessment Info    Patient status New medication   Is this medication appropriate for the patient? Yes   Is this medication effective? Not yet started          Refill Questions - Documented Responses      Flowsheet Row Most Recent Value   Patient Availability and HIPAA Verification    Does patient want to proceed with activity? Yes   HIPAA/medical authority confirmed? Yes   Relationship to patient of person spoken to? Self   Refill Screening Questions    When does the patient need to receive the medication? 03/22/23   Refill Delivery Questions    How will the patient receive the medication? MEDRx   When does the patient need to receive the medication? 03/22/23   Shipping Address Home   Address in [facility] confirmed and updated if neccessary? Yes   Expected Copay ($) 4.3   Is the patient able to afford the medication copay? Yes   Payment Method CC on file   Days supply of Refill 28   Supplies needed? No supplies needed   Refill activity completed? Yes   Refill activity plan Refill scheduled   Shipment/Pickup Date: 03/21/23            Objective    She has a past medical history of ACS (acute coronary syndrome) (3/20/2018), Acute on chronic diastolic congestive heart failure  "(4/27/2015), Acute on chronic respiratory failure with hypercapnia (1/10/2019), Acute renal failure (1/11/2019), Acute systolic heart failure (3/21/2018), JG (acute kidney injury) (1/29/2019), Anticoagulant long-term use, Arthritis, Asthma, Bradycardia (12/16/2013), CHF (congestive heart failure), Clostridium difficile colitis (9/25/2018), Convulsions (2/11/2021), COPD (chronic obstructive pulmonary disease), Coronary artery disease, Depression, Encounter for blood transfusion, Fall (7/8/2019), Gallstones (3/18/2017), History of Pulmonary embolism (7/17/2014), Hospital discharge follow-up (9/21/2020), Hyperlipidemia, Hypertension, Need for vaccination (8/8/2019), Non-intractable cyclical vomiting with nausea (2/1/2019), Peripheral vascular disease, Pulmonary embolus (9/9/2013), and Thyroid disease.        BP Readings from Last 4 Encounters:   03/16/23 (!) 140/76   03/10/23 (!) 170/47   08/10/22 136/60   05/06/22 (!) 144/88     Ht Readings from Last 4 Encounters:   03/16/23 5' 4" (1.626 m)   03/10/23 5' 4" (1.626 m)   08/10/22 5' 4" (1.626 m)   05/06/22 5' 4" (1.626 m)     Wt Readings from Last 4 Encounters:   03/16/23 82.4 kg (181 lb 11.2 oz)   03/10/23 78.9 kg (173 lb 15.1 oz)   08/10/22 78.9 kg (174 lb)   05/06/22 80.7 kg (178 lb)       The goals of prescribed drug therapy management include:  Supporting patient to meet the prescriber's medical treatment objectives  Improving or maintaining quality of life  Maintaining optimal therapy adherence  Minimizing and managing side effects      Goals of Therapy Status: Discussed (new start)    Assessment/Plan  Patient plans to start therapy on 03/22/23      Indication, dosage, appropriateness, effectiveness, safety and convenience of her specialty medication(s) were reviewed today.     Patient Education   Pharmacist offer to  patient was declined. Printed educational materials will be provided with medication.  Patient did accept verbal education on the following " topics:    Patient declined consult. Was previously receiving Repatha from [facility].     Tasks added this encounter   No tasks added.   Tasks due within next 3 months   3/16/2023 - Clinical - Initial Assessment     Jane Campbell, PharmD  [facility]  [address]  [facility] [address]  Phone: [phone]  Fax: [phone]   5

## 2025-01-08 DIAGNOSIS — Z95.0 PACEMAKER: Primary | ICD-10-CM

## 2025-01-17 ENCOUNTER — TELEPHONE (OUTPATIENT)
Dept: CARDIOLOGY | Facility: CLINIC | Age: 75
End: 2025-01-17
Payer: MEDICARE

## 2025-01-17 NOTE — TELEPHONE ENCOUNTER
Order has been given----- Message from Anupama sent at 1/17/2025  2:57 PM CST -----  Contact: mitchel ronquillo  Type: Staff Message     Who called: mitchel ronquillo  Call back number: [phone]  Reason for the call: need verbal order to check INR the week of 1/20  Additional information: n/a

## 2025-01-27 DIAGNOSIS — I10 ESSENTIAL HYPERTENSION: ICD-10-CM

## 2025-01-27 DIAGNOSIS — M81.0 OSTEOPOROSIS, UNSPECIFIED OSTEOPOROSIS TYPE, UNSPECIFIED PATHOLOGICAL FRACTURE PRESENCE: ICD-10-CM

## 2025-01-27 DIAGNOSIS — Z79.01 CURRENT USE OF LONG TERM ANTICOAGULATION: ICD-10-CM

## 2025-01-27 DIAGNOSIS — I50.32 CHRONIC DIASTOLIC CONGESTIVE HEART FAILURE: ICD-10-CM

## 2025-01-27 DIAGNOSIS — Z79.899 ENCOUNTER FOR LONG-TERM (CURRENT) USE OF MEDICATIONS: Chronic | ICD-10-CM

## 2025-01-27 DIAGNOSIS — I48.0 PAROXYSMAL ATRIAL FIBRILLATION: ICD-10-CM

## 2025-01-27 DIAGNOSIS — I20.9 ANGINA PECTORIS: ICD-10-CM

## 2025-01-27 DIAGNOSIS — Z95.1 HX OF CABG: ICD-10-CM

## 2025-01-27 DIAGNOSIS — E03.9 HYPOTHYROIDISM, UNSPECIFIED TYPE: ICD-10-CM

## 2025-01-27 DIAGNOSIS — I25.810 CORONARY ARTERY DISEASE INVOLVING CORONARY BYPASS GRAFT OF NATIVE HEART WITHOUT ANGINA PECTORIS: ICD-10-CM

## 2025-01-27 DIAGNOSIS — K21.00 GASTROESOPHAGEAL REFLUX DISEASE WITH ESOPHAGITIS, UNSPECIFIED WHETHER HEMORRHAGE: ICD-10-CM

## 2025-01-27 DIAGNOSIS — G25.81 RESTLESS LEGS: ICD-10-CM

## 2025-01-27 NOTE — TELEPHONE ENCOUNTER
Care Due:                  Date            Visit Type   Department     Provider  --------------------------------------------------------------------------------                                EP -                              PRIMARY      [facility] FAMILY  Last Visit: 11-      CARE ([facility])   MEDICINE       Adan Woodward  Next Visit: None Scheduled  None         None Found                                                            Last  Test          Frequency    Reason                     Performed    Due Date  --------------------------------------------------------------------------------    Mg Level....  12 months..  alendronate..............  Not Found    Overdue    Phosphate...  12 months..  alendronate..............  Not Found    Overdue    [employer] Embedded Care Due Messages. Reference number: [id number].   1/27/2025 3:43:50 PM CST

## 2025-01-28 ENCOUNTER — TELEPHONE (OUTPATIENT)
Dept: FAMILY MEDICINE | Facility: CLINIC | Age: 75
End: 2025-01-28
Payer: MEDICARE

## 2025-01-28 DIAGNOSIS — I48.0 PAROXYSMAL ATRIAL FIBRILLATION: ICD-10-CM

## 2025-01-28 DIAGNOSIS — I82.403 DEEP VEIN THROMBOSIS (DVT) OF BOTH LOWER EXTREMITIES, UNSPECIFIED CHRONICITY, UNSPECIFIED VEIN: ICD-10-CM

## 2025-01-28 DIAGNOSIS — I25.111 ATHEROSCLEROSIS OF NATIVE CORONARY ARTERY OF NATIVE HEART WITH ANGINA PECTORIS WITH DOCUMENTED SPASM: Chronic | ICD-10-CM

## 2025-01-28 DIAGNOSIS — I25.810 CORONARY ARTERY DISEASE INVOLVING CORONARY BYPASS GRAFT OF NATIVE HEART WITHOUT ANGINA PECTORIS: Primary | ICD-10-CM

## 2025-01-28 DIAGNOSIS — F51.01 PRIMARY INSOMNIA: Primary | ICD-10-CM

## 2025-01-28 RX ORDER — NITROGLYCERIN 0.4 MG/1
TABLET SUBLINGUAL
Qty: 100 TABLET | Refills: 3 | Status: SHIPPED | OUTPATIENT
Start: 2025-01-28

## 2025-01-28 RX ORDER — ISOSORBIDE MONONITRATE 30 MG/1
TABLET, EXTENDED RELEASE ORAL
Qty: 180 TABLET | Refills: 3 | Status: SHIPPED | OUTPATIENT
Start: 2025-01-28

## 2025-01-28 RX ORDER — ALENDRONATE SODIUM 70 MG/1
TABLET ORAL
Qty: 4 TABLET | Refills: 2 | Status: SHIPPED | OUTPATIENT
Start: 2025-01-28 | End: 2025-01-28

## 2025-01-28 RX ORDER — AMITRIPTYLINE HYDROCHLORIDE 25 MG/1
25 TABLET, FILM COATED ORAL NIGHTLY
Qty: 90 TABLET | Refills: 3 | Status: SHIPPED | OUTPATIENT
Start: 2025-01-28

## 2025-01-28 RX ORDER — ALBUTEROL SULFATE 90 UG/1
INHALANT RESPIRATORY (INHALATION)
Qty: 54 G | Refills: 3 | Status: SHIPPED | OUTPATIENT
Start: 2025-01-28

## 2025-01-28 RX ORDER — BISOPROLOL FUMARATE 5 MG/1
5 TABLET, FILM COATED ORAL
Qty: 90 TABLET | Refills: 3 | Status: SHIPPED | OUTPATIENT
Start: 2025-01-28

## 2025-01-28 RX ORDER — LEVOTHYROXINE SODIUM 75 UG/1
TABLET ORAL
Qty: 90 TABLET | Refills: 3 | Status: SHIPPED | OUTPATIENT
Start: 2025-01-28

## 2025-01-28 RX ORDER — POTASSIUM CHLORIDE 750 MG/1
TABLET, EXTENDED RELEASE ORAL
Qty: 90 TABLET | Refills: 3 | Status: SHIPPED | OUTPATIENT
Start: 2025-01-28

## 2025-01-28 RX ORDER — TEMAZEPAM 15 MG/1
15 CAPSULE ORAL NIGHTLY
Qty: 28 CAPSULE | Refills: 0 | Status: SHIPPED | OUTPATIENT
Start: 2025-01-28

## 2025-01-28 RX ORDER — ALENDRONATE SODIUM 70 MG/1
TABLET ORAL
Qty: 4 TABLET | Refills: 2 | Status: SHIPPED | OUTPATIENT
Start: 2025-01-28

## 2025-01-28 RX ORDER — NIFEDIPINE 60 MG/1
60 TABLET, EXTENDED RELEASE ORAL
Qty: 90 TABLET | Refills: 3 | Status: SHIPPED | OUTPATIENT
Start: 2025-01-28

## 2025-01-28 RX ORDER — FLUTICASONE FUROATE, UMECLIDINIUM BROMIDE AND VILANTEROL TRIFENATATE 100; 62.5; 25 UG/1; UG/1; UG/1
POWDER RESPIRATORY (INHALATION)
Qty: 180 EACH | Refills: 3 | Status: SHIPPED | OUTPATIENT
Start: 2025-01-28

## 2025-01-28 RX ORDER — TORSEMIDE 20 MG/1
20 TABLET ORAL
Qty: 90 TABLET | Refills: 3 | Status: SHIPPED | OUTPATIENT
Start: 2025-01-28

## 2025-01-28 RX ORDER — RANOLAZINE 1000 MG/1
TABLET, EXTENDED RELEASE ORAL
Qty: 180 TABLET | Refills: 3 | Status: SHIPPED | OUTPATIENT
Start: 2025-01-28

## 2025-01-28 RX ORDER — ROPINIROLE 5 MG/1
5 TABLET, FILM COATED ORAL NIGHTLY
Qty: 90 TABLET | Refills: 3 | Status: SHIPPED | OUTPATIENT
Start: 2025-01-28

## 2025-01-28 RX ORDER — ESOMEPRAZOLE MAGNESIUM 40 MG/1
40 CAPSULE, DELAYED RELEASE ORAL 2 TIMES DAILY
Qty: 180 CAPSULE | Refills: 3 | Status: SHIPPED | OUTPATIENT
Start: 2025-01-28

## 2025-01-28 RX ORDER — FERROUS SULFATE 325(65) MG
TABLET ORAL
Qty: 60 TABLET | Refills: 2 | Status: SHIPPED | OUTPATIENT
Start: 2025-01-28

## 2025-01-28 RX ORDER — TICAGRELOR 90 MG/1
TABLET ORAL
Qty: 180 TABLET | Refills: 3 | Status: SHIPPED | OUTPATIENT
Start: 2025-01-28

## 2025-01-28 NOTE — TELEPHONE ENCOUNTER
"Called pt daughter. She stated pt is not eating or taking her medication right "it seems like she has given up and I want to put her back in the hospital to get her back right"  With patient's lab results and current condition I agreed with Kendra to get pt to the ER via ambulance. Kendra state she will call anjana to get her to the hospital and will call back with an update.     fyi  "

## 2025-01-28 NOTE — TELEPHONE ENCOUNTER
No care due was identified.  [facility] Embedded Care Due Messages. Reference number: [id number].   1/28/2025 10:21:55 AM CST

## 2025-01-28 NOTE — TELEPHONE ENCOUNTER
I have signed for the following orders AND/OR meds.  Please call the patient and ask the patient to schedule the testing AND/OR inform about any medications that were sent.      Orders Placed This Encounter   Procedures    PROTIME-INR     Standing Status:   Future     Standing Expiration Date:   4/28/2026    Ambulatory referral/consult to Home Health     Standing Status:   Future     Standing Expiration Date:   2/28/2026     Referral Priority:   Routine     Referral Type:   Home Health     Referral Reason:   Specialty Services Required     Requested Specialty:   Home Health Services     Number of Visits Requested:   1       Medications Ordered This Encounter   Medications    temazepam (RESTORIL) 15 mg Cap     Sig: TAKE ONE CAPSULE AT BEDTIME     Dispense:  28 capsule     Refill:  0

## 2025-01-28 NOTE — TELEPHONE ENCOUNTER
----- Message from [email or web address] sent at 1/28/2025  3:05 PM CST -----  Contact: Kendra/ albina  .Type:  Patient Returning Call    Who Called: Kendra   Who Left Message for Patient: no message was left   Does the patient know what this is regarding?: no   Would the patient rather a call back or a response via [facility]?  Call back   Best Call Back Number: .[phone] (Kendra)  Additional Information:      Thanks

## 2025-01-28 NOTE — TELEPHONE ENCOUNTER
Spoke with Alexa pt daughter and she stated the patient wants to use [facility] since she knows them and is comfortable with them.

## 2025-01-28 NOTE — TELEPHONE ENCOUNTER
Called pt phone number and pt daughters phone number no answer on either line and vm box was full. Notified janelle with [facility] that referral was sent and pt needs assistance asap with a report back asap. Janelle verbalized understanding.    Detail Level: Detailed Post-Care Instructions: I reviewed with the patient in detail post-care instructions. Patient is to wear sunprotection, and avoid picking at any of the treated lesions. Pt may apply Vaseline to crusted or scabbing areas. Show Spray Paint Technique Variable?: Yes Spray Paint Technique: No Billing Information: Bill by Static Price Consent: The patient's consent was obtained including but not limited to risks of crusting, scabbing, blistering, scarring, darker or lighter pigmentary change, recurrence, incomplete removal and infection. The patient understands that the procedure is cosmetic in nature and is not covered by insurance. Spray Paint Text: The liquid nitrogen was applied to the skin utilizing a spray paint frosting technique. Price (Use Numbers Only, No Special Characters Or $): 0

## 2025-01-28 NOTE — TELEPHONE ENCOUNTER
----- Message from Pharmacist Cece sent at 1/28/2025  9:39 AM CST -----  Good morning Dr. Woodward,    I'm wondering if you might be able to help us with Ms. Wright.  I think there has been somewhat of a disconnect with her current home health agency [facility].  Her INR was very high last week (8.2 on 01/20/24).  Even though we have contacted them multiple times for follow up, they have been unable to get us a repeat INR.  Furthermore, they are now telling us that they have discharged this patient, even though the patient is still home bound.    Can your staff assist in getting Ms. Wright home health again, but NOT with [facility]?  Any urgency that could be devoted would be appreciated.    Thank you!  Cece

## 2025-01-28 NOTE — TELEPHONE ENCOUNTER
I have signed for the following orders AND/OR meds.  Please call the patient and ask the patient to schedule the testing AND/OR inform about any medications that were sent.      Orders Placed This Encounter   Procedures    Ambulatory referral/consult to Home Health     Standing Status:   Future     Standing Expiration Date:   2/28/2026     Referral Priority:   Urgent     Referral Type:   Home Health     Referral Reason:   Specialty Services Required     Referred to Provider:   [facility] -     Requested Specialty:   Home Health Services     Number of Visits Requested:   1

## 2025-01-28 NOTE — TELEPHONE ENCOUNTER
----- Message from Pharmacist Cece sent at 1/28/2025  9:39 AM CST -----  Good morning Dr. Woodward,    I'm wondering if you might be able to help us with Ms. Wright.  I think there has been somewhat of a disconnect with her current home health agency [facility].  Her INR was very high last week (8.2 on 01/20/24).  Even though we have contacted them multiple times for follow up, they have been unable to get us a repeat INR.  Furthermore, they are now telling us that they have discharged this patient, even though the patient is still home bound.    Can your staff assist in getting Ms. Wright home health again, but NOT with [facility]?  Any urgency that could be devoted would be appreciated.    Thank you!  Cece

## 2025-01-28 NOTE — TELEPHONE ENCOUNTER
Refill Routing Note   Medication(s) are not appropriate for processing by [facility] for the following reason(s):        No active prescription written by provider  Required vitals abnormal  New or recently adjusted medication  Outside of protocol    [facility] action(s):  Defer  Route  Approve     Requires labs : Yes             Appointments  past 12m or future 3m with PCP    Date Provider   Last Visit   11/7/2024 Adan Woodward MD   Next Visit   Visit date not found Adan Woodward MD   ED visits in past 90 days: 0        Note composed:7:59 AM 01/28/2025

## 2025-01-28 NOTE — TELEPHONE ENCOUNTER
Noted.  Agree with ER evaluation for supratherapeutic INR and severe symptoms.  Follow-up with us after emergent conditions have been ruled out.

## 2025-01-28 NOTE — TELEPHONE ENCOUNTER
I have signed for the following orders AND/OR meds.  Please call the patient and ask the patient to schedule the testing AND/OR inform about any medications that were sent.      Orders Placed This Encounter   Procedures    PROTIME-INR     Standing Status:   Future     Standing Expiration Date:   4/28/2026       Medications Ordered This Encounter   Medications    temazepam (RESTORIL) 15 mg Cap     Sig: TAKE ONE CAPSULE AT BEDTIME     Dispense:  28 capsule     Refill:  0

## 2025-01-28 NOTE — TELEPHONE ENCOUNTER
Called pt phone number and pt daughters phone number no answer on either line and vm box was full. Notified janelle with [facility] that referral was sent and pt needs assistance asap with a report back asap. Janelle verbalized understanding.

## 2025-01-29 ENCOUNTER — TELEPHONE (OUTPATIENT)
Dept: FAMILY MEDICINE | Facility: CLINIC | Age: 75
End: 2025-01-29
Payer: MEDICARE

## 2025-01-29 NOTE — TELEPHONE ENCOUNTER
----- Message from Emilie sent at 1/29/2025  1:16 PM CST -----  Contact: daughter Alexa   Patient is returning a phone call.    Who left a message for the patient:  Evangelina     Does patient know what this is regarding: medication     Would you like a call back, or a response through your [facility] portal?:  call back  darryl Liu     Comments:

## 2025-02-05 DIAGNOSIS — Z78.0 MENOPAUSE: ICD-10-CM

## 2025-02-10 ENCOUNTER — CLINICAL SUPPORT (OUTPATIENT)
Dept: CARDIOLOGY | Facility: HOSPITAL | Age: 75
End: 2025-02-10
Attending: INTERNAL MEDICINE
Payer: MEDICARE

## 2025-02-10 ENCOUNTER — CLINICAL SUPPORT (OUTPATIENT)
Dept: CARDIOLOGY | Facility: HOSPITAL | Age: 75
End: 2025-02-10
Payer: MEDICARE

## 2025-02-10 DIAGNOSIS — Z95.0 PRESENCE OF CARDIAC PACEMAKER: ICD-10-CM

## 2025-02-10 PROCEDURE — 93296 REM INTERROG EVL PM/IDS: CPT | Mod: HCNC | Performed by: INTERNAL MEDICINE

## 2025-02-17 LAB
OHS CV AF BURDEN PERCENT: 3
OHS CV DC REMOTE DEVICE TYPE: NORMAL
OHS CV ICD SHOCK: NO
OHS CV RV PACING PERCENT: 4 %

## 2025-02-22 DIAGNOSIS — Z00.00 ENCOUNTER FOR MEDICARE ANNUAL WELLNESS EXAM: ICD-10-CM

## 2025-02-27 ENCOUNTER — EXTERNAL HOME HEALTH (OUTPATIENT)
Dept: HOME HEALTH SERVICES | Facility: HOSPITAL | Age: 75
End: 2025-02-27
Payer: MEDICARE

## 2025-03-07 ENCOUNTER — TELEPHONE (OUTPATIENT)
Dept: FAMILY MEDICINE | Facility: CLINIC | Age: 75
End: 2025-03-07
Payer: MEDICARE

## 2025-03-07 NOTE — TELEPHONE ENCOUNTER
----- Message from Milo sent at 3/7/2025  9:34 AM CST -----  Contact: Valeria/ Jarek  .Type:  Needs Medical AdviceWho Called:  Valeria Would the patient rather a call back or a response via [facility]?  Call back Best Call Back Number:  [phone] (home)  Additional Information:  Valeria is calling in regard to pt requesting a rollator walker, push wheelchair and a bedside commode Thanks

## 2025-03-10 ENCOUNTER — TELEPHONE (OUTPATIENT)
Dept: FAMILY MEDICINE | Facility: CLINIC | Age: 75
End: 2025-03-10
Payer: MEDICARE

## 2025-03-10 NOTE — TELEPHONE ENCOUNTER
Patient had appointment with PCP on today she no showed patient has another appointment with PCP on this week

## 2025-03-10 NOTE — TELEPHONE ENCOUNTER
----- Message from Spike Walker sent at 3/10/2025  9:52 AM CDT -----  Contact: [place]/ [facility]  Type:  Needing Return CallWho Called: Shon Call Back For: Needing to discuss patient, stating she has not eaten since Saturday. Been vomitting and having diarrheaWould the patient rather a call back or a response via [facility]? [facility] Call Back Number: [phone] Information:  ----- Message -----  From: Denise Mott MA  Sent: 3/10/2025   9:59 AM CDT  To: Criss[facility] Arellano Staff    Type:  Needing Return CallWho Called: Shon Call Back For: Needing to discuss patient, stating she has not eaten since Saturday. Been vomitting and having diarrheaWould the patient rather a call back or a response via [facility]? [facility] Call Back Number: [phone] Information:

## 2025-03-11 ENCOUNTER — DOCUMENT SCAN (OUTPATIENT)
Dept: HOME HEALTH SERVICES | Facility: HOSPITAL | Age: 75
End: 2025-03-11
Payer: MEDICARE

## 2025-03-11 ENCOUNTER — TELEPHONE (OUTPATIENT)
Dept: FAMILY MEDICINE | Facility: CLINIC | Age: 75
End: 2025-03-11
Payer: MEDICARE

## 2025-03-11 NOTE — TELEPHONE ENCOUNTER
----- Message from Adan Woodward MD sent at 3/11/2025  4:04 PM CDT -----  Contact: [facility]/ Home Health  I recommend that she go to the ER for hypertension emergency.  Patient needs close follow-up for hypertension.  Also follow-up with Cardiology.  ----- Message -----  From: Lee Lopez MA  Sent: 3/11/2025   3:47 PM CDT  To: Adan Woodward MD    FY Patient Bp  is elevated at 188/90 has appointment on 03/13/2025 .  ----- Message -----  From: Denise Mott MA  Sent: 3/11/2025   3:44 PM CDT  To: [facility] Staff    Type:  Needing Return CallWho Called: Steffany Call Back For: BP is elevated at 188/90Would the patient rather a call back or a response via [facility]? [facility] Call Back Number:[phone].Additional Information: Is having headache, but it seems to be related to a recent fall

## 2025-03-11 NOTE — TELEPHONE ENCOUNTER
Called patient's daughter let her know that Dr Woodward suggested that patient go to ER for high BP.

## 2025-03-12 ENCOUNTER — PATIENT OUTREACH (OUTPATIENT)
Dept: ADMINISTRATIVE | Facility: HOSPITAL | Age: 75
End: 2025-03-12
Payer: MEDICARE

## 2025-03-12 ENCOUNTER — DOCUMENT SCAN (OUTPATIENT)
Dept: HOME HEALTH SERVICES | Facility: HOSPITAL | Age: 75
End: 2025-03-12
Payer: MEDICARE

## 2025-03-13 ENCOUNTER — OFFICE VISIT (OUTPATIENT)
Dept: FAMILY MEDICINE | Facility: CLINIC | Age: 75
End: 2025-03-13
Payer: MEDICARE

## 2025-03-13 VITALS
BODY MASS INDEX: 24.81 KG/M2 | OXYGEN SATURATION: 90 % | HEIGHT: 64 IN | SYSTOLIC BLOOD PRESSURE: 122 MMHG | WEIGHT: 145.31 LBS | HEART RATE: 64 BPM | DIASTOLIC BLOOD PRESSURE: 56 MMHG

## 2025-03-13 DIAGNOSIS — R29.6 FALLS FREQUENTLY: ICD-10-CM

## 2025-03-13 DIAGNOSIS — Z74.09 MOBILITY IMPAIRED: ICD-10-CM

## 2025-03-13 DIAGNOSIS — F19.10 OTHER PSYCHOACTIVE SUBSTANCE ABUSE, UNCOMPLICATED: ICD-10-CM

## 2025-03-13 DIAGNOSIS — I82.503 CHRONIC EMBOLISM AND THROMBOSIS OF UNSPECIFIED DEEP VEINS OF LOWER EXTREMITY, BILATERAL: ICD-10-CM

## 2025-03-13 DIAGNOSIS — Z78.0 MENOPAUSE: ICD-10-CM

## 2025-03-13 DIAGNOSIS — F51.01 PRIMARY INSOMNIA: ICD-10-CM

## 2025-03-13 DIAGNOSIS — M32.9 SYSTEMIC LUPUS ERYTHEMATOSUS, UNSPECIFIED SLE TYPE, UNSPECIFIED ORGAN INVOLVEMENT STATUS: ICD-10-CM

## 2025-03-13 DIAGNOSIS — N18.32 STAGE 3B CHRONIC KIDNEY DISEASE: ICD-10-CM

## 2025-03-13 DIAGNOSIS — Z09 HOSPITAL DISCHARGE FOLLOW-UP: Primary | ICD-10-CM

## 2025-03-13 DIAGNOSIS — R11.0 NAUSEA: ICD-10-CM

## 2025-03-13 DIAGNOSIS — I73.9 PVD (PERIPHERAL VASCULAR DISEASE): Chronic | ICD-10-CM

## 2025-03-13 DIAGNOSIS — E11.51 TYPE 2 DIABETES MELLITUS WITH DIABETIC PERIPHERAL ANGIOPATHY WITHOUT GANGRENE, WITHOUT LONG-TERM CURRENT USE OF INSULIN: ICD-10-CM

## 2025-03-13 DIAGNOSIS — I82.403 DEEP VEIN THROMBOSIS (DVT) OF BOTH LOWER EXTREMITIES, UNSPECIFIED CHRONICITY, UNSPECIFIED VEIN: ICD-10-CM

## 2025-03-13 PROBLEM — R04.2 HEMOPTYSIS: Status: RESOLVED | Noted: 2021-08-19 | Resolved: 2025-03-13

## 2025-03-13 PROBLEM — Z91.81 HISTORY OF FALL: Status: ACTIVE | Noted: 2025-03-13

## 2025-03-13 PROBLEM — E86.0 LUETSCHER'S SYNDROME: Status: RESOLVED | Noted: 2019-01-07 | Resolved: 2025-03-13

## 2025-03-13 PROBLEM — R10.9 ABDOMINAL PAIN: Status: RESOLVED | Noted: 2021-08-19 | Resolved: 2025-03-13

## 2025-03-13 PROBLEM — Z11.59 NEED FOR HEPATITIS C SCREENING TEST: Status: RESOLVED | Noted: 2019-08-08 | Resolved: 2025-03-13

## 2025-03-13 PROBLEM — Z23 NEED FOR VACCINATION FOR STREP PNEUMONIAE: Status: RESOLVED | Noted: 2019-11-05 | Resolved: 2025-03-13

## 2025-03-13 PROBLEM — I65.21 MORE THAN 50 PERCENT STENOSIS OF RIGHT INTERNAL CAROTID ARTERY: Status: ACTIVE | Noted: 2021-06-09

## 2025-03-13 PROBLEM — L60.0 INGROWN TOENAIL: Status: RESOLVED | Noted: 2024-11-07 | Resolved: 2025-03-13

## 2025-03-13 PROBLEM — Z12.31 ENCOUNTER FOR SCREENING MAMMOGRAM FOR BREAST CANCER: Status: RESOLVED | Noted: 2019-09-05 | Resolved: 2025-03-13

## 2025-03-13 PROBLEM — S51.012A SKIN TEAR OF LEFT ELBOW WITHOUT COMPLICATION: Status: RESOLVED | Noted: 2023-05-09 | Resolved: 2025-03-13

## 2025-03-13 PROCEDURE — 3288F FALL RISK ASSESSMENT DOCD: CPT | Mod: HCNC,CPTII,S$GLB, | Performed by: FAMILY MEDICINE

## 2025-03-13 PROCEDURE — 1100F PTFALLS ASSESS-DOCD GE2>/YR: CPT | Mod: HCNC,CPTII,S$GLB, | Performed by: FAMILY MEDICINE

## 2025-03-13 PROCEDURE — 1159F MED LIST DOCD IN RCRD: CPT | Mod: HCNC,CPTII,S$GLB, | Performed by: FAMILY MEDICINE

## 2025-03-13 PROCEDURE — 3074F SYST BP LT 130 MM HG: CPT | Mod: HCNC,CPTII,S$GLB, | Performed by: FAMILY MEDICINE

## 2025-03-13 PROCEDURE — 1126F AMNT PAIN NOTED NONE PRSNT: CPT | Mod: HCNC,CPTII,S$GLB, | Performed by: FAMILY MEDICINE

## 2025-03-13 PROCEDURE — 3078F DIAST BP <80 MM HG: CPT | Mod: HCNC,CPTII,S$GLB, | Performed by: FAMILY MEDICINE

## 2025-03-13 PROCEDURE — 99999 PR PBB SHADOW E&M-EST. PATIENT-LVL V: CPT | Mod: PBBFAC,HCNC,, | Performed by: FAMILY MEDICINE

## 2025-03-13 PROCEDURE — 1160F RVW MEDS BY RX/DR IN RCRD: CPT | Mod: HCNC,CPTII,S$GLB, | Performed by: FAMILY MEDICINE

## 2025-03-13 PROCEDURE — 99215 OFFICE O/P EST HI 40 MIN: CPT | Mod: HCNC,S$GLB,, | Performed by: FAMILY MEDICINE

## 2025-03-13 PROCEDURE — G2211 COMPLEX E/M VISIT ADD ON: HCPCS | Mod: HCNC,S$GLB,, | Performed by: FAMILY MEDICINE

## 2025-03-13 RX ORDER — PROMETHAZINE HYDROCHLORIDE 25 MG/1
25 TABLET ORAL DAILY PRN
Qty: 90 TABLET | Refills: 4 | Status: SHIPPED | OUTPATIENT
Start: 2025-03-13

## 2025-03-13 RX ORDER — CLOPIDOGREL BISULFATE 75 MG/1
75 TABLET, FILM COATED ORAL DAILY
COMMUNITY
Start: 2025-02-12

## 2025-03-13 RX ORDER — TEMAZEPAM 15 MG/1
15 CAPSULE ORAL NIGHTLY
Qty: 28 CAPSULE | Refills: 5 | Status: SHIPPED | OUTPATIENT
Start: 2025-03-13

## 2025-03-13 RX ORDER — EMPAGLIFLOZIN 10 MG/1
10 TABLET, FILM COATED ORAL DAILY
Qty: 30 TABLET | Refills: 11 | Status: SHIPPED | OUTPATIENT
Start: 2025-03-13

## 2025-03-13 RX ORDER — EMPAGLIFLOZIN 10 MG/1
10 TABLET, FILM COATED ORAL DAILY
COMMUNITY
Start: 2025-02-03 | End: 2025-03-13 | Stop reason: SDUPTHER

## 2025-03-13 RX ORDER — CALCIUM CARBONATE 160(400)MG
1 TABLET,CHEWABLE ORAL CONTINUOUS
Qty: 1 EACH | Refills: 0 | Status: SHIPPED | OUTPATIENT
Start: 2025-03-13

## 2025-03-13 NOTE — PROGRESS NOTES
PLAN:      Assessment & Plan  1. Post-hospitalization follow-up.  Her hospital stay was over 2 weeks ago. She falls frequently. She has mobility impairment chronic embolism and thrombosis of DVT, history of type 2 diabetes with diabetic peripheral angiopathy with without gangrene without current long term use of insulin. Her blood sugar has been stable and well controlled weight is stable, BMI of 24. She is prescribed Jardiance 10 mg daily. She has stage IIIb chronic kidney disease, monitoring her kidney function. She also takes Plavix. She has a history of systemic lupus erythematosus (SLE) she has been followed by rheumatology in the past. History of nausea, menopause, and primary insomnia, which we discussed already.    2. Nausea.  She will take Phenergan for nausea. Follow up with [facility] Dr. Booth.    3. Vomiting.  She will take Phenergan for vomiting. Follow up with [facility]equ[facility] BEG[facility] Dr. Booth.    4. Diarrhea.  She will take Phenergan for diarrhea. Follow up with [facility]equ[facility] BEG[facility] Dr. Booth.    5. Hypertension.  Her blood pressure has been fluctuating, with recent episodes of high readings and associated headaches. She is advised to monitor her blood pressure regularly. A blood pressure cuff will be purchased, and the receipt submitted for reimbursement through [employer].  Counseled on importance of hypertension disease course, I recommend ongoing Education for DASH-diet and exercise.  Counseled on medication regimen importance of treating high blood pressure.  Please be advised of risk of untreated blood pressure as discussed.  Please advised of ER precautions were given for symptoms of hypertensive urgency and emergency.    6. Peripheral vascular disease.  She is on ranolazine and follows with cardiology.    7. Deep vein thrombosis.  She is chronically on anticoagulation.    8. Hematoma.  She has a bad bruise from a hematoma from a fall from her recent hospital stay.    9. Insomnia.  She takes temazepam for  insomnia.    10. Type 2 diabetes with diabetic peripheral angiopathy without gangrene.  Her blood sugar has been stable and well-controlled. Weight is stable, BMI of 24. She is prescribed Jardiance 10 mg daily.    11. Stage 3b chronic kidney disease.  Her kidney function is being monitored.    12. Systemic lupus erythematosus (SLE).  She has been followed by rheumatology in the past.    13. Health maintenance.  She is due for an RSV vaccine and shingles vaccine. She needs a bone density scan for osteoporosis.    14. Mobility impairment.  She has a history of falls and requires durable medical equipment at home, including a bedside commode, a new wheelchair, and a rollator. Orders for these items will be provided and processed through [facility].    Problem List Items Addressed This Visit       Falls frequently (Chronic)    Relevant Medications    walker (ULTRA-LIGHT ROLLATOR) Misc    Other Relevant Orders    WHEELCHAIR FOR HOME USE    COMMODE FOR HOME USE    CBC Without Differential    Comprehensive Metabolic Panel    TSH    Hemoglobin A1C    Lipid Panel    Urinalysis    Stage 3b chronic kidney disease (Chronic)    Relevant Orders    CBC Without Differential    Comprehensive Metabolic Panel    TSH    Hemoglobin A1C    Lipid Panel    Urinalysis    PVD (peripheral vascular disease) (Chronic)    Relevant Medications    walker (ULTRA-LIGHT ROLLATOR) Misc    Other Relevant Orders    WHEELCHAIR FOR HOME USE    CBC Without Differential    Comprehensive Metabolic Panel    TSH    Hemoglobin A1C    Lipid Panel    Urinalysis    Insomnia (Chronic)    Relevant Medications    temazepam (RESTORIL) 15 mg Cap    Type 2 diabetes mellitus with diabetic peripheral angiopathy without gangrene, without long-term current use of insulin (Chronic)    Relevant Medications    JARDIANCE 10 mg tablet    Other Relevant Orders    CBC Without Differential    Comprehensive Metabolic Panel    TSH    Hemoglobin A1C    Lipid Panel    Urinalysis     Menopause (Chronic)    Relevant Orders    DXA Bone Density Axial Skeleton 1 or more sites    Mobility impaired (Chronic)    Relevant Medications    walker (ULTRA-LIGHT ROLLATOR) Misc    Other Relevant Orders    WHEELCHAIR FOR HOME USE    CBC Without Differential    Comprehensive Metabolic Panel    TSH    Hemoglobin A1C    Lipid Panel    Urinalysis    Deep vein thrombosis (DVT) of both lower extremities, unspecified chronicity, unspecified vein (Chronic)    Relevant Medications    walker (ULTRA-LIGHT ROLLATOR) Misc    Other Relevant Orders    WHEELCHAIR FOR HOME USE    CBC Without Differential    Comprehensive Metabolic Panel    TSH    Hemoglobin A1C    Lipid Panel    Urinalysis    Nausea (Chronic)    Relevant Medications    promethazine (PHENERGAN) 25 MG tablet    Other Relevant Orders    CBC Without Differential    Comprehensive Metabolic Panel    TSH    Hemoglobin A1C    Lipid Panel    Urinalysis    Other psychoactive substance abuse, uncomplicated (Chronic)    Relevant Orders    CBC Without Differential    Comprehensive Metabolic Panel    TSH    Hemoglobin A1C    Lipid Panel    Urinalysis    Chronic embolism and thrombosis of unspecified deep veins of lower extremity, bilateral (Chronic)    Relevant Orders    CBC Without Differential    Comprehensive Metabolic Panel    TSH    Hemoglobin A1C    Lipid Panel    Urinalysis    Systemic lupus erythematosus, unspecified SLE type, unspecified organ involvement status (Chronic)    Relevant Orders    CBC Without Differential    Comprehensive Metabolic Panel    TSH    Hemoglobin A1C    Lipid Panel    Urinalysis    Hospital discharge follow-up - Primary    Relevant Orders    CBC Without Differential    Comprehensive Metabolic Panel    TSH    Hemoglobin A1C    Lipid Panel    Urinalysis        Medication Management for assessment above:   Medication List with Changes/Refills   New Medications    WALKER (ULTRA-LIGHT ROLLATOR) MISC    1 each by Misc.(Non-Drug; Combo Route) route  continuous.   Current Medications    ALBUTEROL (PROVENTIL/VENTOLIN HFA) 90 MCG/ACTUATION INHALER    INHALE 1 PUFF EVERY 6 HOURS AS NEEDED FOR SHORTNESS OF BREATH OR FOR WHEEZING *THANK YOU*    ALBUTEROL-IPRATROPIUM (DUO-NEB) 2.5 MG-0.5 MG/3 ML NEBULIZER SOLUTION    INHALE 1 CONTENTS OF VIAL VIA NEBULIZER EVERY 6 HOURS WHILE AWAKE.    ALENDRONATE (FOSAMAX) 70 MG TABLET    TAKE 1 TABLET EVERY 7 DAYS *THANK YOU* DO NOT LIE DOWN FOR 30 MINUTES AFTER TAKING    AMITRIPTYLINE (ELAVIL) 25 MG TABLET    TAKE 1 TABLET AT BEDTIME *THANK YOU*    AMLODIPINE (NORVASC) 10 MG TABLET    Take 1 tablet (10 mg total) by mouth once daily.    BENAZEPRIL (LOTENSIN) 40 MG TABLET    Take 1 tablet (40 mg total) by mouth every morning.    BISOPROLOL (ZEBETA) 10 MG TABLET    TAKE 1 TABLET EVERY MORNING    BISOPROLOL (ZEBETA) 5 MG TABLET    TAKE 1 TABLET DAILY *THANK YOU*    CYCLOBENZAPRINE (FLEXERIL) 10 MG TABLET    TAKE ONE TABLET BY MOUTH TWICE DAILY AS NEEDED    ERGOCALCIFEROL (ERGOCALCIFEROL) 50,000 UNIT CAP    Take 1 capsule (50,000 Units total) by mouth every 7 days.    ESOMEPRAZOLE (NEXIUM) 40 MG CAPSULE    Take 1 capsule (40 mg total) by mouth 2 (two) times daily.    EVOLOCUMAB (REPATHA SURECLICK) 140 MG/ML PNIJ    Inject 1 mL (140 mg total) into the skin every 14 (fourteen) days.    FERROUS SULFATE (FEOSOL) 325 MG (65 MG IRON) TAB TABLET    TAKE 1 TABLET TWICE A DAY *THANK YOU*    FLUTICASONE/UMECLIDIN/VILANTER (TRELEGY ELLIPTA INHL)    Inhale 1 Inhaler into the lungs once daily.    HYDROCHLOROTHIAZIDE (HYDRODIURIL) 25 MG TABLET    Take 1 tablet (25 mg total) by mouth every morning.    ISOSORBIDE MONONITRATE (IMDUR) 30 MG 24 HR TABLET    TAKE 1 TABLET TWICE A DAY *THANK YOU*    JANTOVEN 2.5 MG TABLET    TAKE 2 Tablets QPM or as instructed by coumadin clinic  * *WARD WARFARIN FAILURE* *BRAND MEDICALLY NECESSARY* *    LEVOTHYROXINE (SYNTHROID) 75 MCG TABLET    TAKE 1 TABLET BY MOUTH EVERY MORNING BREAKFAST    MICONAZOLE NITRATE 2 %  (ZEASORB, MICONAZOLE,) 2 % TOP POWDER    Apply topically as needed for Itching.    NIFEDIPINE (PROCARDIA-XL) 60 MG (OSM) 24 HR TABLET    TAKE 1 TABLET DAILY *THANK YOU*    NITROGLYCERIN (NITROSTAT) 0.4 MG SL TABLET    PLACE 0.4 MILLIGRAMS SUBLINGUAL AS NEEDED EVERY 5 MINUTES FOR CHEST PAIN, MAX 3 DOSES *THANK YOU*    NYSTATIN (MYCOSTATIN) POWDER    Apply topically as needed.    OXYCODONE-ACETAMINOPHEN (PERCOCET)  MG PER TABLET    Take 1 tablet by mouth every 8 (eight) hours as needed for Pain.    PLAVIX 75 MG TABLET    Take 75 mg by mouth once daily.    POTASSIUM CHLORIDE (KLOR-CON) 10 MEQ TBSR    TAKE 1 TABLET DAILY WITH FULL GLASS OF WATER *THANK YOU*    POTASSIUM CHLORIDE (MICRO-K) 10 MEQ CPSR    TAKE 2 CAPSULES EVERY MORNING AND TAKE ONE CAPSULE EVERY EVENING    PRAMIPEXOLE (MIRAPEX) 0.5 MG TABLET    TAKE 1 TABLET EVERY MORNING    RANOLAZINE (RANEXA) 1,000 MG TB12    TAKE 1 TABLET TWICE A DAY *THANK YOU*    ROPINIROLE (REQUIP) 5 MG TABLET    TAKE 1 TABLET AT BEDTIME *THANK YOU*    TORSEMIDE (DEMADEX) 20 MG TAB    TAKE 1 TABLET DAILY *THANK YOU*    TRELEGY ELLIPTA 100-62.5-25 MCG DSDV    INHALE 1 PUFF DAILY *THANK YOU*    TRIAMCINOLONE ACETONIDE 0.1% (KENALOG) 0.1 % CREAM    Apply topically 2 (two) times daily. for 7 days    UMECLIDINIUM-VILANTEROL (ANORO ELLIPTA) 62.5-25 MCG/ACTUATION DSDV    Inhale 1 puff into the lungs once daily. Controller   Changed and/or Refilled Medications    Modified Medication Previous Medication    JARDIANCE 10 MG TABLET JARDIANCE 10 mg tablet       Take 1 tablet (10 mg total) by mouth once daily.    Take 10 mg by mouth once daily.    PROMETHAZINE (PHENERGAN) 25 MG TABLET promethazine (PHENERGAN) 25 MG tablet       Take 1 tablet (25 mg total) by mouth daily as needed for Nausea.    Take 1 tablet (25 mg total) by mouth daily as needed for Nausea.    TEMAZEPAM (RESTORIL) 15 MG CAP temazepam (RESTORIL) 15 mg Cap       Take 1 capsule (15 mg total) by mouth every evening.    TAKE ONE  CAPSULE AT BEDTIME   Discontinued Medications    BRILINTA 90 MG TABLET    TAKE 1 TABLET TWICE A DAY *THANK YOU*    TEMAZEPAM (RESTORIL) 30 MG CAPSULE    Take 1 capsule (30 mg total) by mouth every evening.       Adan Woodward M.D.  ==========================================================================  Subjective:   Patient ID: Kalpana Wright is a 75 y.o. female.  has a past medical history of ACS (acute coronary syndrome) (3/20/2018), Acute on chronic diastolic congestive heart failure (4/27/2015), Acute on chronic respiratory failure with hypercapnia (1/10/2019), Acute renal failure (1/11/2019), Acute systolic heart failure (3/21/2018), JG (acute kidney injury) (1/29/2019), Anticoagulant long-term use, Arthritis, Asthma, Bradycardia (12/16/2013), CHF (congestive heart failure), Clostridium difficile colitis (9/25/2018), Convulsions (2/11/2021), COPD (chronic obstructive pulmonary disease), Coronary artery disease, Depression, Encounter for blood transfusion, Fall (7/8/2019), Gallstones (3/18/2017), History of Pulmonary embolism (7/17/2014), Hospital discharge follow-up (9/21/2020), Hyperlipidemia, Hypertension, Need for vaccination (8/8/2019), Non-intractable cyclical vomiting with nausea (2/1/2019), Peripheral vascular disease, Pulmonary embolus (9/9/2013), and Thyroid disease.   Chief Complaint: Follow-up      History of Present Illness  The patient is a 77-year-old female who presents for evaluation of nausea, vomiting, and diarrhea.    She has been experiencing episodes of nausea, vomiting, and diarrhea for the past 2 days, which have prevented her from maintaining adequate hydration. These symptoms began to subside last night. She has not consumed any food today. She reports that Zofran has been ineffective in managing her symptoms.    She was hospitalized for 3 months. On 11/30/2024, she fell and broke her arm in 2 places and crushed it. They did not do surgery and put it in a splint. Three weeks  ago, she got up, went to the bathroom, got to the bathroom door, and fell flat on her back. They said she fractured vertebrae in her back. She also hit her head. She was there from 02/20/2025 to 02/26/2025. She went to [facility] for skilled nursing. She was trying to do rehab, but her body is sore and she can not do anything right now. She did not stay long. She is getting home health again with [facility]. They said that she needed a new wheelchair because hers broke apart after 12 years. She needs a rollator walker with a seat so she can sit when her legs get tired. Trying to get to the bathroom caused her to fall last time. She was due for a bone density scan, but they called her and told her that the machine had to be recalled. Then she fell and went through all of this. They did several CT scans and could not find anything. She has bruises from where they have been trying to get blood, except for one from where she fell.    Her blood pressure was good this morning. She has been having headaches, which she imagines are from the injury on her head. The Coumadin clinic is supposed to draw her blood today. They recommended a blood pressure cuff because of how her blood pressure has been running, and they do not want to have to come out every day to take her blood pressure.    MEDICATIONS  Current: ranolazine, temazepam, Jardiance, Plavix, Phenergan    Problem List Items Addressed This Visit       Falls frequently (Chronic)    Overview   Chronic.  Recurrent.  Using wheelchair today.  Patient uses ambulatory device at all times.  Easy bruising and bleeding due to blood thinners.         Stage 3b chronic kidney disease (Chronic)    Overview   Chronic.  Intermittent control.  Following with Nephrology.         PVD (peripheral vascular disease) (Chronic)    Overview   Chronic.  Uncontrolled.  Patient needs compression stockings.  Pain with walking.         Insomnia (Chronic)    Overview   November 2024:  Stable on  temazepam.  March 2024: Patient needing refill on temazepam.  Reports compliance.  No side effects reported.    Chronic.  May 2023:  Patient requesting refill on temazepam.  Reports compliance.  No side effects reported.  Symptoms are controlled.  Intermittent control.  Doing well on temazepam and amitriptyline for comorbid anxiety.   reviewed.  No abuse suspected.JULY 2020:  Chronic.  Stable.  Well controlled on current medication regimen.The patient was checked in the [facility] [employer] [facility] [employer]'s  Prescription Monitoring Program. There appears to be no incongruities with the patient's verbalized history.   May 2021:  Stable on current medication regimen.   reviewed.  No abuse suspected.         Type 2 diabetes mellitus with diabetic peripheral angiopathy without gangrene, without long-term current use of insulin (Chronic)    Overview   November 2024:  On no medication doing well with weight loss.  BMI Readings from Last 10 Encounters:   11/07/24 27.22 kg/m²   10/02/24 26.15 kg/m²   08/20/24 26.14 kg/m²   08/13/24 25.92 kg/m²   07/23/24 25.40 kg/m²   06/06/24 25.56 kg/m²   06/05/24 25.56 kg/m²   04/26/24 28.03 kg/m²   04/23/24 27.98 kg/m²   03/11/24 28.82 kg/m²   March 2024:  On no medication.  May 2023:Patient denies any history of thyroid cancer, pancreatitis, MEN syndrome.  Diabetes Management Status    Statin: Not taking  ACE/ARB: Taking    Screening or Prevention Patient's value Goal Complete/Controlled?   HgA1C Testing and Control   Lab Results   Component Value Date    HGBA1C 4.8 12/14/2023      Annually/Less than 8% Yes   Lipid profile : 12/14/2023 Annually No   LDL control Lab Results   Component Value Date    LDLCALC 71 12/14/2023    Annually/Less than 100 mg/dl  Yes   Nephropathy screening Lab Results   Component Value Date    LABMICR 54.0 05/09/2023     Lab Results   Component Value Date    PROTEINUA Trace (A) 04/23/2024     Lab Results   Component Value Date    UTPCR 0.12 10/21/2020       Annually Yes   Blood pressure BP Readings from Last 1 Encounters:   11/07/24 136/68    Less than 140/90 Yes   Dilated retinal exam : 06/28/2023 Annually No   Foot exam   : 03/11/2024 Annually Yes              Menopause (Chronic)    Overview   Due for bone density scanning.  Patient is taking vitamin-D however most recent prescription was denied by insurance.           Mobility impaired (Chronic)    Overview   See frequent falls         Deep vein thrombosis (DVT) of both lower extremities, unspecified chronicity, unspecified vein (Chronic)    Nausea (Chronic)    Other psychoactive substance abuse, uncomplicated (Chronic)    Chronic embolism and thrombosis of unspecified deep veins of lower extremity, bilateral (Chronic)    Systemic lupus erythematosus, unspecified SLE type, unspecified organ involvement status (Chronic)    Hospital discharge follow-up - Primary    Overview   Images from the original note were not included.  [facility] MEDICINE  DISCHARGE SUMMARY    Patient ID:  Kalpana Wright  [id number]  75 y.o.  1950    Admit Date:  2/20/2025 1:36 PM    Discharge Date:  02/26/25    Admitting Physician:  No admitting provider for patient encounter.    Discharge Physician:  BATSHEVA KEYS MD    Consultants/Treatment Team:  Consultants    Provider Service Role Specialty  Provider,   -- Consulting Physician Gen Prov Case Mgmt  Provider, Physical Therapist  -- Consulting Physician Gen Prov PT  Provider, Occupational Therapist  -- Consulting Physician Gen Prov OT  Provider, Pharmacist  -- Consulting Physician Gen Prov Rx  Provider, Dietitian  -- Consulting Physician Gen Prov Francisco Javier Pierson NP  -- Nurse Practitioner Nurse Practitioner Family        Reason for Admission/Admission Diagnoses:  Present on Admission:  Injury of head, initial encounter  Essential hypertension  Tobacco abuse  Hypothyroidism  Paroxysmal atrial fibrillation (HCC)  IVC thrombosis (HCC)  (Resolved) Bilateral carotid artery  stenosis  ICD (implantable cardioverter-defibrillator) in place  Coronary artery disease involving coronary bypass graft of native heart without angina pectoris  Chronic obstructive pulmonary disease (HCC)  (Resolved) Metabolic encephalopathy  Gastroesophageal reflux disease without esophagitis  (Resolved) Fall  Elevated troponin  (Resolved) SIRS (systemic inflammatory response syndrome) (HCC)  (Resolved) Left carotid artery stenosis  More than 50 percent stenosis of right internal carotid artery    Discharge Diagnoses:  Active Hospital Problems  Diagnosis Date Noted  Injury of head, initial encounter 02/20/2025  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/2024  Tobacco abuse 12/14/2023  Hypothyroidism 12/14/2023  ICD (implantable cardioverter-defibrillator) in place 12/14/2023  Elevated troponin 12/14/2023  Gastroesophageal reflux disease without esophagitis 07/30/2021  Chronic  More than 50 percent stenosis of right internal carotid artery 06/09/2021  Coronary artery disease involving coronary bypass graft of native heart without angina pectoris 01/29/2019  Chronic  Essential hypertension 01/07/2019  Paroxysmal atrial fibrillation (HCC) 01/07/2019  Chronic  Chronic obstructive pulmonary disease (HCC) 03/27/2018    Resolved Hospital Problems  Diagnosis Date Noted Date Resolved  Fall 02/20/2025 02/26/2025  SIRS (systemic inflammatory response syndrome) ([facility]) 02/20/2025 02/26/2025  Metabolic encephalopathy 02/02/2025 02/26/2025  Left carotid artery stenosis 06/09/2021 02/26/2025  Bilateral carotid artery stenosis 06/08/2021 02/26/2025    History of Present Illness at admission:    Kalpana Wright is a 75-year-old female with a past medical history significant for SSS s/p PPM, HFpEF, CAD s/p CABG, SLE, PAF, DVT/PE s/p thrombectomy, chronic hypoxemic respiratory failure on home O2, severe LVH and PAD who presented to ED for evaluation of headache. At approximately 2100 2/19/25 she slipped and fell in her bathroom on her buttocks hitting  her head on her oxygen tank. She denied LOC. After initially falling she was evaluated by EMS but refused transport. The day of admission that morning she developed a headache and presented to the ER at approximately 0530 for evaluation. She also complained of buttock pain and right arm pain. She has a known comminuted fracture of the proximal right humerus from a previous fall in November. Initial workup was without significant findings and she was discharged home. That evening she returned to the ER for continued posterior right sided headache which she described as constant and throbbing. Reported associated nausea. She denied taking any medications for symptoms. Initial CT imaging of the patient showed no acute fractures or intracranial hemorrhage. A hematoma was noted and superficial tissue. EKG showed A-fib with left axis deviation w/ left bundle branch block and left ventricular hypertrophy with repolarization abnormalities. Hemoglobin was stable at 10.1 and the patient was given DuoNebs and morphine 4 mg with 4 mg of Zofran and low-dose of Lasix 20 mg in the ED and the patient was admited for monitoring and treatment.    Hospital Course and Treatment:  Initial management  Following day the patient was exceptionally lethargic but oriented to person place and time, the decision was made to hold the patient's pramipexole for the potential for causing lethargy. It was discovered later that the patietn had been hiding some of her percocet pain medication in plastic baggy inside of a tissue box which was believed to be the reason for her excessive somnolance. Decision at that time was made to continue to monitor the patient.    General Course  Initial blood cultures drawn on the patient remain negative. Patient remained on 2 L nasal cannula and a repeat CT was performed to trend the patient's left pleural effusion. CT showed a significant reduction in the pleural effusion but with some small pleural effusion still  remaining. Patient was also found to have an INR elevated out of the therapeutic range at 3.4. Her warfarin was held until ranges were brought back into normal and then reinitiated on Coumadin. On the fourth day of the patient's hospital stay the patient was noted to have some of her prescription Percocets in a Ziploc bag hidden inside of a tissue box in her room. They were confiscated and tended to security. This was thought to be related to why the patient may have been somnolent on her initial presentation. The patient and treatment team agreed that placement at [facility] would be the most beneficial to receive rehab for safe discharge planning. Request was submitted for insurance authorization.    On discharge  On the day of discharge the patient was stable, alert and oriented x 3, and was off of nasal cannula oxygen while lying in bed. Patient still had a hematoma on the surface of her scalp but it was not changing in size or texture. Patient was discharged to [facility].    Admission Information    Date & Time  2/20/2025 Provider  Carlyle Borja MD Department  [facility] Observation Unit Dept. Phone  [phone]            LAST ASSESSMENT AND PLAN PRIOR TO DISCHARGE:    Fall  Acute posttraumatic headache  Polypharmacy  - CT of head without contrast shows no acute findings.  - CT of cervical spine shows vertebral body height is normal and alignment is maintained. No acute fractures. No prevertebral soft tissue swelling. Atlanto-occipital articulation is normal. Relatively mild degenerative changes stenosis without bony spinal canal stenosis or severe neural foraminal narrowing. No fracture or traumatic malalignment.  - X-ray of right shoulder shows an acute impacted proximal humeral fracture. Initial fall on 11/29/2024. At that time she was evaluated by orthopedic surgery. No surgical interventions were recommended.  - X-ray of lumbar spine showing minimal L1 compression fracture,  fracture to the straight of IVC which is historical, did reach out to IR to see if any intervention would be necessary they said nothing inpatient, if anything outpatient follow-up.  - PT/OT recommending SNF which patient was amenable to, patient will be placed at [facility]  - Fall precautions, decrease or discontinue pramipexole at time of discharge    Elevated troponin  CAD s/p CABG  - Initial troponin of 0.06, plateau of 0.08  - EKG interpreted as A-fib with premature ventricular or aberrantly conducted complexes, left axis deviation, incomplete left bundle branch block and left ventricular hypertrophy with repolarization abnormality.  - S/p recent left heart cath on previous admission with 3 cardiac stents placement  - Continue Imdur, Ranexa, and Plavix  - Cardiac monitoring  Cardiac rehab consult placed      Acute on chronic chronic diastolic CHF  - BNP of 756.4  - Echo with EF of 50 to 55% on 2/10/2025  - Lasix 20 mg IV given in ED  - Continue to Lasix 20 mg IV twice daily  - Strict I&O/daily weights  - GDMT    Acute on chronic hypoxemic respiratory failure (resolved)  COPD without acute exacerbation  Left lower lung pleural effusion  - On home oxygen at 2 L via nasal cannula  -AP and lateral chest x-ray shows spine sign-> pleural effusion consistent with previous CXR 2/8/25 and chest CT on 12/6/2024. Not present before before fall 11/2024  - Scheduled DuoNebs  - Continue Trelegy  - Pulse oximetry  -Repeat CT chest-> left pleural effusion with adjacent atelectasis/volume loss, significantly improved from previous  Also pulmonary opacifications scattered recommend follow-up in 2 months to ensure resolution  6-minute walk test ordered  Patient qualifies for home O2, patient saturated at 86% on room air at rest    Systemic inflammatory response syndrome (resolved)  - SIRS criteria met with elevated temp of 101.4 and respirations of 24. Negative procalcitonin/lactic acid, no leukocytosis  - CXR AP portable shows a left  pleural effusion.  - RSV/influenza AB/COVID-19 negative  - Respiratory viral panel pending  - Blood cultures x 2 drawn in the ED-> no growth to date  - Urine negative  Discontinuing antibiotics after 5 days with treatment (last dose 2/25)      Normocytic anemia  - H&H of 10.1/35.1 on arrival  - No signs of overt bleeding    Hypertension  - Continue nifedipine    Paroxysmal atrial fibrillation  Cardiac pacemaker in situ  - Continue bisoprolol and warfarin  - Consult pharmacy for warfarin management  INR within therapeutic range    Hypothyroidism  - Continue levothyroxine    GERD  - PPI    Former tobacco abuse  - Continue to encourage smoking cessation    Chronic DVT  Chronic IVC thrombosis  DVT prophylaxis: Pharmacy dosing warfarin, INR returned to therapeutic range    Disposition: Patient fall likely secondary to polypharmacy, we decreasing pramipexole and possible discontinuation at time of discharge. PT OT recommending SNF placement with patient was amenable to, she prefers [facility] if possible. Case management consulted,Placement likely 2/25. 6-minute walk failed, home O2 needed at discharge for a minimum of 6 months, possibly more.  Awaiting insurance approval for placement        IN BRIEF:    Patient presented 2 days after fall at home w/ headache unresolved by home medications. CT scans and workup negative for intracranial bleed. Patient's INR noted to be out of therapeutic range at 3.4. Warfarin was held & patient was monitored. Patient covered without any acute issues, INR was brought back in the therapeutic ranges & medications were managed to minimize polypharmacy which was thought to be the reason for the initial fall. PT OT felt SNF placement would be the most beneficial discharge plan. Patient was discharged to [facility] for short stent of therapy per the patient's request. Prescription/orders at discharge:    Jantoven 2.5 mg tablet daily  Oxycodone  mg every 6 hours  Phenergan Q6H as needed  hydralazine  20 mg as needed  PT OT eval and treat  Follow-up with PCP in 2 weeks    THINGS TO FOLLOW UP IN THE POST HOSPITAL DISCHARGE VISIT    Balance and coordination testing  O2 needs at home?  Shortness of breath?  Smoking sensation  Falls at home?  Hematoma on scalp  Headaches    I discussed with the patient about the disease process and treatment prior to discharge.    This discharge summary took >30 minutes to complete.    Patient's Condition On Discharge:  Stable  Physical Exam  Constitutional: She is oriented to person, place, and time. Non-toxic appearance. She does not appear ill. No distress.  HENT:  Head: Normocephalic and atraumatic.  Right Ear: External ear normal.  Left Ear: External ear normal.  Nose: Nose normal. Mouth/Throat: Mucous membranes are moist. Oropharynx is clear.  Eyes: Conjunctivae are normal.  Cardiovascular: Normal rate and normal pulses.  Pulmonary/Chest: Effort normal and breath sounds normal.  Abdominal: Soft. Normal appearance and bowel sounds are normal. There is no abdominal tenderness. There is no rebound and no guarding.  Neurological: She is alert and oriented to person, place, and time. No cranial nerve deficit.  Skin: Skin is warm and dry. Bruising noted. She is not diaphoretic.  Hematoma on scalp related to fall more well-defined but not decreased in size      Significant Diagnostic Studies:  Recent Imaging and Procedure Results    Procedure Component Value Ref Range Date/Time  Blood Culture #1 [[id number]] Collected: 02/20/2025 1643  Specimen: N/A from Blood Updated: 02/25/2025 1802  Micro Culture Result No growth 1 day(s).  Micro Culture Result No growth 2 day(s).  Micro Culture Result No growth 3 day(s).  Micro Culture Result No growth 4 day(s).  Micro Culture Result No growth 5 day(s).  Blood Culture #2 [[id number]] Collected: 02/20/2025 1658  Specimen: N/A from Blood Updated: 02/25/2025 1802  Micro Culture Result No growth 1 day(s).  Micro Culture Result No growth 2  day(s).  Micro Culture Result No growth 3 day(s).  Micro Culture Result No growth 4 day(s).  Micro Culture Result No growth 5 day(s).  CT Chest WO Contrast [[id number]] Collected: 02/23/2025 1103  Updated: 02/23/2025 1111  Narrative:  Indication: Left pleural effusion    Technique: Helical CT images were obtained through the chest without administration of intravenous contrast. Automated exposure control was utilized for radiation dose reduction.    Comparison: 12/6/2024    Findings:  The heart is enlarged. There is severe coronary calcification. There are sternotomy changes. There is a small left pleural effusion with left basilar airspace disease. There is right basilar atelectasis/scarring. There is bronchial wall thickening as can  be seen with bronchitis. There are ill-defined opacities in the lingula, right upper lobe, and right lower lobe that could be infectious/inflammatory in nature. There is a nodular quality to these opacities (image 65, series 2 for example). There is a  right midlung pulmonary nodule measuring 8 mm (image 57, series 2). Nodular right lung base opacity is noted measuring about 2 cm (image 75, series 2). This is new from prior.    There is severe atherosclerosis. There is no destructive osseous lesion. There is a subacute right humeral neck fracture without healing.    Impression:  Small left pleural effusion with adjacent atelectasis/volume loss.    Scattered pulmonary opacities could be infectious/inflammatory in nature, recommend follow up chest CT in 2 months to ensure resolution.    Bronchitis.    Cardiomegaly and additional findings above.    Electronically signed by Juliocesar Montenegro MD on 2/23/2025 11:07 AM    CT Head WO Contrast [[id number]] Collected: 02/20/2025 1547  Updated: 02/20/2025 1552  Narrative:  Indication: fall; headache    Technique: 5 mm contiguous axial CT images were obtained from through the brain. Automated exposure control was utilized for radiation dose  reduction.    Comparison: 2/20/2025    Findings:  The brain appears normal. There is no evidence of hemorrhage, acute infarction, mass lesion, or hydrocephalus. There is a right parietal scalp contusion.    The orbits, mastoid air cells, and paranasal sinuses are unremarkable.    Impression:  No acute finding    Electronically signed by Juliocesar Montenegro MD on 2/20/2025 3:49 PM          Surgical Procedures during this visit:    Discharge Disposition:  Order for Discharge (From admission, onward)    Start Ordered  02/26/25 1217 Discharge Disposition to: Skilled Nursing Facility Once  Expected Discharge Date: 02/26/25  Expected Discharge Time: Midday    Question: Discharge Disposition to Answer: Skilled Nursing Facility  02/26/25 1234                Discharge Orders:    Future Appointments:    Immunizations Administered for This Admission    No immunizations given this admission.          Medication List      START taking these medications    hydrALAZINE 20 mg/mL Soln injection  Commonly known as: APRESOLINE  Inject 0.25 mLs (5 mg total) into the vein every 6 (six) hours as needed (Systolic greater than 170 or diastolic greater than 100)    oxyCODONE-acetaminophen  mg Tab per tablet  Commonly known as: PERCOCET  Take 1 tablet by mouth every 6 (six) hours as needed          CHANGE how you take these medications    Jantoven 2.5 mg Tab tablet  Generic drug: warfarin  Take 1 tablet (2.5 mg total) by mouth daily  What changed:  See the new instructions.  Another medication with the same name was removed. Continue taking this medication, and follow the directions you see here.    * promethazine 12.5 MG Tab tablet  Commonly known as: PHENERGAN  Take 1 tablet (12.5 mg total) by mouth every 6 (six) hours as needed for Nausea  What changed: Another medication with the same name was added. Make sure you understand how and when to take each.    * promethazine 25 mg/mL Soln injection  Commonly known as: PHENERGAN  Inject 0.5 mLs  (12.5 mg total) into the muscle every 6 (six) hours as needed  What changed: You were already taking a medication with the same name, and this prescription was added. Make sure you understand how and when to take each.        * This list has 2 medication(s) that are the same as other medications prescribed for you. Read the directions carefully, and ask your doctor or other care provider to review them with you.            CONTINUE taking these medications    alendronate 70 MG Tab tablet  Commonly known as: FOSAMAX    bisoprolol 5 MG Tab tablet  Commonly known as: ZEBETA  Take 1 tablet (5 mg total) by mouth daily    clopidogreL 75 mg Tab tablet  Commonly known as: PLAVIX  Take 1 tablet (75 mg total) by mouth daily    empagliflozin 10 mg Tab tablet  Commonly known as: JARDIANCE  Take 1 tablet (10 mg total) by mouth every morning    ferrous sulfate 325 (65 FE) MG Tbec EC tablet    ipratropium-albuteroL 0.5 mg-3 mg(2.5 mg base)/3 mL Nebu nebulizer solution  Commonly known as: DUONEB    isosorbide mononitrate 30 MG Tb24 24 hr tablet  Commonly known as: IMDUR    levothyroxine 75 MCG Tab tablet  Commonly known as: SYNTHROID    nebulizer and compressor Yisel  Use as directed    NIFEdipine 60 MG (OSM) Tr24 24 hr tablet  Commonly known as: PROCARDIA-XL  Take 1 tablet (60 mg total) by mouth daily    nitroglycerin 0.4 MG Subl SL tablet  Commonly known as: NITROSTAT    ranolazine 1,000 mg Tb12 SR tablet  Commonly known as: RANEXA    Repatha SureClick 140 mg/mL Pnij  Generic drug: evolocumab    rOPINIRole 5 MG Tab tablet  Commonly known as: REQUIP    torsemide 20 MG Tab tablet  Commonly known as: DEMADEX    Trelegy Ellipta 100-62.5-25 mcg Dsdv inhaler  Generic drug: fluticasone-umeclidin-vilanter  Inhale 1 puff into the lungs daily    Ventolin HFA 90 mcg/actuation Hfaa inhaler  Generic drug: albuterol          STOP taking these medications    pramipexole 0.5 MG Tab tablet  Commonly known as: MIRAPEX            Where to Get Your  Medications      These medications were sent to [facility] - [place], [place] - [address], [place] [address]    Phone: [phone]  Jantoven 2.5 mg Tab tablet      Information about where to get these medications is not yet available  Ask your nurse or doctor about these medications  hydrALAZINE 20 mg/mL Soln injection  oxyCODONE-acetaminophen  mg Tab per tablet  promethazine 25 mg/mL Soln injection      Discharge Orders    Future Labs/Procedures Expected by Expires  Activity: As Tolerated As directed  Activity: Per Rehab Recommendations As directed  Diet (Select type) Regular As directed  Questions:  Diet Type: Regular  Restriction(s):  Solid Consistency:  Liquid Consistency:  Sodium Restriction:  Fluid restriction:  Follow-up with PCP Schedule for: 2; Weeks As directed  Questions:  Schedule for: 2  when: Weeks  Occupational Therapy Eval and Treat As directed  Physical Therapy Eval and Treat As directed  Vital Signs Per Facility Protocol As directed  Weights Per Facility Protocol As directed        Geo Cuellar MD  [facility]  02/26/25  12:34 PM      Cosigned by Carlyle Borja MD at 02/28/2025 1:31 PM CST  Electronically signed by Geo Cuellar MD at 02/26/2025 7:03 PM CST  Electronically signed by Carlyle Borja MD at 02/28/2025 1:31 PM CST    Associated attestation - Carlyle Borja MD - 02/28/2025 1:31 PM CST  Formatting of this note might be different from the original.  I participated in the care of this patient with the resident. I was involved in all medical decision making.    I agree with the a forementioned assessment and plan performed above by the resident.    Carlyle Borja MD  [facility] Staff  02/28/25  1:31 PM               Review of patient's allergies indicates:   Allergen Reactions    Atorvastatin Other (See Comments)     Severe muscle pain  Other reaction(s): Abdominal Pain  SEVERE MYALGIAS       "Azithromycin Other (See Comments)     By shot, closed veins and made large bruises  By shot, closed veins and made large bruises      Latex, natural rubber Rash     "TOURNIQUET ON LEG LEFT HUGE BLISTER THAT TOOK 9 MONTHS OF WOUND CARE TO HEAL."    Meperidine Anaphylaxis and Hives     Other reaction(s): Anaphylaxis    Hives (skin)^    Penicillins Anaphylaxis and Hives     Other reaction(s): Anaphylaxis  Shortness of breath^swelling  Shortness of breath and swelling  Anaphylaxis  Shortness of breath^swelling      Tofacitinib Rash and Swelling     Rash and swelling of face      Hydralazine analogues Other (See Comments)     Difficulty swallowing and vomiting.    Lorazepam Anxiety and Other (See Comments)     Made me goofy  CAUSED CONFUSION "Made me goofy."      Pravastatin Other (See Comments)     Severe myalgias.  Cramps/Severe myalgias.    Bupivacaine     Bupivacaine hcl     Flu vac 2018 [id number](pf)      Other reaction(s): Other (See Comments)  Flip into aFIB    Lidocaine     Neuromuscular blockers, benzylisoquinolinium     Penicillin     Nystatin Rash    Pepper (genus capsicum) Other (See Comments)     Reflux and ulcers     Current Outpatient Medications   Medication Instructions    albuterol (PROVENTIL/VENTOLIN HFA) 90 mcg/actuation inhaler INHALE 1 PUFF EVERY 6 HOURS AS NEEDED FOR SHORTNESS OF BREATH OR FOR WHEEZING *THANK YOU*    albuterol-ipratropium (DUO-NEB) 2.5 mg-0.5 mg/3 mL nebulizer solution INHALE 1 CONTENTS OF VIAL VIA NEBULIZER EVERY 6 HOURS WHILE AWAKE.    alendronate (FOSAMAX) 70 MG tablet TAKE 1 TABLET EVERY 7 DAYS *THANK YOU* DO NOT LIE DOWN FOR 30 MINUTES AFTER TAKING    amitriptyline (ELAVIL) 25 mg, Oral, Nightly    amLODIPine (NORVASC) 10 mg, Oral, Daily    benazepriL (LOTENSIN) 40 mg, Oral, Every morning    bisoprolol (ZEBETA) 10 mg, Oral, Every morning    bisoprolol (ZEBETA) 5 mg    cyclobenzaprine (FLEXERIL) 10 MG tablet TAKE ONE TABLET BY MOUTH TWICE DAILY AS NEEDED    ergocalciferol " (ERGOCALCIFEROL) 50,000 Units, Oral, Every 7 days    esomeprazole (NEXIUM) 40 mg, Oral, 2 times daily    ferrous sulfate (FEOSOL) 325 mg (65 mg iron) Tab tablet TAKE 1 TABLET TWICE A DAY *THANK YOU*    fluticasone/umeclidin/vilanter (TRELEGY ELLIPTA INHL) 1 Inhaler, Daily    hydroCHLOROthiazide (HYDRODIURIL) 25 mg, Oral, Every morning    isosorbide mononitrate (IMDUR) 30 MG 24 hr tablet TAKE 1 TABLET TWICE A DAY *THANK YOU*    JANTOVEN 2.5 mg tablet TAKE 2 Tablets QPM or as instructed by coumadin clinic  * *WARD WARFARIN FAILURE* *BRAND MEDICALLY NECESSARY* *    JARDIANCE 10 mg, Oral, Daily    levothyroxine (SYNTHROID) 75 MCG tablet TAKE 1 TABLET BY MOUTH EVERY MORNING BREAKFAST    miconazole NITRATE 2 % (ZEASORB, MICONAZOLE,) 2 % top powder Topical (Top), As needed (PRN)    NIFEdipine (PROCARDIA-XL) 60 mg    nitroGLYCERIN (NITROSTAT) 0.4 MG SL tablet PLACE 0.4 MILLIGRAMS SUBLINGUAL AS NEEDED EVERY 5 MINUTES FOR CHEST PAIN, MAX 3 DOSES *THANK YOU*    nystatin (MYCOSTATIN) powder As needed (PRN)    oxyCODONE-acetaminophen (PERCOCET)  mg per tablet 1 tablet, Every 8 hours PRN    PLAVIX 75 mg, Daily    potassium chloride (KLOR-CON) 10 MEQ TbSR TAKE 1 TABLET DAILY WITH FULL GLASS OF WATER *THANK YOU*    potassium chloride (MICRO-K) 10 MEQ CpSR TAKE 2 CAPSULES EVERY MORNING AND TAKE ONE CAPSULE EVERY EVENING    pramipexole (MIRAPEX) 0.5 mg, Oral, Every morning    promethazine (PHENERGAN) 25 mg, Oral, Daily PRN    ranolazine (RANEXA) 1,000 mg Tb12 TAKE 1 TABLET TWICE A DAY *THANK YOU*    REPATHA SURECLICK 140 mg, Subcutaneous, Every 14 days    rOPINIRole (REQUIP) 5 mg, Oral, Nightly    temazepam (RESTORIL) 15 mg, Oral, Nightly    torsemide (DEMADEX) 20 mg    TRELEGY ELLIPTA 100-62.5-25 mcg DsDv INHALE 1 PUFF DAILY *THANK YOU*    triamcinolone acetonide 0.1% (KENALOG) 0.1 % cream Topical (Top), 2 times daily    umeclidinium-vilanteroL (ANORO ELLIPTA) 62.5-25 mcg/actuation DsDv 1 puff, Inhalation, Daily, Controller      "walker (ULTRA-LIGHT ROLLATOR) Misc 1 each, Misc.(Non-Drug; Combo Route), Continuous      I have reviewed the PMH, social history, FamilyHx, surgical history, allergies and medications documented / confirmed by the patient at the time of this visit.  Review of Systems   Constitutional:  Positive for fatigue. Negative for chills, fever and unexpected weight change.   HENT:  Negative for ear pain and sore throat.    Eyes:  Negative for redness and visual disturbance.   Respiratory:  Negative for cough and shortness of breath.    Cardiovascular:  Negative for chest pain and palpitations.   Gastrointestinal:  Negative for nausea and vomiting.   Genitourinary:  Negative for difficulty urinating and hematuria.   Musculoskeletal:  Positive for arthralgias. Negative for myalgias.   Skin:  Negative for rash and wound.   Neurological:  Positive for weakness (chronic). Negative for headaches.   Hematological:  Bruises/bleeds easily.   Psychiatric/Behavioral:  Positive for sleep disturbance (Chronic stable on current medication). The patient is not nervous/anxious.      Objective:   BP (!) 122/56   Pulse 64   Ht 5' 4" (1.626 m)   Wt 65.9 kg (145 lb 4.8 oz)   LMP  (LMP Unknown)   SpO2 (!) 90%   BMI 24.94 kg/m²   Physical Exam  Vitals and nursing note reviewed.   Constitutional:       General: She is not in acute distress.     Appearance: She is well-developed.      Comments: Sitting in wheelchair no acute distress   HENT:      Head: Normocephalic and atraumatic.   Eyes:      Pupils: Pupils are equal, round, and reactive to light.   Cardiovascular:      Rate and Rhythm: Normal rate and regular rhythm.      Heart sounds: Normal heart sounds. No murmur heard.     Comments: Pacemaker present  Pulmonary:      Effort: Pulmonary effort is normal. No respiratory distress.      Breath sounds: Normal breath sounds. No wheezing.   Abdominal:      General: Bowel sounds are normal. There is no distension.      Palpations: Abdomen is " soft. There is no mass.      Tenderness: There is no abdominal tenderness. There is no right CVA tenderness, left CVA tenderness or guarding.      Hernia: No hernia is present.   Musculoskeletal:         General: Tenderness present. Normal range of motion.      Cervical back: Normal range of motion and neck supple.   Skin:     General: Skin is warm and dry.      Capillary Refill: Capillary refill takes less than 2 seconds.      Findings: Bruising present.   Neurological:      Mental Status: She is alert and oriented to person, place, and time.      Cranial Nerves: No cranial nerve deficit.   Psychiatric:         Behavior: Behavior normal.       Physical Exam  Lungs were auscultated.    Vital Signs  Weight is stable, BMI of 24.    Results      Assessment:     1. Hospital discharge follow-up    2. PVD (peripheral vascular disease)    3. Deep vein thrombosis (DVT) of both lower extremities, unspecified chronicity, unspecified vein    4. Falls frequently    5. Mobility impaired    6. Other psychoactive substance abuse, uncomplicated    7. Chronic embolism and thrombosis of unspecified deep veins of lower extremity, bilateral    8. Type 2 diabetes mellitus with diabetic peripheral angiopathy without gangrene, without long-term current use of insulin    9. Stage 3b chronic kidney disease    10. Systemic lupus erythematosus, unspecified SLE type, unspecified organ involvement status    11. Nausea    12. Menopause    13. Primary insomnia      MDM:   High medical complexity.  Moderate to high risk.  Total time: 46 minutes.  This includes total time spent on the encounter, which includes face to face time and non-face to face time preparing to see the patient (eg, review of previous medical records, tests), Obtaining and/or reviewing separately obtained history, documenting clinical information in the electronic or other health record, independently interpreting results (not separately reported)/communicating results to the  "patient/family/caregiver, and/or care coordination (not separately reported).    I have Reviewed and summarized old records.  I have performed thorough medication reconciliation today and discussed risk and benefits of medications.  I have reviewed labs and discussed with patient.  All questions were answered.  I am requesting old records and will review them once they are available.  [facility] health  Visit today included increased complexity associated with the care of the episodic problem see above assessment addressed and managing the longitudinal care of the patient due to the serious and/or complex managed problem(s) see above.    I have signed for the following orders AND/OR meds.  Orders Placed This Encounter   Procedures    WHEELCHAIR FOR HOME USE     Hours in W/C per day::   8     Type of Wheelchair::   Standard     Size(Width)::   16"(small adult)     Leg Support::   Elevating leg rests     Lap Belt::   Velcro     Accessories::   Anti-tippers     Cushion::   Basic     Justification for cushion::   Other (specify)     Other justification::   frequent falls and DVT     Reclining Back:   No     Height::   5 4     Weight::   145.3lb     Length of need (1-99 months)::   99     Please check all that apply::   Caregiver is capable and willing to operate wheelchair safely.     Please check all that apply::   The patient has significant edema of the lower extremities that requires an elevating leg rest.     Please check all that apply::   The patient requires the use of a w/c for activities of daily living within the Home.    COMMODE FOR HOME USE     Type::   Standard     Height::   5 4     Weight::   145.3lb     Length of need (1-99 months)::   99    DXA Bone Density Axial Skeleton 1 or more sites     Standing Status:   Future     Expected Date:   3/13/2025     Expiration Date:   3/13/2028     May the Radiologist modify the order per protocol to meet the clinical needs of the patient?:   Yes     Release to " patient:   Immediate    CBC Without Differential     Standing Status:   Future     Number of Occurrences:   1     Expected Date:   3/13/2025     Expiration Date:   2026    Comprehensive Metabolic Panel     Standing Status:   Future     Number of Occurrences:   1     Expected Date:   3/13/2025     Expiration Date:   2026    TSH     Standing Status:   Future     Number of Occurrences:   1     Expected Date:   3/13/2025     Expiration Date:   2026    Hemoglobin A1C     Standing Status:   Future     Number of Occurrences:   1     Expected Date:   3/13/2025     Expiration Date:   2026    Lipid Panel     Standing Status:   Future     Number of Occurrences:   1     Expected Date:   3/13/2025     Expiration Date:   2026    Urinalysis     Standing Status:   Future     Expected Date:   3/13/2025     Expiration Date:   2026     Collection Type:   Urine, Clean Catch     Medications Ordered This Encounter   Medications    JARDIANCE 10 mg tablet     Sig: Take 1 tablet (10 mg total) by mouth once daily.     Dispense:  30 tablet     Refill:  11    promethazine (PHENERGAN) 25 MG tablet     Sig: Take 1 tablet (25 mg total) by mouth daily as needed for Nausea.     Dispense:  90 tablet     Refill:  4    temazepam (RESTORIL) 15 mg Cap     Sig: Take 1 capsule (15 mg total) by mouth every evening.     Dispense:  28 capsule     Refill:  5    walker (ULTRA-LIGHT ROLLATOR) Misc     Si each by Misc.(Non-Drug; Combo Route) route continuous.     Dispense:  1 each     Refill:  0        Follow up in about 6 months (around 2025), or if symptoms worsen or fail to improve.    If no improvement in symptoms or symptoms worsen, advised to call/follow-up at clinic or go to ER. Patient voiced understanding and all questions/concerns were addressed.   DISCLAIMER: This note was compiled by using a speech recognition dictation system and therefore please be aware that typographical / speech recognition errors can and  do occur.  Please contact me if you see any errors specifically.  Consent was obtained for VERNA recording system prior to the visit.    This note was generated with the assistance of ambient listening technology. Verbal consent was obtained by the patient and accompanying visitor(s) for the recording of patient appointment to facilitate this note. I attest to having reviewed and edited the generated note for accuracy, though some syntax or spelling errors may persist. Please contact the author of this note for any clarification.    Adan Woodward M.D.       Office: [phone] [address] [place] [address]  FAX: [phone]

## 2025-03-20 ENCOUNTER — TELEPHONE (OUTPATIENT)
Dept: FAMILY MEDICINE | Facility: CLINIC | Age: 75
End: 2025-03-20
Payer: MEDICARE

## 2025-03-20 NOTE — TELEPHONE ENCOUNTER
Called and spoke with Pennie at the Coumadin clinic and gave him the results. He stated they will give the pt a call and see what is going on.     KYLEIGH Kapoor- PT-40.4 and INR-4.2

## 2025-03-20 NOTE — TELEPHONE ENCOUNTER
Lab received from [facility] PT/INR  PT- 40.4  INR-4.2    Placed In red folder. Please advise.

## 2025-04-05 PROCEDURE — G0179 MD RECERTIFICATION HHA PT: HCPCS | Mod: ,,, | Performed by: INTERNAL MEDICINE

## 2025-04-21 ENCOUNTER — TELEPHONE (OUTPATIENT)
Dept: FAMILY MEDICINE | Facility: CLINIC | Age: 75
End: 2025-04-21
Payer: MEDICARE

## 2025-04-21 NOTE — TELEPHONE ENCOUNTER
----- Message from [facility] sent at 4/21/2025  2:29 PM CDT -----  Contact: Ms. Crum Phone# [phone], ext# [phone]  Comments: Ms. Crum from [facility] would like to put in an order for a wheel chair for the patient, and she would like for you to send the [facility] Phone[facility] [phone], Fax# [phone].

## 2025-04-30 ENCOUNTER — DOCUMENT SCAN (OUTPATIENT)
Dept: HOME HEALTH SERVICES | Facility: HOSPITAL | Age: 75
End: 2025-04-30
Payer: MEDICARE

## 2025-05-02 DIAGNOSIS — J98.4 PULMONARY DISORDER: ICD-10-CM

## 2025-05-02 DIAGNOSIS — J90 PLEURAL EFFUSION: Primary | ICD-10-CM

## 2025-05-05 ENCOUNTER — DOCUMENT SCAN (OUTPATIENT)
Dept: HOME HEALTH SERVICES | Facility: HOSPITAL | Age: 75
End: 2025-05-05
Payer: MEDICARE

## 2025-05-07 ENCOUNTER — DOCUMENT SCAN (OUTPATIENT)
Dept: HOME HEALTH SERVICES | Facility: HOSPITAL | Age: 75
End: 2025-05-07
Payer: MEDICARE

## 2025-05-09 ENCOUNTER — TELEPHONE (OUTPATIENT)
Dept: FAMILY MEDICINE | Facility: CLINIC | Age: 75
End: 2025-05-09
Payer: MEDICARE

## 2025-05-09 NOTE — TELEPHONE ENCOUNTER
Tired to call for patient they could not give me any information to keep my patient information private

## 2025-05-16 DIAGNOSIS — M81.0 OSTEOPOROSIS, UNSPECIFIED OSTEOPOROSIS TYPE, UNSPECIFIED PATHOLOGICAL FRACTURE PRESENCE: ICD-10-CM

## 2025-05-16 NOTE — TELEPHONE ENCOUNTER
Refill Routing Note   Medication(s) are not appropriate for processing by [facility] for the following reason(s):        Outside of protocol    [facility] action(s):  Route     Requires labs : Yes             Appointments  past 12m or future 3m with PCP    Date Provider   Last Visit   3/13/2025 Adan Woodward MD   Next Visit   Visit date not found Adan Woodward MD   ED visits in past 90 days: 0        Note composed:4:51 PM 05/16/2025

## 2025-05-16 NOTE — TELEPHONE ENCOUNTER
Care Due:                  Date            Visit Type   Department     Provider  --------------------------------------------------------------------------------                                EP -                              PRIMARY      [facility] FAMILY  Last Visit: 03-      CARE ([facility])   MEDICINE       Adan Woodward  Next Visit: None Scheduled  None         None Found                                                            Last  Test          Frequency    Reason                     Performed    Due Date  --------------------------------------------------------------------------------    HBA1C.......  6 months...  JARDIANCE................  11- 05-    Mg Level....  12 months..  alendronate..............  Not Found    Overdue    Phosphate...  12 months..  alendronate..............  Not Found    Overdue    [employer] Embedded Care Due Messages. Reference number: [id number].   5/16/2025 12:59:13 PM CDT

## 2025-05-19 RX ORDER — FERROUS SULFATE 325(65) MG
TABLET ORAL 2 TIMES DAILY
Qty: 56 TABLET | Refills: 2 | Status: SHIPPED | OUTPATIENT
Start: 2025-05-19

## 2025-05-19 RX ORDER — ALENDRONATE SODIUM 70 MG/1
TABLET ORAL
Qty: 4 TABLET | Refills: 2 | Status: SHIPPED | OUTPATIENT
Start: 2025-05-19

## 2025-05-30 ENCOUNTER — TELEPHONE (OUTPATIENT)
Dept: PHARMACY | Facility: CLINIC | Age: 75
End: 2025-05-30
Payer: MEDICARE

## 2025-05-30 NOTE — TELEPHONE ENCOUNTER
[facility]/Population Health Chart Review & Patient Outreach Details For Medication Adherence Project    Reason for Outreach Encounter: 3rd Party payor non-compliance report ([employer], [employer], C, etc)  2.  Patient Outreach Method: Reviewed patient chart  and [employer] message  3.   Medication in question:    Hypertension Medications              amLODIPine (NORVASC) 10 MG tablet Take 1 tablet (10 mg total) by mouth once daily.    benazepriL (LOTENSIN) 40 MG tablet Take 1 tablet (40 mg total) by mouth every morning.    bisoprolol (ZEBETA) 10 MG tablet TAKE 1 TABLET EVERY MORNING    bisoprolol (ZEBETA) 5 MG tablet TAKE 1 TABLET DAILY *THANK YOU*    hydroCHLOROthiazide (HYDRODIURIL) 25 MG tablet Take 1 tablet (25 mg total) by mouth every morning.    isosorbide mononitrate (IMDUR) 30 MG 24 hr tablet TAKE 1 TABLET TWICE A DAY *THANK YOU*    NIFEdipine (PROCARDIA-XL) 60 MG (OSM) 24 hr tablet TAKE 1 TABLET DAILY *THANK YOU*    nitroGLYCERIN (NITROSTAT) 0.4 MG SL tablet PLACE 0.4 MILLIGRAMS SUBLINGUAL AS NEEDED EVERY 5 MINUTES FOR CHEST PAIN, MAX 3 DOSES *THANK YOU*    torsemide (DEMADEX) 20 MG Tab TAKE 1 TABLET DAILY *THANK YOU*                 LF 28 ds 11/21/24    4.  Reviewed and or Updates Made To: Patient Chart  5. Outreach Outcomes and/or actions taken: Sent inquiry to patient: Waiting for response  Additional Notes:

## 2025-06-04 PROCEDURE — G0179 MD RECERTIFICATION HHA PT: HCPCS | Mod: ,,, | Performed by: FAMILY MEDICINE

## 2025-06-26 DIAGNOSIS — G25.81 RESTLESS LEGS: Primary | Chronic | ICD-10-CM

## 2025-06-26 RX ORDER — PRAMIPEXOLE DIHYDROCHLORIDE 0.5 MG/1
0.5 TABLET ORAL EVERY MORNING
Qty: 28 TABLET | Refills: 0 | Status: SHIPPED | OUTPATIENT
Start: 2025-06-26

## 2025-06-26 NOTE — TELEPHONE ENCOUNTER
No care due was identified.  [facility] Embedded Care Due Messages. Reference number: [id number].   6/26/2025 9:20:32 AM CDT

## 2025-07-05 DIAGNOSIS — I82.403 DEEP VEIN THROMBOSIS (DVT) OF BOTH LOWER EXTREMITIES, UNSPECIFIED CHRONICITY, UNSPECIFIED VEIN: ICD-10-CM

## 2025-07-05 DIAGNOSIS — D68.62 LUPUS ANTICOAGULANT SYNDROME: Chronic | ICD-10-CM

## 2025-07-05 DIAGNOSIS — I48.0 PAROXYSMAL ATRIAL FIBRILLATION: ICD-10-CM

## 2025-07-09 RX ORDER — WARFARIN SODIUM 2.5 MG/1
TABLET ORAL
Qty: 90 TABLET | Refills: 0 | Status: SHIPPED | OUTPATIENT
Start: 2025-07-09 | End: 2025-07-10 | Stop reason: SDUPTHER

## 2025-08-11 ENCOUNTER — TELEPHONE (OUTPATIENT)
Dept: FAMILY MEDICINE | Facility: CLINIC | Age: 75
End: 2025-08-11
Payer: MEDICARE

## 2025-08-12 ENCOUNTER — OFFICE VISIT (OUTPATIENT)
Dept: FAMILY MEDICINE | Facility: CLINIC | Age: 75
End: 2025-08-12
Payer: MEDICARE

## 2025-08-12 VITALS
WEIGHT: 179 LBS | BODY MASS INDEX: 30.56 KG/M2 | HEART RATE: 68 BPM | HEIGHT: 64 IN | SYSTOLIC BLOOD PRESSURE: 136 MMHG | OXYGEN SATURATION: 97 % | DIASTOLIC BLOOD PRESSURE: 60 MMHG

## 2025-08-12 DIAGNOSIS — K21.00 GASTROESOPHAGEAL REFLUX DISEASE WITH ESOPHAGITIS, UNSPECIFIED WHETHER HEMORRHAGE: ICD-10-CM

## 2025-08-12 DIAGNOSIS — Z79.899 ENCOUNTER FOR LONG-TERM (CURRENT) USE OF MEDICATIONS: ICD-10-CM

## 2025-08-12 DIAGNOSIS — Z09 HOSPITAL DISCHARGE FOLLOW-UP: Primary | ICD-10-CM

## 2025-08-12 DIAGNOSIS — M62.838 MUSCLE SPASM: ICD-10-CM

## 2025-08-12 DIAGNOSIS — J44.9 COPD WITHOUT EXACERBATION: ICD-10-CM

## 2025-08-12 DIAGNOSIS — F51.01 PRIMARY INSOMNIA: ICD-10-CM

## 2025-08-12 PROCEDURE — 1159F MED LIST DOCD IN RCRD: CPT | Mod: CPTII,HCNC,S$GLB, | Performed by: FAMILY MEDICINE

## 2025-08-12 PROCEDURE — 1160F RVW MEDS BY RX/DR IN RCRD: CPT | Mod: CPTII,HCNC,S$GLB, | Performed by: FAMILY MEDICINE

## 2025-08-12 PROCEDURE — 3075F SYST BP GE 130 - 139MM HG: CPT | Mod: CPTII,HCNC,S$GLB, | Performed by: FAMILY MEDICINE

## 2025-08-12 PROCEDURE — 1126F AMNT PAIN NOTED NONE PRSNT: CPT | Mod: CPTII,HCNC,S$GLB, | Performed by: FAMILY MEDICINE

## 2025-08-12 PROCEDURE — 99999 PR PBB SHADOW E&M-EST. PATIENT-LVL V: CPT | Mod: PBBFAC,HCNC,, | Performed by: FAMILY MEDICINE

## 2025-08-12 PROCEDURE — 3078F DIAST BP <80 MM HG: CPT | Mod: CPTII,HCNC,S$GLB, | Performed by: FAMILY MEDICINE

## 2025-08-12 PROCEDURE — 3288F FALL RISK ASSESSMENT DOCD: CPT | Mod: CPTII,HCNC,S$GLB, | Performed by: FAMILY MEDICINE

## 2025-08-12 PROCEDURE — 1101F PT FALLS ASSESS-DOCD LE1/YR: CPT | Mod: CPTII,HCNC,S$GLB, | Performed by: FAMILY MEDICINE

## 2025-08-12 PROCEDURE — 99214 OFFICE O/P EST MOD 30 MIN: CPT | Mod: HCNC,S$GLB,, | Performed by: FAMILY MEDICINE

## 2025-08-12 RX ORDER — CYCLOBENZAPRINE HCL 10 MG
10 TABLET ORAL 2 TIMES DAILY PRN
Qty: 180 TABLET | Refills: 4 | Status: SHIPPED | OUTPATIENT
Start: 2025-08-12

## 2025-08-12 RX ORDER — EVOLOCUMAB 140 MG/ML
INJECTION, SOLUTION SUBCUTANEOUS
COMMUNITY
Start: 2025-08-01

## 2025-08-12 RX ORDER — IPRATROPIUM BROMIDE AND ALBUTEROL SULFATE 2.5; .5 MG/3ML; MG/3ML
SOLUTION RESPIRATORY (INHALATION)
Qty: 270 ML | Refills: 4 | Status: SHIPPED | OUTPATIENT
Start: 2025-08-12

## 2025-08-12 RX ORDER — ESOMEPRAZOLE MAGNESIUM 40 MG/1
40 CAPSULE, DELAYED RELEASE ORAL 2 TIMES DAILY
Qty: 200 CAPSULE | Refills: 4 | Status: SHIPPED | OUTPATIENT
Start: 2025-08-12

## 2025-08-12 RX ORDER — TEMAZEPAM 15 MG/1
15 CAPSULE ORAL NIGHTLY
Qty: 90 CAPSULE | Refills: 1 | Status: SHIPPED | OUTPATIENT
Start: 2025-08-12

## 2025-08-13 RX ORDER — AMITRIPTYLINE HYDROCHLORIDE 25 MG/1
TABLET, FILM COATED ORAL
Qty: 30 TABLET | Refills: 0 | Status: SHIPPED | OUTPATIENT
Start: 2025-08-13

## 2025-08-13 RX ORDER — BISOPROLOL FUMARATE 5 MG/1
5 TABLET, FILM COATED ORAL EVERY MORNING
Qty: 84 TABLET | Refills: 3 | Status: SHIPPED | OUTPATIENT
Start: 2025-08-13

## 2025-08-21 ENCOUNTER — DOCUMENT SCAN (OUTPATIENT)
Dept: HOME HEALTH SERVICES | Facility: HOSPITAL | Age: 75
End: 2025-08-21
Payer: MEDICARE

## 2025-08-22 ENCOUNTER — EXTERNAL HOME HEALTH (OUTPATIENT)
Dept: HOME HEALTH SERVICES | Facility: HOSPITAL | Age: 75
End: 2025-08-22
Payer: MEDICARE

## 2025-08-22 ENCOUNTER — OFFICE VISIT (OUTPATIENT)
Dept: CARDIOLOGY | Facility: CLINIC | Age: 75
End: 2025-08-22
Payer: MEDICARE

## 2025-08-22 VITALS
HEIGHT: 64 IN | OXYGEN SATURATION: 91 % | BODY MASS INDEX: 25.9 KG/M2 | DIASTOLIC BLOOD PRESSURE: 60 MMHG | SYSTOLIC BLOOD PRESSURE: 122 MMHG | HEART RATE: 61 BPM | WEIGHT: 151.69 LBS

## 2025-08-22 DIAGNOSIS — E78.49 OTHER HYPERLIPIDEMIA: ICD-10-CM

## 2025-08-22 DIAGNOSIS — Z79.01 LONG TERM (CURRENT) USE OF ANTICOAGULANTS: ICD-10-CM

## 2025-08-22 DIAGNOSIS — I10 ESSENTIAL HYPERTENSION: ICD-10-CM

## 2025-08-22 DIAGNOSIS — Z78.9 STATIN INTOLERANCE: ICD-10-CM

## 2025-08-22 DIAGNOSIS — Z95.0 PACEMAKER: ICD-10-CM

## 2025-08-22 DIAGNOSIS — Z09 HOSPITAL DISCHARGE FOLLOW-UP: Primary | ICD-10-CM

## 2025-08-22 DIAGNOSIS — I25.111 ATHEROSCLEROSIS OF NATIVE CORONARY ARTERY OF NATIVE HEART WITH ANGINA PECTORIS WITH DOCUMENTED SPASM: ICD-10-CM

## 2025-08-22 DIAGNOSIS — I50.32 CHRONIC DIASTOLIC CONGESTIVE HEART FAILURE: ICD-10-CM

## 2025-08-22 DIAGNOSIS — F17.210 CIGARETTE SMOKER: ICD-10-CM

## 2025-08-22 DIAGNOSIS — Z95.1 HX OF CABG: ICD-10-CM

## 2025-08-22 DIAGNOSIS — Z86.711 PERSONAL HISTORY OF PULMONARY EMBOLISM: ICD-10-CM

## 2025-08-22 DIAGNOSIS — I65.23 BILATERAL CAROTID ARTERY STENOSIS: ICD-10-CM

## 2025-08-22 DIAGNOSIS — I49.5 SSS (SICK SINUS SYNDROME): ICD-10-CM

## 2025-08-22 DIAGNOSIS — D68.62 LUPUS ANTICOAGULANT SYNDROME: ICD-10-CM

## 2025-08-22 DIAGNOSIS — I48.0 PAROXYSMAL ATRIAL FIBRILLATION: ICD-10-CM

## 2025-08-22 PROCEDURE — 99999 PR PBB SHADOW E&M-EST. PATIENT-LVL V: CPT | Mod: PBBFAC,HCNC,, | Performed by: NURSE PRACTITIONER

## 2025-08-22 RX ORDER — EVOLOCUMAB 140 MG/ML
140 INJECTION, SOLUTION SUBCUTANEOUS
Qty: 6 ML | Refills: 3 | Status: SHIPPED | OUTPATIENT
Start: 2025-08-22

## (undated) DEVICE — BLADE PLASMA WIDE SPATULA TIP

## (undated) DEVICE — SUT 4/0 18IN COATED VICRYL

## (undated) DEVICE — DRESSING AQUACEL AG ADV 3.5X6

## (undated) DEVICE — SYR BULB 60CC IRRIGATION

## (undated) DEVICE — PACK PACER PERMANENT OMC

## (undated) DEVICE — PAD DEFIB CADENCE ADULT R2

## (undated) DEVICE — ADHESIVE DERMABOND ADVANCED

## (undated) DEVICE — SUT 3-0 VICRYL / SH (J416)

## (undated) DEVICE — PAD GROUNDING DISPER ELECTRODE